# Patient Record
Sex: FEMALE | Race: WHITE | NOT HISPANIC OR LATINO | Employment: UNEMPLOYED | URBAN - METROPOLITAN AREA
[De-identification: names, ages, dates, MRNs, and addresses within clinical notes are randomized per-mention and may not be internally consistent; named-entity substitution may affect disease eponyms.]

---

## 2017-03-22 ENCOUNTER — OFFICE VISIT (OUTPATIENT)
Dept: INTERNAL MEDICINE | Facility: CLINIC | Age: 37
End: 2017-03-22
Payer: COMMERCIAL

## 2017-03-22 VITALS
BODY MASS INDEX: 28.23 KG/M2 | OXYGEN SATURATION: 99 % | HEART RATE: 89 BPM | TEMPERATURE: 98 F | SYSTOLIC BLOOD PRESSURE: 140 MMHG | DIASTOLIC BLOOD PRESSURE: 94 MMHG | RESPIRATION RATE: 18 BRPM | WEIGHT: 179.88 LBS | HEIGHT: 67 IN

## 2017-03-22 DIAGNOSIS — I10 ESSENTIAL HYPERTENSION: ICD-10-CM

## 2017-03-22 DIAGNOSIS — L03.011 INFECTED NAILBED OF FINGER, RIGHT: ICD-10-CM

## 2017-03-22 DIAGNOSIS — E10.9 TYPE 1 DIABETES MELLITUS WITHOUT COMPLICATION: Primary | ICD-10-CM

## 2017-03-22 DIAGNOSIS — Z13.29 SCREENING FOR HYPOTHYROIDISM: ICD-10-CM

## 2017-03-22 DIAGNOSIS — D50.9 IRON DEFICIENCY ANEMIA, UNSPECIFIED IRON DEFICIENCY ANEMIA TYPE: ICD-10-CM

## 2017-03-22 PROCEDURE — 99203 OFFICE O/P NEW LOW 30 MIN: CPT | Mod: S$GLB,,, | Performed by: NURSE PRACTITIONER

## 2017-03-22 PROCEDURE — 2022F DILAT RTA XM EVC RTNOPTHY: CPT | Mod: S$GLB,,, | Performed by: NURSE PRACTITIONER

## 2017-03-22 PROCEDURE — 3080F DIAST BP >= 90 MM HG: CPT | Mod: S$GLB,,, | Performed by: NURSE PRACTITIONER

## 2017-03-22 PROCEDURE — 4010F ACE/ARB THERAPY RXD/TAKEN: CPT | Mod: S$GLB,,, | Performed by: NURSE PRACTITIONER

## 2017-03-22 PROCEDURE — 3077F SYST BP >= 140 MM HG: CPT | Mod: S$GLB,,, | Performed by: NURSE PRACTITIONER

## 2017-03-22 PROCEDURE — 99999 PR PBB SHADOW E&M-EST. PATIENT-LVL III: CPT | Mod: PBBFAC,,, | Performed by: NURSE PRACTITIONER

## 2017-03-22 PROCEDURE — 3045F PR MOST RECENT HEMOGLOBIN A1C LEVEL 7.0-9.0%: CPT | Mod: S$GLB,,, | Performed by: NURSE PRACTITIONER

## 2017-03-22 PROCEDURE — 1160F RVW MEDS BY RX/DR IN RCRD: CPT | Mod: S$GLB,,, | Performed by: NURSE PRACTITIONER

## 2017-03-22 RX ORDER — LEVOFLOXACIN 500 MG/1
500 TABLET, FILM COATED ORAL DAILY
Qty: 7 TABLET | Refills: 0 | Status: SHIPPED | OUTPATIENT
Start: 2017-03-22 | End: 2017-10-05

## 2017-03-22 RX ORDER — LISINOPRIL 10 MG/1
10 TABLET ORAL DAILY
Qty: 30 TABLET | Refills: 1 | Status: SHIPPED | OUTPATIENT
Start: 2017-03-22 | End: 2017-05-17 | Stop reason: SDUPTHER

## 2017-03-22 NOTE — PROGRESS NOTES
Subjective:       Patient ID: Cira Keys is a 36 y.o. female.    Chief Complaint: Recurrent Skin Infections (4-5 months. index finger. right hand.)    HPI: Pt presents to clinic today new to me and clinic with c/o recurrent skin infection. She reports that she was seeing [facility] in [place] but would like to establish here. She reports that her right pointer finger has been infected Dec 30th. She reports that it stared as a splinter which she removed. She went to urgent care and they put her on bactrim and bactroban oint. . She also had some cipro left over from a cellulitis on face so she tried taking the cipro. Not getting better after 1 week.     She has a hx of ecoli bacteria bacteremia x 2 years ago  Also a type 1 diabetic. She reports that she has gone into DKA 4 times. She fgets her insulin OTC from [facility]. She takes MPH 20 in am and 15 at nioght. She uses coverage with R during the day but only 1- a week. She reports that she only usually has to cover during her cycle. Last A1C was 7.4 3 nights ago. She reports that she stopped taking her lisinopril because felt good      Review of Systems   Constitutional: Negative for chills, fatigue, fever and unexpected weight change.   HENT: Negative for congestion, ear pain, sore throat and trouble swallowing.    Eyes: Negative for pain and visual disturbance.   Respiratory: Negative for cough, chest tightness and shortness of breath.    Cardiovascular: Negative for chest pain, palpitations and leg swelling.   Gastrointestinal: Negative for abdominal distention, abdominal pain, constipation, diarrhea and vomiting.   Genitourinary: Negative for difficulty urinating, dysuria, flank pain, frequency and hematuria.   Musculoskeletal: Negative for back pain, gait problem, joint swelling, neck pain and neck stiffness.   Skin: Positive for wound. Negative for rash.        Right index finger infected   Neurological: Negative for dizziness, seizures, speech difficulty,  light-headedness and headaches.       Objective:      Physical Exam   Constitutional: She is oriented to person, place, and time. She appears well-developed and well-nourished.   HENT:   Head: Normocephalic and atraumatic.   Right Ear: External ear normal.   Left Ear: External ear normal.   Nose: Nose normal.   Mouth/Throat: Oropharynx is clear and moist.   Eyes: Conjunctivae and EOM are normal. Pupils are equal, round, and reactive to light.   Neck: Normal range of motion. Neck supple.   Cardiovascular: Normal rate, regular rhythm, normal heart sounds and intact distal pulses.    Pulmonary/Chest: Effort normal and breath sounds normal.   Abdominal: Soft. Bowel sounds are normal.   Musculoskeletal: Normal range of motion. She exhibits no deformity.        Arms:  Right finger nail bed lifting  Nail, infected cuticles no pulse   Neurological: She is alert and oriented to person, place, and time.   Skin: Skin is warm and dry.   Psychiatric: She has a normal mood and affect. Her behavior is normal. Judgment and thought content normal.   Nursing note and vitals reviewed.      Assessment:       1. Type 1 diabetes mellitus without complication    2. Essential hypertension    3. Infected nailbed of finger, right    4. Iron deficiency anemia, unspecified iron deficiency anemia type    5. Screening for hypothyroidism        Plan:   Cira was seen today for recurrent skin infections.    Diagnoses and all orders for this visit:    Type 1 diabetes mellitus without complication  -     CBC auto differential; Future  -     Comprehensive metabolic panel; Future  -     Lipid panel; Future  -     Hemoglobin A1c; Future    Essential hypertension  -     lisinopril 10 MG tablet; Take 1 tablet (10 mg total) by mouth once daily.    Infected nailbed of finger, right  -     levoFLOXacin (LEVAQUIN) 500 MG tablet; Take 1 tablet (500 mg total) by mouth once daily.    Iron deficiency anemia, unspecified iron deficiency anemia type  -      Ferritin; Future  -     Iron and TIBC; Future    Screening for hypothyroidism  -     TSH; Future  recheck finger 1 week and lab review

## 2017-03-22 NOTE — MR AVS SNAPSHOT
[facility]  [address] [facility] [address]  Phone: [phone]  Fax: [phone]                  Cira Keys   3/22/2017 1:45 PM   Office Visit    Description:  Female : 1980   Provider:  Radha Lino NP   Department:  [facility]           Reason for Visit     Recurrent Skin Infections           Diagnoses this Visit        Comments    Type 1 diabetes mellitus without complication    -  Primary     Essential hypertension         Infected nailbed of finger, right         Iron deficiency anemia, unspecified iron deficiency anemia type         Screening for hypothyroidism                To Do List           Future Appointments        Provider Department Dept Phone    3/24/2017 9:40 AM LAB, [facility] [phone]    3/30/2017 8:30 AM Radha Lino NP [facility] [phone]      Goals (5 Years of Data)     None       These Medications        Disp Refills Start End    lisinopril 10 MG tablet 30 tablet 1 3/22/2017 3/22/2018    Take 1 tablet (10 mg total) by mouth once daily. - Oral    Pharmacy: [facility] [address] [place] [address] [id number] Ph #: [phone]       levoFLOXacin (LEVAQUIN) 500 MG tablet 7 tablet 0 3/22/2017     Take 1 tablet (500 mg total) by mouth once daily. - Oral    Pharmacy: [facility] [address] [place] [address] [id number] Ph #: [phone]         [facility]     [facility] -  Assistance  Registered nurses in the [facility] provide clinical advisement, health education, appointment booking, and other advisory services.  Call for this free service at [phone].             Medications           Message regarding Medications     Verify the changes and/or additions to your medication regime listed below are the same as discussed with your clinician today.  If any of these changes or additions are incorrect, please notify  "your healthcare provider.        START taking these NEW medications        Refills    levoFLOXacin (LEVAQUIN) 500 MG tablet 0    Sig: Take 1 tablet (500 mg total) by mouth once daily.    Class: Normal    Route: Oral           Verify that the below list of medications is an accurate representation of the medications you are currently taking.  If none reported, the list may be blank. If incorrect, please contact your healthcare provider. Carry this list with you in case of emergency.           Current Medications     aspirin (ECOTRIN) 81 MG EC tablet Take 81 mg by mouth once daily.    b complex vitamins tablet Take 1 tablet by mouth once daily.    ferrous sulfate 325 (65 FE) MG EC tablet Take 1 tablet (325 mg total) by mouth once daily.    insulin aspart (NOVOLOG) 100 unit/mL InPn pen Inject 6 Units into the skin 3 (three) times daily with meals.    insulin needles, disposable, 32 x 1/4 " Ndle Use to inject insulin 5 times a day    insulin NPH (NOVOLIN N) 100 unit/mL injection Inject 15-20 Units into the skin as directed. Take 20 units qam and 15 units qpm    lisinopril 10 MG tablet Take 1 tablet (10 mg total) by mouth once daily.    pantoprazole (PROTONIX) 20 MG tablet Take 20 mg by mouth once daily.     levoFLOXacin (LEVAQUIN) 500 MG tablet Take 1 tablet (500 mg total) by mouth once daily.           Clinical Reference Information           Your Vitals Were     BP Pulse Temp Resp Height Weight    140/94 89 97.7 °F (36.5 °C) (Tympanic) 18 5' 7" (1.702 m) 81.6 kg (179 lb 14.3 oz)    SpO2 BMI             99% 28.18 kg/m2         Blood Pressure          Most Recent Value    BP  (!)  140/94      Allergies as of 3/22/2017     No Known Allergies      Immunizations Administered on Date of Encounter - 3/22/2017     None      Orders Placed During Today's Visit     Future Labs/Procedures Expected by Expires    CBC auto differential  3/22/2017 5/21/2018    Comprehensive metabolic panel  3/22/2017 5/21/2018    Ferritin  3/22/2017 " (Approximate) 3/22/2018    Iron and TIBC  3/22/2017 (Approximate) 3/22/2018    Lipid panel  3/22/2017 5/21/2018    TSH  3/22/2017 5/21/2018    Hemoglobin A1c  6/20/2017 (Approximate) 3/22/2018      [facility] Sign-Up     Activating your [facility] account is as easy as 1-2-3!     1) Visit [email or web address], select Sign Up Now, enter this activation code and your date of birth, then select Next.  [email or web address]  Expires: 5/6/2017  2:22 PM      2) Create a username and password to use when you visit [facility] in the future and select a security question in case you lose your password and select Next.    3) Enter your e-mail address and click Sign Up!    Additional Information  If you have questions, please e-mail [email or web address] or call [phone] to talk to our [facility] staff. Remember, [facility] is NOT to be used for urgent needs. For medical emergencies, dial 911.         Language Assistance Services     ATTENTION: Language assistance services are available, free of charge. Please call [phone].      ATENCIÓN: Si habla jaskaran, tiene a bhatti disposición servicios gratuitos de asistencia lingüística. Llame al [phone].     CHÚ Ý: N?u b?n nói Ti?ng Vi?t, có các d?ch v? h? tr? ngôn ng? mi?n phí dành cho b?n. G?i s? [phone].         [facility] complies with applicable Federal civil rights laws and does not discriminate on the basis of race, color, national origin, age, disability, or sex.

## 2017-05-17 DIAGNOSIS — I10 ESSENTIAL HYPERTENSION: ICD-10-CM

## 2017-05-17 RX ORDER — LISINOPRIL 10 MG/1
TABLET ORAL
Qty: 30 TABLET | Refills: 0 | Status: SHIPPED | OUTPATIENT
Start: 2017-05-17 | End: 2017-10-05 | Stop reason: SDUPTHER

## 2017-05-18 DIAGNOSIS — I10 ESSENTIAL HYPERTENSION: ICD-10-CM

## 2017-05-18 RX ORDER — LISINOPRIL 10 MG/1
TABLET ORAL
Qty: 30 TABLET | Refills: 0 | Status: SHIPPED | OUTPATIENT
Start: 2017-05-18 | End: 2017-07-20 | Stop reason: SDUPTHER

## 2017-07-20 DIAGNOSIS — I10 ESSENTIAL HYPERTENSION: ICD-10-CM

## 2017-07-20 RX ORDER — LISINOPRIL 10 MG/1
TABLET ORAL
Qty: 30 TABLET | Refills: 0 | Status: SHIPPED | OUTPATIENT
Start: 2017-07-20 | End: 2017-08-15 | Stop reason: SDUPTHER

## 2017-08-15 DIAGNOSIS — I10 ESSENTIAL HYPERTENSION: ICD-10-CM

## 2017-08-16 DIAGNOSIS — I10 ESSENTIAL HYPERTENSION: ICD-10-CM

## 2017-08-17 RX ORDER — LISINOPRIL 10 MG/1
10 TABLET ORAL DAILY
Qty: 30 TABLET | Refills: 0 | OUTPATIENT
Start: 2017-08-17

## 2017-08-17 RX ORDER — LISINOPRIL 10 MG/1
TABLET ORAL
Qty: 30 TABLET | Refills: 0 | Status: SHIPPED | OUTPATIENT
Start: 2017-08-17 | End: 2017-09-18 | Stop reason: SDUPTHER

## 2017-09-18 DIAGNOSIS — I10 ESSENTIAL HYPERTENSION: ICD-10-CM

## 2017-09-19 RX ORDER — LISINOPRIL 10 MG/1
TABLET ORAL
Qty: 30 TABLET | Refills: 0 | Status: SHIPPED | OUTPATIENT
Start: 2017-09-19 | End: 2017-10-05 | Stop reason: SDUPTHER

## 2017-09-20 DIAGNOSIS — I10 ESSENTIAL HYPERTENSION: ICD-10-CM

## 2017-09-20 RX ORDER — LISINOPRIL 10 MG/1
TABLET ORAL
Qty: 30 TABLET | Refills: 0 | Status: SHIPPED | OUTPATIENT
Start: 2017-09-20 | End: 2017-10-05 | Stop reason: SDUPTHER

## 2017-10-05 ENCOUNTER — HOSPITAL ENCOUNTER (OUTPATIENT)
Dept: RADIOLOGY | Facility: HOSPITAL | Age: 37
Discharge: HOME | End: 2017-10-05
Attending: NURSE PRACTITIONER
Payer: COMMERCIAL

## 2017-10-05 ENCOUNTER — OFFICE VISIT (OUTPATIENT)
Dept: INTERNAL MEDICINE | Facility: CLINIC | Age: 37
End: 2017-10-05
Payer: COMMERCIAL

## 2017-10-05 VITALS
SYSTOLIC BLOOD PRESSURE: 116 MMHG | BODY MASS INDEX: 26.26 KG/M2 | DIASTOLIC BLOOD PRESSURE: 64 MMHG | HEART RATE: 84 BPM | WEIGHT: 167.31 LBS | HEIGHT: 67 IN | RESPIRATION RATE: 18 BRPM

## 2017-10-05 DIAGNOSIS — I10 ESSENTIAL HYPERTENSION: ICD-10-CM

## 2017-10-05 DIAGNOSIS — L08.9: ICD-10-CM

## 2017-10-05 DIAGNOSIS — L08.9: Primary | ICD-10-CM

## 2017-10-05 DIAGNOSIS — S60.419D: Primary | ICD-10-CM

## 2017-10-05 DIAGNOSIS — S60.419D: ICD-10-CM

## 2017-10-05 PROCEDURE — 73130 X-RAY EXAM OF HAND: CPT | Mod: 26,RT,, | Performed by: RADIOLOGY

## 2017-10-05 PROCEDURE — 99214 OFFICE O/P EST MOD 30 MIN: CPT | Mod: S$GLB,,, | Performed by: NURSE PRACTITIONER

## 2017-10-05 PROCEDURE — 99999 PR PBB SHADOW E&M-EST. PATIENT-LVL III: CPT | Mod: PBBFAC,,, | Performed by: NURSE PRACTITIONER

## 2017-10-05 PROCEDURE — 73130 X-RAY EXAM OF HAND: CPT | Mod: TC,RT

## 2017-10-05 RX ORDER — LEVOFLOXACIN 500 MG/1
500 TABLET, FILM COATED ORAL DAILY
Qty: 10 TABLET | Refills: 0 | Status: SHIPPED | OUTPATIENT
Start: 2017-10-05 | End: 2017-10-16

## 2017-10-05 RX ORDER — LISINOPRIL 10 MG/1
10 TABLET ORAL DAILY
Qty: 30 TABLET | Refills: 0 | Status: SHIPPED | OUTPATIENT
Start: 2017-10-05 | End: 2017-12-20 | Stop reason: SDUPTHER

## 2017-10-05 RX ORDER — MUPIROCIN 20 MG/G
OINTMENT TOPICAL 3 TIMES DAILY
Qty: 30 G | Refills: 1 | Status: SHIPPED | OUTPATIENT
Start: 2017-10-05 | End: 2018-12-03 | Stop reason: ALTCHOICE

## 2017-10-05 NOTE — PROGRESS NOTES
Subjective:       Patient ID: Cira Keys is a 37 y.o. female.    Chief Complaint: Hand Pain (infected right index finger)    HPI: Pt presents to clinic today known to me with lost to f/u. She reports that she has never cleared up since Dec when she first saw us. She was suppose to come back to be referred and further w/u but she admits that she did not want to. She reports that it never got better but has only gotten worse. She reports that it is painful and draining. No fever. Has been checking her bs lately and reports that it is running well except around her period. She reports that her am bs . During the day she can get up to upper 100, 231 after chinese but that is not often.    Review of Systems   Constitutional: Negative for chills, fatigue, fever and unexpected weight change.   HENT: Negative for congestion, ear pain, sore throat and trouble swallowing.    Eyes: Negative for pain and visual disturbance.   Respiratory: Negative for cough, chest tightness and shortness of breath.    Cardiovascular: Negative for chest pain, palpitations and leg swelling.   Gastrointestinal: Negative for abdominal distention, abdominal pain, constipation, diarrhea and vomiting.   Genitourinary: Negative for difficulty urinating, dysuria, flank pain, frequency and hematuria.   Musculoskeletal: Negative for back pain, gait problem, joint swelling, neck pain and neck stiffness.   Skin: Positive for wound (right index finger). Negative for rash.   Neurological: Negative for dizziness, seizures, speech difficulty, light-headedness and headaches.       Objective:      Physical Exam   Constitutional: She is oriented to person, place, and time. She appears well-developed and well-nourished.   HENT:   Head: Normocephalic and atraumatic.   Right Ear: External ear normal.   Left Ear: External ear normal.   Nose: Nose normal.   Mouth/Throat: Oropharynx is clear and moist.   Eyes: Conjunctivae and EOM are normal. Pupils are equal,  round, and reactive to light.   Neck: Normal range of motion. Neck supple.   Cardiovascular: Normal rate, regular rhythm, normal heart sounds and intact distal pulses.    Pulmonary/Chest: Effort normal and breath sounds normal.   Abdominal: Soft. Bowel sounds are normal.   Musculoskeletal: Normal range of motion.   Neurological: She is alert and oriented to person, place, and time.   Skin: Skin is warm and dry. Capillary refill takes less than 2 seconds.   Right index finger pad completely red/swollen and tender. She has dried yellow crust to tip under nail bed.    Psychiatric: She has a normal mood and affect. Her behavior is normal. Judgment and thought content normal.   Nursing note and vitals reviewed.      Assessment:       1. Infected abrasion of finger of right hand, subsequent encounter    2. Essential hypertension        Plan:   Cira was seen today for hand pain.    Diagnoses and all orders for this visit:    Infected abrasion of finger of right hand, subsequent encounter  -     X-Ray Hand 3 view Right; Future  -     levoFLOXacin (LEVAQUIN) 500 MG tablet; Take 1 tablet (500 mg total) by mouth once daily.  -     mupirocin (BACTROBAN) 2 % ointment; Apply topically 3 (three) times daily.    Essential hypertension  -     lisinopril 10 MG tablet; Take 1 tablet (10 mg total) by mouth once daily.

## 2017-10-06 ENCOUNTER — PATIENT MESSAGE (OUTPATIENT)
Dept: INTERNAL MEDICINE | Facility: CLINIC | Age: 37
End: 2017-10-06

## 2017-10-10 ENCOUNTER — LAB VISIT (OUTPATIENT)
Dept: LAB | Facility: HOSPITAL | Age: 37
End: 2017-10-10
Attending: NURSE PRACTITIONER
Payer: COMMERCIAL

## 2017-10-10 DIAGNOSIS — E10.9 TYPE 1 DIABETES MELLITUS WITHOUT COMPLICATION: ICD-10-CM

## 2017-10-10 DIAGNOSIS — D50.9 IRON DEFICIENCY ANEMIA, UNSPECIFIED IRON DEFICIENCY ANEMIA TYPE: ICD-10-CM

## 2017-10-10 DIAGNOSIS — Z13.29 SCREENING FOR HYPOTHYROIDISM: ICD-10-CM

## 2017-10-10 LAB
ALBUMIN SERPL BCP-MCNC: 3.3 G/DL
ALP SERPL-CCNC: 131 U/L
ALT SERPL W/O P-5'-P-CCNC: 33 U/L
ANION GAP SERPL CALC-SCNC: 10 MMOL/L
AST SERPL-CCNC: 102 U/L
BASOPHILS # BLD AUTO: 0.06 K/UL
BASOPHILS NFR BLD: 1.4 %
BILIRUB SERPL-MCNC: 1 MG/DL
BUN SERPL-MCNC: 6 MG/DL
CALCIUM SERPL-MCNC: 9.2 MG/DL
CHLORIDE SERPL-SCNC: 102 MMOL/L
CHOLEST SERPL-MCNC: 234 MG/DL
CHOLEST/HDLC SERPL: 3.4 {RATIO}
CO2 SERPL-SCNC: 26 MMOL/L
CREAT SERPL-MCNC: 0.7 MG/DL
DIFFERENTIAL METHOD: ABNORMAL
EOSINOPHIL # BLD AUTO: 0 K/UL
EOSINOPHIL NFR BLD: 0.9 %
ERYTHROCYTE [DISTWIDTH] IN BLOOD BY AUTOMATED COUNT: 11.9 %
EST. GFR  (AFRICAN AMERICAN): >60 ML/MIN/1.73 M^2
EST. GFR  (NON AFRICAN AMERICAN): >60 ML/MIN/1.73 M^2
ESTIMATED AVG GLUCOSE: 177 MG/DL
FERRITIN SERPL-MCNC: 391 NG/ML
GLUCOSE SERPL-MCNC: 338 MG/DL
HBA1C MFR BLD HPLC: 7.8 %
HCT VFR BLD AUTO: 39.1 %
HDLC SERPL-MCNC: 68 MG/DL
HDLC SERPL: 29.1 %
HGB BLD-MCNC: 13.3 G/DL
IRON SERPL-MCNC: 87 UG/DL
LDLC SERPL CALC-MCNC: 142.6 MG/DL
LYMPHOCYTES # BLD AUTO: 1.5 K/UL
LYMPHOCYTES NFR BLD: 33.7 %
MCH RBC QN AUTO: 34.3 PG
MCHC RBC AUTO-ENTMCNC: 34 G/DL
MCV RBC AUTO: 101 FL
MONOCYTES # BLD AUTO: 0.4 K/UL
MONOCYTES NFR BLD: 10.1 %
NEUTROPHILS # BLD AUTO: 2.4 K/UL
NEUTROPHILS NFR BLD: 53.9 %
NONHDLC SERPL-MCNC: 166 MG/DL
PLATELET # BLD AUTO: 229 K/UL
PMV BLD AUTO: 9.9 FL
POTASSIUM SERPL-SCNC: 4.2 MMOL/L
PROT SERPL-MCNC: 7.5 G/DL
RBC # BLD AUTO: 3.88 M/UL
SATURATED IRON: 35 %
SODIUM SERPL-SCNC: 138 MMOL/L
TOTAL IRON BINDING CAPACITY: 246 UG/DL
TRANSFERRIN SERPL-MCNC: 166 MG/DL
TRIGL SERPL-MCNC: 117 MG/DL
TSH SERPL DL<=0.005 MIU/L-ACNC: 2.73 UIU/ML
WBC # BLD AUTO: 4.36 K/UL

## 2017-10-10 PROCEDURE — 36415 COLL VENOUS BLD VENIPUNCTURE: CPT

## 2017-10-10 PROCEDURE — 80061 LIPID PANEL: CPT

## 2017-10-10 PROCEDURE — 82728 ASSAY OF FERRITIN: CPT

## 2017-10-10 PROCEDURE — 83540 ASSAY OF IRON: CPT

## 2017-10-10 PROCEDURE — 85025 COMPLETE CBC W/AUTO DIFF WBC: CPT

## 2017-10-10 PROCEDURE — 84443 ASSAY THYROID STIM HORMONE: CPT

## 2017-10-10 PROCEDURE — 83036 HEMOGLOBIN GLYCOSYLATED A1C: CPT

## 2017-10-10 PROCEDURE — 80053 COMPREHEN METABOLIC PANEL: CPT

## 2017-10-16 ENCOUNTER — OFFICE VISIT (OUTPATIENT)
Dept: INTERNAL MEDICINE | Facility: CLINIC | Age: 37
End: 2017-10-16
Payer: COMMERCIAL

## 2017-10-16 VITALS
HEIGHT: 67 IN | SYSTOLIC BLOOD PRESSURE: 122 MMHG | TEMPERATURE: 96 F | BODY MASS INDEX: 26.4 KG/M2 | RESPIRATION RATE: 15 BRPM | WEIGHT: 168.19 LBS | DIASTOLIC BLOOD PRESSURE: 82 MMHG | HEART RATE: 71 BPM

## 2017-10-16 DIAGNOSIS — Z78.9 ALCOHOL USE: ICD-10-CM

## 2017-10-16 DIAGNOSIS — E78.5 ELEVATED LIPIDS: ICD-10-CM

## 2017-10-16 DIAGNOSIS — R71.8 ELEVATED MCV: ICD-10-CM

## 2017-10-16 DIAGNOSIS — R74.01 ELEVATED AST (SGOT): ICD-10-CM

## 2017-10-16 DIAGNOSIS — E10.9 TYPE 1 DIABETES MELLITUS WITHOUT COMPLICATION: ICD-10-CM

## 2017-10-16 DIAGNOSIS — S41.101D NON-HEALING WOUND OF UPPER EXTREMITY, RIGHT, SUBSEQUENT ENCOUNTER: Primary | ICD-10-CM

## 2017-10-16 PROCEDURE — 99999 PR PBB SHADOW E&M-EST. PATIENT-LVL III: CPT | Mod: PBBFAC,,, | Performed by: NURSE PRACTITIONER

## 2017-10-16 PROCEDURE — 99214 OFFICE O/P EST MOD 30 MIN: CPT | Mod: S$GLB,,, | Performed by: NURSE PRACTITIONER

## 2017-10-16 RX ORDER — ERYTHROMYCIN 5 MG/G
OINTMENT OPHTHALMIC
COMMUNITY
Start: 2017-09-26 | End: 2017-10-16

## 2017-10-16 NOTE — PROGRESS NOTES
Subjective:       Patient ID: Cira Keys is a 37 y.o. female.    Chief Complaint: Follow-up (10 day follow up)    HPI: Pt presents to clinic today known to me with c/o her finger is still infected. She reports that it had looked like it was healing but 2 days ago it started with a puss pocket and scabbed over again. She took her last levaquin last night. She also had an x ray which did not show bone involvement. She is a diabetic but not bad. Unsure why she is having trouble healing this. No fever.     Lab Results   Component Value Date    WBC 4.36 10/10/2017    HGB 13.3 10/10/2017    HCT 39.1 10/10/2017     (H) 10/10/2017     10/10/2017     BMP  Lab Results   Component Value Date     10/10/2017    K 4.2 10/10/2017     10/10/2017    CO2 26 10/10/2017    BUN 6 10/10/2017    CREATININE 0.7 10/10/2017    CALCIUM 9.2 10/10/2017    ANIONGAP 10 10/10/2017    ESTGFRAFRICA >60 10/10/2017    EGFRNONAA >60 10/10/2017     Lab Results   Component Value Date    ALT 33 10/10/2017     (H) 10/10/2017    ALKPHOS 131 10/10/2017    BILITOT 1.0 10/10/2017     She does drink 3 liquor drinks a night. She reports that this is a long standing thing for her.     She also reports that she is on her cycle now. She has elevated bs during her cycles.     Lab Results   Component Value Date    HGBA1C 7.8 (H) 10/10/2017     Lab Results   Component Value Date    FERRITIN 391 (H) 10/10/2017     Lab Results   Component Value Date    CHOL 234 (H) 10/10/2017     Lab Results   Component Value Date    HDL 68 10/10/2017     Lab Results   Component Value Date    LDLCALC 142.6 10/10/2017     Lab Results   Component Value Date    TRIG 117 10/10/2017     Lab Results   Component Value Date    CHOLHDL 29.1 10/10/2017     She takes novolog N 6 BID. She also has novolog R and she uses 6 units only as needed. She has had DM since 8yo. She also reports that she has low blood sugars. About 1-2 times a week. She reports that she  usually has this in the middle of the evening. She is happy ion the NPH because she can afford it.   Review of Systems   Constitutional: Negative for chills, fatigue, fever and unexpected weight change.   HENT: Negative for congestion, ear pain, sore throat and trouble swallowing.    Eyes: Negative for pain and visual disturbance.   Respiratory: Negative for cough, chest tightness and shortness of breath.    Cardiovascular: Negative for chest pain, palpitations and leg swelling.   Gastrointestinal: Negative for abdominal distention, abdominal pain, constipation, diarrhea and vomiting.   Genitourinary: Negative for difficulty urinating, dysuria, flank pain, frequency and hematuria.   Musculoskeletal: Negative for back pain, gait problem, joint swelling, neck pain and neck stiffness.   Skin: Negative for rash and wound.        Right index finger infected   Neurological: Negative for dizziness, seizures, speech difficulty, light-headedness and headaches.       Objective:      Physical Exam   Constitutional: She is oriented to person, place, and time. She appears well-developed and well-nourished.   HENT:   Head: Normocephalic and atraumatic.   Right Ear: External ear normal.   Left Ear: External ear normal.   Nose: Nose normal.   Mouth/Throat: Oropharynx is clear and moist.   Eyes: Conjunctivae and EOM are normal. Pupils are equal, round, and reactive to light.   Neck: Normal range of motion. Neck supple.   Cardiovascular: Normal rate, regular rhythm, normal heart sounds and intact distal pulses.    Pulmonary/Chest: Effort normal and breath sounds normal. No respiratory distress. She has no wheezes.   Abdominal: Soft. Bowel sounds are normal. She exhibits no distension and no mass. There is no tenderness. There is no guarding.   Musculoskeletal: Normal range of motion.   Neurological: She is alert and oriented to person, place, and time.   Skin: Skin is warm and dry.   Right index finger with scabbing just under nail  bed   Psychiatric: She has a normal mood and affect. Her behavior is normal. Judgment and thought content normal.   Nursing note and vitals reviewed.      Assessment:       1. Non-healing wound of upper extremity, right, subsequent encounter    2. Type 1 diabetes mellitus without complication    3. Elevated MCV    4. Alcohol use    5. Elevated AST (SGOT)    6. Elevated lipids        Plan:   Cira was seen today for follow-up.    Diagnoses and all orders for this visit:    Non-healing wound of upper extremity, right, subsequent encounter  -     Ambulatory Referral to Wound Clinic    Type 1 diabetes mellitus without complication    Elevated MCV  -     Vitamin B12; Future  -     Folate; Future  -     CBC auto differential; Future    Alcohol use  -     Vitamin B12; Future  -     Folate; Future  -     Comprehensive metabolic panel; Future    Elevated AST (SGOT)  -     Comprehensive metabolic panel; Future    Elevated lipids  -     Lipid panel; Future    Other orders  -     insulin NPH (NOVOLIN N) 100 unit/mL injection; Use 10 units in am and 5 units at night    Discussed low cholesterol diet. I am leary about starting Statin with elevated S+AST. She is a DM, HTN p[atient and would benifit from statin treatment but with her alcohol use causing elevated AST I would like to try life style modifications first. She was given hand outs on this. She also was encouraged to reduce alcohol to 1 drink a day. Increasing insulin during the day for tighter control and reducing at noght to reduce her episode of hypoglycemia.

## 2017-10-18 ENCOUNTER — PATIENT MESSAGE (OUTPATIENT)
Dept: INTERNAL MEDICINE | Facility: CLINIC | Age: 37
End: 2017-10-18

## 2017-10-23 ENCOUNTER — OFFICE VISIT (OUTPATIENT)
Dept: WOUND CARE | Facility: HOSPITAL | Age: 37
End: 2017-10-23
Attending: SURGERY
Payer: COMMERCIAL

## 2017-10-23 VITALS — DIASTOLIC BLOOD PRESSURE: 81 MMHG | RESPIRATION RATE: 20 BRPM | SYSTOLIC BLOOD PRESSURE: 134 MMHG | HEART RATE: 75 BPM

## 2017-10-23 DIAGNOSIS — L08.9 INFECTION OF INDEX FINGER: ICD-10-CM

## 2017-10-23 DIAGNOSIS — E10.9 TYPE 1 DIABETES MELLITUS WITHOUT COMPLICATION: Primary | ICD-10-CM

## 2017-10-23 PROCEDURE — 99212 OFFICE O/P EST SF 10 MIN: CPT
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
## 2017-12-20 DIAGNOSIS — I10 ESSENTIAL HYPERTENSION: ICD-10-CM

## 2017-12-21 RX ORDER — LISINOPRIL 10 MG/1
TABLET ORAL
Qty: 30 TABLET | Refills: 0 | Status: SHIPPED | OUTPATIENT
Start: 2017-12-21 | End: 2018-01-18 | Stop reason: SDUPTHER

## 2018-01-05 ENCOUNTER — TELEPHONE (OUTPATIENT)
Dept: INTERNAL MEDICINE | Facility: CLINIC | Age: 38
End: 2018-01-05

## 2018-01-05 RX ORDER — OSELTAMIVIR PHOSPHATE 75 MG/1
75 CAPSULE ORAL 2 TIMES DAILY
Qty: 10 CAPSULE | Refills: 0 | Status: SHIPPED | OUTPATIENT
Start: 2018-01-05 | End: 2018-01-15

## 2018-01-05 NOTE — TELEPHONE ENCOUNTER
----- Message from Bozena Redmond sent at 2018 10:20 AM CST -----  Contact: Pt  Cira Keys  MRN: [id number]  : 1980  PCP: Radha Lino  Home Phone      [phone]  Work Phone      Not on file.  Mobile          [phone]  Home Phone      [phone]      MESSAGE:  Pt's  Randolph saw Dr. Mercado today and was told he as the flu. Pt has the same symptoms as her  and wants to know if there is anything we can do for her before she comes in for her appt Monday. Please advise.  PHONE:  [phone]

## 2018-01-11 ENCOUNTER — TELEPHONE (OUTPATIENT)
Dept: INTERNAL MEDICINE | Facility: CLINIC | Age: 38
End: 2018-01-11

## 2018-01-11 DIAGNOSIS — E10.9 TYPE 1 DIABETES MELLITUS WITHOUT COMPLICATION: ICD-10-CM

## 2018-01-11 DIAGNOSIS — E11.9 TYPE 2 DIABETES MELLITUS WITHOUT COMPLICATION, WITHOUT LONG-TERM CURRENT USE OF INSULIN: Primary | ICD-10-CM

## 2018-01-11 NOTE — TELEPHONE ENCOUNTER
Spoke to Dr. Curry office and pt had a Liver function panel drawn recently but no other labs. Requested LF test results to be faxed to clinic and contacted patient to recommend her complete labs tomorrow as scheduled.   Pt verbalized understanding and thanked me for calling.

## 2018-01-11 NOTE — TELEPHONE ENCOUNTER
----- Message from Bozena Redmond sent at 2018 10:42 AM CST -----  Contact: pt  Cira Keys  MRN: [id number]  : 1980  PCP: Radha Lino  Home Phone      [phone]  Work Phone      Not on file.  Mobile          [phone]  Home Phone      [phone]      MESSAGE:  Pt had bloodwork done at Dr. Curry's office in [place] a couple months ago and pt wants to know if we can request her lab results from them so she doesn't have to go do labs tomorrow because she can't afford another bill. Please advise.  PHONE:  [phone]

## 2018-01-11 NOTE — TELEPHONE ENCOUNTER
Lab results have been received and reviewed per you.  You noted that LFT's are elevated and instructed that we check with patient to make sure that someone is addressing this. I spoke to the patient and she states that Dr. Finley has advised her that her LFT's have improved from 10/10/17 when you last checked it so he has not ordered any follow-up testing. She wanted me to check with you regarding tomorrow's labs. She states that she is struggling financially right now and cannot afford any additional labs. Please review her chart and advise on what labs are absolutely necessary. Thanks.

## 2018-01-18 DIAGNOSIS — I10 ESSENTIAL HYPERTENSION: ICD-10-CM

## 2018-01-18 RX ORDER — LISINOPRIL 10 MG/1
TABLET ORAL
Qty: 30 TABLET | Refills: 0 | Status: SHIPPED | OUTPATIENT
Start: 2018-01-18 | End: 2018-02-18 | Stop reason: SDUPTHER

## 2018-02-17 DIAGNOSIS — I10 ESSENTIAL HYPERTENSION: ICD-10-CM

## 2018-02-18 RX ORDER — LISINOPRIL 10 MG/1
TABLET ORAL
Qty: 30 TABLET | Refills: 0 | Status: SHIPPED | OUTPATIENT
Start: 2018-02-18 | End: 2020-02-11

## 2018-02-19 DIAGNOSIS — I10 ESSENTIAL HYPERTENSION: ICD-10-CM

## 2018-02-19 RX ORDER — LISINOPRIL 10 MG/1
TABLET ORAL
Qty: 30 TABLET | Refills: 0 | Status: SHIPPED | OUTPATIENT
Start: 2018-02-19 | End: 2018-04-26 | Stop reason: SDUPTHER

## 2018-04-26 DIAGNOSIS — I10 ESSENTIAL HYPERTENSION: ICD-10-CM

## 2018-04-26 RX ORDER — LISINOPRIL 10 MG/1
TABLET ORAL
Qty: 30 TABLET | Refills: 0 | Status: SHIPPED | OUTPATIENT
Start: 2018-04-26 | End: 2018-12-03 | Stop reason: SDUPTHER

## 2018-12-03 ENCOUNTER — OFFICE VISIT (OUTPATIENT)
Dept: OBSTETRICS AND GYNECOLOGY | Facility: CLINIC | Age: 38
End: 2018-12-03
Payer: COMMERCIAL

## 2018-12-03 ENCOUNTER — TELEPHONE (OUTPATIENT)
Dept: OBSTETRICS AND GYNECOLOGY | Facility: CLINIC | Age: 38
End: 2018-12-03

## 2018-12-03 VITALS
HEIGHT: 67 IN | DIASTOLIC BLOOD PRESSURE: 82 MMHG | HEART RATE: 80 BPM | WEIGHT: 179 LBS | BODY MASS INDEX: 28.09 KG/M2 | RESPIRATION RATE: 18 BRPM | SYSTOLIC BLOOD PRESSURE: 124 MMHG

## 2018-12-03 DIAGNOSIS — Z01.411 ENCOUNTER FOR GYNECOLOGICAL EXAMINATION WITH ABNORMAL FINDING: ICD-10-CM

## 2018-12-03 DIAGNOSIS — N92.1 MENORRHAGIA WITH IRREGULAR CYCLE: Primary | ICD-10-CM

## 2018-12-03 DIAGNOSIS — E10.9 TYPE 1 DIABETES MELLITUS WITHOUT COMPLICATION: ICD-10-CM

## 2018-12-03 PROCEDURE — 99999 PR PBB SHADOW E&M-EST. PATIENT-LVL III: CPT | Mod: PBBFAC,,, | Performed by: OBSTETRICS & GYNECOLOGY

## 2018-12-03 PROCEDURE — 3079F DIAST BP 80-89 MM HG: CPT | Mod: CPTII,S$GLB,, | Performed by: OBSTETRICS & GYNECOLOGY

## 2018-12-03 PROCEDURE — 3074F SYST BP LT 130 MM HG: CPT | Mod: CPTII,S$GLB,, | Performed by: OBSTETRICS & GYNECOLOGY

## 2018-12-03 PROCEDURE — 88175 CYTOPATH C/V AUTO FLUID REDO: CPT

## 2018-12-03 PROCEDURE — 99385 PREV VISIT NEW AGE 18-39: CPT | Mod: S$GLB,,, | Performed by: OBSTETRICS & GYNECOLOGY

## 2018-12-03 RX ORDER — NORGESTIMATE AND ETHINYL ESTRADIOL 0.25-0.035
1 KIT ORAL DAILY
Qty: 28 TABLET | Refills: 12 | Status: SHIPPED | OUTPATIENT
Start: 2018-12-03 | End: 2019-12-31

## 2018-12-03 RX ORDER — LORATADINE 10 MG/1
10 TABLET ORAL DAILY PRN
COMMUNITY
End: 2023-12-27

## 2018-12-03 NOTE — PROGRESS NOTES
Subjective:       Patient ID: Cira Keys is a 38 y.o. female.    Chief Complaint:  Well Woman and Menstrual Problem (spotting irregular)      History of Present Illness   the patient presents for her annual exam.  Patient reports that her menstrual cycles are very irregular.  Patient states she will go many months without a cycle and then will bleed heavy for up to 2 weeks.  Patient is a type 1 diabetic and states that her sugars are mostly under control with the exception when her menstrual cycle begins.  She is otherwise without gyn complaints.    Menstrual History:  OB History      Para Term  AB Living    2 2 2     1    SAB TAB Ectopic Multiple Live Births                      Menarche age:   Patient's last menstrual period was 2018 (approximate).         Review of Systems  Review of Systems   Constitutional: Negative for activity change, appetite change, chills, diaphoresis, fatigue, fever and unexpected weight change.   HENT: Negative for congestion, dental problem, drooling, ear discharge, ear pain, facial swelling, hearing loss, mouth sores, nosebleeds, postnasal drip, rhinorrhea, sinus pressure, sneezing, sore throat, tinnitus, trouble swallowing and voice change.    Eyes: Negative for photophobia, pain, discharge, redness, itching and visual disturbance.   Respiratory: Negative for apnea, cough, choking, chest tightness, shortness of breath, wheezing and stridor.    Cardiovascular: Negative for chest pain, palpitations and leg swelling.   Gastrointestinal: Negative for abdominal distention, abdominal pain, anal bleeding, blood in stool, constipation, diarrhea, nausea, rectal pain and vomiting.   Endocrine: Negative for cold intolerance, heat intolerance, polydipsia, polyphagia and polyuria.   Genitourinary: Positive for vaginal bleeding. Negative for decreased urine volume, difficulty urinating, dyspareunia, dysuria, enuresis, flank pain, frequency, genital sores, hematuria,  menstrual problem, pelvic pain, urgency, vaginal discharge and vaginal pain.   Musculoskeletal: Negative for arthralgias, back pain, gait problem, joint swelling, myalgias, neck pain and neck stiffness.   Skin: Negative for color change, pallor, rash and wound.   Allergic/Immunologic: Negative for environmental allergies, food allergies and immunocompromised state.   Neurological: Negative for dizziness, tremors, seizures, syncope, facial asymmetry, speech difficulty, weakness, light-headedness, numbness and headaches.   Hematological: Negative for adenopathy. Does not bruise/bleed easily.   Psychiatric/Behavioral: Negative for agitation, behavioral problems, confusion, decreased concentration, dysphoric mood, hallucinations, self-injury, sleep disturbance and suicidal ideas. The patient is not nervous/anxious and is not hyperactive.            Objective:      Physical Exam   Constitutional: She is oriented to person, place, and time. She appears well-developed and well-nourished.   Neck: No thyromegaly present.   Cardiovascular: Normal rate and regular rhythm.   Pulmonary/Chest: Effort normal and breath sounds normal. Right breast exhibits no inverted nipple, no mass, no nipple discharge, no skin change and no tenderness. Left breast exhibits no inverted nipple, no mass, no nipple discharge, no skin change and no tenderness. Breasts are symmetrical.   Abdominal: Soft. Bowel sounds are normal. She exhibits no mass. There is no tenderness. Hernia confirmed negative in the right inguinal area and confirmed negative in the left inguinal area.   Genitourinary: Vagina normal and uterus normal. Rectal exam shows no external hemorrhoid. No breast tenderness or discharge. Uterus is not enlarged and not tender. Cervix exhibits no motion tenderness, no discharge and no friability. Right adnexum displays no mass, no tenderness and no fullness. Left adnexum displays no mass, no tenderness and no fullness. No tenderness in the  vagina. No foreign body in the vagina. No vaginal discharge found.   Musculoskeletal: Normal range of motion.   Lymphadenopathy:        Right: No inguinal adenopathy present.        Left: No inguinal adenopathy present.   Neurological: She is alert and oriented to person, place, and time. She has normal reflexes.   Skin: Skin is dry.   Psychiatric: She has a normal mood and affect. Her behavior is normal. Judgment and thought content normal.   Nursing note and vitals reviewed.          Assessment:        1. Menorrhagia with irregular cycle    2. Type 1 diabetes mellitus without complication                Plan:    Patient considering birth control pills versus thermal ablation     Cira was seen today for well woman and menstrual problem.    Diagnoses and all orders for this visit:    Menorrhagia with irregular cycle    Type 1 diabetes mellitus without complication

## 2018-12-03 NOTE — TELEPHONE ENCOUNTER
----- Message from Nancy Suh LPN sent at 12/3/2018  2:30 PM CST -----  Contact: self      ----- Message -----  From: Ning Merchant  Sent: 12/3/2018   2:17 PM  To: [facility] Clinical Staff    Cira Keys  MRN: [id number]  Home Phone      [phone]  Work Phone      Not on file.  Mobile          [phone]  Home Phone      [phone]    Patient Care Team:  Krysta Mercado MD as PCP - General (Internal Medicine)  Clem Sidhu MD as Obstetrician (Obstetrics)  OB? No  What phone number can you be reached at? [phone]  Message:   Patient states she would like to go through with taking birth control as a treatment option for her irregular bleeding. Her pharmacy is [facility] in [place], Please return call.

## 2018-12-12 ENCOUNTER — OFFICE VISIT (OUTPATIENT)
Dept: URGENT CARE | Facility: CLINIC | Age: 38
End: 2018-12-12
Payer: COMMERCIAL

## 2018-12-12 VITALS
DIASTOLIC BLOOD PRESSURE: 83 MMHG | HEIGHT: 67 IN | OXYGEN SATURATION: 99 % | BODY MASS INDEX: 28.09 KG/M2 | SYSTOLIC BLOOD PRESSURE: 136 MMHG | RESPIRATION RATE: 16 BRPM | WEIGHT: 179 LBS | HEART RATE: 93 BPM | TEMPERATURE: 98 F

## 2018-12-12 DIAGNOSIS — R05.9 COUGH: Primary | ICD-10-CM

## 2018-12-12 DIAGNOSIS — E10.9 TYPE 1 DIABETES MELLITUS WITHOUT COMPLICATION: ICD-10-CM

## 2018-12-12 LAB
CTP QC/QA: YES
FLUAV AG NPH QL: NEGATIVE
FLUBV AG NPH QL: NEGATIVE

## 2018-12-12 PROCEDURE — 3079F DIAST BP 80-89 MM HG: CPT | Mod: CPTII,S$GLB,, | Performed by: EMERGENCY MEDICINE

## 2018-12-12 PROCEDURE — 99214 OFFICE O/P EST MOD 30 MIN: CPT | Mod: S$GLB,,, | Performed by: EMERGENCY MEDICINE

## 2018-12-12 PROCEDURE — 3075F SYST BP GE 130 - 139MM HG: CPT | Mod: CPTII,S$GLB,, | Performed by: EMERGENCY MEDICINE

## 2018-12-12 PROCEDURE — 87804 INFLUENZA ASSAY W/OPTIC: CPT | Mod: 59,QW,S$GLB, | Performed by: EMERGENCY MEDICINE

## 2018-12-12 PROCEDURE — 3008F BODY MASS INDEX DOCD: CPT | Mod: CPTII,S$GLB,, | Performed by: EMERGENCY MEDICINE

## 2018-12-12 RX ORDER — CODEINE PHOSPHATE AND GUAIFENESIN 10; 100 MG/5ML; MG/5ML
5-10 SOLUTION ORAL 3 TIMES DAILY PRN
Qty: 120 ML | Refills: 0 | Status: SHIPPED | OUTPATIENT
Start: 2018-12-12 | End: 2018-12-17

## 2018-12-12 RX ORDER — AMOXICILLIN 500 MG/1
500 TABLET, FILM COATED ORAL EVERY 12 HOURS
Qty: 20 TABLET | Refills: 0 | Status: SHIPPED | OUTPATIENT
Start: 2018-12-12 | End: 2018-12-22

## 2018-12-13 NOTE — PATIENT INSTRUCTIONS
Vernon Ritter MD  Go to the Emergency Department for any problems  Call your PCP for follow up next available.    Cough, Chronic, Uncertain Cause (Adult)    Everyone has had a cough as part of the common cold, flu, or bronchitis. This kind of cough occurs along with an achy feeling, low-grade fever, nasal and sinus congestion, and a scratchy or sore throat. This usually gets better in 2 to 3 weeks. A cough that lasts longer than 3 weeks may be due to other causes.  If your cough does not improve over the next 2 weeks, further testing may be needed. Follow up with your healthcare provider as advised. Cough suppressants may be recommended. Based on your exam today, the exact cause of your cough is not certain. Below are some common causes for persistent cough.  Smokers cough  Smokers cough doesnt go away. If you continue to smoke, it only gets worse. The cough is from irritation in the air passages. Talk to your healthcare provider about quitting. Medicines or nicotine-replacement products, like gum or the patch, may make quitting easier.  Postnasal drip  A cough that is worse at night may be due to postnasal drip. Excess mucus in the nose drains from the back of your nose to your throat. This triggers the cough reflex. Postnasal drip may be due to a sinus infection or allergy. Common allergens include dust, tobacco smoke (both inhaled and secondhand smoke), environmental pollutants, pollen, mold, pets, cleaning agents, room deodorizers, and chemical fumes. Over-the-counter antihistamines or decongestants may be helpful for allergies. A sinus infection may requires antibiotic treatment. See your healthcare provider if symptoms continue.  Medicines  Certain prescribed medicines can cause a chronic cough in some people:  · ACE inhibitors for high blood pressure. These include benazepril, captopril, enalapril, fosinopril, lisinopril, quinapril, ramipril, and others.  · Beta-blockers for high blood pressure and other  conditions. These include propranolol, atenolol, metoprolol, nadolol, and others.  Let your healthcare provider know if you are taking any of these.  Asthma  Cough may be the only sign of mild asthma. You may have tests to find out if asthma is causing your cough. You may also take asthma medicine on a trial basis.  Acid reflux (heartburn, GERD)  The esophagus is the tube that carries food from the mouth to the stomach. A valve at its lower end prevents stomach acids from flowing upward. If this valve does not work properly, acid from the stomach enters the esophagus. This may cause a burning pain in the upper abdomen or lower chest, belching, or cough. Symptoms are often worse when lying flat. Avoid eating or drinking before bedtime. Try using extra pillows to raise your upper body, or place 4-inch blocks under the head of your bed. You may try an over-the-counter antacid or an acid-blocking medicine such as famotidine, cimetidine, ranitidine, esomeprazole, lansoprazole, or omeprazole. Stronger medicines for this condition can be prescribed by your healthcare provider.  Follow-up care  Follow up with your healthcare provider, or as advised, if your cough does not improve. Further testing may be needed.  Note: If an X-ray was taken, a specialist will review it. You will be notified of any new findings that may affect your care.  When to seek medical advice  Call your healthcare provider right away if any of these occur:  · Mild wheezing or difficulty breathing  · Fever of 100.4ºF (38ºC) or higher, or as directed by your healthcare provider  · Unexpected weight loss  · Coughing up large amounts of colored sputum  · Night sweats (sheets and pajamas get soaking wet)  Call 911, or get immediate medical care  Contact emergency services right away if any of these occur:  · Coughing up blood  · Moderate to severe trouble breathing or wheezing  Date Last Reviewed: 9/13/2015  © 8040-1880 [employer] [address]  [place] [address]. All rights reserved. This information is not intended as a substitute for professional medical care. Always follow your healthcare professional's instructions.        Bronchitis, Antibiotic Treatment (Adult)    Bronchitis is an infection of the air passages (bronchial tubes) in your lungs. It often occurs when you have a cold. This illness is contagious during the first few days and is spread through the air by coughing and sneezing, or by direct contact (touching the sick person and then touching your own eyes, nose, or mouth).  Symptoms of bronchitis include cough with mucus (phlegm) and low-grade fever. Bronchitis usually lasts 7 to 14 days. Mild cases can be treated with simple home remedies. More severe infection is treated with an antibiotic.  Home care  Follow these guidelines when caring for yourself at home:  · If your symptoms are severe, rest at home for the first 2 to 3 days. When you go back to your usual activities, don't let yourself get too tired.  · Do not smoke. Also avoid being exposed to secondhand smoke.  · You may use over-the-counter medicines to control fever or pain, unless another medicine was prescribed. (Note: If you have chronic liver or kidney disease or have ever had a stomach ulcer or gastrointestinal bleeding, talk with your healthcare provider before using these medicines. Also talk to your provider if you are taking medicine to prevent blood clots.) Aspirin should never be given to anyone younger than 18 years of age who is ill with a viral infection or fever. It may cause severe liver or brain damage.  · Your appetite may be poor, so a light diet is fine. Avoid dehydration by drinking 6 to 8 glasses of fluids per day (such as water, soft drinks, sports drinks, juices, tea, or soup). Extra fluids will help loosen secretions in the nose and lungs.  · Over-the-counter cough, cold, and sore-throat medicines will not shorten the length of the  illness, but they may be helpful to reduce symptoms. (Note: Do not use decongestants if you have high blood pressure.)  · Finish all antibiotic medicine. Do this even if you are feeling better after only a few days.  Follow-up care  Follow up with your healthcare provider, or as advised. If you had an X-ray or ECG (electrocardiogram), a specialist will review it. You will be notified of any new findings that may affect your care.  Note: If you are age 65 or older, or if you have a chronic lung disease or condition that affects your immune system, or you smoke, talk to your healthcare provider about having pneumococcal vaccinations and a yearly influenza vaccination (flu shot).  When to seek medical advice  Call your healthcare provider right away if any of these occur:  · Fever of 100.4°F (38°C) or higher  · Coughing up increased amounts of colored sputum  · Weakness, drowsiness, headache, facial pain, ear pain, or a stiff neck  Call 911, or get immediate medical care  Contact emergency services right away if any of these occur.  · Coughing up blood  · Worsening weakness, drowsiness, headache, or stiff neck  · Trouble breathing, wheezing, or pain with breathing  Date Last Reviewed: 9/13/2015  © 7083-2748 [employer] [address] [place] [address], [place] [address]. All rights reserved. This information is not intended as a substitute for professional medical care. Always follow your healthcare professional's instructions.

## 2018-12-13 NOTE — PROGRESS NOTES
"Subjective:       Patient ID: Cira Keys is a 38 y.o. female.    Vitals:  height is 5' 7" (1.702 m) and weight is 81.2 kg (179 lb). Her oral temperature is 98.3 °F (36.8 °C). Her blood pressure is 136/83 and her pulse is 93. Her respiration is 16 and oxygen saturation is 99%.     Chief Complaint: Cough    1 1/2 week hx occas mucus prod cough, had a runny nose and sneezing but since resolved, BS have been elevated, is not pregnant, OK with amoxil and codeine cough med, NOC.      Cough   This is a new problem. Episode onset: 1.5 weeks. The problem has been gradually worsening. The problem occurs every few minutes. The cough is productive of sputum. Associated symptoms include a fever, a sore throat and wheezing. Pertinent negatives include no chills, ear pain, eye redness, hemoptysis, myalgias, rash or shortness of breath. Nothing aggravates the symptoms. She has tried OTC cough suppressant (mucinex) for the symptoms. The treatment provided no relief.       Constitution: Positive for fever. Negative for chills, sweating and fatigue.   HENT: Positive for congestion, sinus pressure and sore throat. Negative for ear pain, sinus pain and voice change.    Neck: Negative for painful lymph nodes.   Eyes: Negative for eye redness.   Respiratory: Positive for cough, sputum production and wheezing. Negative for chest tightness, bloody sputum, COPD, shortness of breath, stridor and asthma.    Gastrointestinal: Positive for nausea. Negative for vomiting.   Musculoskeletal: Negative for muscle ache.   Skin: Negative for rash.   Allergic/Immunologic: Negative for seasonal allergies and asthma.   Hematologic/Lymphatic: Negative for swollen lymph nodes.       Objective:      Physical Exam   Constitutional: She is oriented to person, place, and time. She appears well-developed and well-nourished.   Pleasant, occas non prod cough   HENT:   Head: Normocephalic and atraumatic.   Right Ear: Tympanic membrane, external ear and ear " canal normal.   Left Ear: Tympanic membrane, external ear and ear canal normal.   Nose: Nose normal.   Mouth/Throat: Uvula is midline, oropharynx is clear and moist and mucous membranes are normal.   Neck: Normal range of motion. Neck supple.   Cardiovascular: Normal rate, regular rhythm and normal heart sounds.   Pulmonary/Chest:   Occas coarse BS bilat lower 1/2 lung fields posteriorly   Musculoskeletal: Normal range of motion.   Lymphadenopathy:     She has no cervical adenopathy.   Neurological: She is alert and oriented to person, place, and time.   Skin: Skin is warm and dry.   Psychiatric: She has a normal mood and affect. Her behavior is normal.       Assessment:       1. Cough    2. Type 1 diabetes mellitus without complication        Plan:         Cough  -     POCT Influenza A/B  -     amoxicillin (AMOXIL) 500 MG Tab; Take 1 tablet (500 mg total) by mouth every 12 (twelve) hours. for 10 days  Dispense: 20 tablet; Refill: 0  -     guaifenesin-codeine 100-10 mg/5 ml (TUSSI-ORGANIDIN NR)  mg/5 mL syrup; Take 5-10 mLs by mouth 3 (three) times daily as needed for Cough.  Dispense: 120 mL; Refill: 0    Type 1 diabetes mellitus without complication        Vernon Ritter MD  Go to the Emergency Department for any problems  Call your PCP for follow up next available.

## 2019-01-08 ENCOUNTER — HOSPITAL ENCOUNTER (OUTPATIENT)
Facility: HOSPITAL | Age: 39
Discharge: HOME | End: 2019-01-09
Attending: SURGERY | Admitting: INTERNAL MEDICINE
Payer: COMMERCIAL

## 2019-01-08 DIAGNOSIS — I25.10 CARDIOVASCULAR DISEASE: ICD-10-CM

## 2019-01-08 DIAGNOSIS — E11.10 DIABETIC KETOACIDOSIS WITHOUT COMA ASSOCIATED WITH TYPE 2 DIABETES MELLITUS: Primary | ICD-10-CM

## 2019-01-08 DIAGNOSIS — R05.9 COUGH: ICD-10-CM

## 2019-01-08 LAB
ALBUMIN SERPL BCP-MCNC: 3.7 G/DL
ALP SERPL-CCNC: 115 U/L
ALT SERPL W/O P-5'-P-CCNC: 25 U/L
ANION GAP SERPL CALC-SCNC: 12 MMOL/L
ANION GAP SERPL CALC-SCNC: 16 MMOL/L
AST SERPL-CCNC: 57 U/L
B-HCG UR QL: NEGATIVE
B-OH-BUTYR BLD STRIP-SCNC: 3.1 MMOL/L
BASOPHILS # BLD AUTO: 0.07 K/UL
BASOPHILS NFR BLD: 0.8 %
BILIRUB SERPL-MCNC: 1.8 MG/DL
BILIRUB UR QL STRIP: ABNORMAL
BNP SERPL-MCNC: 13 PG/ML
BUN SERPL-MCNC: 6 MG/DL
BUN SERPL-MCNC: 7 MG/DL
CALCIUM SERPL-MCNC: 9.4 MG/DL
CALCIUM SERPL-MCNC: 9.7 MG/DL
CHLORIDE SERPL-SCNC: 100 MMOL/L
CHLORIDE SERPL-SCNC: 95 MMOL/L
CLARITY UR: CLEAR
CO2 SERPL-SCNC: 24 MMOL/L
CO2 SERPL-SCNC: 25 MMOL/L
COLOR UR: YELLOW
CREAT SERPL-MCNC: 0.7 MG/DL
CREAT SERPL-MCNC: 1 MG/DL
DIFFERENTIAL METHOD: ABNORMAL
EOSINOPHIL # BLD AUTO: 0 K/UL
EOSINOPHIL NFR BLD: 0.3 %
ERYTHROCYTE [DISTWIDTH] IN BLOOD BY AUTOMATED COUNT: 14 %
EST. GFR  (AFRICAN AMERICAN): >60 ML/MIN/1.73 M^2
EST. GFR  (AFRICAN AMERICAN): >60 ML/MIN/1.73 M^2
EST. GFR  (NON AFRICAN AMERICAN): >60 ML/MIN/1.73 M^2
EST. GFR  (NON AFRICAN AMERICAN): >60 ML/MIN/1.73 M^2
ESTIMATED AVG GLUCOSE: 217 MG/DL
GLUCOSE SERPL-MCNC: 379 MG/DL
GLUCOSE SERPL-MCNC: 75 MG/DL
GLUCOSE UR QL STRIP: ABNORMAL
HBA1C MFR BLD HPLC: 9.2 %
HCT VFR BLD AUTO: 38.7 %
HGB BLD-MCNC: 12.3 G/DL
HGB UR QL STRIP: ABNORMAL
KETONES UR QL STRIP: ABNORMAL
LEUKOCYTE ESTERASE UR QL STRIP: NEGATIVE
LYMPHOCYTES # BLD AUTO: 1.3 K/UL
LYMPHOCYTES NFR BLD: 13.8 %
MCH RBC QN AUTO: 29.3 PG
MCHC RBC AUTO-ENTMCNC: 31.8 G/DL
MCV RBC AUTO: 92 FL
MICROSCOPIC COMMENT: NORMAL
MONOCYTES # BLD AUTO: 0.7 K/UL
MONOCYTES NFR BLD: 7.4 %
NEUTROPHILS # BLD AUTO: 7 K/UL
NEUTROPHILS NFR BLD: 77.7 %
NITRITE UR QL STRIP: NEGATIVE
PH UR STRIP: 6 [PH] (ref 5–8)
PLATELET # BLD AUTO: 369 K/UL
PMV BLD AUTO: 9.6 FL
POCT GLUCOSE: 127 MG/DL (ref 70–110)
POCT GLUCOSE: 160 MG/DL (ref 70–110)
POCT GLUCOSE: 209 MG/DL (ref 70–110)
POCT GLUCOSE: 368 MG/DL (ref 70–110)
POTASSIUM SERPL-SCNC: 3.1 MMOL/L
POTASSIUM SERPL-SCNC: 3.7 MMOL/L
PROT SERPL-MCNC: 8.1 G/DL
PROT UR QL STRIP: NEGATIVE
RBC # BLD AUTO: 4.2 M/UL
RBC #/AREA URNS HPF: 4 /HPF (ref 0–4)
SODIUM SERPL-SCNC: 135 MMOL/L
SODIUM SERPL-SCNC: 137 MMOL/L
SP GR UR STRIP: 1.01 (ref 1–1.03)
URN SPEC COLLECT METH UR: ABNORMAL
UROBILINOGEN UR STRIP-ACNC: 1 EU/DL
WBC # BLD AUTO: 9.06 K/UL

## 2019-01-08 PROCEDURE — 63600175 PHARM REV CODE 636 W HCPCS: Performed by: NURSE PRACTITIONER

## 2019-01-08 PROCEDURE — 94760 N-INVAS EAR/PLS OXIMETRY 1: CPT

## 2019-01-08 PROCEDURE — 82010 KETONE BODYS QUAN: CPT

## 2019-01-08 PROCEDURE — 99220 PR INITIAL OBSERVATION CARE,LEVL III: CPT | Mod: ,,, | Performed by: INTERNAL MEDICINE

## 2019-01-08 PROCEDURE — 63600175 PHARM REV CODE 636 W HCPCS: Performed by: INTERNAL MEDICINE

## 2019-01-08 PROCEDURE — 99220 PR INITIAL OBSERVATION CARE,LEVL III: ICD-10-PCS | Mod: ,,, | Performed by: INTERNAL MEDICINE

## 2019-01-08 PROCEDURE — 80048 BASIC METABOLIC PNL TOTAL CA: CPT

## 2019-01-08 PROCEDURE — 94761 N-INVAS EAR/PLS OXIMETRY MLT: CPT

## 2019-01-08 PROCEDURE — 81025 URINE PREGNANCY TEST: CPT

## 2019-01-08 PROCEDURE — 82962 GLUCOSE BLOOD TEST: CPT

## 2019-01-08 PROCEDURE — 96361 HYDRATE IV INFUSION ADD-ON: CPT

## 2019-01-08 PROCEDURE — S5571 INSULIN DISPOS PEN 3 ML: HCPCS | Performed by: INTERNAL MEDICINE

## 2019-01-08 PROCEDURE — 81000 URINALYSIS NONAUTO W/SCOPE: CPT

## 2019-01-08 PROCEDURE — 96374 THER/PROPH/DIAG INJ IV PUSH: CPT

## 2019-01-08 PROCEDURE — 25000003 PHARM REV CODE 250: Performed by: NURSE PRACTITIONER

## 2019-01-08 PROCEDURE — 36415 COLL VENOUS BLD VENIPUNCTURE: CPT

## 2019-01-08 PROCEDURE — 99900035 HC TECH TIME PER 15 MIN (STAT)

## 2019-01-08 PROCEDURE — 85025 COMPLETE CBC W/AUTO DIFF WBC: CPT

## 2019-01-08 PROCEDURE — 83880 ASSAY OF NATRIURETIC PEPTIDE: CPT

## 2019-01-08 PROCEDURE — G0378 HOSPITAL OBSERVATION PER HR: HCPCS

## 2019-01-08 PROCEDURE — 83036 HEMOGLOBIN GLYCOSYLATED A1C: CPT

## 2019-01-08 PROCEDURE — 80053 COMPREHEN METABOLIC PANEL: CPT

## 2019-01-08 PROCEDURE — 96361 HYDRATE IV INFUSION ADD-ON: CPT | Performed by: SURGERY

## 2019-01-08 PROCEDURE — 96372 THER/PROPH/DIAG INJ SC/IM: CPT | Mod: 59 | Performed by: SURGERY

## 2019-01-08 PROCEDURE — 25000003 PHARM REV CODE 250: Performed by: SURGERY

## 2019-01-08 PROCEDURE — 25000003 PHARM REV CODE 250: Performed by: INTERNAL MEDICINE

## 2019-01-08 PROCEDURE — 99285 EMERGENCY DEPT VISIT HI MDM: CPT | Mod: 25

## 2019-01-08 RX ORDER — PANTOPRAZOLE SODIUM 40 MG/1
40 TABLET, DELAYED RELEASE ORAL DAILY
Status: DISCONTINUED | OUTPATIENT
Start: 2019-01-08 | End: 2019-01-09 | Stop reason: HOSPADM

## 2019-01-08 RX ORDER — ONDANSETRON 2 MG/ML
4 INJECTION INTRAMUSCULAR; INTRAVENOUS EVERY 8 HOURS PRN
Status: DISCONTINUED | OUTPATIENT
Start: 2019-01-08 | End: 2019-01-09 | Stop reason: HOSPADM

## 2019-01-08 RX ORDER — ACETAMINOPHEN 325 MG/1
650 TABLET ORAL EVERY 8 HOURS PRN
Status: DISCONTINUED | OUTPATIENT
Start: 2019-01-08 | End: 2019-01-09 | Stop reason: HOSPADM

## 2019-01-08 RX ORDER — SODIUM CHLORIDE 0.9 % (FLUSH) 0.9 %
5 SYRINGE (ML) INJECTION
Status: DISCONTINUED | OUTPATIENT
Start: 2019-01-08 | End: 2019-01-09 | Stop reason: HOSPADM

## 2019-01-08 RX ORDER — HYDROCODONE BITARTRATE AND ACETAMINOPHEN 5; 325 MG/1; MG/1
1 TABLET ORAL EVERY 4 HOURS PRN
Status: DISCONTINUED | OUTPATIENT
Start: 2019-01-08 | End: 2019-01-09 | Stop reason: HOSPADM

## 2019-01-08 RX ORDER — SODIUM CHLORIDE 9 MG/ML
INJECTION, SOLUTION INTRAVENOUS CONTINUOUS
Status: DISCONTINUED | OUTPATIENT
Start: 2019-01-08 | End: 2019-01-09 | Stop reason: HOSPADM

## 2019-01-08 RX ORDER — LISINOPRIL 10 MG/1
10 TABLET ORAL DAILY
Status: DISCONTINUED | OUTPATIENT
Start: 2019-01-09 | End: 2019-01-09 | Stop reason: HOSPADM

## 2019-01-08 RX ORDER — SODIUM CHLORIDE 9 MG/ML
1000 INJECTION, SOLUTION INTRAVENOUS
Status: ACTIVE | OUTPATIENT
Start: 2019-01-08 | End: 2019-01-09

## 2019-01-08 RX ORDER — GLUCAGON 1 MG
1 KIT INJECTION
Status: DISCONTINUED | OUTPATIENT
Start: 2019-01-08 | End: 2019-01-09 | Stop reason: HOSPADM

## 2019-01-08 RX ADMIN — PANTOPRAZOLE SODIUM 40 MG: 40 TABLET, DELAYED RELEASE ORAL at 03:01

## 2019-01-08 RX ADMIN — INSULIN HUMAN 5 UNITS: 100 INJECTION, SOLUTION PARENTERAL at 12:01

## 2019-01-08 RX ADMIN — SODIUM CHLORIDE 1000 ML: 0.9 INJECTION, SOLUTION INTRAVENOUS at 12:01

## 2019-01-08 RX ADMIN — INSULIN DETEMIR 10 UNITS: 100 INJECTION, SOLUTION SUBCUTANEOUS at 08:01

## 2019-01-08 RX ADMIN — SODIUM CHLORIDE: 0.9 INJECTION, SOLUTION INTRAVENOUS at 06:01

## 2019-01-08 NOTE — ED PROVIDER NOTES
Encounter Date: 2019       History     Chief Complaint   Patient presents with    Hyperglycemia     Patient presents with concerned for DKA. Reports that her blood sugar at home was >500. History of type 1 DM. Administered 4 units regular insulin about 30 mins ago.  Patient reports that she was recently treated for bronchitis and reports that her symptoms improved after antibiotics, but remains with productive cough.   Reports nausea. Denies abdominal pain or vomiting. No urinary symptoms.      The history is provided by the patient.     Review of patient's allergies indicates:  No Known Allergies  Past Medical History:   Diagnosis Date    E coli bacteremia     GERD (gastroesophageal reflux disease)     Iron deficiency anemia due to chronic blood loss 10/31/2015    Type 1 diabetes mellitus without complication 10/31/2015     Past Surgical History:   Procedure Laterality Date     SECTION      TUBAL LIGATION       Family History   Problem Relation Age of Onset    No Known Problems Mother     No Known Problems Father     No Known Problems Sister     No Known Problems Brother     Breast cancer Neg Hx     Colon cancer Neg Hx     Ovarian cancer Neg Hx      Social History     Tobacco Use    Smoking status: Former Smoker     Packs/day: 1.00     Start date: 1996     Last attempt to quit: 2014     Years since quittin.1    Smokeless tobacco: Never Used   Substance Use Topics    Alcohol use: Yes     Alcohol/week: 2.4 oz     Types: 4 Standard drinks or equivalent per week     Comment: occasionally    Drug use: No     Review of Systems   Constitutional: Negative for fever.   HENT: Negative for congestion, ear pain, rhinorrhea, sore throat and trouble swallowing.    Eyes: Negative for pain, discharge, redness and visual disturbance.   Respiratory: Positive for cough. Negative for shortness of breath and wheezing.    Cardiovascular: Negative for chest pain and leg swelling.    Gastrointestinal: Positive for nausea. Negative for abdominal pain, constipation, diarrhea and vomiting.   Endocrine: Negative for polydipsia, polyphagia and polyuria.   Genitourinary: Negative for difficulty urinating, dysuria, flank pain, frequency and urgency.   Musculoskeletal: Negative for arthralgias, back pain, myalgias and neck pain.   Skin: Negative for rash and wound.   Neurological: Negative for seizures, weakness and headaches.   Psychiatric/Behavioral: Negative.        Physical Exam     Initial Vitals [01/08/19 1123]   BP Pulse Resp Temp SpO2   (!) 150/103 108 20 96.5 °F (35.8 °C) 98 %      MAP       --         Physical Exam    Constitutional: No distress.   HENT:   Head: Normocephalic and atraumatic.   Nose: Nose normal.   Mouth/Throat: Oropharynx is clear and moist.   Eyes: Conjunctivae and EOM are normal. Pupils are equal, round, and reactive to light.   Neck: Neck supple.   Cardiovascular: Normal rate, regular rhythm, normal heart sounds and intact distal pulses.   Pulmonary/Chest: Breath sounds normal.   Abdominal: Soft. Bowel sounds are normal. There is no tenderness.   Musculoskeletal: Normal range of motion.   Neurological: She is alert and oriented to person, place, and time. She has normal strength.   Skin: Skin is warm and dry.   Psychiatric: She has a normal mood and affect. Thought content normal.         ED Course   Procedures  Labs Reviewed   CBC W/ AUTO DIFFERENTIAL   COMPREHENSIVE METABOLIC PANEL   URINALYSIS, REFLEX TO URINE CULTURE   PREGNANCY TEST, URINE RAPID   BETA - HYDROXYBUTYRATE, SERUM   B-TYPE NATRIURETIC PEPTIDE   POCT GLUCOSE MONITORING CONTINUOUS          Imaging Results    None              Medications   sodium chloride 0.9% bolus 1,000 mL (not administered)                         Clinical Impression:   The primary encounter diagnosis was Diabetic ketoacidosis without coma associated with type 2 diabetes mellitus. A diagnosis of Cough was also pertinent to this  visit.      Disposition:   Disposition: Placed in Observation  Condition: Stable                        Sukumar Poole Jr., MD  01/08/19 7009

## 2019-01-08 NOTE — HPI
Cira Keys is a 38 y.o. female  With PMH of type 1 diabetes since the age of 9 who presented to ER with 400-550 sugars for last 2-3 days.  She reports some nausea nd vomiting episodes.  She reports a recent ac bronchitis for last 2 weeks . And finished her antibiotics but she got sicker for last 3 days .  Work up in ER showed mild DKA ; received IVF in ER . Her sugars are better now   She is feeling hungry .  
None known

## 2019-01-08 NOTE — ASSESSMENT & PLAN NOTE
Very mild case   IVF should help   Long acting insulin   Correction scale should help   No need for icu or insulin drip   Home in am

## 2019-01-08 NOTE — PLAN OF CARE
Problem: Adult Inpatient Plan of Care  Goal: Plan of Care Review  Outcome: Ongoing (interventions implemented as appropriate)  Patient stable. Denies pain. Denies nausea. Accuchecks. Telemetry monitoring. Remains free from falls. POC reviewed. Patient voiced understanding. Instructed to call for needs. Will continue to monitor.

## 2019-01-08 NOTE — ED TRIAGE NOTES
"38 y.o. female presents to ER   Chief Complaint   Patient presents with    Hyperglycemia   Pt reports she "feels like I'm going into DKA again." No acute distress noted.    "

## 2019-01-09 VITALS
WEIGHT: 179 LBS | DIASTOLIC BLOOD PRESSURE: 63 MMHG | HEIGHT: 67 IN | HEART RATE: 79 BPM | BODY MASS INDEX: 28.09 KG/M2 | TEMPERATURE: 98 F | OXYGEN SATURATION: 98 % | RESPIRATION RATE: 18 BRPM | SYSTOLIC BLOOD PRESSURE: 134 MMHG

## 2019-01-09 LAB
ANION GAP SERPL CALC-SCNC: 12 MMOL/L
BASOPHILS # BLD AUTO: 0.07 K/UL
BASOPHILS NFR BLD: 1.4 %
BUN SERPL-MCNC: 4 MG/DL
CALCIUM SERPL-MCNC: 8.2 MG/DL
CHLORIDE SERPL-SCNC: 104 MMOL/L
CO2 SERPL-SCNC: 23 MMOL/L
CREAT SERPL-MCNC: 0.7 MG/DL
DIFFERENTIAL METHOD: ABNORMAL
EOSINOPHIL # BLD AUTO: 0.1 K/UL
EOSINOPHIL NFR BLD: 1.2 %
ERYTHROCYTE [DISTWIDTH] IN BLOOD BY AUTOMATED COUNT: 14.3 %
EST. GFR  (AFRICAN AMERICAN): >60 ML/MIN/1.73 M^2
EST. GFR  (NON AFRICAN AMERICAN): >60 ML/MIN/1.73 M^2
GLUCOSE SERPL-MCNC: 63 MG/DL
HCT VFR BLD AUTO: 33.5 %
HGB BLD-MCNC: 10.6 G/DL
LYMPHOCYTES # BLD AUTO: 1.9 K/UL
LYMPHOCYTES NFR BLD: 36.7 %
MCH RBC QN AUTO: 29.5 PG
MCHC RBC AUTO-ENTMCNC: 31.6 G/DL
MCV RBC AUTO: 93 FL
MONOCYTES # BLD AUTO: 0.5 K/UL
MONOCYTES NFR BLD: 10.3 %
NEUTROPHILS # BLD AUTO: 2.6 K/UL
NEUTROPHILS NFR BLD: 50.4 %
PLATELET # BLD AUTO: 291 K/UL
PMV BLD AUTO: 9.8 FL
POCT GLUCOSE: 296 MG/DL (ref 70–110)
POCT GLUCOSE: 74 MG/DL (ref 70–110)
POTASSIUM SERPL-SCNC: 3.3 MMOL/L
RBC # BLD AUTO: 3.59 M/UL
SODIUM SERPL-SCNC: 139 MMOL/L
WBC # BLD AUTO: 5.07 K/UL

## 2019-01-09 PROCEDURE — 63600175 PHARM REV CODE 636 W HCPCS: Performed by: INTERNAL MEDICINE

## 2019-01-09 PROCEDURE — 90471 IMMUNIZATION ADMIN: CPT | Performed by: INTERNAL MEDICINE

## 2019-01-09 PROCEDURE — 99238 HOSP IP/OBS DSCHRG MGMT 30/<: CPT | Mod: ,,, | Performed by: FAMILY MEDICINE

## 2019-01-09 PROCEDURE — G0378 HOSPITAL OBSERVATION PER HR: HCPCS

## 2019-01-09 PROCEDURE — 80048 BASIC METABOLIC PNL TOTAL CA: CPT

## 2019-01-09 PROCEDURE — 90686 IIV4 VACC NO PRSV 0.5 ML IM: CPT | Performed by: INTERNAL MEDICINE

## 2019-01-09 PROCEDURE — 96361 HYDRATE IV INFUSION ADD-ON: CPT | Performed by: SURGERY

## 2019-01-09 PROCEDURE — 36415 COLL VENOUS BLD VENIPUNCTURE: CPT

## 2019-01-09 PROCEDURE — 25000003 PHARM REV CODE 250: Performed by: INTERNAL MEDICINE

## 2019-01-09 PROCEDURE — 99238 PR HOSPITAL DISCHARGE DAY,<30 MIN: ICD-10-PCS | Mod: ,,, | Performed by: FAMILY MEDICINE

## 2019-01-09 PROCEDURE — 90472 IMMUNIZATION ADMIN EACH ADD: CPT | Performed by: INTERNAL MEDICINE

## 2019-01-09 PROCEDURE — 93010 EKG 12-LEAD: ICD-10-PCS | Mod: ,,, | Performed by: INTERNAL MEDICINE

## 2019-01-09 PROCEDURE — 25000003 PHARM REV CODE 250: Performed by: SURGERY

## 2019-01-09 PROCEDURE — 93010 ELECTROCARDIOGRAM REPORT: CPT | Mod: ,,, | Performed by: INTERNAL MEDICINE

## 2019-01-09 PROCEDURE — 93005 ELECTROCARDIOGRAM TRACING: CPT

## 2019-01-09 PROCEDURE — 90670 PCV13 VACCINE IM: CPT | Performed by: INTERNAL MEDICINE

## 2019-01-09 PROCEDURE — 85025 COMPLETE CBC W/AUTO DIFF WBC: CPT

## 2019-01-09 RX ADMIN — PNEUMOCOCCAL 13-VALENT CONJUGATE VACCINE 0.5 ML: 2.2; 2.2; 2.2; 2.2; 2.2; 4.4; 2.2; 2.2; 2.2; 2.2; 2.2; 2.2; 2.2 INJECTION, SUSPENSION INTRAMUSCULAR at 01:01

## 2019-01-09 RX ADMIN — LISINOPRIL 10 MG: 10 TABLET ORAL at 08:01

## 2019-01-09 RX ADMIN — SODIUM CHLORIDE: 0.9 INJECTION, SOLUTION INTRAVENOUS at 07:01

## 2019-01-09 RX ADMIN — PANTOPRAZOLE SODIUM 40 MG: 40 TABLET, DELAYED RELEASE ORAL at 08:01

## 2019-01-09 RX ADMIN — INFLUENZA A VIRUS A/MICHIGAN/45/2015 X-275 (H1N1) ANTIGEN (FORMALDEHYDE INACTIVATED), INFLUENZA A VIRUS A/SINGAPORE/INFIMH-16-0019/2016 IVR-186 (H3N2) ANTIGEN (FORMALDEHYDE INACTIVATED), INFLUENZA B VIRUS B/PHUKET/3073/2013 ANTIGEN (FORMALDEHYDE INACTIVATED), AND INFLUENZA B VIRUS B/MARYLAND/15/2016 BX-69A ANTIGEN (FORMALDEHYDE INACTIVATED) 0.5 ML: 15; 15; 15; 15 INJECTION, SUSPENSION INTRAMUSCULAR at 01:01

## 2019-01-09 RX ADMIN — SODIUM CHLORIDE: 0.9 INJECTION, SOLUTION INTRAVENOUS at 12:01

## 2019-01-09 NOTE — HOSPITAL COURSE
She reports that she is feeling much better this am. Ate breakfast. No Nausea. No diarrhea. On NS at 150ml/hr. VSS. Blood sugar this am down to 63.

## 2019-01-09 NOTE — PROGRESS NOTES
Staff Handoff  Bedside report per DERRICK Bender. No distress noted. Tele normal sinus. Room air. Awake, alert, oriented. Patient denies pain. Call bell in reach. Encouraged to call for assistance.     Resident Handoff

## 2019-01-09 NOTE — PLAN OF CARE
01/09/19 1336   Discharge Assessment   Assessment Type Discharge Planning Assessment   Confirmed/corrected address and phone number on facesheet? Yes   Assessment information obtained from? Patient   Expected Length of Stay (days) 2   Communicated expected length of stay with patient/caregiver yes   Prior to hospitilization cognitive status: Alert/Oriented   Prior to hospitalization functional status: Independent   Current cognitive status: Alert/Oriented   Current Functional Status: Independent   Facility Arrived From: home   Lives With spouse   Able to Return to Prior Arrangements yes   Is patient able to care for self after discharge? Yes   Who are your caregiver(s) and their phone number(s)? Karla [phone]   Patient's perception of discharge disposition home or selfcare   Readmission Within the Last 30 Days no previous admission in last 30 days   Patient currently being followed by outpatient case management? No   Patient currently receives any other outside agency services? No   Equipment Currently Used at Home glucometer   Do you have any problems affording any of your prescribed medications? No   Is the patient taking medications as prescribed? yes   Does the patient have transportation home? Yes   Transportation Anticipated family or friend will provide   Does the patient receive services at the [facility]? No   Discharge Plan A Home   Discharge Plan B Home   DME Needed Upon Discharge  none   Patient/Family in Agreement with Plan yes     Cira plans to discharge home today with her . She has no concerns for post acute care. CM contact information and discharge brochure given. Discharge information sheet for DKA given ncluding signs and symptoms indicating return to ED or call to PCP. Compliance and understanding voiced.

## 2019-01-09 NOTE — PROGRESS NOTES
[facility] Medicine  Progress Note    Patient Name: Cira Keys  MRN: [id number]  Patient Class: OP- Observation   Admission Date: 1/8/2019  Length of Stay: 0 days  Attending Physician: Vianney Lehman MD  Primary Care Provider: Krysta Mercado MD        Subjective:     Principal Problem:Diabetic ketoacidosis without coma associated with type 2 diabetes mellitus    HPI:  Cira Keys is a 38 y.o. female  With PMH of type 1 diabetes since the age of 9 who presented to ER with 400-550 sugars for last 2-3 days.  She reports some nausea nd vomiting episodes.  She reports a recent ac bronchitis for last 2 weeks . And finished her antibiotics but she got sicker for last 3 days .  Work up in ER showed mild DKA ; received IVF in ER . Her sugars are better now   She is feeling hungry .    Hospital Course:  She reports that she is feeling much better this am. Ate breakfast. No Nausea. No diarrhea. On NS at 150ml/hr. VSS. Blood sugar this am down to 63.     Interval note: feeling better  Review of Systems   Constitutional: Negative for chills and fever.   HENT: Negative for congestion, hearing loss, sinus pressure and sore throat.    Eyes: Negative for photophobia.   Respiratory: Positive for cough. Negative for choking, chest tightness and wheezing.    Cardiovascular: Negative for chest pain and palpitations.   Gastrointestinal: Positive for abdominal pain, nausea and vomiting. Negative for blood in stool.   Genitourinary: Negative for dysuria and hematuria.   Musculoskeletal: Negative for arthralgias and myalgias.   Skin: Negative for pallor.   Neurological: Negative for dizziness and numbness.   Hematological: Does not bruise/bleed easily.   Psychiatric/Behavioral: Negative for confusion and suicidal ideas. The patient is not nervous/anxious.      Objective:     Vital Signs (Most Recent):  Temp: 97.9 °F (36.6 °C) (01/09/19 0730)  Pulse: 78 (01/09/19 1000)  Resp: 18 (01/09/19  0730)  BP: (!) 142/69 (01/09/19 0730)  SpO2: 99 % (01/09/19 0730) Vital Signs (24h Range):  Temp:  [96.9 °F (36.1 °C)-98.7 °F (37.1 °C)] 97.9 °F (36.6 °C)  Pulse:  [72-97] 78  Resp:  [16-20] 18  SpO2:  [97 %-100 %] 99 %  BP: (119-142)/(60-70) 142/69     Weight: 81.2 kg (179 lb)  Body mass index is 28.04 kg/m².    Physical Exam   Constitutional: She is oriented to person, place, and time. She appears well-developed and well-nourished.   HENT:   Head: Normocephalic and atraumatic.   Right Ear: External ear normal.   Left Ear: External ear normal.   Mouth/Throat: Oropharynx is clear and moist.   Eyes: Conjunctivae and EOM are normal. Pupils are equal, round, and reactive to light.   Neck: Normal range of motion. Neck supple. No JVD present. No tracheal deviation present. No thyromegaly present.   Cardiovascular: Normal rate, regular rhythm, normal heart sounds and intact distal pulses.   Pulmonary/Chest: Effort normal and breath sounds normal. No respiratory distress. She has no wheezes. She has no rales. She exhibits no tenderness.   Abdominal: Soft. Bowel sounds are normal. She exhibits no distension and no mass. There is no tenderness. There is no rebound and no guarding.   Musculoskeletal: Normal range of motion. She exhibits no edema.   Lymphadenopathy:     She has no cervical adenopathy.   Neurological: She is alert and oriented to person, place, and time. She has normal reflexes. She displays normal reflexes. No cranial nerve deficit. She exhibits normal muscle tone. Coordination normal.   CN: Optic discs are flat with normal vasculature, PERRL, Extraoccular movements and visual fields are full. Normal facial sensation and strength, Hearing symmetric, Tongue and Palate are midline and strong. Shoulder Shrug symmetric and strong.   Skin: Skin is warm and dry.   Psychiatric: She has a normal mood and affect.   Nursing note and vitals reviewed.        CRANIAL NERVES     CN III, IV, VI   Pupils are equal, round, and  reactive to light.  Extraocular motions are normal.        Significant Labs:   CBC:   Recent Labs   Lab 01/08/19  1135 01/09/19  0431   WBC 9.06 5.07   HGB 12.3 10.6*   HCT 38.7 33.5*   * 291     CMP:   Recent Labs   Lab 01/08/19  1136 01/08/19  1758 01/09/19  0431   * 137 139   K 3.7 3.1* 3.3*   CL 95 100 104   CO2 24 25 23   * 75 63*   BUN 7 6 4*   CREATININE 1.0 0.7 0.7   CALCIUM 9.7 9.4 8.2*   PROT 8.1  --   --    ALBUMIN 3.7  --   --    BILITOT 1.8*  --   --    ALKPHOS 115  --   --    AST 57*  --   --    ALT 25  --   --    ANIONGAP 16 12 12   EGFRNONAA >60 >60 >60   Urine Studies:   Recent Labs   Lab 01/08/19  1235   COLORU Yellow   APPEARANCEUA Clear   PHUR 6.0   SPECGRAV 1.010   PROTEINUA Negative   GLUCUA 2+*   KETONESU 3+*   BILIRUBINUA 1+*   OCCULTUA 3+*   NITRITE Negative   UROBILINOGEN 1.0   LEUKOCYTESUR Negative   RBCUA 4        Beta-Hydroxybutyrate 0.0 - 0.5 mmol/L 3.1 Abnormally high   6.1 Abnormally high   4.9 Abnormally high   5.4 Abnormally high     CXR The lungs are clear, with normal appearance of pulmonary vasculature and no pleural effusion or pneumothorax.    The cardiac silhouette is normal in size. The hilar and mediastinal contours are unremarkable.    Bones are intact.    EKG NSR    Assessment/Plan:      * Diabetic ketoacidosis without coma associated with type 2 diabetes mellitus    Very mild case   IVF should help   Long acting insulin   Correction scale should help   No need for icu or insulin drip   Home in am     Can not afford long acting insulin. Will resume NPH but not till this evening. Will resume her sliding scale as well     Cough    CXR is clear   No intervention   She completed course of antibiotics         Essential hypertension    Resume lisinopril            VTE Risk Mitigation (From admission, onward)        Ordered     IP VTE LOW RISK PATIENT  Once      01/08/19 1514     Place JODY hose  Until discontinued      01/08/19 1514              Maulik MATOS  MD Juanjo  Department of Hospital Medicine   [facility]

## 2019-01-09 NOTE — PLAN OF CARE
Problem: Adult Inpatient Plan of Care  Goal: Plan of Care Review  Outcome: Ongoing (interventions implemented as appropriate)  Patient rested well throughout shift. Room air. Tele normal sinus. Alert and oriented. Neuro intact. IV fluids continued. Blood glucose monitoring continued. Free from fall or injury. Plan of care reviewed with patient.

## 2019-01-09 NOTE — H&P
"[facility] Medicine  History & Physical    Patient Name: Cira Keys  MRN: [id number]  Admission Date: 2019  Attending Physician: iVanney Lehman MD   Primary Care Provider: Krysta Mercado MD         Patient information was obtained from patient, past medical records and ER records.     Subjective:     Principal Problem:Diabetic ketoacidosis without coma associated with type 2 diabetes mellitus    Chief Complaint:   Chief Complaint   Patient presents with    Hyperglycemia        HPI: Cira Keys is a 38 y.o. female  With PMH of type 1 diabetes since the age of 9 who presented to ER with 400-550 sugars for last 2-3 days.  She reports some nausea nd vomiting episodes.  She reports a recent ac bronchitis for last 2 weeks . And finished her antibiotics but she got sicker for last 3 days .  Work up in ER showed mild DKA ; received IVF in ER . Her sugars are better now   She is feeling hungry .    Past Medical History:   Diagnosis Date    E coli bacteremia     GERD (gastroesophageal reflux disease)     Iron deficiency anemia due to chronic blood loss 10/31/2015    Type 1 diabetes mellitus without complication 10/31/2015       Past Surgical History:   Procedure Laterality Date     SECTION      TUBAL LIGATION         Review of patient's allergies indicates:  No Known Allergies    No current facility-administered medications on file prior to encounter.      Current Outpatient Medications on File Prior to Encounter   Medication Sig    b complex vitamins tablet Take 1 tablet by mouth once daily.    insulin needles, disposable, 32 x 1/4 " Ndle Use to inject insulin 5 times a day    insulin NPH (NOVOLIN N) 100 unit/mL injection Use 10 units in am and 5 units at night    lisinopril 10 MG tablet TAKE ONE TABLET BY MOUTH ONCE DAILY    loratadine (CLARITIN) 10 mg tablet Take 10 mg by mouth once daily.    norgestimate-ethinyl estradiol (SPRINTEC, 28,) 0.25-35 " mg-mcg per tablet Take 1 tablet by mouth once daily.     Family History     Problem Relation (Age of Onset)    No Known Problems Mother, Father, Sister, Brother        Tobacco Use    Smoking status: Former Smoker     Packs/day: 1.00     Start date: 1996     Last attempt to quit: 2014     Years since quittin.1    Smokeless tobacco: Never Used   Substance and Sexual Activity    Alcohol use: Yes     Alcohol/week: 2.4 oz     Types: 4 Standard drinks or equivalent per week     Comment: occasionally    Drug use: No    Sexual activity: Yes     Birth control/protection: See Surgical Hx     Comment:      Review of Systems   Constitutional: Negative for chills and fever.   HENT: Negative for congestion, hearing loss, sinus pressure and sore throat.    Eyes: Negative for photophobia.   Respiratory: Positive for cough. Negative for choking, chest tightness and wheezing.    Cardiovascular: Negative for chest pain and palpitations.   Gastrointestinal: Positive for abdominal pain, nausea and vomiting. Negative for blood in stool.   Genitourinary: Negative for dysuria and hematuria.   Musculoskeletal: Negative for arthralgias and myalgias.   Skin: Negative for pallor.   Neurological: Negative for dizziness and numbness.   Hematological: Does not bruise/bleed easily.   Psychiatric/Behavioral: Negative for confusion and suicidal ideas. The patient is not nervous/anxious.      Objective:     Vital Signs (Most Recent):  Temp: 98.7 °F (37.1 °C) (19 1535)  Pulse: 81 (19 1612)  Resp: 18 (19 1535)  BP: 126/60 (19 1535)  SpO2: 100 % (19 1535) Vital Signs (24h Range):  Temp:  [96.5 °F (35.8 °C)-98.7 °F (37.1 °C)] 98.7 °F (37.1 °C)  Pulse:  [] 81  Resp:  [16-20] 18  SpO2:  [98 %-100 %] 100 %  BP: (126-150)/() 126/60     Weight: 81.2 kg (179 lb)  Body mass index is 28.04 kg/m².    Physical Exam   Constitutional: She is oriented to person, place, and time. She appears  well-developed and well-nourished.   HENT:   Head: Normocephalic and atraumatic.   Right Ear: External ear normal.   Left Ear: External ear normal.   Mouth/Throat: Oropharynx is clear and moist.   Eyes: Conjunctivae and EOM are normal. Pupils are equal, round, and reactive to light.   Neck: Normal range of motion. Neck supple. No JVD present. No tracheal deviation present. No thyromegaly present.   Cardiovascular: Normal rate, regular rhythm, normal heart sounds and intact distal pulses.   Pulmonary/Chest: Effort normal and breath sounds normal. No respiratory distress. She has no wheezes. She has no rales. She exhibits no tenderness.   Abdominal: Soft. Bowel sounds are normal. She exhibits no distension and no mass. There is no tenderness. There is no rebound and no guarding.   Musculoskeletal: Normal range of motion. She exhibits no edema.   Lymphadenopathy:     She has no cervical adenopathy.   Neurological: She is alert and oriented to person, place, and time. She has normal reflexes. She displays normal reflexes. No cranial nerve deficit. She exhibits normal muscle tone. Coordination normal.   CN: Optic discs are flat with normal vasculature, PERRL, Extraoccular movements and visual fields are full. Normal facial sensation and strength, Hearing symmetric, Tongue and Palate are midline and strong. Shoulder Shrug symmetric and strong.   Skin: Skin is warm and dry.   Psychiatric: She has a normal mood and affect.   Nursing note and vitals reviewed.        CRANIAL NERVES     CN III, IV, VI   Pupils are equal, round, and reactive to light.  Extraocular motions are normal.        Significant Labs:   CBC:   Recent Labs   Lab 01/08/19  1135   WBC 9.06   HGB 12.3   HCT 38.7   *     CMP:   Recent Labs   Lab 01/08/19  1136   *   K 3.7   CL 95   CO2 24   *   BUN 7   CREATININE 1.0   CALCIUM 9.7   PROT 8.1   ALBUMIN 3.7   BILITOT 1.8*   ALKPHOS 115   AST 57*   ALT 25   ANIONGAP 16   EGFRNONAA >60   Urine  Studies:   Recent Labs   Lab 01/08/19  1235   COLORU Yellow   APPEARANCEUA Clear   PHUR 6.0   SPECGRAV 1.010   PROTEINUA Negative   GLUCUA 2+*   KETONESU 3+*   BILIRUBINUA 1+*   OCCULTUA 3+*   NITRITE Negative   UROBILINOGEN 1.0   LEUKOCYTESUR Negative   RBCUA 4        Beta-Hydroxybutyrate 0.0 - 0.5 mmol/L 3.1 Abnormally high   6.1 Abnormally high   4.9 Abnormally high   5.4 Abnormally high         Significant Imaging: CXR: I have reviewed all pertinent results/findings within the past 24 hours and my personal findings are:  clear    Assessment/Plan:     * Diabetic ketoacidosis without coma associated with type 2 diabetes mellitus    Very mild case   IVF should help   Long acting insulin   Correction scale should help   No need for icu or insulin drip   Home in am          Cough    CXR is clear   No intervention   She completed course of antibiotics         Essential hypertension    Resume lisinopril            VTE Risk Mitigation (From admission, onward)        Ordered     IP VTE LOW RISK PATIENT  Once      01/08/19 1514     Place JODY hose  Until discontinued      01/08/19 1514             Vianney Lehman MD  Department of Hospital Medicine   [facility]

## 2019-01-09 NOTE — DISCHARGE SUMMARY
[facility] Medicine  Discharge Summary      Patient Name: Cira Keys  MRN: [id number]  Admission Date: 1/8/2019  Hospital Length of Stay: 0 days  Discharge Date and Time:  01/09/2019 12:24 PM  Attending Physician: Vianney Lehman MD   Discharging Provider: Radha Lino NP  Primary Care Provider: Krysta Mercado MD      HPI:   Cira Keys is a 38 y.o. female  With PMH of type 1 diabetes since the age of 9 who presented to ER with 400-550 sugars for last 2-3 days.  She reports some nausea nd vomiting episodes.  She reports a recent ac bronchitis for last 2 weeks . And finished her antibiotics but she got sicker for last 3 days .  Work up in ER showed mild DKA ; received IVF in ER . Her sugars are better now   She is feeling hungry .    * No surgery found *      Hospital Course:   She reports that she is feeling much better this am. Ate breakfast. No Nausea. No diarrhea. On NS at 150ml/hr. VSS. Blood sugar this am down to 63.      Consults:     * Diabetic ketoacidosis without coma associated with type 2 diabetes mellitus    Very mild case   IVF should help   Long acting insulin   Correction scale should help   No need for icu or insulin drip   Home in am     Can not afford long acting insulin. Will resume NPH but not till this evening. Will resume her sliding scale as well     Cough    CXR is clear   No intervention   She completed course of antibiotics         Essential hypertension    Resume lisinopril            Final Active Diagnoses:    Diagnosis Date Noted POA    PRINCIPAL PROBLEM:  Diabetic ketoacidosis without coma associated with type 2 diabetes mellitus [E11.10] 01/08/2019 Yes    Cough [R05] 12/12/2018 Yes    Essential hypertension [I10] 03/22/2017 Yes      Problems Resolved During this Admission:       Discharged Condition: good    Disposition: Home or Self Care    Follow Up:  Follow-up Information     Krysta Mercado MD In 2 days.    Specialty:   Internal Medicine  Contact information:  [address] [address]  [phone]                 Patient Instructions:   No discharge procedures on file.    Significant Diagnostic Studies:     CBC:        Recent Labs   Lab 01/08/19  1135 01/09/19  0431   WBC 9.06 5.07   HGB 12.3 10.6*   HCT 38.7 33.5*   * 291      CMP:         Recent Labs   Lab 01/08/19  1136 01/08/19  1758 01/09/19  0431   * 137 139   K 3.7 3.1* 3.3*   CL 95 100 104   CO2 24 25 23   * 75 63*   BUN 7 6 4*   CREATININE 1.0 0.7 0.7   CALCIUM 9.7 9.4 8.2*   PROT 8.1  --   --    ALBUMIN 3.7  --   --    BILITOT 1.8*  --   --    ALKPHOS 115  --   --    AST 57*  --   --    ALT 25  --   --    ANIONGAP 16 12 12   EGFRNONAA >60 >60 >60   Urine Studies:       Recent Labs   Lab 01/08/19  1235   COLORU Yellow   APPEARANCEUA Clear   PHUR 6.0   SPECGRAV 1.010   PROTEINUA Negative   GLUCUA 2+*   KETONESU 3+*   BILIRUBINUA 1+*   OCCULTUA 3+*   NITRITE Negative   UROBILINOGEN 1.0   LEUKOCYTESUR Negative   RBCUA 4         Beta-Hydroxybutyrate 0.0 - 0.5 mmol/L 3.1 Abnormally high   6.1 Abnormally high   4.9 Abnormally high   5.4 Abnormally high     CXR The lungs are clear, with normal appearance of pulmonary vasculature and no pleural effusion or pneumothorax.    The cardiac silhouette is normal in size. The hilar and mediastinal contours are unremarkable.    Bones are intact.     EKG NSR          Pending Diagnostic Studies:     None         Medications:  Reconciled Home Medications:      Medication List      CONTINUE taking these medications    b complex vitamins tablet  Take 1 tablet by mouth once daily.     insulin  unit/mL injection  Commonly known as:  NovoLIN N NPH U-100 Insulin  Use 10 units in am and 5 units at night     lisinopril 10 MG tablet  TAKE ONE TABLET BY MOUTH ONCE DAILY     loratadine 10 mg tablet  Commonly known as:  CLARITIN  Take 10 mg by mouth once daily.     norgestimate-ethinyl estradiol 0.25-35 mg-mcg per  "tablet  Commonly known as:  SPRINTEC (28)  Take 1 tablet by mouth once daily.     pen needle, diabetic 32 gauge x 1/4" Ndle  Use to inject insulin 5 times a day            Indwelling Lines/Drains at time of discharge:   Lines/Drains/Airways          None          Time spent on the discharge of patient: 20 minutes  Patient was seen and examined on the date of discharge and determined to be suitable for discharge.         Radha Lino NP  Department of Hospital Medicine  [facility]  "

## 2019-01-09 NOTE — PLAN OF CARE
Problem: Adult Inpatient Plan of Care  Goal: Plan of Care Review  Outcome: Outcome(s) achieved Date Met: 01/09/19  Patient stable. Denies pain. Denies nausea. Accuchecks. IV fluids. Telemetry monitoring. Remains free from falls/injury. Patient to be discharged to home/self-care. Discharge instructions given. Patient voiced understanding. Will continue to monitor.

## 2019-01-09 NOTE — PLAN OF CARE
01/09/19 1348   Final Note   Assessment Type Final Discharge Note   Anticipated Discharge Disposition Home   What phone number can be called within the next 1-3 days to see how you are doing after discharge? [phone]   Hospital Follow Up  Appt(s) scheduled? Yes   Discharge plans and expectations educations in teach back method with documentation complete? Yes   Right Care Referral Info   Post Acute Recommendation No Care

## 2019-01-09 NOTE — SUBJECTIVE & OBJECTIVE
Interval note: feeling better  Review of Systems   Constitutional: Negative for chills and fever.   HENT: Negative for congestion, hearing loss, sinus pressure and sore throat.    Eyes: Negative for photophobia.   Respiratory: Positive for cough. Negative for choking, chest tightness and wheezing.    Cardiovascular: Negative for chest pain and palpitations.   Gastrointestinal: Positive for abdominal pain, nausea and vomiting. Negative for blood in stool.   Genitourinary: Negative for dysuria and hematuria.   Musculoskeletal: Negative for arthralgias and myalgias.   Skin: Negative for pallor.   Neurological: Negative for dizziness and numbness.   Hematological: Does not bruise/bleed easily.   Psychiatric/Behavioral: Negative for confusion and suicidal ideas. The patient is not nervous/anxious.      Objective:     Vital Signs (Most Recent):  Temp: 97.9 °F (36.6 °C) (01/09/19 0730)  Pulse: 78 (01/09/19 1000)  Resp: 18 (01/09/19 0730)  BP: (!) 142/69 (01/09/19 0730)  SpO2: 99 % (01/09/19 0730) Vital Signs (24h Range):  Temp:  [96.9 °F (36.1 °C)-98.7 °F (37.1 °C)] 97.9 °F (36.6 °C)  Pulse:  [72-97] 78  Resp:  [16-20] 18  SpO2:  [97 %-100 %] 99 %  BP: (119-142)/(60-70) 142/69     Weight: 81.2 kg (179 lb)  Body mass index is 28.04 kg/m².    Physical Exam   Constitutional: She is oriented to person, place, and time. She appears well-developed and well-nourished.   HENT:   Head: Normocephalic and atraumatic.   Right Ear: External ear normal.   Left Ear: External ear normal.   Mouth/Throat: Oropharynx is clear and moist.   Eyes: Conjunctivae and EOM are normal. Pupils are equal, round, and reactive to light.   Neck: Normal range of motion. Neck supple. No JVD present. No tracheal deviation present. No thyromegaly present.   Cardiovascular: Normal rate, regular rhythm, normal heart sounds and intact distal pulses.   Pulmonary/Chest: Effort normal and breath sounds normal. No respiratory distress. She has no wheezes. She has no  rales. She exhibits no tenderness.   Abdominal: Soft. Bowel sounds are normal. She exhibits no distension and no mass. There is no tenderness. There is no rebound and no guarding.   Musculoskeletal: Normal range of motion. She exhibits no edema.   Lymphadenopathy:     She has no cervical adenopathy.   Neurological: She is alert and oriented to person, place, and time. She has normal reflexes. She displays normal reflexes. No cranial nerve deficit. She exhibits normal muscle tone. Coordination normal.   CN: Optic discs are flat with normal vasculature, PERRL, Extraoccular movements and visual fields are full. Normal facial sensation and strength, Hearing symmetric, Tongue and Palate are midline and strong. Shoulder Shrug symmetric and strong.   Skin: Skin is warm and dry.   Psychiatric: She has a normal mood and affect.   Nursing note and vitals reviewed.        CRANIAL NERVES     CN III, IV, VI   Pupils are equal, round, and reactive to light.  Extraocular motions are normal.        Significant Labs:   CBC:   Recent Labs   Lab 01/08/19  1135 01/09/19  0431   WBC 9.06 5.07   HGB 12.3 10.6*   HCT 38.7 33.5*   * 291     CMP:   Recent Labs   Lab 01/08/19  1136 01/08/19  1758 01/09/19  0431   * 137 139   K 3.7 3.1* 3.3*   CL 95 100 104   CO2 24 25 23   * 75 63*   BUN 7 6 4*   CREATININE 1.0 0.7 0.7   CALCIUM 9.7 9.4 8.2*   PROT 8.1  --   --    ALBUMIN 3.7  --   --    BILITOT 1.8*  --   --    ALKPHOS 115  --   --    AST 57*  --   --    ALT 25  --   --    ANIONGAP 16 12 12   EGFRNONAA >60 >60 >60   Urine Studies:   Recent Labs   Lab 01/08/19  1235   COLORU Yellow   APPEARANCEUA Clear   PHUR 6.0   SPECGRAV 1.010   PROTEINUA Negative   GLUCUA 2+*   KETONESU 3+*   BILIRUBINUA 1+*   OCCULTUA 3+*   NITRITE Negative   UROBILINOGEN 1.0   LEUKOCYTESUR Negative   RBCUA 4        Beta-Hydroxybutyrate 0.0 - 0.5 mmol/L 3.1 Abnormally high   6.1 Abnormally high   4.9 Abnormally high   5.4 Abnormally high     CXR The  lungs are clear, with normal appearance of pulmonary vasculature and no pleural effusion or pneumothorax.    The cardiac silhouette is normal in size. The hilar and mediastinal contours are unremarkable.    Bones are intact.    EKG NSR

## 2019-01-09 NOTE — ASSESSMENT & PLAN NOTE
Very mild case   IVF should help   Long acting insulin   Correction scale should help   No need for icu or insulin drip   Home in am     Can not afford long acting insulin. Will resume NPH but not till this evening. Will resume her sliding scale as well

## 2019-03-14 ENCOUNTER — PATIENT MESSAGE (OUTPATIENT)
Dept: INTERNAL MEDICINE | Facility: CLINIC | Age: 39
End: 2019-03-14

## 2019-08-16 DIAGNOSIS — Z12.39 BREAST CANCER SCREENING: ICD-10-CM

## 2019-08-21 LAB
LEFT EYE DM RETINOPATHY: NEGATIVE
RIGHT EYE DM RETINOPATHY: NEGATIVE

## 2019-10-10 ENCOUNTER — OFFICE VISIT (OUTPATIENT)
Dept: URGENT CARE | Facility: CLINIC | Age: 39
End: 2019-10-10
Payer: COMMERCIAL

## 2019-10-10 VITALS
WEIGHT: 170 LBS | DIASTOLIC BLOOD PRESSURE: 62 MMHG | TEMPERATURE: 99 F | HEIGHT: 67 IN | SYSTOLIC BLOOD PRESSURE: 137 MMHG | BODY MASS INDEX: 26.68 KG/M2 | OXYGEN SATURATION: 99 % | HEART RATE: 86 BPM

## 2019-10-10 DIAGNOSIS — R81 GLUCOSURIA: ICD-10-CM

## 2019-10-10 DIAGNOSIS — R30.0 DYSURIA: Primary | ICD-10-CM

## 2019-10-10 LAB
BILIRUB UR QL STRIP: POSITIVE
GLUCOSE SERPL-MCNC: 234 MG/DL (ref 70–110)
GLUCOSE UR QL STRIP: POSITIVE
KETONES UR QL STRIP: POSITIVE
LEUKOCYTE ESTERASE UR QL STRIP: NEGATIVE
PH, POC UA: 5.5
POC BLOOD, URINE: NEGATIVE
POC NITRATES, URINE: POSITIVE
PROT UR QL STRIP: NEGATIVE
SP GR UR STRIP: 1.01 (ref 1–1.03)
UROBILINOGEN UR STRIP-ACNC: NORMAL (ref 0.1–1.1)

## 2019-10-10 PROCEDURE — 99214 OFFICE O/P EST MOD 30 MIN: CPT | Mod: S$GLB,,, | Performed by: NURSE PRACTITIONER

## 2019-10-10 PROCEDURE — 99214 PR OFFICE/OUTPT VISIT, EST, LEVL IV, 30-39 MIN: ICD-10-PCS | Mod: S$GLB,,, | Performed by: NURSE PRACTITIONER

## 2019-10-10 PROCEDURE — 99000 PR SPECIMEN HANDLING,DR OFF->LAB: ICD-10-PCS | Mod: S$GLB,,, | Performed by: NURSE PRACTITIONER

## 2019-10-10 PROCEDURE — 82962 GLUCOSE BLOOD TEST: CPT | Mod: S$GLB,,, | Performed by: NURSE PRACTITIONER

## 2019-10-10 PROCEDURE — 82962 POCT GLUCOSE, HAND-HELD DEVICE: ICD-10-PCS | Mod: S$GLB,,, | Performed by: NURSE PRACTITIONER

## 2019-10-10 PROCEDURE — 81003 URINALYSIS AUTO W/O SCOPE: CPT | Mod: QW,S$GLB,, | Performed by: NURSE PRACTITIONER

## 2019-10-10 PROCEDURE — 99000 SPECIMEN HANDLING OFFICE-LAB: CPT | Mod: S$GLB,,, | Performed by: NURSE PRACTITIONER

## 2019-10-10 PROCEDURE — 81003 POCT URINALYSIS, DIPSTICK, AUTOMATED, W/O SCOPE: ICD-10-PCS | Mod: QW,S$GLB,, | Performed by: NURSE PRACTITIONER

## 2019-10-10 RX ORDER — PHENAZOPYRIDINE HYDROCHLORIDE 200 MG/1
200 TABLET, FILM COATED ORAL 3 TIMES DAILY PRN
Qty: 6 TABLET | Refills: 0 | Status: SHIPPED | OUTPATIENT
Start: 2019-10-10 | End: 2019-10-12

## 2019-10-10 RX ORDER — SULFAMETHOXAZOLE AND TRIMETHOPRIM 800; 160 MG/1; MG/1
1 TABLET ORAL 2 TIMES DAILY
Qty: 20 TABLET | Refills: 0 | Status: SHIPPED | OUTPATIENT
Start: 2019-10-10 | End: 2019-10-20

## 2019-10-10 NOTE — PROGRESS NOTES
"Subjective:       Patient ID: Cira Keys is a 39 y.o. female.    Vitals:  height is 5' 7" (1.702 m) and weight is 77.1 kg (170 lb). Her oral temperature is 98.6 °F (37 °C). Her blood pressure is 137/62 and her pulse is 86. Her oxygen saturation is 99%.     Chief Complaint: Dysuria    Patient presents today with frequency on urination and urgency . Patient also notices an odor when she urinates. Onset of symptoms two days ago . Patient is concerned there may be sugar in her urine as she is diabetic she states.     Dysuria    This is a new problem. The current episode started gradual onset. The problem occurs every urination. The problem has been unchanged. The quality of the pain is described as aching. The pain is at a severity of 0/10. The patient is experiencing no pain. There has been no fever. She is sexually active. There is no history of pyelonephritis. Associated symptoms include frequency and urgency. Pertinent negatives include no chills, hematuria, nausea, vomiting or rash. She has tried acetaminophen for the symptoms. The treatment provided mild relief. Her past medical history is significant for recurrent UTIs.       Constitution: Negative for chills and fever.   Neck: Negative for painful lymph nodes.   Gastrointestinal: Negative for abdominal pain, nausea and vomiting.   Genitourinary: Positive for dysuria, frequency and urgency. Negative for urine decreased, hematuria, history of kidney stones, painful menstruation, irregular menstruation, missed menses, heavy menstrual bleeding, ovarian cysts, genital trauma, vaginal pain, vaginal discharge, vaginal bleeding, vaginal odor, painful intercourse, genital sore, painful ejaculation and pelvic pain.   Musculoskeletal: Positive for back pain.   Skin: Negative for rash and lesion.   Hematologic/Lymphatic: Negative for swollen lymph nodes.       Objective:      Physical Exam      Assessment:       1. Dysuria    2. Glucosuria        Plan:       Patient had " elevated glucose.  Advised to take insulin as directed by her PCP for this issue.  Verbalized understanding.  Patient states she has her insulin with her and will do so asap.      Dysuria  -     POCT Urinalysis, Dipstick, Automated, W/O Scope  -     sulfamethoxazole-trimethoprim 800-160mg (BACTRIM DS) 800-160 mg Tab; Take 1 tablet by mouth 2 (two) times daily. for 10 days  Dispense: 20 tablet; Refill: 0  -     phenazopyridine (PYRIDIUM) 200 MG tablet; Take 1 tablet (200 mg total) by mouth 3 (three) times daily as needed for Pain.  Dispense: 6 tablet; Refill: 0  -     Urine culture    Glucosuria  -     POCT Glucose, Hand-Held Device      Patient Instructions     Please follow up with your Primary care provider within 2-5 days if your signs and symptoms have not resolved or worsen.     If your condition worsens or fails to improve we recommend that you receive another evaluation at the emergency room immediately or contact your primary medical clinic to discuss your concerns.    You must understand that you have received an Urgent Care treatment only and that you may be released before all of your medical problems are known or treated.   You, the patient, will arrange for follow up care as instructed.       Urinary Tract Infection    If your condition worsens or fails to improve we recommend that you receive another evaluation at the emergency room immediately or contact your primary medical clinic to discuss your concerns.    You must understand that you have received an Urgent Care treatment only and that you may be released before all of your medical problems are known or treated.     You, the patient, will arrange for follow up care as instructed.   You have been given an antibiotic to treat your condition today.  Please complete the antibiotic as directed on the bottle.     If you are female and on BCP use additional methods to prevent pregnancy while on antibiotics and for one cycle after.     You may take AZO  "for discomfort as directed on box. Take after a meal.  Use NO MORE than 2 full days as this can mask worsening symptoms.      If a culture was sent off:  We should receive results within 4 business days of culture.  If you are not contacted by the afternoon of the 4th day, please contact us for your results.          Dysuria     Painful urination (dysuria) is often caused by a problem in the urinary tract.   Dysuria is pain felt during urination. It is often described as a burning. Learn more about this problem and how it can be treated.  What causes dysuria?  Possible causes include:  · Infection with a bacteria or virus such as a urinary tract infection (UTI or a sexually transmitted infection (STI)  · Sensitivity or allergy to chemicals such as those found in lotions and other products  · Prostate or bladder problems  · Radiation therapy to the pelvic area  How is dysuria diagnosed?  Your healthcare provider will examine you. He or she will ask about your symptoms and health. After talking with you and doing a physical exam, your healthcare provider may know what is causing your dysuria. He or she will usually request  a sample of your urine. Tests of your urine, or a "urinalysis," are done. A urinalysis may include:  · Looking at the urine sample (visual exam)  · Checking for substances (chemical exam)  · Looking at a small amount under a microscope (microscopic exam)  Some parts of the urinalysis may be done in the provider's office and some in a lab. And, the urine sample may be checked for bacteria and yeast (urine culture). Your healthcare provider will tell you more about these tests if they are needed.  How is dysuria treated?  Treatment depends on the cause. If you have a bacterial infection, you may need antibiotics. You may be given medicines to make it easier for you to urinate and help relieve pain. Your healthcare provider can tell you more about your treatment options. Untreated, symptoms may get " worse.  When to call your healthcare provider  Call the healthcare provider right away if you have any of the following:  · Fever of 100.4°F (38°C) or higher   · No improvement after three days of treatment  · Trouble urinating because of pain  · New or increased discharge from the vagina or penis  · Rash or joint pain  · Increased back or abdominal pain  · Enlarged painful lymph nodes (lumps) in the groin   Date Last Reviewed: 1/1/2017 © 2000-2017 [employer]. [address] [place] [address]. All rights reserved. This information is not intended as a substitute for professional medical care. Always follow your healthcare professional's instructions.

## 2019-10-10 NOTE — PATIENT INSTRUCTIONS

## 2019-10-14 ENCOUNTER — TELEPHONE (OUTPATIENT)
Dept: URGENT CARE | Facility: CLINIC | Age: 39
End: 2019-10-14

## 2019-10-14 LAB
BACTERIA UR CULT: ABNORMAL
BACTERIA UR CULT: ABNORMAL
OTHER ANTIBIOTIC SUSC ISLT: ABNORMAL

## 2019-11-08 ENCOUNTER — IMMUNIZATION (OUTPATIENT)
Dept: INTERNAL MEDICINE | Facility: CLINIC | Age: 39
End: 2019-11-08
Payer: COMMERCIAL

## 2019-11-08 PROCEDURE — 90686 IIV4 VACC NO PRSV 0.5 ML IM: CPT | Mod: PBBFAC

## 2019-11-08 NOTE — PROGRESS NOTES
Influenza vaccine given, no reaction noted. Patient informed last visit was 2017. Annual is past due. Patient states she will schedule appointment before leaving clinic today.

## 2019-12-11 ENCOUNTER — OFFICE VISIT (OUTPATIENT)
Dept: INTERNAL MEDICINE | Facility: CLINIC | Age: 39
End: 2019-12-11
Payer: COMMERCIAL

## 2019-12-11 VITALS
OXYGEN SATURATION: 100 % | HEART RATE: 99 BPM | BODY MASS INDEX: 28.72 KG/M2 | HEIGHT: 67 IN | RESPIRATION RATE: 18 BRPM | WEIGHT: 183 LBS | DIASTOLIC BLOOD PRESSURE: 76 MMHG | SYSTOLIC BLOOD PRESSURE: 142 MMHG

## 2019-12-11 DIAGNOSIS — L03.011 CELLULITIS OF FINGER OF RIGHT HAND: Primary | ICD-10-CM

## 2019-12-11 DIAGNOSIS — E10.9 TYPE 1 DIABETES MELLITUS WITHOUT COMPLICATION: ICD-10-CM

## 2019-12-11 DIAGNOSIS — Z23 NEED FOR VACCINATION: ICD-10-CM

## 2019-12-11 PROCEDURE — 99999 PR PBB SHADOW E&M-EST. PATIENT-LVL III: ICD-10-PCS | Mod: PBBFAC,,, | Performed by: INTERNAL MEDICINE

## 2019-12-11 PROCEDURE — 90714 TD VACC NO PRESV 7 YRS+ IM: CPT | Mod: S$GLB,,, | Performed by: INTERNAL MEDICINE

## 2019-12-11 PROCEDURE — 90471 PNEUMOCOCCAL POLYSACCHARIDE VACCINE 23-VALENT =>2YO SQ IM: ICD-10-PCS | Mod: S$GLB,,, | Performed by: INTERNAL MEDICINE

## 2019-12-11 PROCEDURE — 99214 PR OFFICE/OUTPT VISIT, EST, LEVL IV, 30-39 MIN: ICD-10-PCS | Mod: 25,S$GLB,, | Performed by: INTERNAL MEDICINE

## 2019-12-11 PROCEDURE — 99214 OFFICE O/P EST MOD 30 MIN: CPT | Mod: 25,S$GLB,, | Performed by: INTERNAL MEDICINE

## 2019-12-11 PROCEDURE — 99999 PR PBB SHADOW E&M-EST. PATIENT-LVL III: CPT | Mod: PBBFAC,,, | Performed by: INTERNAL MEDICINE

## 2019-12-11 PROCEDURE — 90732 PNEUMOCOCCAL POLYSACCHARIDE VACCINE 23-VALENT =>2YO SQ IM: ICD-10-PCS | Mod: S$GLB,,, | Performed by: INTERNAL MEDICINE

## 2019-12-11 PROCEDURE — 90472 IMMUNIZATION ADMIN EACH ADD: CPT | Mod: S$GLB,,, | Performed by: INTERNAL MEDICINE

## 2019-12-11 PROCEDURE — 90472 TD VACCINE GREATER THAN OR EQUAL TO 7YO PRESERVATIVE FREE IM: ICD-10-PCS | Mod: S$GLB,,, | Performed by: INTERNAL MEDICINE

## 2019-12-11 PROCEDURE — 90714 TD VACCINE GREATER THAN OR EQUAL TO 7YO PRESERVATIVE FREE IM: ICD-10-PCS | Mod: S$GLB,,, | Performed by: INTERNAL MEDICINE

## 2019-12-11 PROCEDURE — 90732 PPSV23 VACC 2 YRS+ SUBQ/IM: CPT | Mod: S$GLB,,, | Performed by: INTERNAL MEDICINE

## 2019-12-11 PROCEDURE — 90471 IMMUNIZATION ADMIN: CPT | Mod: S$GLB,,, | Performed by: INTERNAL MEDICINE

## 2019-12-11 RX ORDER — CLINDAMYCIN HYDROCHLORIDE 300 MG/1
300 CAPSULE ORAL EVERY 8 HOURS
Qty: 30 CAPSULE | Refills: 0 | Status: SHIPPED | OUTPATIENT
Start: 2019-12-11 | End: 2019-12-21

## 2019-12-11 NOTE — PROGRESS NOTES
"Subjective:       Patient ID: Cira Keys is a 39 y.o. female.    Chief Complaint: Nail Problem      HPI:  Patient is new to me but known to clinic and presents with concern for nail infection. She reports her sx started 5 years ago. She was diagnosed with an infection and fungus of her right 2nd finger nail at that time. She reports she had her nail removed and was "put on some kind of medications"--this was done 2 years ago. Reports there was no improvement. She thinks her sx are getting worse as has some redness spreading down her finger. Denies fevers. She reports the tip of the finger is painful.     She reports she is "getting my insulin over the counter". She is taking "10 units in the morning and 5 units at night". She has not seen her prviosu PCP in 2 years. Reports home blood sugars are 200s. She is a former smoker: 1 PPD for about 20 years; now she vapes for last 3 years.     Past Medical History:   Diagnosis Date    E coli bacteremia 2011    GERD (gastroesophageal reflux disease)     Iron deficiency anemia due to chronic blood loss 10/31/2015    Type 1 diabetes mellitus without complication 10/31/2015       Family History   Problem Relation Age of Onset    No Known Problems Mother     No Known Problems Father     No Known Problems Sister     No Known Problems Brother     Breast cancer Neg Hx     Colon cancer Neg Hx     Ovarian cancer Neg Hx        Social History     Socioeconomic History    Marital status:      Spouse name: Not on file    Number of children: Not on file    Years of education: Not on file    Highest education level: Not on file   Occupational History    Not on file   Social Needs    Financial resource strain: Not on file    Food insecurity:     Worry: Not on file     Inability: Not on file    Transportation needs:     Medical: Not on file     Non-medical: Not on file   Tobacco Use    Smoking status: Former Smoker     Packs/day: 1.00     Start date: 11/1/1996 "     Last attempt to quit: 2014     Years since quittin.1    Smokeless tobacco: Never Used   Substance and Sexual Activity    Alcohol use: Yes     Alcohol/week: 4.0 standard drinks     Types: 4 Standard drinks or equivalent per week     Comment: occasionally    Drug use: No    Sexual activity: Yes     Birth control/protection: See Surgical Hx     Comment:    Lifestyle    Physical activity:     Days per week: Not on file     Minutes per session: Not on file    Stress: Not on file   Relationships    Social connections:     Talks on phone: Not on file     Gets together: Not on file     Attends Taoism service: Not on file     Active member of club or organization: Not on file     Attends meetings of clubs or organizations: Not on file     Relationship status: Not on file   Other Topics Concern    Not on file   Social History Narrative    Not on file       Review of Systems   Constitutional: Negative for activity change, fatigue, fever and unexpected weight change.   HENT: Negative for congestion, ear pain, hearing loss, rhinorrhea and sore throat.    Eyes: Negative for redness and visual disturbance.   Respiratory: Negative for cough, shortness of breath and wheezing.    Cardiovascular: Negative for chest pain, palpitations and leg swelling.   Gastrointestinal: Negative for abdominal pain, constipation, diarrhea, nausea and vomiting.   Genitourinary: Negative for dysuria, frequency, pelvic pain and urgency.   Musculoskeletal: Negative for back pain, joint swelling and neck pain.   Skin: Positive for color change (redness right 2nd finger). Negative for rash and wound.   Neurological: Negative for dizziness, tremors, weakness, light-headedness and headaches.         Objective:      Physical Exam   Constitutional: She is oriented to person, place, and time. She appears well-developed and well-nourished. No distress.   HENT:   Head: Normocephalic and atraumatic.   Right Ear: External ear normal.    Left Ear: External ear normal.   Eyes: Pupils are equal, round, and reactive to light. Conjunctivae and EOM are normal. Right eye exhibits no discharge. Left eye exhibits no discharge.   Neck: Neck supple. No tracheal deviation present.   Cardiovascular: Normal rate and regular rhythm.   No murmur heard.  Pulmonary/Chest: Effort normal and breath sounds normal. No respiratory distress. She has no wheezes. She has no rales.   Abdominal: Soft. Bowel sounds are normal. She exhibits no distension. There is no tenderness.   Neurological: She is alert and oriented to person, place, and time. No cranial nerve deficit.   Skin: Skin is warm and dry. There is erythema (right distal 2nd finger with peeling of cuticle).   Psychiatric: She has a normal mood and affect. Her behavior is normal.   Vitals reviewed.      Assessment:       1. Cellulitis of finger of right hand    2. Type 1 diabetes mellitus without complication    3. Need for vaccination        Plan:       Cira was seen today for nail problem.    Diagnoses and all orders for this visit:    Cellulitis of finger of right hand  -     clindamycin (CLEOCIN) 300 MG capsule; Take 1 capsule (300 mg total) by mouth every 8 (eight) hours. for 10 days  She reports having this issue for 2 years  My thoughts are she is getting recurrent skin infections due to uncontrolled DM or possible some sort of embolic phenomenin with DM, tobacco use, etc  On exam, this really looks more like a cellulitis. There is redness spreading from distal finger to DIP joint, no swelling. Cuticle is peeling and there is small open area medially which would be a source of infection  Treat with antibx first  If not better, and given chronicity, consider embolism. However, pulse is good. Cap refill good and no signs of necrosis  Recurrent fungal infection also a consideration.    Type 1 diabetes mellitus without complication  -     (In Office Administered) Pneumococcal Polysaccharide Vaccine (23  Valent) (SQ/IM)  Has not seen any doctor for this in 2 years  Will have her follow up with her PCP  Reports getting her insulin over the counter last 2 years but sugars are uncontrolled  1800 nicole ADA diet  Will need labs for next visit  Long discussion with patient. It is critical she follow up with her PCP to get her diabetes under control    Need for vaccination  -     (In Office Administered) Pneumococcal Polysaccharide Vaccine (23 Valent) (SQ/IM)  -     (In Office Administered) Td Vaccine - Preservative Free

## 2019-12-31 RX ORDER — NORGESTIMATE AND ETHINYL ESTRADIOL 0.25-0.035
KIT ORAL
Qty: 28 TABLET | Refills: 0 | Status: SHIPPED | OUTPATIENT
Start: 2019-12-31 | End: 2020-01-13 | Stop reason: SDUPTHER

## 2020-01-13 RX ORDER — NORGESTIMATE AND ETHINYL ESTRADIOL 0.25-0.035
1 KIT ORAL DAILY
Qty: 28 TABLET | Refills: 0 | Status: SHIPPED | OUTPATIENT
Start: 2020-01-13 | End: 2020-01-29 | Stop reason: SDUPTHER

## 2020-01-13 NOTE — TELEPHONE ENCOUNTER
"Cira desire refill of OCP. Annual scheduled 1/29  Patient Active Problem List   Diagnosis    Type 1 diabetes mellitus without complication    Iron deficiency anemia due to chronic blood loss    Pyelonephritis    Hyponatremia    High anion gap metabolic acidosis    Respiratory alkalosis    Hypophosphatemia    Hypomagnesemia    Fever    Hypokalemia    Essential hypertension    Infection of right index finger    Cough    Diabetic ketoacidosis without coma associated with type 2 diabetes mellitus     Prior to Admission medications    Medication Sig Start Date End Date Taking? Authorizing Provider   b complex vitamins tablet Take 1 tablet by mouth once daily.    Historical Provider, MD   insulin needles, disposable, 32 x 1/4 " Ndle Use to inject insulin 5 times a day 11/3/15   Haily Yang MD   insulin NPH (NOVOLIN N) 100 unit/mL injection Use 10 units in am and 5 units at night 10/16/17   Radha Lino NP   lisinopril 10 MG tablet TAKE ONE TABLET BY MOUTH ONCE DAILY  Patient not taking: Reported on 12/11/2019 2/18/18   Radha Lino NP   loratadine (CLARITIN) 10 mg tablet Take 10 mg by mouth once daily.    Historical Provider, MD   SPRINTEC, 28, 0.25-35 mg-mcg per tablet TAKE 1 TABLET BY MOUTH ONCE DAILY 12/31/19   Marianna Gamble CNM       "

## 2020-01-13 NOTE — TELEPHONE ENCOUNTER
----- Message from Shweta Presley MA sent at 1/13/2020  9:43 AM CST -----      ----- Message -----  From: Irasema Banegas MA  Sent: 1/13/2020   9:18 AM CST  To: [facility] Staff    Pt is a [facility] pt. Pt needs a refill on her OCP sent to [facility] in [place] Pt is scheduled for an annual appt with Marianna since she has the soonest available.

## 2020-01-29 ENCOUNTER — OFFICE VISIT (OUTPATIENT)
Dept: OBSTETRICS AND GYNECOLOGY | Facility: CLINIC | Age: 40
End: 2020-01-29
Payer: COMMERCIAL

## 2020-01-29 VITALS
BODY MASS INDEX: 28.25 KG/M2 | RESPIRATION RATE: 16 BRPM | SYSTOLIC BLOOD PRESSURE: 138 MMHG | HEIGHT: 67 IN | WEIGHT: 180 LBS | HEART RATE: 86 BPM | DIASTOLIC BLOOD PRESSURE: 80 MMHG

## 2020-01-29 DIAGNOSIS — Z30.09 COUNSELING FOR BIRTH CONTROL, ORAL CONTRACEPTIVES: ICD-10-CM

## 2020-01-29 DIAGNOSIS — B37.2 SKIN YEAST INFECTION: ICD-10-CM

## 2020-01-29 DIAGNOSIS — Z12.31 BREAST CANCER SCREENING BY MAMMOGRAM: Primary | ICD-10-CM

## 2020-01-29 DIAGNOSIS — Z01.419 WELL WOMAN EXAM WITH ROUTINE GYNECOLOGICAL EXAM: ICD-10-CM

## 2020-01-29 PROCEDURE — 99396 PREV VISIT EST AGE 40-64: CPT | Mod: S$GLB,,, | Performed by: ADVANCED PRACTICE MIDWIFE

## 2020-01-29 PROCEDURE — 99999 PR PBB SHADOW E&M-EST. PATIENT-LVL III: ICD-10-PCS | Mod: PBBFAC,,, | Performed by: ADVANCED PRACTICE MIDWIFE

## 2020-01-29 PROCEDURE — 99396 PR PREVENTIVE VISIT,EST,40-64: ICD-10-PCS | Mod: S$GLB,,, | Performed by: ADVANCED PRACTICE MIDWIFE

## 2020-01-29 PROCEDURE — 99999 PR PBB SHADOW E&M-EST. PATIENT-LVL III: CPT | Mod: PBBFAC,,, | Performed by: ADVANCED PRACTICE MIDWIFE

## 2020-01-29 RX ORDER — NYSTATIN 100000 U/G
CREAM TOPICAL 2 TIMES DAILY
Qty: 15 G | Refills: 0 | Status: SHIPPED | OUTPATIENT
Start: 2020-01-29 | End: 2020-02-11

## 2020-01-29 RX ORDER — NORGESTIMATE AND ETHINYL ESTRADIOL 0.25-0.035
1 KIT ORAL DAILY
Qty: 28 TABLET | Refills: 0 | Status: SHIPPED | OUTPATIENT
Start: 2020-01-29 | End: 2020-03-09

## 2020-01-29 NOTE — PROGRESS NOTES
"  Subjective:    Patient ID: Cira Keys is a 39 y.o. y.o. female.     Chief Complaint: Annual Well Woman Exam     History of Present Illness:  Cira presents today for Annual Well Woman exam. She describes her menses as regular every month without intermenstrual spotting.She denies pelvic pain.  She denies breast tenderness, masses, nipple discharge. She denies difficulty with urination or bowel movements. She denies menopausal symptoms such as hotflashes, vaginal dryness, and night sweats. She denies bloating, early satiety, or weight changes. She is sexually active. Contraception is by oral contraceptives (estrogen/progesterone).    The following portions of the patient's history were reviewed and updated as appropriate: allergies, current medications, past family history, past medical history, past social history, past surgical history and problem list.      Menstrual History:   Patient's last menstrual period was 2019 (exact date)..     OB History        2    Para   2    Term   2            AB        Living   1       SAB        TAB        Ectopic        Multiple        Live Births   2                 The following portions of the patient's history were reviewed and updated as appropriate: allergies, current medications, past family history, past medical history, past social history, past surgical history and problem list.        ROS:     Review of Systems   Constitutional: Negative for fatigue.   Endocrine: Positive for diabetes.        Type 1    Genitourinary: Negative for menorrhagia, menstrual problem, pelvic pain, vaginal discharge, vaginal pain and vaginal odor.       Objective:    Vital Signs:  Vitals:    20 1613   BP: 138/80   Pulse: 86   Resp: 16   Weight: 81.6 kg (180 lb)   Height: 5' 7" (1.702 m)         Physical Exam:  General:  alert, cooperative, appears stated age   Skin:  Skin color, texture, turgor normal. No rashes or lesions   HEENT:  conjunctivae/corneas clear. " PERRL.   Neck: supple, trachea midline, no adenopathy or thyromegally   Respiratory:  clear to auscultation bilaterally   Heart:  regular rate and rhythm, S1, S2 normal, no murmur, click, rub or gallop   Breasts:  no discharge, erythema, or tenderness, mammography schedule is discussed, yeast under bilateral breast    Abdomen:  normal findings: bowel sounds normal, no masses palpable, no organomegaly and soft, non-tender   Pelvis: External genitalia: normal general appearance  Urinary system: urethral meatus normal, bladder nontender  Vaginal: normal mucosa without prolapse or lesions  Cervix: normal appearance  Uterus: normal size, shape, position  Adnexa: normal size, nontender bilaterally   Extremities: Normal ROM; no edema, no cyanosis   Neurologial: Normal strength and tone. No focal numbness or weakness. Reflexes 2+ and equal.   Psychiatric: normal mood, speech, dress, and thought processes         Assessment:       Healthy female exam.     1. Breast cancer screening by mammogram    2. Well woman exam with routine gynecological exam    3. Counseling for birth control, oral contraceptives          Plan:       All questions answered.  Breast self exam technique reviewed and patient encouraged to perform self-exam monthly.  Contraception: OCP (estrogen/progesterone).  Follow up in 1 year.  Follow up as needed.  Mammogram.  nystatin      COUNSELING:  Cira was counseled on S use and side-effects of various contraceptive measures, A.C.O.G. Pap guidelines and recommendations for yearly pelvic exams in addition to recommendations for monthly self breast exams; to see her PCP for other health maintenance.

## 2020-02-06 ENCOUNTER — HOSPITAL ENCOUNTER (OUTPATIENT)
Dept: RADIOLOGY | Facility: HOSPITAL | Age: 40
Discharge: HOME | End: 2020-02-06
Attending: ADVANCED PRACTICE MIDWIFE
Payer: COMMERCIAL

## 2020-02-06 VITALS — BODY MASS INDEX: 28.25 KG/M2 | WEIGHT: 180 LBS | HEIGHT: 67 IN

## 2020-02-06 DIAGNOSIS — Z12.31 BREAST CANCER SCREENING BY MAMMOGRAM: ICD-10-CM

## 2020-02-06 PROCEDURE — 77067 MAMMO DIGITAL SCREENING BILAT WITH TOMOSYNTHESIS_CAD: ICD-10-PCS | Mod: 26,,, | Performed by: RADIOLOGY

## 2020-02-06 PROCEDURE — 77067 SCR MAMMO BI INCL CAD: CPT | Mod: TC

## 2020-02-06 PROCEDURE — 77067 SCR MAMMO BI INCL CAD: CPT | Mod: 26,,, | Performed by: RADIOLOGY

## 2020-02-06 PROCEDURE — 77063 MAMMO DIGITAL SCREENING BILAT WITH TOMOSYNTHESIS_CAD: ICD-10-PCS | Mod: 26,,, | Performed by: RADIOLOGY

## 2020-02-06 PROCEDURE — 77063 BREAST TOMOSYNTHESIS BI: CPT | Mod: 26,,, | Performed by: RADIOLOGY

## 2020-02-11 ENCOUNTER — OFFICE VISIT (OUTPATIENT)
Dept: INTERNAL MEDICINE | Facility: CLINIC | Age: 40
End: 2020-02-11
Payer: COMMERCIAL

## 2020-02-11 VITALS
RESPIRATION RATE: 16 BRPM | HEART RATE: 93 BPM | HEIGHT: 67 IN | OXYGEN SATURATION: 99 % | DIASTOLIC BLOOD PRESSURE: 68 MMHG | BODY MASS INDEX: 28.48 KG/M2 | SYSTOLIC BLOOD PRESSURE: 132 MMHG | WEIGHT: 181.44 LBS

## 2020-02-11 DIAGNOSIS — L08.9 INFECTION OF INDEX FINGER: ICD-10-CM

## 2020-02-11 DIAGNOSIS — E10.9 TYPE 1 DIABETES MELLITUS WITHOUT COMPLICATION: Primary | ICD-10-CM

## 2020-02-11 DIAGNOSIS — D50.0 IRON DEFICIENCY ANEMIA DUE TO CHRONIC BLOOD LOSS: ICD-10-CM

## 2020-02-11 DIAGNOSIS — I10 ESSENTIAL HYPERTENSION: ICD-10-CM

## 2020-02-11 PROCEDURE — 99214 PR OFFICE/OUTPT VISIT, EST, LEVL IV, 30-39 MIN: ICD-10-PCS | Mod: S$GLB,,, | Performed by: NURSE PRACTITIONER

## 2020-02-11 PROCEDURE — 99214 OFFICE O/P EST MOD 30 MIN: CPT | Mod: S$GLB,,, | Performed by: NURSE PRACTITIONER

## 2020-02-11 PROCEDURE — 99999 PR PBB SHADOW E&M-EST. PATIENT-LVL III: CPT | Mod: PBBFAC,,, | Performed by: NURSE PRACTITIONER

## 2020-02-11 PROCEDURE — 99999 PR PBB SHADOW E&M-EST. PATIENT-LVL III: ICD-10-PCS | Mod: PBBFAC,,, | Performed by: NURSE PRACTITIONER

## 2020-02-11 RX ORDER — MUPIROCIN 20 MG/G
OINTMENT TOPICAL 3 TIMES DAILY
Qty: 30 G | Refills: 0 | Status: ON HOLD | OUTPATIENT
Start: 2020-02-11 | End: 2020-09-14

## 2020-02-11 NOTE — PROGRESS NOTES
Subjective:       Patient ID: Cira Keys is a 39 y.o. female.    Chief Complaint: Follow-up    HPI: Pt presents to clinic today known to me. She is here to f/u from Dr Mercado pointer finger right hand. She had cellulitis. Was treated with abx from Dr Mercado. She had to f/u with Dr Engel because it did not get better. She reports that she was told to f/u if not better. It did get better but after abx has started to come back. She has pulled her hang nail. She has been dealing with this finger on and off for 3 years. She was first referred to wound care and had x ray which did not show involvement. They removed the nail.     She has not been seen for her DM management since 3/2017. She reports that she gets her insulin  OTC and treats herself. She was checking her own A1C and accu check machine. She is using novolin N am 14 units nightly 4-6 and she uses regular insulin on sliding scale.   Review of Systems   Constitutional: Negative for chills, fatigue, fever and unexpected weight change.   HENT: Negative for congestion, ear pain, sore throat and trouble swallowing.    Eyes: Negative for pain and visual disturbance.   Respiratory: Negative for cough, chest tightness and shortness of breath.    Cardiovascular: Negative for chest pain, palpitations and leg swelling.   Gastrointestinal: Negative for abdominal distention, abdominal pain, constipation, diarrhea and vomiting.   Genitourinary: Negative for difficulty urinating, dysuria, flank pain, frequency and hematuria.   Musculoskeletal: Negative for back pain, gait problem, joint swelling, neck pain and neck stiffness.   Skin: Negative for rash and wound.        Right pointer finger skin pealing, no puss, no swelling   Neurological: Negative for dizziness, seizures, speech difficulty, light-headedness and headaches.       Objective:      Physical Exam   Constitutional: She is oriented to person, place, and time. She appears well-developed and well-nourished.    HENT:   Head: Normocephalic and atraumatic.   Right Ear: External ear normal.   Left Ear: External ear normal.   Nose: Nose normal.   Mouth/Throat: Oropharynx is clear and moist.   Eyes: Pupils are equal, round, and reactive to light. Conjunctivae and EOM are normal.   Neck: Normal range of motion. Neck supple. No thyromegaly present.   Cardiovascular: Normal rate, regular rhythm, normal heart sounds and intact distal pulses.   No murmur heard.  Pulmonary/Chest: Effort normal and breath sounds normal. No stridor. No respiratory distress. She has no wheezes.   Abdominal: Soft. Bowel sounds are normal. She exhibits no distension. There is no tenderness. There is no guarding.   Musculoskeletal: Normal range of motion. She exhibits no edema.   Neurological: She is alert and oriented to person, place, and time.   Skin: Skin is warm and dry. Capillary refill takes less than 2 seconds.   Psychiatric: She has a normal mood and affect. Her behavior is normal. Judgment and thought content normal.   Nursing note and vitals reviewed.      Assessment:       1. Type 1 diabetes mellitus without complication    2. Iron deficiency anemia due to chronic blood loss    3. Essential hypertension    4. Infection of right index finger        Plan:     Problem List Items Addressed This Visit     Type 1 diabetes mellitus without complication - Primary    Relevant Orders    Lipid panel    Hemoglobin A1c    Iron deficiency anemia due to chronic blood loss    Relevant Orders    Ferritin    Iron and TIBC    Essential hypertension    Relevant Orders    CBC auto differential    Comprehensive metabolic panel    TSH    Infection of right index finger    Overview            Relevant Medications    mupirocin (BACTROBAN) 2 % ointment    Other Relevant Orders    X-Ray Hand 2 View Right          Repeat image of that finger to make sure just soft tissue involved. Bactroban and refer back to derm.   Labs this week and f.u next week for review and start  ace/statin and discuss DM management

## 2020-02-12 ENCOUNTER — HOSPITAL ENCOUNTER (OUTPATIENT)
Dept: RADIOLOGY | Facility: HOSPITAL | Age: 40
Discharge: HOME | End: 2020-02-12
Attending: NURSE PRACTITIONER
Payer: COMMERCIAL

## 2020-02-12 DIAGNOSIS — L08.9 INFECTION OF INDEX FINGER: ICD-10-CM

## 2020-02-12 PROCEDURE — 73130 XR HAND COMPLETE 3 VIEW RIGHT: ICD-10-PCS | Mod: 26,RT,, | Performed by: RADIOLOGY

## 2020-02-12 PROCEDURE — 73130 X-RAY EXAM OF HAND: CPT | Mod: 26,RT,, | Performed by: RADIOLOGY

## 2020-02-12 PROCEDURE — 73130 X-RAY EXAM OF HAND: CPT | Mod: TC,RT

## 2020-02-18 ENCOUNTER — OFFICE VISIT (OUTPATIENT)
Dept: INTERNAL MEDICINE | Facility: CLINIC | Age: 40
End: 2020-02-18
Payer: COMMERCIAL

## 2020-02-18 VITALS
RESPIRATION RATE: 16 BRPM | DIASTOLIC BLOOD PRESSURE: 68 MMHG | BODY MASS INDEX: 27.64 KG/M2 | SYSTOLIC BLOOD PRESSURE: 110 MMHG | OXYGEN SATURATION: 100 % | HEIGHT: 67 IN | HEART RATE: 102 BPM | WEIGHT: 176.13 LBS

## 2020-02-18 DIAGNOSIS — E10.65 UNCONTROLLED TYPE 1 DIABETES MELLITUS WITH HYPERGLYCEMIA: Primary | ICD-10-CM

## 2020-02-18 DIAGNOSIS — D50.9 IRON DEFICIENCY ANEMIA, UNSPECIFIED IRON DEFICIENCY ANEMIA TYPE: ICD-10-CM

## 2020-02-18 PROCEDURE — 99214 PR OFFICE/OUTPT VISIT, EST, LEVL IV, 30-39 MIN: ICD-10-PCS | Mod: S$GLB,,, | Performed by: NURSE PRACTITIONER

## 2020-02-18 PROCEDURE — 99999 PR PBB SHADOW E&M-EST. PATIENT-LVL III: ICD-10-PCS | Mod: PBBFAC,,, | Performed by: NURSE PRACTITIONER

## 2020-02-18 PROCEDURE — 99214 OFFICE O/P EST MOD 30 MIN: CPT | Mod: S$GLB,,, | Performed by: NURSE PRACTITIONER

## 2020-02-18 PROCEDURE — 99999 PR PBB SHADOW E&M-EST. PATIENT-LVL III: CPT | Mod: PBBFAC,,, | Performed by: NURSE PRACTITIONER

## 2020-02-18 RX ORDER — IRON POLYSACCHARIDE COMPLEX 150 MG
150 CAPSULE ORAL DAILY
Qty: 30 CAPSULE | Refills: 3 | COMMUNITY
Start: 2020-02-18 | End: 2020-02-19 | Stop reason: SDUPTHER

## 2020-02-18 RX ORDER — ATORVASTATIN CALCIUM 20 MG/1
20 TABLET, FILM COATED ORAL DAILY
Qty: 90 TABLET | Refills: 3 | Status: ON HOLD | OUTPATIENT
Start: 2020-02-25 | End: 2020-09-15 | Stop reason: SDUPTHER

## 2020-02-18 RX ORDER — LISINOPRIL 2.5 MG/1
2.5 TABLET ORAL DAILY
Qty: 90 TABLET | Refills: 3 | Status: SHIPPED | OUTPATIENT
Start: 2020-02-18 | End: 2021-03-22

## 2020-02-18 NOTE — PROGRESS NOTES
Subjective:       Patient ID: Cira Keys is a 39 y.o. female.    Chief Complaint: Follow-up    HPI: Pt presents to clinic today known to me with c/o needing routine f/u. Her finger swelling and redness has resolved. She has completed her second course of doxy last week from derm.     Lab Results   Component Value Date    WBC 3.43 (L) 02/12/2020    HGB 8.5 (L) 02/12/2020    HCT 30.1 (L) 02/12/2020    MCV 81 (L) 02/12/2020     02/12/2020     Lab Results   Component Value Date    FERRITIN 24 02/12/2020     Lab Results   Component Value Date    IRON 15 (L) 02/12/2020    TIBC 494 (H) 02/12/2020    FERRITIN 24 02/12/2020     Lab Results   Component Value Date    HGBA1C 8.0 (H) 02/12/2020         She denies heavy periods. Has had better periods since on OCP. Microcytic and hypochromic. She reports that she is eating a lot of red meat. Has hx of fe def anemia. She reports that  She was told to stop taking it.    Review of Systems   Constitutional: Negative for chills, fatigue, fever and unexpected weight change.   HENT: Negative for congestion, ear pain, sore throat and trouble swallowing.    Eyes: Negative for pain and visual disturbance.   Respiratory: Negative for cough, chest tightness and shortness of breath.    Cardiovascular: Negative for chest pain, palpitations and leg swelling.   Gastrointestinal: Negative for abdominal distention, abdominal pain, constipation, diarrhea and vomiting.   Genitourinary: Negative for difficulty urinating, dysuria, flank pain, frequency and hematuria.   Musculoskeletal: Negative for back pain, gait problem, joint swelling, neck pain and neck stiffness.   Skin: Negative for rash and wound.   Neurological: Negative for dizziness, seizures, speech difficulty, light-headedness and headaches.       Objective:      Physical Exam   Constitutional: She is oriented to person, place, and time. She appears well-developed and well-nourished.   HENT:   Head: Normocephalic and atraumatic.    Right Ear: External ear normal.   Left Ear: External ear normal.   Nose: Nose normal.   Mouth/Throat: Oropharynx is clear and moist.   Eyes: Pupils are equal, round, and reactive to light. Conjunctivae and EOM are normal.   Neck: Normal range of motion. Neck supple.   Cardiovascular: Normal rate, regular rhythm, normal heart sounds and intact distal pulses.   Pulmonary/Chest: Effort normal and breath sounds normal.   Abdominal: Soft. Bowel sounds are normal.   Musculoskeletal: Normal range of motion.   Neurological: She is alert and oriented to person, place, and time.   Skin: Skin is warm and dry.   Psychiatric: She has a normal mood and affect. Her behavior is normal. Judgment and thought content normal.   Nursing note and vitals reviewed.      Assessment:       1. Uncontrolled type 1 diabetes mellitus with hyperglycemia    2. Iron deficiency anemia, unspecified iron deficiency anemia type        Plan:     Problem List Items Addressed This Visit     None      Visit Diagnoses     Uncontrolled type 1 diabetes mellitus with hyperglycemia    -  Primary    Relevant Medications    lisinopril (PRINIVIL,ZESTRIL) 2.5 MG tablet    atorvastatin (LIPITOR) 20 MG tablet (Start on 2/25/2020)    Iron deficiency anemia, unspecified iron deficiency anemia type        Relevant Medications    iron polysaccharides (NIFEREX) 150 mg iron Cap        F/u 2 months for repeat fe studies as well as lft with new addition of statin.

## 2020-02-19 DIAGNOSIS — D50.9 IRON DEFICIENCY ANEMIA, UNSPECIFIED IRON DEFICIENCY ANEMIA TYPE: ICD-10-CM

## 2020-02-19 RX ORDER — IRON POLYSACCHARIDE COMPLEX 150 MG
150 CAPSULE ORAL DAILY
Qty: 30 CAPSULE | Refills: 3 | Status: ON HOLD | OUTPATIENT
Start: 2020-02-19 | End: 2020-09-14

## 2020-03-09 RX ORDER — NORGESTIMATE AND ETHINYL ESTRADIOL 0.25-0.035
KIT ORAL
Qty: 28 TABLET | Refills: 5 | Status: SHIPPED | OUTPATIENT
Start: 2020-03-09 | End: 2021-04-06

## 2020-09-10 ENCOUNTER — HOSPITAL ENCOUNTER (EMERGENCY)
Facility: HOSPITAL | Age: 40
Discharge: ANOTHER HOSPITAL | End: 2020-09-11
Attending: SURGERY
Payer: OTHER GOVERNMENT

## 2020-09-10 DIAGNOSIS — R11.2 NAUSEA AND VOMITING, INTRACTABILITY OF VOMITING NOT SPECIFIED, UNSPECIFIED VOMITING TYPE: Primary | ICD-10-CM

## 2020-09-10 DIAGNOSIS — R11.10 EMESIS: ICD-10-CM

## 2020-09-10 DIAGNOSIS — R74.8 ELEVATED LIVER ENZYMES: ICD-10-CM

## 2020-09-10 LAB
ALBUMIN SERPL BCP-MCNC: 2.7 G/DL (ref 3.5–5.2)
ALBUMIN SERPL BCP-MCNC: 3.3 G/DL (ref 3.5–5.2)
ALP SERPL-CCNC: 122 U/L (ref 55–135)
ALP SERPL-CCNC: 158 U/L (ref 55–135)
ALT SERPL W/O P-5'-P-CCNC: 395 U/L (ref 10–44)
ALT SERPL W/O P-5'-P-CCNC: 473 U/L (ref 10–44)
AMMONIA PLAS-SCNC: 36 UMOL/L (ref 10–50)
AMPHET+METHAMPHET UR QL: NEGATIVE
ANION GAP SERPL CALC-SCNC: 14 MMOL/L (ref 8–16)
ANION GAP SERPL CALC-SCNC: 21 MMOL/L (ref 8–16)
APTT BLDCRRT: 28.4 SEC (ref 21–32)
AST SERPL-CCNC: 1144 U/L (ref 10–40)
AST SERPL-CCNC: 1734 U/L (ref 10–40)
B-HCG UR QL: NEGATIVE
B-OH-BUTYR BLD STRIP-SCNC: 5.4 MMOL/L (ref 0–0.5)
BACTERIA #/AREA URNS HPF: ABNORMAL /HPF
BARBITURATES UR QL SCN>200 NG/ML: NEGATIVE
BASOPHILS # BLD AUTO: 0.08 K/UL (ref 0–0.2)
BASOPHILS NFR BLD: 0.9 % (ref 0–1.9)
BENZODIAZ UR QL SCN>200 NG/ML: NEGATIVE
BILIRUB SERPL-MCNC: 1.4 MG/DL (ref 0.1–1)
BILIRUB SERPL-MCNC: 1.9 MG/DL (ref 0.1–1)
BILIRUB UR QL STRIP: ABNORMAL
BNP SERPL-MCNC: 15 PG/ML (ref 0–99)
BUN SERPL-MCNC: 7 MG/DL (ref 6–20)
BUN SERPL-MCNC: 8 MG/DL (ref 6–20)
BZE UR QL SCN: NEGATIVE
CALCIUM SERPL-MCNC: 7.2 MG/DL (ref 8.7–10.5)
CALCIUM SERPL-MCNC: 8 MG/DL (ref 8.7–10.5)
CANNABINOIDS UR QL SCN: NEGATIVE
CHLORIDE SERPL-SCNC: 101 MMOL/L (ref 95–110)
CHLORIDE SERPL-SCNC: 96 MMOL/L (ref 95–110)
CK MB SERPL-MCNC: 0.6 NG/ML (ref 0.1–6.5)
CK MB SERPL-RTO: 1.1 % (ref 0–5)
CK SERPL-CCNC: 57 U/L (ref 20–180)
CK SERPL-CCNC: 57 U/L (ref 20–180)
CLARITY UR: CLEAR
CO2 SERPL-SCNC: 20 MMOL/L (ref 23–29)
CO2 SERPL-SCNC: 24 MMOL/L (ref 23–29)
COLOR UR: YELLOW
CREAT SERPL-MCNC: 0.8 MG/DL (ref 0.5–1.4)
CREAT SERPL-MCNC: 1 MG/DL (ref 0.5–1.4)
CREAT UR-MCNC: 401.8 MG/DL (ref 15–325)
DIFFERENTIAL METHOD: ABNORMAL
EOSINOPHIL # BLD AUTO: 0 K/UL (ref 0–0.5)
EOSINOPHIL NFR BLD: 0.5 % (ref 0–8)
ERYTHROCYTE [DISTWIDTH] IN BLOOD BY AUTOMATED COUNT: 16.9 % (ref 11.5–14.5)
EST. GFR  (AFRICAN AMERICAN): >60 ML/MIN/1.73 M^2
EST. GFR  (AFRICAN AMERICAN): >60 ML/MIN/1.73 M^2
EST. GFR  (NON AFRICAN AMERICAN): >60 ML/MIN/1.73 M^2
EST. GFR  (NON AFRICAN AMERICAN): >60 ML/MIN/1.73 M^2
ETHANOL SERPL-MCNC: <10 MG/DL
GLUCOSE SERPL-MCNC: 167 MG/DL (ref 70–110)
GLUCOSE SERPL-MCNC: 230 MG/DL (ref 70–110)
GLUCOSE UR QL STRIP: NEGATIVE
HCT VFR BLD AUTO: 34.7 % (ref 37–48.5)
HGB BLD-MCNC: 10.4 G/DL (ref 12–16)
HGB UR QL STRIP: ABNORMAL
HYALINE CASTS #/AREA URNS LPF: 0 /LPF
IMM GRANULOCYTES # BLD AUTO: 0.03 K/UL (ref 0–0.04)
IMM GRANULOCYTES NFR BLD AUTO: 0.3 % (ref 0–0.5)
INR PPP: 1.4 (ref 0.8–1.2)
KETONES UR QL STRIP: ABNORMAL
LACTATE SERPL-SCNC: 2.1 MMOL/L (ref 0.5–2.2)
LEUKOCYTE ESTERASE UR QL STRIP: ABNORMAL
LIPASE SERPL-CCNC: 11 U/L (ref 4–60)
LYMPHOCYTES # BLD AUTO: 1.3 K/UL (ref 1–4.8)
LYMPHOCYTES NFR BLD: 14.5 % (ref 18–48)
MCH RBC QN AUTO: 24.5 PG (ref 27–31)
MCHC RBC AUTO-ENTMCNC: 30 G/DL (ref 32–36)
MCV RBC AUTO: 82 FL (ref 82–98)
METHADONE UR QL SCN>300 NG/ML: NEGATIVE
MICROSCOPIC COMMENT: ABNORMAL
MONOCYTES # BLD AUTO: 0.6 K/UL (ref 0.3–1)
MONOCYTES NFR BLD: 6.5 % (ref 4–15)
NEUTROPHILS # BLD AUTO: 6.7 K/UL (ref 1.8–7.7)
NEUTROPHILS NFR BLD: 77.3 % (ref 38–73)
NITRITE UR QL STRIP: POSITIVE
NRBC BLD-RTO: 0 /100 WBC
OPIATES UR QL SCN: NEGATIVE
PCP UR QL SCN>25 NG/ML: NEGATIVE
PH UR STRIP: 6 [PH] (ref 5–8)
PLATELET # BLD AUTO: 321 K/UL (ref 150–350)
PMV BLD AUTO: 10 FL (ref 9.2–12.9)
POC PH VENOUS: 7.5
POCT GLUCOSE: 153 MG/DL (ref 70–110)
POCT GLUCOSE: 154 MG/DL (ref 70–110)
POCT GLUCOSE: 174 MG/DL (ref 70–110)
POCT GLUCOSE: 174 MG/DL (ref 70–110)
POCT GLUCOSE: 201 MG/DL (ref 70–110)
POCT GLUCOSE: 202 MG/DL (ref 70–110)
POCT GLUCOSE: 204 MG/DL (ref 70–110)
POCT GLUCOSE: 206 MG/DL (ref 70–110)
POCT GLUCOSE: 212 MG/DL (ref 70–110)
POTASSIUM SERPL-SCNC: 2.9 MMOL/L (ref 3.5–5.1)
POTASSIUM SERPL-SCNC: 3.2 MMOL/L (ref 3.5–5.1)
PROT SERPL-MCNC: 6.1 G/DL (ref 6–8.4)
PROT SERPL-MCNC: 7.3 G/DL (ref 6–8.4)
PROT UR QL STRIP: ABNORMAL
PROTHROMBIN TIME: 14.8 SEC (ref 9–12.5)
RBC # BLD AUTO: 4.25 M/UL (ref 4–5.4)
RBC #/AREA URNS HPF: 30 /HPF (ref 0–4)
SARS-COV-2 RDRP RESP QL NAA+PROBE: NEGATIVE
SODIUM SERPL-SCNC: 137 MMOL/L (ref 136–145)
SODIUM SERPL-SCNC: 139 MMOL/L (ref 136–145)
SP GR UR STRIP: >=1.03 (ref 1–1.03)
SQUAMOUS #/AREA URNS HPF: 10 /HPF
TOXICOLOGY INFORMATION: ABNORMAL
TROPONIN I SERPL DL<=0.01 NG/ML-MCNC: <0.006 NG/ML (ref 0–0.03)
URN SPEC COLLECT METH UR: ABNORMAL
UROBILINOGEN UR STRIP-ACNC: ABNORMAL EU/DL
WBC # BLD AUTO: 8.64 K/UL (ref 3.9–12.7)
WBC #/AREA URNS HPF: 20 /HPF (ref 0–5)

## 2020-09-10 PROCEDURE — 84484 ASSAY OF TROPONIN QUANT: CPT

## 2020-09-10 PROCEDURE — 83036 HEMOGLOBIN GLYCOSYLATED A1C: CPT

## 2020-09-10 PROCEDURE — 96365 THER/PROPH/DIAG IV INF INIT: CPT

## 2020-09-10 PROCEDURE — 87086 URINE CULTURE/COLONY COUNT: CPT

## 2020-09-10 PROCEDURE — 93005 ELECTROCARDIOGRAM TRACING: CPT

## 2020-09-10 PROCEDURE — 82140 ASSAY OF AMMONIA: CPT

## 2020-09-10 PROCEDURE — 96375 TX/PRO/DX INJ NEW DRUG ADDON: CPT

## 2020-09-10 PROCEDURE — 82553 CREATINE MB FRACTION: CPT

## 2020-09-10 PROCEDURE — 96361 HYDRATE IV INFUSION ADD-ON: CPT

## 2020-09-10 PROCEDURE — 25000003 PHARM REV CODE 250: Performed by: SURGERY

## 2020-09-10 PROCEDURE — 81000 URINALYSIS NONAUTO W/SCOPE: CPT | Mod: 59

## 2020-09-10 PROCEDURE — 85610 PROTHROMBIN TIME: CPT

## 2020-09-10 PROCEDURE — 82800 BLOOD PH: CPT | Performed by: SURGERY

## 2020-09-10 PROCEDURE — 82010 KETONE BODYS QUAN: CPT

## 2020-09-10 PROCEDURE — 83880 ASSAY OF NATRIURETIC PEPTIDE: CPT

## 2020-09-10 PROCEDURE — 63600175 PHARM REV CODE 636 W HCPCS: Performed by: SURGERY

## 2020-09-10 PROCEDURE — 96376 TX/PRO/DX INJ SAME DRUG ADON: CPT

## 2020-09-10 PROCEDURE — 83605 ASSAY OF LACTIC ACID: CPT

## 2020-09-10 PROCEDURE — 82962 GLUCOSE BLOOD TEST: CPT | Mod: 91

## 2020-09-10 PROCEDURE — 96367 TX/PROPH/DG ADDL SEQ IV INF: CPT

## 2020-09-10 PROCEDURE — 87077 CULTURE AEROBIC IDENTIFY: CPT | Mod: 59

## 2020-09-10 PROCEDURE — 36415 COLL VENOUS BLD VENIPUNCTURE: CPT

## 2020-09-10 PROCEDURE — 87088 URINE BACTERIA CULTURE: CPT

## 2020-09-10 PROCEDURE — 63600175 PHARM REV CODE 636 W HCPCS: Performed by: INTERNAL MEDICINE

## 2020-09-10 PROCEDURE — 80320 DRUG SCREEN QUANTALCOHOLS: CPT

## 2020-09-10 PROCEDURE — 81025 URINE PREGNANCY TEST: CPT

## 2020-09-10 PROCEDURE — 99285 EMERGENCY DEPT VISIT HI MDM: CPT | Mod: 25

## 2020-09-10 PROCEDURE — 80053 COMPREHEN METABOLIC PANEL: CPT | Mod: 91

## 2020-09-10 PROCEDURE — 85025 COMPLETE CBC W/AUTO DIFF WBC: CPT

## 2020-09-10 PROCEDURE — 93010 ELECTROCARDIOGRAM REPORT: CPT | Mod: ,,, | Performed by: INTERNAL MEDICINE

## 2020-09-10 PROCEDURE — 80307 DRUG TEST PRSMV CHEM ANLYZR: CPT

## 2020-09-10 PROCEDURE — 93010 EKG 12-LEAD: ICD-10-PCS | Mod: ,,, | Performed by: INTERNAL MEDICINE

## 2020-09-10 PROCEDURE — 87040 BLOOD CULTURE FOR BACTERIA: CPT

## 2020-09-10 PROCEDURE — 96366 THER/PROPH/DIAG IV INF ADDON: CPT

## 2020-09-10 PROCEDURE — U0002 COVID-19 LAB TEST NON-CDC: HCPCS

## 2020-09-10 PROCEDURE — 80074 ACUTE HEPATITIS PANEL: CPT

## 2020-09-10 PROCEDURE — 87186 SC STD MICRODIL/AGAR DIL: CPT

## 2020-09-10 PROCEDURE — 85730 THROMBOPLASTIN TIME PARTIAL: CPT

## 2020-09-10 PROCEDURE — 82550 ASSAY OF CK (CPK): CPT

## 2020-09-10 PROCEDURE — 83690 ASSAY OF LIPASE: CPT

## 2020-09-10 RX ORDER — DEXTROSE MONOHYDRATE AND SODIUM CHLORIDE 5; .9 G/100ML; G/100ML
1000 INJECTION, SOLUTION INTRAVENOUS CONTINUOUS
Status: DISCONTINUED | OUTPATIENT
Start: 2020-09-10 | End: 2020-09-10

## 2020-09-10 RX ORDER — SODIUM CHLORIDE AND POTASSIUM CHLORIDE 150; 900 MG/100ML; MG/100ML
1000 INJECTION, SOLUTION INTRAVENOUS CONTINUOUS
Status: DISCONTINUED | OUTPATIENT
Start: 2020-09-11 | End: 2020-09-10

## 2020-09-10 RX ORDER — POTASSIUM CHLORIDE 7.45 MG/ML
10 INJECTION INTRAVENOUS
Status: COMPLETED | OUTPATIENT
Start: 2020-09-10 | End: 2020-09-11

## 2020-09-10 RX ORDER — SODIUM CHLORIDE 9 MG/ML
1000 INJECTION, SOLUTION INTRAVENOUS
Status: COMPLETED | OUTPATIENT
Start: 2020-09-10 | End: 2020-09-11

## 2020-09-10 RX ORDER — ONDANSETRON 2 MG/ML
4 INJECTION INTRAMUSCULAR; INTRAVENOUS
Status: COMPLETED | OUTPATIENT
Start: 2020-09-10 | End: 2020-09-10

## 2020-09-10 RX ORDER — POTASSIUM CHLORIDE 7.45 MG/ML
INJECTION INTRAVENOUS
Status: DISPENSED
Start: 2020-09-10 | End: 2020-09-11

## 2020-09-10 RX ADMIN — ONDANSETRON 4 MG: 2 INJECTION, SOLUTION INTRAMUSCULAR; INTRAVENOUS at 04:09

## 2020-09-10 RX ADMIN — SODIUM CHLORIDE 2 UNITS/HR  (ORDERING DOSE ONLY,MUST KEEP AS DOSE RANGE.ONLY CHANGE IF INITIAL DOSE EXCEEDS SUGGESTED RANGE): 9 INJECTION, SOLUTION INTRAVENOUS at 04:09

## 2020-09-10 RX ADMIN — SODIUM CHLORIDE 1000 ML: 0.9 INJECTION, SOLUTION INTRAVENOUS at 11:09

## 2020-09-10 RX ADMIN — DEXTROSE AND SODIUM CHLORIDE 1000 ML: 5; .9 INJECTION, SOLUTION INTRAVENOUS at 04:09

## 2020-09-10 RX ADMIN — POTASSIUM CHLORIDE 10 MEQ: 7.46 INJECTION, SOLUTION INTRAVENOUS at 11:09

## 2020-09-10 RX ADMIN — PIPERACILLIN AND TAZOBACTAM 4.5 G: 4; .5 INJECTION, POWDER, FOR SOLUTION INTRAVENOUS at 11:09

## 2020-09-10 NOTE — PLAN OF CARE
(Physician in Lead of Transfers)   Outside Transfer Acceptance Note / Regional Referral Center    Transferring Physician:  Juan Campoverde MD (ED)    Accepting Physician: Erika Lima MD    Date of Acceptance: 09/10/2020    Transferring Facility: [facility]     Reason for Transfer: Transaminitis, AGMA, DKA, HLOC for hepatology consult    Report from Transferring Physician/Hospital course: 40F with DM-2, to ED with N/V, and found to have acute hepatitis, transaminitis, hyperbilirubinemia,  anion gap metabolic acidosis likely due to a mixed picture of mild DKA (venous pH 7.5, AG 21, B hydroxy 5) and starvation ketoacidosis.  She has no appetite or PO intake. Starting insulin gtt and D5.  Transfer center called for transfer to [facility] for hepatology consult, and Dr. Dr Godfrey (hepatology) agrees that she needs to come to [facility].  May have hepatitis or early liver failure, and may need a liver Bx.      VS: VSS, see Epic    Labs:   TB 1.9  AST 1700         EtOH/Tox screen/COVID all negative    Radiographs: U/S shows fatty liver, no CBD dilation.      To Do List: trend labs, supportive care, hepatology consult, f/u viral hep panel sent in ED    Upon patient arrival to floor, please send SecureChat to [facility] or call extension [phone] (if no answer, this will flip to a beeper, so enter your call back number) for Hospital Medicine admit team assignment and for additional admit orders for the patient.  Do not page the attending physician associated with the patient on arrival (this physician may not be on duty at the time of arrival).  Rather, always call [phone] to reach the triage physician for orders and team assignment.    Erika Lima MD  Hospital Medicine Staff  Cell: [phone]

## 2020-09-10 NOTE — ED PROVIDER NOTES
[facility]                                                 Chief Complaint  40 y.o. female with Vomiting and Abdominal Pain      History of Present Illness  Cira Keys presents to the emergency room with abdominal pain  Patient with abdominal pain with nausea vomiting, feeling worse today  Patient with intractable nausea vomiting this week, denies any fever   Patient with nonacute abdomen, no signs of distress on ER evaluation    The history is provided by the patient   device was not used during this ER visit  Medical history: Bacteremia, GERD, anemia, diabetes  Surgeries:  tubal ligation  No Known Allergies     I have reviewed all of this patient's past medical, surgical, family, and social   histories as well as active allergies and medications documented in the  electronic medical record    Review of Systems and Physical Exam      Review of Systems  -- Constitution - no fever, denies fatigue, no weakness, no chills  -- Eyes - no tearing or redness, no visual disturbance  -- Ear, Nose - no tinnitus or earache, no nasal congestion or discharge  -- Mouth,Throat - no sore throat, no toothache, normal voice, normal swallowing  -- Respiratory - denies cough and congestion, no shortness of breath, no SUGGS  -- Cardiovascular - denies chest pain, no palpitations, denies claudication  -- Gastrointestinal - nausea, vomiting, abdominal cramps, diarrhea  -- Genitourinary - no dysuria, denies flank pain, no hematuria, no STD risk  -- Musculoskeletal - denies back pain, negative for trauma or injury  -- Neurological - no headache, denies weakness or seizure; no LOC  -- Skin - denies pallor, rash, or changes in skin. no hives or welts noted    Vital Signs  Her temperature is 98.9 °F (37.2 °C).   Her blood pressure is 114/74 and her pulse is 101.   Her respiration is 20 and oxygen saturation is 97%.     Physical Exam  -- Nursing note and vitals reviewed  -- Constitutional:  Appears well-developed and well-nourished  -- Head: Atraumatic. Normocephalic. No obvious abnormality  -- Eyes: Pupils are equal and reactive to light. Normal conjunctiva and lids  -- Cardiac: Normal rate, regular rhythm and normal heart sounds  -- Pulmonary: Normal respiratory effort, breath sounds clear to auscultation  -- Abdominal: Soft, no tenderness. Normal bowel sounds. Normal liver edge  -- Musculoskeletal: Normal range of motion, no effusions. Joints stable   -- Neurological: No focal deficits. Showed good interaction with staff  -- Vascular: Posterior tibial, dorsalis pedis and radial pulses 2+ bilaterally      Emergency Room Course      Lab Results     K 3.2 (L)   CL 96   CO2 20 (L)   BUN 8   CREATININE 1.0    (H)   ALKPHOS 158 (H)   AST 1,734 (H)    (H)   BILITOT 1.9 (H)   ALBUMIN 3.3 (L)   PROT 7.3   WBC 8.64   HGB 10.4 (L)   HCT 34.7 (L)      CPK 57   CPK 57   CPKMB 0.6   TROPONINI <0.006   BNP 15   LACTATE 2.1     Urinalysis  -- Urinalysis performed during this ER visit showed signs of infection  -- The urine today has been sent for lab culture, results pending      EKG  -- The EKG findings today were without concerning findings from baseline    Radiology  -- patient with fatty infiltration liver on ultrasound today    Additional Work up  -- ammonia is currently pending  -- Blood cultures have also been drawn, results are pending   -- rapid Coronavirus PCR was negative    Medications Given  piperacillin-tazobactam 4.5 g in dextrose 5 % 100 mL IVPB (ready to mix system)   sodium chloride 0.9% bolus 1,000 mL (1,000 mLs Intravenous New Bag 9/10/20 1105)     Critical Care ED Physician Time (minutes):  -- Performed by: Juan Campoverde M.D.  -- Date/Time: 11:36 AM 9/10/2020   -- Direct Patient Care (Face Time): 5  -- Additional History from Records or Taking Additional History: 0  -- Ordering, Reviewing, and Interpreting Diagnostic Studies: 5  -- Total Time in Documentation: 5  --  Consultation with Other Physicians: 5  -- Consultation with Family Related to Condition: 0  -- Total Critical Care Time: 20     ED Physician Management  -- Diagnosis management comments: 40 y.o. female with nausea vomiting  -- patient initially thought she was in diabetic ketoacidosis, no DKA today   -- marked elevation of liver enzymes with no history of liver pathology noted  -- patient with no fever, blood cultures drawn, negative COVID virus test today  -- patient given IV Zosyn as a prophylaxis, hepatitis test currently pending  -- patient needs further evaluation by hepatology, transfer pending now    Diagnosis  [R11.10] Emesis  [R11.2] Nausea and vomiting  [R74.8] Elevated liver enzymes    Disposition and Plan  -- Disposition: transfer  -- Condition: stable    This note is dictated on M*Modal word recognition program.  There are word recognition mistakes that are occasionally missed on review.             Juan Campoverde MD  09/11/20 3505

## 2020-09-10 NOTE — ED TRIAGE NOTES
Weakness, nausea, abdominal pain, vomiting x2 days. Patient has blurred vision when standing up. Patient is a diabetic. Last cbg was 260.

## 2020-09-11 ENCOUNTER — HOSPITAL ENCOUNTER (INPATIENT)
Facility: HOSPITAL | Age: 40
LOS: 4 days | Discharge: HOME | DRG: 441 | End: 2020-09-15
Attending: HOSPITALIST | Admitting: HOSPITALIST

## 2020-09-11 VITALS
RESPIRATION RATE: 23 BRPM | WEIGHT: 174.19 LBS | TEMPERATURE: 98 F | BODY MASS INDEX: 27.34 KG/M2 | OXYGEN SATURATION: 100 % | SYSTOLIC BLOOD PRESSURE: 110 MMHG | HEART RATE: 138 BPM | HEIGHT: 67 IN | DIASTOLIC BLOOD PRESSURE: 66 MMHG

## 2020-09-11 DIAGNOSIS — R07.9 CHEST PAIN: ICD-10-CM

## 2020-09-11 DIAGNOSIS — E10.65 UNCONTROLLED TYPE 1 DIABETES MELLITUS WITH HYPERGLYCEMIA: ICD-10-CM

## 2020-09-11 DIAGNOSIS — E11.10 DKA (DIABETIC KETOACIDOSES): ICD-10-CM

## 2020-09-11 DIAGNOSIS — B17.9 ACUTE HEPATITIS: Primary | ICD-10-CM

## 2020-09-11 DIAGNOSIS — K76.9: ICD-10-CM

## 2020-09-11 PROBLEM — F10.939 ALCOHOL WITHDRAWAL: Status: ACTIVE | Noted: 2020-09-11

## 2020-09-11 PROBLEM — N39.0 UTI (URINARY TRACT INFECTION): Status: ACTIVE | Noted: 2020-09-11

## 2020-09-11 LAB
ALBUMIN SERPL BCP-MCNC: 2.2 G/DL (ref 3.5–5.2)
ALBUMIN SERPL BCP-MCNC: 2.7 G/DL (ref 3.5–5.2)
ALBUMIN SERPL BCP-MCNC: 2.9 G/DL (ref 3.5–5.2)
ALLENS TEST: ABNORMAL
ALP SERPL-CCNC: 102 U/L (ref 55–135)
ALP SERPL-CCNC: 125 U/L (ref 55–135)
ALP SERPL-CCNC: 127 U/L (ref 55–135)
ALT SERPL W/O P-5'-P-CCNC: 315 U/L (ref 10–44)
ALT SERPL W/O P-5'-P-CCNC: 323 U/L (ref 10–44)
ALT SERPL W/O P-5'-P-CCNC: 326 U/L (ref 10–44)
AMMONIA PLAS-SCNC: 35 UMOL/L (ref 10–50)
ANION GAP SERPL CALC-SCNC: 11 MMOL/L (ref 8–16)
ANION GAP SERPL CALC-SCNC: 17 MMOL/L (ref 8–16)
ANION GAP SERPL CALC-SCNC: 19 MMOL/L (ref 8–16)
ANION GAP SERPL CALC-SCNC: 25 MMOL/L (ref 8–16)
ANION GAP SERPL CALC-SCNC: 29 MMOL/L (ref 8–16)
APAP SERPL-MCNC: <3 UG/ML (ref 10–20)
AST SERPL-CCNC: 427 U/L (ref 10–40)
AST SERPL-CCNC: 582 U/L (ref 10–40)
AST SERPL-CCNC: 694 U/L (ref 10–40)
BILIRUB SERPL-MCNC: 1.3 MG/DL (ref 0.1–1)
BILIRUB SERPL-MCNC: 1.4 MG/DL (ref 0.1–1)
BILIRUB SERPL-MCNC: 1.5 MG/DL (ref 0.1–1)
BUN SERPL-MCNC: 10 MG/DL (ref 6–20)
BUN SERPL-MCNC: 6 MG/DL (ref 6–20)
BUN SERPL-MCNC: 6 MG/DL (ref 6–20)
BUN SERPL-MCNC: 9 MG/DL (ref 6–20)
BUN SERPL-MCNC: 9 MG/DL (ref 6–20)
CALCIUM SERPL-MCNC: 6.1 MG/DL (ref 8.7–10.5)
CALCIUM SERPL-MCNC: 6.3 MG/DL (ref 8.7–10.5)
CALCIUM SERPL-MCNC: 7.2 MG/DL (ref 8.7–10.5)
CALCIUM SERPL-MCNC: 7.4 MG/DL (ref 8.7–10.5)
CALCIUM SERPL-MCNC: 7.5 MG/DL (ref 8.7–10.5)
CHLORIDE SERPL-SCNC: 100 MMOL/L (ref 95–110)
CHLORIDE SERPL-SCNC: 100 MMOL/L (ref 95–110)
CHLORIDE SERPL-SCNC: 104 MMOL/L (ref 95–110)
CHLORIDE SERPL-SCNC: 107 MMOL/L (ref 95–110)
CHLORIDE SERPL-SCNC: 116 MMOL/L (ref 95–110)
CO2 SERPL-SCNC: 11 MMOL/L (ref 23–29)
CO2 SERPL-SCNC: 11 MMOL/L (ref 23–29)
CO2 SERPL-SCNC: 12 MMOL/L (ref 23–29)
CO2 SERPL-SCNC: 15 MMOL/L (ref 23–29)
CO2 SERPL-SCNC: 7 MMOL/L (ref 23–29)
CREAT SERPL-MCNC: 0.7 MG/DL (ref 0.5–1.4)
CREAT SERPL-MCNC: 0.8 MG/DL (ref 0.5–1.4)
CREAT SERPL-MCNC: 1.1 MG/DL (ref 0.5–1.4)
CREAT SERPL-MCNC: 1.2 MG/DL (ref 0.5–1.4)
CREAT SERPL-MCNC: 1.2 MG/DL (ref 0.5–1.4)
DELSYS: ABNORMAL
EST. GFR  (AFRICAN AMERICAN): >60 ML/MIN/1.73 M^2
EST. GFR  (NON AFRICAN AMERICAN): 56 ML/MIN/1.73 M^2
EST. GFR  (NON AFRICAN AMERICAN): 56 ML/MIN/1.73 M^2
EST. GFR  (NON AFRICAN AMERICAN): >60 ML/MIN/1.73 M^2
ESTIMATED AVG GLUCOSE: 194 MG/DL (ref 68–131)
FERRITIN SERPL-MCNC: 123 NG/ML (ref 20–300)
GLUCOSE SERPL-MCNC: 132 MG/DL (ref 70–110)
GLUCOSE SERPL-MCNC: 267 MG/DL (ref 70–110)
GLUCOSE SERPL-MCNC: 295 MG/DL (ref 70–110)
GLUCOSE SERPL-MCNC: 397 MG/DL (ref 70–110)
GLUCOSE SERPL-MCNC: 494 MG/DL (ref 70–110)
HAV IGM SERPL QL IA: NEGATIVE
HBA1C MFR BLD HPLC: 8.4 % (ref 4–5.6)
HBV CORE IGM SERPL QL IA: NEGATIVE
HBV SURFACE AG SERPL QL IA: NEGATIVE
HCO3 UR-SCNC: 12.4 MMOL/L (ref 24–28)
HCV AB SERPL QL IA: NEGATIVE
IRON SERPL-MCNC: 11 UG/DL (ref 30–160)
LACTATE SERPL-SCNC: 2.1 MMOL/L (ref 0.5–2.2)
LACTATE SERPL-SCNC: 5.9 MMOL/L (ref 0.5–2.2)
LIPASE SERPL-CCNC: 3 U/L (ref 4–60)
MAGNESIUM SERPL-MCNC: 0.8 MG/DL (ref 1.6–2.6)
MAGNESIUM SERPL-MCNC: 1.5 MG/DL (ref 1.6–2.6)
MODE: ABNORMAL
PCO2 BLDA: 23.5 MMHG (ref 35–45)
PH SMN: 7.33 [PH] (ref 7.35–7.45)
PHOSPHATE SERPL-MCNC: 2.3 MG/DL (ref 2.7–4.5)
PHOSPHATE SERPL-MCNC: 4.7 MG/DL (ref 2.7–4.5)
PO2 BLDA: 22 MMHG (ref 40–60)
POC BE: -14 MMOL/L
POC SATURATED O2: 36 % (ref 95–100)
POC TCO2: 13 MMOL/L (ref 24–29)
POCT GLUCOSE: 147 MG/DL (ref 70–110)
POCT GLUCOSE: 172 MG/DL (ref 70–110)
POCT GLUCOSE: 180 MG/DL (ref 70–110)
POCT GLUCOSE: 189 MG/DL (ref 70–110)
POCT GLUCOSE: 220 MG/DL (ref 70–110)
POCT GLUCOSE: 262 MG/DL (ref 70–110)
POCT GLUCOSE: 270 MG/DL (ref 70–110)
POCT GLUCOSE: 290 MG/DL (ref 70–110)
POCT GLUCOSE: 333 MG/DL (ref 70–110)
POCT GLUCOSE: 341 MG/DL (ref 70–110)
POCT GLUCOSE: 403 MG/DL (ref 70–110)
POCT GLUCOSE: 449 MG/DL (ref 70–110)
POTASSIUM SERPL-SCNC: 3.3 MMOL/L (ref 3.5–5.1)
POTASSIUM SERPL-SCNC: 3.4 MMOL/L (ref 3.5–5.1)
POTASSIUM SERPL-SCNC: 3.5 MMOL/L (ref 3.5–5.1)
POTASSIUM SERPL-SCNC: 3.8 MMOL/L (ref 3.5–5.1)
POTASSIUM SERPL-SCNC: 3.8 MMOL/L (ref 3.5–5.1)
PROT SERPL-MCNC: 5.1 G/DL (ref 6–8.4)
PROT SERPL-MCNC: 6.1 G/DL (ref 6–8.4)
PROT SERPL-MCNC: 6.7 G/DL (ref 6–8.4)
SAMPLE: ABNORMAL
SATURATED IRON: 4 % (ref 20–50)
SITE: ABNORMAL
SODIUM SERPL-SCNC: 136 MMOL/L (ref 136–145)
SODIUM SERPL-SCNC: 137 MMOL/L (ref 136–145)
SODIUM SERPL-SCNC: 139 MMOL/L (ref 136–145)
TOTAL IRON BINDING CAPACITY: 290 UG/DL (ref 250–450)
TRANSFERRIN SERPL-MCNC: 196 MG/DL (ref 200–375)
TRANSFERRIN SERPL-MCNC: 196 MG/DL (ref 200–375)
TSH SERPL DL<=0.005 MIU/L-ACNC: 1.15 UIU/ML (ref 0.4–4)

## 2020-09-11 PROCEDURE — 86703 HIV-1/HIV-2 1 RESULT ANTBDY: CPT

## 2020-09-11 PROCEDURE — 63600175 PHARM REV CODE 636 W HCPCS: Performed by: STUDENT IN AN ORGANIZED HEALTH CARE EDUCATION/TRAINING PROGRAM

## 2020-09-11 PROCEDURE — 80048 BASIC METABOLIC PNL TOTAL CA: CPT | Mod: 91

## 2020-09-11 PROCEDURE — 83605 ASSAY OF LACTIC ACID: CPT | Mod: 91

## 2020-09-11 PROCEDURE — 82728 ASSAY OF FERRITIN: CPT

## 2020-09-11 PROCEDURE — 80053 COMPREHEN METABOLIC PANEL: CPT

## 2020-09-11 PROCEDURE — 82803 BLOOD GASES ANY COMBINATION: CPT

## 2020-09-11 PROCEDURE — 20600001 HC STEP DOWN PRIVATE ROOM

## 2020-09-11 PROCEDURE — 83735 ASSAY OF MAGNESIUM: CPT | Mod: 91

## 2020-09-11 PROCEDURE — 83540 ASSAY OF IRON: CPT

## 2020-09-11 PROCEDURE — 63600175 PHARM REV CODE 636 W HCPCS: Performed by: INTERNAL MEDICINE

## 2020-09-11 PROCEDURE — 80329 ANALGESICS NON-OPIOID 1 OR 2: CPT

## 2020-09-11 PROCEDURE — 82140 ASSAY OF AMMONIA: CPT

## 2020-09-11 PROCEDURE — 84100 ASSAY OF PHOSPHORUS: CPT | Mod: 91

## 2020-09-11 PROCEDURE — 87522 HEPATITIS C REVRS TRNSCRPJ: CPT

## 2020-09-11 PROCEDURE — 99223 PR INITIAL HOSPITAL CARE,LEVL III: ICD-10-PCS | Mod: ,,, | Performed by: STUDENT IN AN ORGANIZED HEALTH CARE EDUCATION/TRAINING PROGRAM

## 2020-09-11 PROCEDURE — 25000003 PHARM REV CODE 250: Performed by: STUDENT IN AN ORGANIZED HEALTH CARE EDUCATION/TRAINING PROGRAM

## 2020-09-11 PROCEDURE — 84443 ASSAY THYROID STIM HORMONE: CPT

## 2020-09-11 PROCEDURE — 63600175 PHARM REV CODE 636 W HCPCS: Performed by: SURGERY

## 2020-09-11 PROCEDURE — 80053 COMPREHEN METABOLIC PANEL: CPT | Mod: 91

## 2020-09-11 PROCEDURE — 36415 COLL VENOUS BLD VENIPUNCTURE: CPT

## 2020-09-11 PROCEDURE — 83605 ASSAY OF LACTIC ACID: CPT

## 2020-09-11 PROCEDURE — 25000003 PHARM REV CODE 250: Performed by: INTERNAL MEDICINE

## 2020-09-11 PROCEDURE — 83516 IMMUNOASSAY NONANTIBODY: CPT

## 2020-09-11 PROCEDURE — 86038 ANTINUCLEAR ANTIBODIES: CPT

## 2020-09-11 PROCEDURE — 99900035 HC TECH TIME PER 15 MIN (STAT)

## 2020-09-11 PROCEDURE — 80048 BASIC METABOLIC PNL TOTAL CA: CPT

## 2020-09-11 PROCEDURE — 83690 ASSAY OF LIPASE: CPT

## 2020-09-11 PROCEDURE — 82103 ALPHA-1-ANTITRYPSIN TOTAL: CPT

## 2020-09-11 PROCEDURE — 99223 1ST HOSP IP/OBS HIGH 75: CPT | Mod: ,,, | Performed by: STUDENT IN AN ORGANIZED HEALTH CARE EDUCATION/TRAINING PROGRAM

## 2020-09-11 PROCEDURE — 82962 GLUCOSE BLOOD TEST: CPT

## 2020-09-11 PROCEDURE — 25000003 PHARM REV CODE 250: Performed by: SURGERY

## 2020-09-11 PROCEDURE — 80074 ACUTE HEPATITIS PANEL: CPT

## 2020-09-11 RX ORDER — POTASSIUM CHLORIDE 20 MEQ/1
40 TABLET, EXTENDED RELEASE ORAL ONCE
Status: COMPLETED | OUTPATIENT
Start: 2020-09-11 | End: 2020-09-11

## 2020-09-11 RX ORDER — DEXTROSE MONOHYDRATE 100 MG/ML
1000 INJECTION, SOLUTION INTRAVENOUS
Status: DISCONTINUED | OUTPATIENT
Start: 2020-09-11 | End: 2020-09-15 | Stop reason: HOSPADM

## 2020-09-11 RX ORDER — LORAZEPAM 2 MG/ML
2 INJECTION INTRAMUSCULAR ONCE AS NEEDED
Status: DISCONTINUED | OUTPATIENT
Start: 2020-09-11 | End: 2020-09-13

## 2020-09-11 RX ORDER — IBUPROFEN 200 MG
16 TABLET ORAL
Status: DISCONTINUED | OUTPATIENT
Start: 2020-09-11 | End: 2020-09-14 | Stop reason: SDUPTHER

## 2020-09-11 RX ORDER — SODIUM CHLORIDE 9 MG/ML
1000 INJECTION, SOLUTION INTRAVENOUS
Status: COMPLETED | OUTPATIENT
Start: 2020-09-11 | End: 2020-09-11

## 2020-09-11 RX ORDER — IBUPROFEN 200 MG
24 TABLET ORAL
Status: DISCONTINUED | OUTPATIENT
Start: 2020-09-11 | End: 2020-09-14 | Stop reason: SDUPTHER

## 2020-09-11 RX ORDER — THIAMINE HCL 100 MG
100 TABLET ORAL 3 TIMES DAILY
Status: DISCONTINUED | OUTPATIENT
Start: 2020-09-24 | End: 2020-09-15

## 2020-09-11 RX ORDER — THIAMINE HCL 100 MG
100 TABLET ORAL DAILY
Status: DISCONTINUED | OUTPATIENT
Start: 2020-09-21 | End: 2020-09-11

## 2020-09-11 RX ORDER — LORAZEPAM 0.5 MG/1
0.5 TABLET ORAL EVERY 6 HOURS PRN
Status: DISCONTINUED | OUTPATIENT
Start: 2020-09-11 | End: 2020-09-11

## 2020-09-11 RX ORDER — POTASSIUM CHLORIDE 7.45 MG/ML
10 INJECTION INTRAVENOUS
Status: COMPLETED | OUTPATIENT
Start: 2020-09-11 | End: 2020-09-11

## 2020-09-11 RX ORDER — ONDANSETRON 2 MG/ML
4 INJECTION INTRAMUSCULAR; INTRAVENOUS
Status: COMPLETED | OUTPATIENT
Start: 2020-09-11 | End: 2020-09-11

## 2020-09-11 RX ORDER — THIAMINE HCL 100 MG
100 TABLET ORAL 3 TIMES DAILY
Status: DISCONTINUED | OUTPATIENT
Start: 2020-09-14 | End: 2020-09-11

## 2020-09-11 RX ORDER — MAGNESIUM SULFATE 1 G/100ML
1 INJECTION INTRAVENOUS ONCE
Status: COMPLETED | OUTPATIENT
Start: 2020-09-11 | End: 2020-09-11

## 2020-09-11 RX ORDER — DEXTROSE MONOHYDRATE AND SODIUM CHLORIDE 5; .9 G/100ML; G/100ML
1000 INJECTION, SOLUTION INTRAVENOUS
Status: COMPLETED | OUTPATIENT
Start: 2020-09-11 | End: 2020-09-11

## 2020-09-11 RX ORDER — LORAZEPAM 0.5 MG/1
0.5 TABLET ORAL EVERY 6 HOURS PRN
Status: DISCONTINUED | OUTPATIENT
Start: 2020-09-11 | End: 2020-09-14

## 2020-09-11 RX ORDER — GLUCAGON 1 MG
1 KIT INJECTION
Status: DISCONTINUED | OUTPATIENT
Start: 2020-09-11 | End: 2020-09-14 | Stop reason: SDUPTHER

## 2020-09-11 RX ORDER — MAGNESIUM SULFATE HEPTAHYDRATE 40 MG/ML
2 INJECTION, SOLUTION INTRAVENOUS ONCE
Status: COMPLETED | OUTPATIENT
Start: 2020-09-11 | End: 2020-09-11

## 2020-09-11 RX ORDER — SODIUM CHLORIDE 9 MG/ML
1000 INJECTION, SOLUTION INTRAVENOUS
Status: DISCONTINUED | OUTPATIENT
Start: 2020-09-11 | End: 2020-09-11

## 2020-09-11 RX ORDER — SODIUM CHLORIDE 0.9 % (FLUSH) 0.9 %
10 SYRINGE (ML) INJECTION
Status: DISCONTINUED | OUTPATIENT
Start: 2020-09-11 | End: 2020-09-15 | Stop reason: HOSPADM

## 2020-09-11 RX ORDER — THIAMINE HCL 100 MG
100 TABLET ORAL DAILY
Status: DISCONTINUED | OUTPATIENT
Start: 2020-10-08 | End: 2020-09-15

## 2020-09-11 RX ORDER — LORAZEPAM 1 MG/1
1 TABLET ORAL EVERY 6 HOURS PRN
Status: DISCONTINUED | OUTPATIENT
Start: 2020-09-11 | End: 2020-09-11

## 2020-09-11 RX ORDER — FOLIC ACID 1 MG/1
1 TABLET ORAL DAILY
Status: DISCONTINUED | OUTPATIENT
Start: 2020-09-12 | End: 2020-09-15 | Stop reason: HOSPADM

## 2020-09-11 RX ORDER — ENOXAPARIN SODIUM 100 MG/ML
40 INJECTION SUBCUTANEOUS EVERY 24 HOURS
Status: DISCONTINUED | OUTPATIENT
Start: 2020-09-11 | End: 2020-09-15 | Stop reason: HOSPADM

## 2020-09-11 RX ADMIN — POTASSIUM CHLORIDE: 149 INJECTION, SOLUTION, CONCENTRATE INTRAVENOUS at 08:09

## 2020-09-11 RX ADMIN — INSULIN HUMAN 5 UNITS: 100 INJECTION, SOLUTION PARENTERAL at 05:09

## 2020-09-11 RX ADMIN — ONDANSETRON 4 MG: 2 INJECTION INTRAMUSCULAR; INTRAVENOUS at 08:09

## 2020-09-11 RX ADMIN — POTASSIUM CHLORIDE 40 MEQ: 1500 TABLET, EXTENDED RELEASE ORAL at 10:09

## 2020-09-11 RX ADMIN — SODIUM CHLORIDE 1000 ML: 0.9 INJECTION, SOLUTION INTRAVENOUS at 11:09

## 2020-09-11 RX ADMIN — SODIUM CHLORIDE 3 UNITS/HR  (ORDERING DOSE ONLY,MUST KEEP AS DOSE RANGE.ONLY CHANGE IF INITIAL DOSE EXCEEDS SUGGESTED RANGE): 9 INJECTION, SOLUTION INTRAVENOUS at 08:09

## 2020-09-11 RX ADMIN — MAGNESIUM SULFATE 1 G: 1 INJECTION INTRAVENOUS at 01:09

## 2020-09-11 RX ADMIN — DEXTROSE AND SODIUM CHLORIDE 1000 ML: 5; .9 INJECTION, SOLUTION INTRAVENOUS at 12:09

## 2020-09-11 RX ADMIN — MAGNESIUM SULFATE 2 G: 2 INJECTION INTRAVENOUS at 09:09

## 2020-09-11 RX ADMIN — CALCIUM GLUCONATE 1000 MG: 98 INJECTION, SOLUTION INTRAVENOUS at 06:09

## 2020-09-11 RX ADMIN — PIPERACILLIN SODIUM,TAZOBACTAM SODIUM 4.5 G: 4; .5 INJECTION, POWDER, FOR SOLUTION INTRAVENOUS at 09:09

## 2020-09-11 RX ADMIN — ONDANSETRON 4 MG: 2 INJECTION INTRAMUSCULAR; INTRAVENOUS at 05:09

## 2020-09-11 RX ADMIN — POTASSIUM CHLORIDE 10 MEQ: 7.46 INJECTION, SOLUTION INTRAVENOUS at 05:09

## 2020-09-11 RX ADMIN — ENOXAPARIN SODIUM 40 MG: 40 INJECTION SUBCUTANEOUS at 08:09

## 2020-09-11 RX ADMIN — SODIUM CHLORIDE 1000 ML: 0.9 INJECTION, SOLUTION INTRAVENOUS at 05:09

## 2020-09-11 RX ADMIN — SODIUM CHLORIDE 2 UNITS/HR  (ORDERING DOSE ONLY,MUST KEEP AS DOSE RANGE.ONLY CHANGE IF INITIAL DOSE EXCEEDS SUGGESTED RANGE): 9 INJECTION, SOLUTION INTRAVENOUS at 11:09

## 2020-09-11 RX ADMIN — CEFTRIAXONE 2 G: 2 INJECTION, SOLUTION INTRAVENOUS at 04:09

## 2020-09-11 NOTE — ED NOTES
Care assumed of pt, agree with assessment of previous nurse.  Pt remains calm and cooperative. Instructed to call for needs and voiced understanding. Denies any pain. Does not need to use the bathroom at this time.

## 2020-09-11 NOTE — PROVIDER PROGRESS NOTES - EMERGENCY DEPT.
Encounter Date: 9/10/2020    ED Physician Progress Notes           This patient initially presented to the ER with elevated LFTs  Patient also had hard to control blood sugars in the ER as well  Patient's initial venous pH was 7.5, also symptoms of DKA now  Patient's picture was also clouded by possible hepatitis/liver disease  Ultrasound of the abdomen only showed fatty infiltration of liver  Patient also had been heavily drinking until 2 days ago per interview  This was complex patient with several different issues to address    Patient had a UTI in diagnosis, given Zosyn/ IV Rocephin in the ER  Blood cultures are currently pending, urine culture shows E coli    Patient throughout her ER stay got IV fluids and IV insulin  Patient CO2 continue to remain low, unclear of DKA or not  Electrolytes were corrected on continued lab work in the ER  Unclear if this pictures clouded by UTI and or liver issues back/ETOH  LFTs are improving, patient not undergoing any withdrawal symptoms    Patient certainly needs higher level care for hepatology evaluation  Patient also needs further evaluation for blood sugars and abnormal labs  Care going forward to be appropriately managed by specialists at [facility]  Patient's blood sugar is also being managed with insulin and IV fluids  Blood cultures need to be followed for possible sepsis issues also  IV Rocephin given in the ER for E coli UTI, normal initial white count  Lactic acid however was elevated, hemodynamically stable on transfer       Juan Campoverde M.D. 5:47 PM 9/11/2020

## 2020-09-11 NOTE — ED NOTES
Patient resting quietly with no complaints at this time. Awaiting blood work at 2000. Insulin drip transfusing without problems. IV site without redness or swelling. Call light in reach, bed in low position, SR up x2.

## 2020-09-11 NOTE — ED NOTES
Patient has bed assignment at [facility].  Bed 8096; patient has bee notified.  No questions at this time.  EMS has been set up per transfer center.  Will continue to monitor.

## 2020-09-12 LAB
A1AT SERPL-MCNC: 174 MG/DL (ref 100–190)
ALBUMIN SERPL BCP-MCNC: 2.5 G/DL (ref 3.5–5.2)
ALP SERPL-CCNC: 114 U/L (ref 55–135)
ALT SERPL W/O P-5'-P-CCNC: 237 U/L (ref 10–44)
ANION GAP SERPL CALC-SCNC: 11 MMOL/L (ref 8–16)
ANION GAP SERPL CALC-SCNC: 11 MMOL/L (ref 8–16)
ANION GAP SERPL CALC-SCNC: 12 MMOL/L (ref 8–16)
ANION GAP SERPL CALC-SCNC: 13 MMOL/L (ref 8–16)
ANION GAP SERPL CALC-SCNC: 14 MMOL/L (ref 8–16)
ANION GAP SERPL CALC-SCNC: 15 MMOL/L (ref 8–16)
ANION GAP SERPL CALC-SCNC: 18 MMOL/L (ref 8–16)
AST SERPL-CCNC: 237 U/L (ref 10–40)
BASOPHILS # BLD AUTO: 0.08 K/UL (ref 0–0.2)
BASOPHILS NFR BLD: 0.9 % (ref 0–1.9)
BILIRUB SERPL-MCNC: 1.1 MG/DL (ref 0.1–1)
BUN SERPL-MCNC: 2 MG/DL (ref 6–20)
BUN SERPL-MCNC: 3 MG/DL (ref 6–20)
BUN SERPL-MCNC: 3 MG/DL (ref 6–20)
BUN SERPL-MCNC: 5 MG/DL (ref 6–20)
BUN SERPL-MCNC: 6 MG/DL (ref 6–20)
CALCIUM SERPL-MCNC: 6.8 MG/DL (ref 8.7–10.5)
CALCIUM SERPL-MCNC: 7.3 MG/DL (ref 8.7–10.5)
CALCIUM SERPL-MCNC: 7.4 MG/DL (ref 8.7–10.5)
CALCIUM SERPL-MCNC: 7.5 MG/DL (ref 8.7–10.5)
CERULOPLASMIN SERPL-MCNC: 31 MG/DL (ref 15–45)
CHLORIDE SERPL-SCNC: 103 MMOL/L (ref 95–110)
CHLORIDE SERPL-SCNC: 104 MMOL/L (ref 95–110)
CHLORIDE SERPL-SCNC: 106 MMOL/L (ref 95–110)
CHLORIDE SERPL-SCNC: 107 MMOL/L (ref 95–110)
CHLORIDE SERPL-SCNC: 107 MMOL/L (ref 95–110)
CO2 SERPL-SCNC: 11 MMOL/L (ref 23–29)
CO2 SERPL-SCNC: 13 MMOL/L (ref 23–29)
CO2 SERPL-SCNC: 14 MMOL/L (ref 23–29)
CO2 SERPL-SCNC: 15 MMOL/L (ref 23–29)
CO2 SERPL-SCNC: 17 MMOL/L (ref 23–29)
CREAT SERPL-MCNC: 0.7 MG/DL (ref 0.5–1.4)
CREAT SERPL-MCNC: 0.8 MG/DL (ref 0.5–1.4)
CREAT SERPL-MCNC: 0.9 MG/DL (ref 0.5–1.4)
CREAT SERPL-MCNC: 0.9 MG/DL (ref 0.5–1.4)
DIFFERENTIAL METHOD: ABNORMAL
EOSINOPHIL # BLD AUTO: 0.1 K/UL (ref 0–0.5)
EOSINOPHIL NFR BLD: 0.9 % (ref 0–8)
ERYTHROCYTE [DISTWIDTH] IN BLOOD BY AUTOMATED COUNT: 17.2 % (ref 11.5–14.5)
EST. GFR  (AFRICAN AMERICAN): >60 ML/MIN/1.73 M^2
EST. GFR  (NON AFRICAN AMERICAN): >60 ML/MIN/1.73 M^2
GLUCOSE SERPL-MCNC: 151 MG/DL (ref 70–110)
GLUCOSE SERPL-MCNC: 156 MG/DL (ref 70–110)
GLUCOSE SERPL-MCNC: 191 MG/DL (ref 70–110)
GLUCOSE SERPL-MCNC: 199 MG/DL (ref 70–110)
GLUCOSE SERPL-MCNC: 199 MG/DL (ref 70–110)
GLUCOSE SERPL-MCNC: 205 MG/DL (ref 70–110)
GLUCOSE SERPL-MCNC: 303 MG/DL (ref 70–110)
HCT VFR BLD AUTO: 29.9 % (ref 37–48.5)
HGB BLD-MCNC: 8.4 G/DL (ref 12–16)
IMM GRANULOCYTES # BLD AUTO: 0.03 K/UL (ref 0–0.04)
IMM GRANULOCYTES NFR BLD AUTO: 0.3 % (ref 0–0.5)
INR PPP: 1.1 (ref 0.8–1.2)
LACTATE SERPL-SCNC: 0.9 MMOL/L (ref 0.5–2.2)
LYMPHOCYTES # BLD AUTO: 1.7 K/UL (ref 1–4.8)
LYMPHOCYTES NFR BLD: 19.5 % (ref 18–48)
MAGNESIUM SERPL-MCNC: 1.5 MG/DL (ref 1.6–2.6)
MCH RBC QN AUTO: 24.7 PG (ref 27–31)
MCHC RBC AUTO-ENTMCNC: 28.1 G/DL (ref 32–36)
MCV RBC AUTO: 88 FL (ref 82–98)
MONOCYTES # BLD AUTO: 0.8 K/UL (ref 0.3–1)
MONOCYTES NFR BLD: 9.6 % (ref 4–15)
NEUTROPHILS # BLD AUTO: 6 K/UL (ref 1.8–7.7)
NEUTROPHILS NFR BLD: 68.8 % (ref 38–73)
NRBC BLD-RTO: 0 /100 WBC
PHOSPHATE SERPL-MCNC: 1.8 MG/DL (ref 2.7–4.5)
PLATELET # BLD AUTO: 270 K/UL (ref 150–350)
PMV BLD AUTO: 10.4 FL (ref 9.2–12.9)
POCT GLUCOSE: 145 MG/DL (ref 70–110)
POCT GLUCOSE: 147 MG/DL (ref 70–110)
POCT GLUCOSE: 153 MG/DL (ref 70–110)
POCT GLUCOSE: 171 MG/DL (ref 70–110)
POCT GLUCOSE: 185 MG/DL (ref 70–110)
POCT GLUCOSE: 186 MG/DL (ref 70–110)
POCT GLUCOSE: 188 MG/DL (ref 70–110)
POCT GLUCOSE: 200 MG/DL (ref 70–110)
POCT GLUCOSE: 202 MG/DL (ref 70–110)
POCT GLUCOSE: 206 MG/DL (ref 70–110)
POCT GLUCOSE: 219 MG/DL (ref 70–110)
POCT GLUCOSE: 221 MG/DL (ref 70–110)
POCT GLUCOSE: 248 MG/DL (ref 70–110)
POCT GLUCOSE: 264 MG/DL (ref 70–110)
POCT GLUCOSE: 282 MG/DL (ref 70–110)
POCT GLUCOSE: 282 MG/DL (ref 70–110)
POCT GLUCOSE: 307 MG/DL (ref 70–110)
POCT GLUCOSE: 318 MG/DL (ref 70–110)
POTASSIUM SERPL-SCNC: 3.5 MMOL/L (ref 3.5–5.1)
POTASSIUM SERPL-SCNC: 3.7 MMOL/L (ref 3.5–5.1)
POTASSIUM SERPL-SCNC: 3.8 MMOL/L (ref 3.5–5.1)
POTASSIUM SERPL-SCNC: 3.9 MMOL/L (ref 3.5–5.1)
POTASSIUM SERPL-SCNC: 3.9 MMOL/L (ref 3.5–5.1)
POTASSIUM SERPL-SCNC: 4 MMOL/L (ref 3.5–5.1)
POTASSIUM SERPL-SCNC: 4.1 MMOL/L (ref 3.5–5.1)
PROT SERPL-MCNC: 5.9 G/DL (ref 6–8.4)
PROTHROMBIN TIME: 11.9 SEC (ref 9–12.5)
RBC # BLD AUTO: 3.4 M/UL (ref 4–5.4)
SODIUM SERPL-SCNC: 129 MMOL/L (ref 136–145)
SODIUM SERPL-SCNC: 132 MMOL/L (ref 136–145)
SODIUM SERPL-SCNC: 133 MMOL/L (ref 136–145)
SODIUM SERPL-SCNC: 134 MMOL/L (ref 136–145)
SODIUM SERPL-SCNC: 135 MMOL/L (ref 136–145)
WBC # BLD AUTO: 8.68 K/UL (ref 3.9–12.7)

## 2020-09-12 PROCEDURE — 63600175 PHARM REV CODE 636 W HCPCS: Performed by: STUDENT IN AN ORGANIZED HEALTH CARE EDUCATION/TRAINING PROGRAM

## 2020-09-12 PROCEDURE — 84100 ASSAY OF PHOSPHORUS: CPT

## 2020-09-12 PROCEDURE — 20600001 HC STEP DOWN PRIVATE ROOM

## 2020-09-12 PROCEDURE — 85610 PROTHROMBIN TIME: CPT

## 2020-09-12 PROCEDURE — 80048 BASIC METABOLIC PNL TOTAL CA: CPT | Mod: 91

## 2020-09-12 PROCEDURE — 80048 BASIC METABOLIC PNL TOTAL CA: CPT

## 2020-09-12 PROCEDURE — 99223 1ST HOSP IP/OBS HIGH 75: CPT | Mod: ,,, | Performed by: STUDENT IN AN ORGANIZED HEALTH CARE EDUCATION/TRAINING PROGRAM

## 2020-09-12 PROCEDURE — 83735 ASSAY OF MAGNESIUM: CPT

## 2020-09-12 PROCEDURE — 25000003 PHARM REV CODE 250: Performed by: STUDENT IN AN ORGANIZED HEALTH CARE EDUCATION/TRAINING PROGRAM

## 2020-09-12 PROCEDURE — 99223 PR INITIAL HOSPITAL CARE,LEVL III: ICD-10-PCS | Mod: ,,, | Performed by: STUDENT IN AN ORGANIZED HEALTH CARE EDUCATION/TRAINING PROGRAM

## 2020-09-12 PROCEDURE — 36415 COLL VENOUS BLD VENIPUNCTURE: CPT

## 2020-09-12 PROCEDURE — 85025 COMPLETE CBC W/AUTO DIFF WBC: CPT

## 2020-09-12 PROCEDURE — 82550 ASSAY OF CK (CPK): CPT

## 2020-09-12 PROCEDURE — S5010 5% DEXTROSE AND 0.45% SALINE: HCPCS | Performed by: STUDENT IN AN ORGANIZED HEALTH CARE EDUCATION/TRAINING PROGRAM

## 2020-09-12 PROCEDURE — 80053 COMPREHEN METABOLIC PANEL: CPT

## 2020-09-12 PROCEDURE — 82390 ASSAY OF CERULOPLASMIN: CPT

## 2020-09-12 PROCEDURE — 83605 ASSAY OF LACTIC ACID: CPT

## 2020-09-12 PROCEDURE — 82784 ASSAY IGA/IGD/IGG/IGM EACH: CPT

## 2020-09-12 PROCEDURE — 86235 NUCLEAR ANTIGEN ANTIBODY: CPT

## 2020-09-12 RX ORDER — MAGNESIUM SULFATE HEPTAHYDRATE 40 MG/ML
2 INJECTION, SOLUTION INTRAVENOUS ONCE
Status: COMPLETED | OUTPATIENT
Start: 2020-09-12 | End: 2020-09-12

## 2020-09-12 RX ORDER — SODIUM,POTASSIUM PHOSPHATES 280-250MG
2 POWDER IN PACKET (EA) ORAL ONCE
Status: COMPLETED | OUTPATIENT
Start: 2020-09-12 | End: 2020-09-12

## 2020-09-12 RX ORDER — INSULIN ASPART 100 [IU]/ML
5 INJECTION, SOLUTION INTRAVENOUS; SUBCUTANEOUS ONCE
Status: COMPLETED | OUTPATIENT
Start: 2020-09-12 | End: 2020-09-12

## 2020-09-12 RX ADMIN — POTASSIUM & SODIUM PHOSPHATES POWDER PACK 280-160-250 MG 2 PACKET: 280-160-250 PACK at 10:09

## 2020-09-12 RX ADMIN — ENOXAPARIN SODIUM 40 MG: 40 INJECTION SUBCUTANEOUS at 04:09

## 2020-09-12 RX ADMIN — MAGNESIUM SULFATE 2 G: 2 INJECTION INTRAVENOUS at 10:09

## 2020-09-12 RX ADMIN — VANCOMYCIN HYDROCHLORIDE 1250 MG: 1.25 INJECTION, POWDER, LYOPHILIZED, FOR SOLUTION INTRAVENOUS at 02:09

## 2020-09-12 RX ADMIN — CALCIUM GLUCONATE 1000 MG: 98 INJECTION, SOLUTION INTRAVENOUS at 02:09

## 2020-09-12 RX ADMIN — VANCOMYCIN HYDROCHLORIDE 1250 MG: 1.25 INJECTION, POWDER, LYOPHILIZED, FOR SOLUTION INTRAVENOUS at 11:09

## 2020-09-12 RX ADMIN — INSULIN ASPART 5 UNITS: 100 INJECTION, SOLUTION INTRAVENOUS; SUBCUTANEOUS at 01:09

## 2020-09-12 RX ADMIN — THIAMINE HYDROCHLORIDE 500 MG: 100 INJECTION, SOLUTION INTRAMUSCULAR; INTRAVENOUS at 09:09

## 2020-09-12 RX ADMIN — POTASSIUM CHLORIDE: 149 INJECTION, SOLUTION, CONCENTRATE INTRAVENOUS at 08:09

## 2020-09-12 RX ADMIN — THERA TABS 1 TABLET: TAB at 09:09

## 2020-09-12 RX ADMIN — FOLIC ACID 1 MG: 1 TABLET ORAL at 09:09

## 2020-09-12 RX ADMIN — POTASSIUM CHLORIDE: 149 INJECTION, SOLUTION, CONCENTRATE INTRAVENOUS at 02:09

## 2020-09-12 RX ADMIN — THIAMINE HYDROCHLORIDE 500 MG: 100 INJECTION, SOLUTION INTRAMUSCULAR; INTRAVENOUS at 04:09

## 2020-09-12 RX ADMIN — PIPERACILLIN SODIUM,TAZOBACTAM SODIUM 4.5 G: 4; .5 INJECTION, POWDER, FOR SOLUTION INTRAVENOUS at 04:09

## 2020-09-12 RX ADMIN — CALCIUM GLUCONATE 1000 MG: 98 INJECTION, SOLUTION INTRAVENOUS at 03:09

## 2020-09-12 NOTE — SUBJECTIVE & OBJECTIVE
Interval History: No acute events overnight, patient still on DKA on insulin drip with D5. Anion gap impriving, BG 200s. Following bicarb to be >18 in order to transition to SQ.     Review of Systems   Constitutional: Positive for appetite change, diaphoresis and fatigue. Negative for fever.   HENT: Negative.    Eyes: Negative.    Respiratory: Negative    Cardiovascular: Negative.    Gastrointestinal: Negative    Endocrine: Negative.    Genitourinary: Negative.    Musculoskeletal: Negative.    Skin: Negative.    Neurological: Positive for dizziness and light-headedness.   Psychiatric/Behavioral: The patient is nervous/anxious.      Objective:     Vital Signs (Most Recent):  Temp: 97.5 °F (36.4 °C) (09/12/20 1530)  Pulse: 89 (09/12/20 1530)  Resp: (!) 26 (09/12/20 1530)  BP: (!) 114/56 (09/12/20 1530)  SpO2: 99 % (09/12/20 0800) Vital Signs (24h Range):  Temp:  [97.5 °F (36.4 °C)-98.6 °F (37 °C)] 97.5 °F (36.4 °C)  Pulse:  [] 89  Resp:  [23-28] 26  SpO2:  [99 %-100 %] 99 %  BP: ()/(52-60) 114/56     Weight: 79 kg (174 lb 2.6 oz)  Body mass index is 27.28 kg/m².    Intake/Output Summary (Last 24 hours) at 9/12/2020 1658  Last data filed at 9/12/2020 1422  Gross per 24 hour   Intake 2420.74 ml   Output --   Net 2420.74 ml      Physical Exam  Constitutional:       General: She is in acute distress.   HENT:      Head: Normocephalic and atraumatic.      Mouth/Throat:      Mouth: Mucous membranes are moist.   Eyes:      Extraocular Movements: Extraocular movements intact.      Pupils: Pupils are equal, round, and reactive to light.   Cardiovascular:      Rate and Rhythm: Normal rate and regular rhythm.   Pulmonary:      Breath sounds: Normal breath sounds.   Abdominal:      General: Abdomen is flat. Bowel sounds are normal.   Musculoskeletal:         General: No swelling.   Skin:     General: Skin is warm.   Neurological:      General: No focal deficit present.      Mental Status: She is alert and oriented to  person, place, and time.      Cranial Nerves: No cranial nerve deficit.       Significant Labs:   CBC:   Recent Labs   Lab 09/12/20  0708   WBC 8.68   HGB 8.4*   HCT 29.9*        CMP:   Recent Labs   Lab 09/11/20  0941  09/11/20  1609  09/12/20  0708 09/12/20  0751 09/12/20  1311      < > 137   < > 134*  134* 133* 129*   K 3.5   < > 3.8   < > 4.1  4.0 3.9 3.7      < > 107   < > 106  107 104 103   CO2 11*   < > 11*   < > 14*  14* 11* 15*   *   < > 267*   < > 199*  199* 205* 303*   BUN 10   < > 9   < > 5*  5* 5* 3*   CREATININE 1.1   < > 1.2   < > 0.9  0.9 0.8 0.8   CALCIUM 7.2*   < > 7.4*   < > 7.5*  7.5* 7.5* 7.4*   PROT 6.1  --  6.7  --  5.9*  --   --    ALBUMIN 2.7*  --  2.9*  --  2.5*  --   --    BILITOT 1.5*  --  1.4*  --  1.1*  --   --    ALKPHOS 125  --  127  --  114  --   --    *  --  427*  --  237*  --   --    *  --  326*  --  237*  --   --    ANIONGAP 25*   < > 19*   < > 14  13 18* 11   EGFRNONAA >60   < > 56*   < > >60.0  >60.0 >60.0 >60.0    < > = values in this interval not displayed.       Significant Imaging: I have reviewed and interpreted all pertinent imaging results/findings within the past 24 hours.

## 2020-09-12 NOTE — SUBJECTIVE & OBJECTIVE
Past Medical History:   Diagnosis Date    E coli bacteremia     GERD (gastroesophageal reflux disease)     Iron deficiency anemia due to chronic blood loss 10/31/2015    Type 1 diabetes mellitus without complication 10/31/2015       Past Surgical History:   Procedure Laterality Date     SECTION      TUBAL LIGATION         Review of patient's allergies indicates:  No Known Allergies    Current Facility-Administered Medications on File Prior to Encounter   Medication    [COMPLETED] 0.9%  NaCl infusion    [COMPLETED] 0.9%  NaCl infusion    [COMPLETED] calcium gluconate 1g in dextrose 5% 100mL (ready to mix system)    [COMPLETED] cefTRIAXone (ROCEPHIN) 2 g/50 mL D5W IVPB    [COMPLETED] dextrose 5 % and 0.9 % NaCl infusion    [COMPLETED] insulin regular injection 5 Units    [COMPLETED] magnesium sulfate in dextrose IVPB (premix) 1 g    [COMPLETED] ondansetron injection 4 mg    [COMPLETED] ondansetron injection 4 mg    [COMPLETED] potassium chloride 10 mEq in 100 mL IVPB    [COMPLETED] potassium chloride 10 mEq in 100 mL IVPB    [] potassium chloride in water 10 mEq/100 mL IVPB    [DISCONTINUED] 0.9 % NaCl with KCl 20 mEq infusion    [DISCONTINUED] 0.9%  NaCl infusion    [DISCONTINUED] calcium gluconate 1 g in dextrose 5 % 100 mL IVPB    [DISCONTINUED] dextrose 5 % and 0.9 % NaCl infusion    [DISCONTINUED] dextrose 50% injection 12.5 g    [DISCONTINUED] dextrose 50% injection 12.5 g    [DISCONTINUED] dextrose 50% injection 25 g    [DISCONTINUED] dextrose 50% injection 25 g    [DISCONTINUED] insulin regular 100 Units in sodium chloride 0.9% 100 mL infusion    [DISCONTINUED] insulin regular 100 Units in sodium chloride 0.9% 100 mL infusion    [DISCONTINUED] piperacillin-tazobactam 4.5 g in dextrose 5 % 100 mL IVPB (ready to mix system)    [DISCONTINUED] sodium chloride 0.9% bolus 1,000 mL    [DISCONTINUED] sodium chloride 0.9% bolus 1,000 mL    [DISCONTINUED] sodium  "chloride 0.9% bolus 1,000 mL    [DISCONTINUED] sodium chloride 0.9% bolus 250 mL     Current Outpatient Medications on File Prior to Encounter   Medication Sig    atorvastatin (LIPITOR) 20 MG tablet Take 1 tablet (20 mg total) by mouth once daily.    b complex vitamins tablet Take 1 tablet by mouth once daily.    insulin needles, disposable, 32 x 1/4 " Ndle Use to inject insulin 5 times a day    insulin NPH (NOVOLIN N) 100 unit/mL injection Use 10 units in am and 5 units at night    iron polysaccharides (NIFEREX) 150 mg iron Cap Take 1 capsule (150 mg total) by mouth once daily.    lisinopril (PRINIVIL,ZESTRIL) 2.5 MG tablet Take 1 tablet (2.5 mg total) by mouth once daily.    loratadine (CLARITIN) 10 mg tablet Take 10 mg by mouth once daily.    mupirocin (BACTROBAN) 2 % ointment Apply topically 3 (three) times daily.    SPRINTEC, 28, 0.25-35 mg-mcg per tablet TAKE 1 TABLET BY MOUTH ONCE DAILY     Family History     Problem Relation (Age of Onset)    No Known Problems Mother, Father, Sister, Brother        Tobacco Use    Smoking status: Former Smoker     Packs/day: 1.00     Start date: 1996     Quit date: 2014     Years since quittin.8    Smokeless tobacco: Never Used   Substance and Sexual Activity    Alcohol use: Yes     Alcohol/week: 4.0 standard drinks     Types: 4 Standard drinks or equivalent per week     Comment: occasionally    Drug use: No    Sexual activity: Yes     Partners: Male     Birth control/protection: See Surgical Hx     Comment:      Review of Systems   Constitutional: Positive for appetite change, chills, diaphoresis and fatigue. Negative for fever.   HENT: Negative.    Eyes: Negative.    Respiratory: Positive for shortness of breath.    Cardiovascular: Negative.    Gastrointestinal: Positive for nausea and vomiting.   Endocrine: Negative.    Genitourinary: Negative.    Musculoskeletal: Negative.    Skin: Negative.    Neurological: Positive for dizziness and " light-headedness.   Psychiatric/Behavioral: The patient is nervous/anxious.      Objective:     Vital Signs (Most Recent):  Temp: 98.2 °F (36.8 °C) (09/11/20 2019)  Pulse: (!) 112 (09/11/20 2019)  Resp: (!) 25 (09/11/20 2019)  BP: (!) 89/52 (09/11/20 2019)  SpO2: 100 % (09/11/20 2019) Vital Signs (24h Range):  Temp:  [97.3 °F (36.3 °C)-98.8 °F (37.1 °C)] 98.2 °F (36.8 °C)  Pulse:  [] 112  Resp:  [16-25] 25  SpO2:  [97 %-100 %] 100 %  BP: ()/(52-78) 89/52     Weight: 79 kg (174 lb 2.6 oz)  Body mass index is 27.28 kg/m².    Physical Exam  Constitutional:       General: She is in acute distress.   HENT:      Head: Normocephalic and atraumatic.      Mouth/Throat:      Mouth: Mucous membranes are moist.   Eyes:      Extraocular Movements: Extraocular movements intact.      Pupils: Pupils are equal, round, and reactive to light.   Cardiovascular:      Rate and Rhythm: Normal rate and regular rhythm.   Pulmonary:      Breath sounds: Normal breath sounds.   Abdominal:      General: Abdomen is flat. Bowel sounds are normal.   Musculoskeletal:         General: No swelling.   Skin:     General: Skin is warm.   Neurological:      General: No focal deficit present.      Mental Status: She is alert and oriented to person, place, and time.      Cranial Nerves: No cranial nerve deficit.           CRANIAL NERVES     CN III, IV, VI   Pupils are equal, round, and reactive to light.       Significant Labs:   CBC:   Recent Labs   Lab 09/10/20  1001   WBC 8.64   HGB 10.4*   HCT 34.7*        CMP:   Recent Labs   Lab 09/11/20  0452 09/11/20  0941 09/11/20  1157 09/11/20  1609    136 136 137   K 3.4* 3.5 3.8 3.8    100 100 107   CO2 15* 11* 7* 11*   * 397* 494* 267*   BUN 6 10 9 9   CREATININE 0.7 1.1 1.2 1.2   CALCIUM 6.1* 7.2* 7.5* 7.4*   PROT 5.1* 6.1  --  6.7   ALBUMIN 2.2* 2.7*  --  2.9*   BILITOT 1.3* 1.5*  --  1.4*   ALKPHOS 102 125  --  127   * 582*  --  427*   * 323*  --  326*    ANIONGAP 17* 25* 29* 19*   EGFRNONAA >60 >60 56* 56*     Urine Culture:   Recent Labs   Lab 09/10/20  1006   LABURIN GRAM NEGATIVE MCKINLEY  >100,000 cfu/ml  Identification and susceptibility pending  *     Urine Studies:   Recent Labs   Lab 09/10/20  1006   COLORU Yellow   APPEARANCEUA Clear   PHUR 6.0   SPECGRAV >=1.030*   PROTEINUA 2+*   GLUCUA Negative   KETONESU 3+*   BILIRUBINUA 3+*   OCCULTUA 3+*   NITRITE Positive*   UROBILINOGEN 2.0-3.0*   LEUKOCYTESUR 1+*   RBCUA 30*   WBCUA 20*   BACTERIA Many*   SQUAMEPITHEL 10   HYALINECASTS 0       Significant Imaging: I have reviewed and interpreted all pertinent imaging results/findings within the past 24 hours.

## 2020-09-12 NOTE — PROGRESS NOTES
Therapy with vancomycin complete and/or consult discontinued by provider. Pharmacy will sign off, please re-consult as needed.    Bernarda Velez, PharmD, BCPS  [phone]

## 2020-09-12 NOTE — ASSESSMENT & PLAN NOTE
Patient with chronic alcohol abuse. Last drink on Saturday (6 days ago) this is the longest she has gone without drinking. She has never had alcohol withdrawal, seizures or DT. At the moment patient is extremely anxious with tachycardia due to new diagnosis of possible liver disease. No hallucinations, no diaphoresis, no tremors.     Plan:   --CIWA protocol   -- ativan 0.5 for withdrawal PRN   --ativan 2mg for seizures PRN   --Started thiamine, folate

## 2020-09-12 NOTE — CARE UPDATE
Rapid Response Nurse AI Alert     AI alert received, chart check completed abnormal VS noted. bedside RN, Lopez contacted, no concerns verbalized at this time. Primary to round a write orders soon per RN. Rapid response recommend replacing electrolytes and rechecking labs and lactic acid ASAP. Will continue to follow as orders are placed and plan to round ASAP. Instructed to call [phone] for further concerns or assistance.

## 2020-09-12 NOTE — ASSESSMENT & PLAN NOTE
39 y/o presenting with nausea, vomiting, dizziness, diaphoresis transferred from [place] with DKA with new onset transiminits, on insulin ggt on arrival. On arrival anion gap improving, blood glucose <200,  Lactic acid improving. VBG with normal pH. Patient refers that she has had DKA before, but never have been as this. She has not been able to keep food down for the past 4 days. Patient presenting with hypotension, MAP 66 yesterday. Today BP well controlled.     Plan:   --BG <200, transitioned to dextrose 5 % and 0.45 % NaCl 1,000 mL with potassium chloride infusion.   --Will monitor serum bicarb until >18, to transition start SubQ insulin.   --BG Q1hr  --CMP Q4h  --Will monitor

## 2020-09-12 NOTE — SUBJECTIVE & OBJECTIVE
"Review of Systems   Constitutional: Positive for appetite change and fatigue. Negative for chills and fever.   HENT: Negative for trouble swallowing.    Respiratory: Negative for cough and shortness of breath.    Cardiovascular: Negative for chest pain and leg swelling.   Gastrointestinal: Positive for nausea and vomiting. Negative for abdominal pain, blood in stool, constipation and diarrhea.   Genitourinary: Negative for dysuria.   Musculoskeletal: Negative for arthralgias.   Skin: Negative for color change and pallor.   Neurological: Positive for light-headedness. Negative for headaches.   Psychiatric/Behavioral: Negative for behavioral problems and confusion.       Past Medical History:   Diagnosis Date    E coli bacteremia     GERD (gastroesophageal reflux disease)     Iron deficiency anemia due to chronic blood loss 10/31/2015    Type 1 diabetes mellitus without complication 10/31/2015       Past Surgical History:   Procedure Laterality Date     SECTION      TUBAL LIGATION         Family history of liver disease: Yes    Review of patient's allergies indicates:  No Known Allergies    Tobacco Use    Smoking status: Former Smoker     Packs/day: 1.00     Start date: 1996     Quit date: 2014     Years since quittin.8    Smokeless tobacco: Never Used   Substance and Sexual Activity    Alcohol use: Yes     Alcohol/week: 4.0 standard drinks     Types: 4 Standard drinks or equivalent per week     Comment: occasionally    Drug use: No    Sexual activity: Yes     Partners: Male     Birth control/protection: See Surgical Hx     Comment:        Medications Prior to Admission   Medication Sig Dispense Refill Last Dose    atorvastatin (LIPITOR) 20 MG tablet Take 1 tablet (20 mg total) by mouth once daily. 90 tablet 3     b complex vitamins tablet Take 1 tablet by mouth once daily.       insulin needles, disposable, 32 x 1/4 " Ndle Use to inject insulin 5 times a day 100 each 1  "    insulin NPH (NOVOLIN N) 100 unit/mL injection Use 10 units in am and 5 units at night 10 mL 0     iron polysaccharides (NIFEREX) 150 mg iron Cap Take 1 capsule (150 mg total) by mouth once daily. 30 capsule 3     lisinopril (PRINIVIL,ZESTRIL) 2.5 MG tablet Take 1 tablet (2.5 mg total) by mouth once daily. 90 tablet 3     loratadine (CLARITIN) 10 mg tablet Take 10 mg by mouth once daily.       mupirocin (BACTROBAN) 2 % ointment Apply topically 3 (three) times daily. 30 g 0     SPRINTEC, 28, 0.25-35 mg-mcg per tablet TAKE 1 TABLET BY MOUTH ONCE DAILY 28 tablet 5        Objective:     Vital Signs (Most Recent):  Temp: 97.5 °F (36.4 °C) (09/12/20 1530)  Pulse: 89 (09/12/20 1530)  Resp: (!) 26 (09/12/20 1530)  BP: (!) 114/56 (09/12/20 1530)  SpO2: 99 % (09/12/20 0800) Vital Signs (24h Range):  Temp:  [97.5 °F (36.4 °C)-98.6 °F (37 °C)] 97.5 °F (36.4 °C)  Pulse:  [] 89  Resp:  [23-28] 26  SpO2:  [99 %-100 %] 99 %  BP: ()/(52-66) 114/56     Weight: 79 kg (174 lb 2.6 oz) (09/11/20 1813)  Body mass index is 27.28 kg/m².    Physical Exam  Vitals signs and nursing note reviewed.   Constitutional:       General: She is not in acute distress.  Eyes:      General: No scleral icterus.  Neck:      Musculoskeletal: Neck supple.   Cardiovascular:      Rate and Rhythm: Normal rate and regular rhythm.      Heart sounds: No murmur.   Pulmonary:      Effort: Pulmonary effort is normal. No respiratory distress.      Breath sounds: Normal breath sounds.   Abdominal:      General: Bowel sounds are normal. There is no distension.      Palpations: Abdomen is soft.      Tenderness: There is no abdominal tenderness.   Musculoskeletal:      Right lower leg: No edema.      Left lower leg: No edema.   Skin:     General: Skin is warm.      Coloration: Skin is not jaundiced.      Comments: No palmar erythema or spider angiomas   Neurological:      Mental Status: She is alert.   Psychiatric:         Behavior: Behavior normal.          MELD-Na score: 8 at 9/12/2020  1:11 PM  MELD score: 8 at 9/12/2020  1:11 PM  Calculated from:  Serum Creatinine: 0.8 mg/dL (Rounded to 1 mg/dL) at 9/12/2020  1:11 PM  Serum Sodium: 129 mmol/L at 9/12/2020  1:11 PM  Total Bilirubin: 1.1 mg/dL at 9/12/2020  7:08 AM  INR(ratio): 1.1 at 9/12/2020  7:08 AM  Age: 40 years 1 month    Significant Labs:  Labs within the past month have been reviewed.    Significant Imaging:  Reviewed

## 2020-09-12 NOTE — ASSESSMENT & PLAN NOTE
Patient with history of chronic alcohol abuse, drinks up to a liter daily and has increased her alcohol intake in the last couple of months. Presented with DKA to outside hospital with elevated LFTs, AST 1734, . Patient refers that she had elevated liver enzymes once, but resolved and never to this extend. She was transferred here for hepatology workup.     Plan:  --Liver US: Hepatomegaly with fatty infiltration of the liver.   --Hepatology consult,  aware  --Hepatitis panel ordered  --acetaminophen level  --CINDY, anti-smitth ab  --alpha- antitrypsin  --HIV

## 2020-09-12 NOTE — ASSESSMENT & PLAN NOTE
Ms. Keys is a 39yo F w/PMH of T1DM, HTN and alcohol use who presented with nausea, decreased PO intake, DKA and elevated transminases. Hepatology consulted for increased LFTs.    Rise in transaminases to AST 1700s and  he has with only very mild rise in bilirubin.  She does have significant alcohol use and some risk factors for nonalcoholic fatty liver disease, but neither explains such high LFT elevations or the AST:ALT ratio.  Differential includes maybe some ischemic injury (initially hypotensive) or autoimmune. Hep panel negative.  Per patient no other medication exposures.  Mental status intact, no signs of portal hypertension, no evidence of cirrhosis.    - secondary workup:  Please send CINDY, anti mitochondrial antibody, and anti smooth muscle antibody, total IgG, CK.  Hep panel negative, alpha-1 antitrypsin normal.  - please get Doppler ultrasound of the liver  - monitor daily CMP and INR  - if LFTs do not continue to improve and or no etiology to hepatitis found, will consider liver biopsy on Monday

## 2020-09-12 NOTE — PROGRESS NOTES
Pharmacokinetic Initial Assessment: IV Vancomycin    Assessment/Plan:    Initiate intravenous vancomycin with loading dose of 0 mg once followed by a maintenance dose of vancomycin 1250mg IV every 12 hours  Desired empiric serum trough concentration is 10 to 15 mcg/mL  Draw vancomycin trough level 60 min prior to fourth dose on 9/13 at approximately 0930  Pharmacy will continue to follow and monitor vancomycin.      Thank you for allowing pharmacy participate in this patient's care.     Laura Dietz, PharmD  Clinical Pharmacist, [facility]  [email or web address]  office [phone]         Patient brief summary:  Cira Keys is a 40 y.o. female initiated on antimicrobial therapy with IV Vancomycin for treatment of suspected urinary tract infection    Drug Allergies:   Review of patient's allergies indicates:  No Known Allergies    Actual Body Weight:   100.2kg    Renal Function:   Estimated Creatinine Clearance: 100.2 mL/min (based on SCr of 0.8 mg/dL).,     Dialysis Method (if applicable):  N/A    CBC (last 72 hours):  Recent Labs   Lab Result Units 09/10/20  1001 09/10/20  1050   WBC K/uL 8.64  --    Hemoglobin g/dL 10.4*  --    Hemoglobin A1C %  --  8.4*   Hematocrit % 34.7*  --    Platelets K/uL 321  --    Gran% % 77.3*  --    Lymph% % 14.5*  --    Mono% % 6.5  --    Eosinophil% % 0.5  --    Basophil% % 0.9  --    Differential Method  Automated  --        Metabolic Panel (last 72 hours):  Recent Labs   Lab Result Units 09/10/20  1001 09/10/20  1006 09/10/20  2101 09/11/20  0452 09/11/20  0941 09/11/20  1157 09/11/20  1609 09/11/20  1954   Sodium mmol/L 137  --  139 136 136 136 137 139   Potassium mmol/L 3.2*  --  2.9* 3.4* 3.5 3.8 3.8 3.3*   Chloride mmol/L 96  --  101 104 100 100 107 116*   CO2 mmol/L 20*  --  24 15* 11* 7* 11* 12*   Glucose mg/dL 230*  --  167* 295* 397* 494* 267* 132*   Glucose, UA   --  Negative  --   --   --   --   --   --    BUN, Bld mg/dL 8  --  7 6 10 9 9 6   Creatinine  mg/dL 1.0  --  0.8 0.7 1.1 1.2 1.2 0.8   Creatinine, Random Ur mg/dL  --  401.8*  --   --   --   --   --   --    Albumin g/dL 3.3*  --  2.7* 2.2* 2.7*  --  2.9*  --    Total Bilirubin mg/dL 1.9*  --  1.4* 1.3* 1.5*  --  1.4*  --    Alkaline Phosphatase U/L 158*  --  122 102 125  --  127  --    AST U/L 1,734*  --  1,144* 694* 582*  --  427*  --    ALT U/L 473*  --  395* 315* 323*  --  326*  --    Magnesium mg/dL  --   --   --   --   --  0.8* 1.5*  --    Phosphorus mg/dL  --   --   --   --   --  4.7* 2.3*  --        Drug levels (last 3 results):  No results for input(s): VANCOMYCINRA, VANCOMYCINPE, VANCOMYCINTR in the last 72 hours.    Microbiologic Results:  Microbiology Results (last 7 days)       ** No results found for the last 168 hours. **

## 2020-09-12 NOTE — ASSESSMENT & PLAN NOTE
Patient with history of chronic alcohol abuse, drinks up to a liter daily and has increased her alcohol intake in the last couple of months. Presented with DKA to outside hospital with elevated LFTs, AST 1734, . Patient refers that she had elevated liver enzymes once, but resolved and never to this extend. She was transferred here for hepatology workup.     Plan:  --Liver US: Hepatomegaly with fatty infiltration of the liver.   --Hepatology consulted will continue to follow recommendations. At the moment most likely etiology could be alcohol indice, but rarely it presents with LFTs >1000. They are concerned for possible ischemic vs autoimmune. Will f/u labs.   --Hepatitis panel ordered: normal   --acetaminophen level NEG  --alpha-1 antitrypsin normal.  --Pending: CINDY, anti-smooth ab, cyroglobulin, anti smith,CK, total IgG  --Dopple US of liver ordered along with ECHO   >>>>if LFTs do not continue to improve and or no etiology to hepatitis found, will consider liver biopsy on Monday

## 2020-09-12 NOTE — HPI
Ms. Keys is a 39yo F w/PMH of T1DM, HTN and alcohol use who presented with nausea, decreased PO intake, DKA and elevated transminases. Hepatology consulted for increased LFTs.    Patient came in to outside hospital with about a week decreased p.o. intake nausea, non-bloody vomiting.  She was found to be in DKA but also with AST 1700s, ALT 470s, TBili 1.9, .  Transferred here for higher level of care.  Patient does have history of heavy alcohol use.  She has been drinking for 20+ years and since the recent COVID pandemic has been drinking up to a gallon of vodka over 2-3 days.  Her last drink was a week ago prior to onset of recent symptoms.  She has been told about elevated liver enzymes in the past, but never been told about cirrhosis or alcohol-related liver disease. She otherwise denies any new medications other than Prilosec.  No over-the-counter or herbal meds.  No excessive Tylenol use.  No syncopal episodes at home, but did have some low blood pressures on presentation.  Her father  from alcohol-related liver disease.  Regarding her alcohol use, she has never been to rehab, she did have a DUI 14 years ago, she has never tried to quit in the past.  Initially, a slightly tachycardic and borderline hypotensive with lactate 5.1.  AST and , T bili 1.1.  Hep panel negative.  Ultrasound with hepatomegaly.

## 2020-09-12 NOTE — ASSESSMENT & PLAN NOTE
Ucx + gram negative rods patient treated with zosyn and rocephin on outside hospital. Denies urinary symptoms at the moment.      Plan:   --Continue zosyn   --f/u urine cultures

## 2020-09-12 NOTE — PROGRESS NOTES
"[facility] Hospital Medicine  Progress Note    Patient Name: Cira Keys  MRN: [id number]  Patient Class: IP- Inpatient   Admission Date: 9/11/2020  Length of Stay: 1 days  Attending Physician: Behram Khan, MD  Primary Care Provider: Primary Doctor [facility] Medicine Team: [facility] MED 5 Sindhu Cruz MD    Subjective:     Principal Problem:<principal problem not specified>        HPI:  41 y/o F with TIDM, HTN and heavy ETOH abuse transferred from [facility] ED after presenting with worsening nausea, vomiting, diaphoresis and low oral intake for the past 5 days. It started as dizziness when standing up that worsened to the extend of not being able to move on her bed, she had a decrease appetite, with no intake since 4 days ago and excessive vomiting "too many to count".  Her last drink was on Saturday, 1/5 vodka. This is the most she has been without drinking. She refers mild SOB and chills. Denies chest pain, dysuria, fever, weakness, numbness and tingling.     Patient with history of heavy alcohol use, drinks everyday since she was 17 y/o. She says she has increased the amount she drinks since March, can finish a gallon in 2.5 days.     On [facility] patient presented with acute hepatitis, anion gap metabolic acidosis secondary to mild DKA vs starvation ketoacidosis. Patient with no previous history of cirrhosis, liver US remarkable for hepatomegaly with fatty infiltration of the liver, for which patient was transferred for hepatology evaluation. Patient refers having 5-6 DKA in her life, last one 3 years ago, but has never presented as this.     Overview/Hospital Course:  Patient admitted to internal medicine team as transfer for hepatology evaluation after presenting with LFTs >1000. Patient also on DKA most likely 2/2 UTI, came with insulin infusion drip. Anion gap improving, currently following bicarb to be >18 in order to transition to SQ. Patient with history of excessive " alcohol intake, for which concerned for alcohol withdrawal, CIWA protocol started and patient on thiamine and folate. Hepatology consulted and will f/u labs and US doppler.     Interval History: No acute events overnight, patient still on DKA on insulin drip with D5. Anion gap impriving, BG 200s. Following bicarb to be >18 in order to transition to SQ.     Review of Systems   Constitutional: Positive for appetite change, diaphoresis and fatigue. Negative for fever.   HENT: Negative.    Eyes: Negative.    Respiratory: Negative    Cardiovascular: Negative.    Gastrointestinal: Negative    Endocrine: Negative.    Genitourinary: Negative.    Musculoskeletal: Negative.    Skin: Negative.    Neurological: Positive for dizziness and light-headedness.   Psychiatric/Behavioral: The patient is nervous/anxious.      Objective:     Vital Signs (Most Recent):  Temp: 97.5 °F (36.4 °C) (09/12/20 1530)  Pulse: 89 (09/12/20 1530)  Resp: (!) 26 (09/12/20 1530)  BP: (!) 114/56 (09/12/20 1530)  SpO2: 99 % (09/12/20 0800) Vital Signs (24h Range):  Temp:  [97.5 °F (36.4 °C)-98.6 °F (37 °C)] 97.5 °F (36.4 °C)  Pulse:  [] 89  Resp:  [23-28] 26  SpO2:  [99 %-100 %] 99 %  BP: ()/(52-60) 114/56     Weight: 79 kg (174 lb 2.6 oz)  Body mass index is 27.28 kg/m².    Intake/Output Summary (Last 24 hours) at 9/12/2020 1658  Last data filed at 9/12/2020 1422  Gross per 24 hour   Intake 2420.74 ml   Output --   Net 2420.74 ml      Physical Exam  Constitutional:       General: She is in acute distress.   HENT:      Head: Normocephalic and atraumatic.      Mouth/Throat:      Mouth: Mucous membranes are moist.   Eyes:      Extraocular Movements: Extraocular movements intact.      Pupils: Pupils are equal, round, and reactive to light.   Cardiovascular:      Rate and Rhythm: Normal rate and regular rhythm.   Pulmonary:      Breath sounds: Normal breath sounds.   Abdominal:      General: Abdomen is flat. Bowel sounds are normal.    Musculoskeletal:         General: No swelling.   Skin:     General: Skin is warm.   Neurological:      General: No focal deficit present.      Mental Status: She is alert and oriented to person, place, and time.      Cranial Nerves: No cranial nerve deficit.       Significant Labs:   CBC:   Recent Labs   Lab 09/12/20  0708   WBC 8.68   HGB 8.4*   HCT 29.9*        CMP:   Recent Labs   Lab 09/11/20  0941  09/11/20  1609  09/12/20  0708 09/12/20  0751 09/12/20  1311      < > 137   < > 134*  134* 133* 129*   K 3.5   < > 3.8   < > 4.1  4.0 3.9 3.7      < > 107   < > 106  107 104 103   CO2 11*   < > 11*   < > 14*  14* 11* 15*   *   < > 267*   < > 199*  199* 205* 303*   BUN 10   < > 9   < > 5*  5* 5* 3*   CREATININE 1.1   < > 1.2   < > 0.9  0.9 0.8 0.8   CALCIUM 7.2*   < > 7.4*   < > 7.5*  7.5* 7.5* 7.4*   PROT 6.1  --  6.7  --  5.9*  --   --    ALBUMIN 2.7*  --  2.9*  --  2.5*  --   --    BILITOT 1.5*  --  1.4*  --  1.1*  --   --    ALKPHOS 125  --  127  --  114  --   --    *  --  427*  --  237*  --   --    *  --  326*  --  237*  --   --    ANIONGAP 25*   < > 19*   < > 14  13 18* 11   EGFRNONAA >60   < > 56*   < > >60.0  >60.0 >60.0 >60.0    < > = values in this interval not displayed.       Significant Imaging: I have reviewed and interpreted all pertinent imaging results/findings within the past 24 hours.      Assessment/Plan:      DKA (diabetic ketoacidoses)  41 y/o presenting with nausea, vomiting, dizziness, diaphoresis transferred from [place] with DKA with new onset transiminits, on insulin ggt on arrival. On arrival anion gap improving, blood glucose <200,  Lactic acid improving. VBG with normal pH. Patient refers that she has had DKA before, but never have been as this. She has not been able to keep food down for the past 4 days. Patient presenting with hypotension, MAP 66 yesterday. Today BP well controlled.     Plan:   --BG <200, transitioned to dextrose 5 %  and 0.45 % NaCl 1,000 mL with potassium chloride infusion.   --Will monitor serum bicarb until >18, to transition start SubQ insulin.   --BG Q1hr  --CMP Q4h  --Will monitor     Acute hepatitis  Patient with history of chronic alcohol abuse, drinks up to a liter daily and has increased her alcohol intake in the last couple of months. Presented with DKA to outside hospital with elevated LFTs, AST 1734, . Patient refers that she had elevated liver enzymes once, but resolved and never to this extend. She was transferred here for hepatology workup.     Plan:  --Liver US: Hepatomegaly with fatty infiltration of the liver.   --Hepatology consulted will continue to follow recommendations. At the moment most likely etiology could be alcohol indice, but rarely it presents with LFTs >1000. They are concerned for possible ischemic vs autoimmune. Will f/u labs.   --Hepatitis panel ordered: normal   --acetaminophen level NEG  --alpha-1 antitrypsin normal.  --Pending: CINDY, anti-smooth ab, cyroglobulin, anti smith,CK, total IgG  --Dopple US of liver ordered along with ECHO   >>>>if LFTs do not continue to improve and or no etiology to hepatitis found, will consider liver biopsy on Monday      Alcohol withdrawal  Patient with chronic alcohol abuse. Last drink on Saturday (6 days ago) this is the longest she has gone without drinking. She has never had alcohol withdrawal, seizures or DT. At the moment patient is extremely anxious with tachycardia due to new diagnosis of possible liver disease. No hallucinations, no diaphoresis, no tremors.     Plan:   --CIWA protocol   -- ativan 0.5 for withdrawal PRN   --ativan 2mg for seizures PRN   --Started thiamine, folate       UTI (urinary tract infection)  Ucx + gram negative rods patient treated with zosyn and rocephin on outside hospital. Denies urinary symptoms at the moment.      Plan:   --Will discontinue antibiotics since patient assymptomatic     Hypokalemia  Patient with low  potassium     Plan:   --replace with d5 infusion and 40meq PO   --F/U BMP       High anion gap metabolic acidosis  --see DKA      VTE Risk Mitigation (From admission, onward)         Ordered     enoxaparin injection 40 mg  Every 24 hours      09/11/20 1927     Place sequential compression device  Until discontinued      09/11/20 1927     IP VTE LOW RISK PATIENT  Once      09/11/20 1927                Discharge Planning   JUVENTINO: 9/14/2020     Code Status: Full Code   Is the patient medically ready for discharge?:     Reason for patient still in hospital (select all that apply): Treatment                     Sindhu Cruz MD  Department of Hospital Medicine   [facility]

## 2020-09-12 NOTE — HOSPITAL COURSE
Patient admitted to internal medicine team as transfer for hepatology evaluation after presenting with LFTs >1000. Patient also was in DKA most likely 2/2 UTI, came with insulin infusion drip. Now on sub Q insulin. Patient with history of excessive alcohol intake, for which concerned for alcohol withdrawal, CIWA protocol started and patient on thiamine and folate. Hepatology consulted and will f/u labs. Doppler US negative. On 9/15 patient's liver enzymes: 67 AST and 135 ALT. Hepatology does not want a biopsy anymore since enzymes down trending. Transitioned to low dose sliding scale.

## 2020-09-12 NOTE — HPI
"39 y/o F with TIDM, HTN and heavy ETOH abuse transferred from [facility] ED after presenting with worsening nausea, vomiting, diaphoresis and low oral intake for the past 5 days. It started as dizziness when standing up that worsened to the extend of not being able to move on her bed, she had a decrease appetite, with no intake since 4 days ago and excessive vomiting "too many to count".  Her last drink was on Saturday, 1/5 vodka. This is the most she has been without drinking. She refers mild SOB and chills. Denies chest pain, dysuria, fever, weakness, numbness and tingling.     Patient with history of heavy alcohol use, drinks everyday since she was 17 y/o. She says she has increased the amount she drinks since March, can finish a gallon in 2.5 days.     On [facility] patient presented with acute hepatitis, anion gap metabolic acidosis secondary to mild DKA vs starvation ketoacidosis. Patient with no previous history of cirrhosis, liver US remarkable for hepatomegaly with fatty infiltration of the liver, for which patient was transferred for hepatology evaluation. Patient refers having 5-6 DKA in her life, last one 3 years ago, but has never presented as this.   "

## 2020-09-12 NOTE — ASSESSMENT & PLAN NOTE
39 y/o presenting with nausea, vomiting, dizziness, diaphoresis transferred from [place] with DKA with new onset transiminits, on insulin ggt on arrival. On arrival anion gap improving, blood glucose <200,  Lactic acid improving. VBG with normal pH. Patient refers that she has had DKA before, but never have been as this. She has not been able to keep food down for the past 4 days. Patient presenting with hypotension, MAP 66.     Plan:   --BG <200, transitioned to dextrose 5 % and 0.45 % NaCl 1,000 mL with potassium chloride infusion.   --Will monitor serum bicarb until >18, to transition start SubQ insulin.   --BG Q1hr  --CMP Q4h  --Will monitor

## 2020-09-12 NOTE — H&P
"[facility] Hospital Medicine  History & Physical    Patient Name: Cira Keys  MRN: [id number]  Admission Date: 9/11/2020  Attending Physician: Vernon Olivares MD   Primary Care Provider: Primary Doctor [facility] Medicine Team: Networked reference to record PCT  Sindhu Cruz MD     Patient information was obtained from patient and ER records.     Subjective:     Principal Problem:<principal problem not specified>    Chief Complaint: No chief complaint on file.       HPI: 39 y/o F with TIDM, HTN and heavy ETOH abuse transferred from [facility] ED after presenting with worsening nausea, vomiting, diaphoresis and low oral intake for the past 5 days. It started as dizziness when standing up that worsened to the extend of not being able to move on her bed, she had a decrease appetite, with no intake since 4 days ago and excessive vomiting "too many to count".  Her last drink was on Saturday, 1/5 vodka. This is the most she has been without drinking. She refers mild SOB and chills. Denies chest pain, dysuria, fever, weakness, numbness and tingling.     Patient with history of heavy alcohol use, drinks everyday since she was 17 y/o. She says she has increased the amount she drinks since March, can finish a gallon in 2.5 days.     On [facility] patient presented with acute hepatitis, anion gap metabolic acidosis secondary to mild DKA vs starvation ketoacidosis. Patient with no previous history of cirrhosis, liver US remarkable for hepatomegaly with fatty infiltration of the liver, for which patient was transferred for hepatology evaluation. Patient refers having 5-6 DKA in her life, last one 3 years ago, but has never presented as this.     Past Medical History:   Diagnosis Date    E coli bacteremia 2011    GERD (gastroesophageal reflux disease)     Iron deficiency anemia due to chronic blood loss 10/31/2015    Type 1 diabetes mellitus without complication 10/31/2015       Past Surgical " History:   Procedure Laterality Date     SECTION      TUBAL LIGATION         Review of patient's allergies indicates:  No Known Allergies    Current Facility-Administered Medications on File Prior to Encounter   Medication    [COMPLETED] 0.9%  NaCl infusion    [COMPLETED] 0.9%  NaCl infusion    [COMPLETED] calcium gluconate 1g in dextrose 5% 100mL (ready to mix system)    [COMPLETED] cefTRIAXone (ROCEPHIN) 2 g/50 mL D5W IVPB    [COMPLETED] dextrose 5 % and 0.9 % NaCl infusion    [COMPLETED] insulin regular injection 5 Units    [COMPLETED] magnesium sulfate in dextrose IVPB (premix) 1 g    [COMPLETED] ondansetron injection 4 mg    [COMPLETED] ondansetron injection 4 mg    [COMPLETED] potassium chloride 10 mEq in 100 mL IVPB    [COMPLETED] potassium chloride 10 mEq in 100 mL IVPB    [] potassium chloride in water 10 mEq/100 mL IVPB    [DISCONTINUED] 0.9 % NaCl with KCl 20 mEq infusion    [DISCONTINUED] 0.9%  NaCl infusion    [DISCONTINUED] calcium gluconate 1 g in dextrose 5 % 100 mL IVPB    [DISCONTINUED] dextrose 5 % and 0.9 % NaCl infusion    [DISCONTINUED] dextrose 50% injection 12.5 g    [DISCONTINUED] dextrose 50% injection 12.5 g    [DISCONTINUED] dextrose 50% injection 25 g    [DISCONTINUED] dextrose 50% injection 25 g    [DISCONTINUED] insulin regular 100 Units in sodium chloride 0.9% 100 mL infusion    [DISCONTINUED] insulin regular 100 Units in sodium chloride 0.9% 100 mL infusion    [DISCONTINUED] piperacillin-tazobactam 4.5 g in dextrose 5 % 100 mL IVPB (ready to mix system)    [DISCONTINUED] sodium chloride 0.9% bolus 1,000 mL    [DISCONTINUED] sodium chloride 0.9% bolus 1,000 mL    [DISCONTINUED] sodium chloride 0.9% bolus 1,000 mL    [DISCONTINUED] sodium chloride 0.9% bolus 250 mL     Current Outpatient Medications on File Prior to Encounter   Medication Sig    atorvastatin (LIPITOR) 20 MG tablet Take 1 tablet (20 mg total) by mouth once daily.    b  "complex vitamins tablet Take 1 tablet by mouth once daily.    insulin needles, disposable, 32 x 1/4 " Ndle Use to inject insulin 5 times a day    insulin NPH (NOVOLIN N) 100 unit/mL injection Use 10 units in am and 5 units at night    iron polysaccharides (NIFEREX) 150 mg iron Cap Take 1 capsule (150 mg total) by mouth once daily.    lisinopril (PRINIVIL,ZESTRIL) 2.5 MG tablet Take 1 tablet (2.5 mg total) by mouth once daily.    loratadine (CLARITIN) 10 mg tablet Take 10 mg by mouth once daily.    mupirocin (BACTROBAN) 2 % ointment Apply topically 3 (three) times daily.    SPRINTEC, 28, 0.25-35 mg-mcg per tablet TAKE 1 TABLET BY MOUTH ONCE DAILY     Family History     Problem Relation (Age of Onset)    No Known Problems Mother, Father, Sister, Brother        Tobacco Use    Smoking status: Former Smoker     Packs/day: 1.00     Start date: 1996     Quit date: 2014     Years since quittin.8    Smokeless tobacco: Never Used   Substance and Sexual Activity    Alcohol use: Yes     Alcohol/week: 4.0 standard drinks     Types: 4 Standard drinks or equivalent per week     Comment: occasionally    Drug use: No    Sexual activity: Yes     Partners: Male     Birth control/protection: See Surgical Hx     Comment:      Review of Systems   Constitutional: Positive for appetite change, chills, diaphoresis and fatigue. Negative for fever.   HENT: Negative.    Eyes: Negative.    Respiratory: Positive for shortness of breath.    Cardiovascular: Negative.    Gastrointestinal: Positive for nausea and vomiting.   Endocrine: Negative.    Genitourinary: Negative.    Musculoskeletal: Negative.    Skin: Negative.    Neurological: Positive for dizziness and light-headedness.   Psychiatric/Behavioral: The patient is nervous/anxious.      Objective:     Vital Signs (Most Recent):  Temp: 98.2 °F (36.8 °C) (20)  Pulse: (!) 112 (20)  Resp: (!) 25 (20)  BP: (!) 89/52 (20 " 2019)  SpO2: 100 % (09/11/20 2019) Vital Signs (24h Range):  Temp:  [97.3 °F (36.3 °C)-98.8 °F (37.1 °C)] 98.2 °F (36.8 °C)  Pulse:  [] 112  Resp:  [16-25] 25  SpO2:  [97 %-100 %] 100 %  BP: ()/(52-78) 89/52     Weight: 79 kg (174 lb 2.6 oz)  Body mass index is 27.28 kg/m².    Physical Exam  Constitutional:       General: She is in acute distress.   HENT:      Head: Normocephalic and atraumatic.      Mouth/Throat:      Mouth: Mucous membranes are moist.   Eyes:      Extraocular Movements: Extraocular movements intact.      Pupils: Pupils are equal, round, and reactive to light.   Cardiovascular:      Rate and Rhythm: Normal rate and regular rhythm.   Pulmonary:      Breath sounds: Normal breath sounds.   Abdominal:      General: Abdomen is flat. Bowel sounds are normal.   Musculoskeletal:         General: No swelling.   Skin:     General: Skin is warm.   Neurological:      General: No focal deficit present.      Mental Status: She is alert and oriented to person, place, and time.      Cranial Nerves: No cranial nerve deficit.           CRANIAL NERVES     CN III, IV, VI   Pupils are equal, round, and reactive to light.       Significant Labs:   CBC:   Recent Labs   Lab 09/10/20  1001   WBC 8.64   HGB 10.4*   HCT 34.7*        CMP:   Recent Labs   Lab 09/11/20  0452 09/11/20  0941 09/11/20  1157 09/11/20  1609    136 136 137   K 3.4* 3.5 3.8 3.8    100 100 107   CO2 15* 11* 7* 11*   * 397* 494* 267*   BUN 6 10 9 9   CREATININE 0.7 1.1 1.2 1.2   CALCIUM 6.1* 7.2* 7.5* 7.4*   PROT 5.1* 6.1  --  6.7   ALBUMIN 2.2* 2.7*  --  2.9*   BILITOT 1.3* 1.5*  --  1.4*   ALKPHOS 102 125  --  127   * 582*  --  427*   * 323*  --  326*   ANIONGAP 17* 25* 29* 19*   EGFRNONAA >60 >60 56* 56*     Urine Culture:   Recent Labs   Lab 09/10/20  1006   LABURIN GRAM NEGATIVE MCKINLEY  >100,000 cfu/ml  Identification and susceptibility pending  *     Urine Studies:   Recent Labs   Lab 09/10/20  1006    COLORU Yellow   APPEARANCEUA Clear   PHUR 6.0   SPECGRAV >=1.030*   PROTEINUA 2+*   GLUCUA Negative   KETONESU 3+*   BILIRUBINUA 3+*   OCCULTUA 3+*   NITRITE Positive*   UROBILINOGEN 2.0-3.0*   LEUKOCYTESUR 1+*   RBCUA 30*   WBCUA 20*   BACTERIA Many*   SQUAMEPITHEL 10   HYALINECASTS 0       Significant Imaging: I have reviewed and interpreted all pertinent imaging results/findings within the past 24 hours.    Assessment/Plan:     DKA (diabetic ketoacidoses)  39 y/o presenting with nausea, vomiting, dizziness, diaphoresis transferred from [place] with DKA with new onset transiminits, on insulin ggt on arrival. On arrival anion gap improving, blood glucose <200,  Lactic acid improving. VBG with normal pH. Patient refers that she has had DKA before, but never have been as this. She has not been able to keep food down for the past 4 days. Patient presenting with hypotension, MAP 66.     Plan:   --BG <200, transitioned to dextrose 5 % and 0.45 % NaCl 1,000 mL with potassium chloride infusion.   --Will monitor serum bicarb until >18, to transition start SubQ insulin.   --BG Q1hr  --CMP Q4h  --Will monitor     Acute hepatitis  Patient with history of chronic alcohol abuse, drinks up to a liter daily and has increased her alcohol intake in the last couple of months. Presented with DKA to outside hospital with elevated LFTs, AST 1734, . Patient refers that she had elevated liver enzymes once, but resolved and never to this extend. She was transferred here for hepatology workup.     Plan:  --Liver US: Hepatomegaly with fatty infiltration of the liver.   --Hepatology consult,  aware  --Hepatitis panel ordered  --acetaminophen level  --CINDY, anti-smitth ab  --alpha- antitrypsin  --HIV     Alcohol withdrawal  Patient with chronic alcohol abuse. Last drink on Saturday (6 days ago) this is the longest she has gone without drinking. She has never had alcohol withdrawal, seizures or DT. At the moment patient  is extremely anxious with tachycardia due to new diagnosis of possible liver disease. No hallucinations, no diaphoresis, no tremors.     Plan:   --CIWA protocol   -- ativan 0.5 for withdrawal PRN   --ativan 2mg for seizures PRN   --Started thiamine, folate       UTI (urinary tract infection)  Ucx + gram negative rods patient treated with zosyn and rocephin on outside hospital. Denies urinary symptoms at the moment.      Plan:   --Continue zosyn   --f/u urine cultures     Hypokalemia  Patient with low potassium     Plan:   --replace with d5 infusion and 40meq PO   --F/U BMP       High anion gap metabolic acidosis  --see DKA      VTE Risk Mitigation (From admission, onward)         Ordered     enoxaparin injection 40 mg  Every 24 hours      09/11/20 1927     Place sequential compression device  Until discontinued      09/11/20 1927     IP VTE LOW RISK PATIENT  Once      09/11/20 1927                   Sindhu Cruz MD  Department of Hospital Medicine   [facility]

## 2020-09-12 NOTE — CARE UPDATE
Rapid Response Nurse Chart Check     Chart check completed, abnormal VS noted. bedside RN, [place] contacted. Discussed doses of electrolyte replacements and antibiotics not given as ordered and on time. Patient with adequate IV access. Instructed to RN to check IV compatibility of medications and administer them. Stressed the importance of antibiotics given on time and electrolyte replacements to prevent arrhythmias. No further concerns verbalized at this time. Instructed to call [phone] for further concerns or assistance.

## 2020-09-12 NOTE — ASSESSMENT & PLAN NOTE
Ucx + gram negative rods patient treated with zosyn and rocephin on outside hospital. Denies urinary symptoms at the moment.      Plan:   --Will discontinue antibiotics since patient assymptomatic

## 2020-09-12 NOTE — CONSULTS
[facility]  Hepatology  Consult Note    Patient Name: Cira Keys  MRN: [id number]  Admission Date: 2020  Hospital Length of Stay: 1 days  Attending Provider: Behram Khan, MD   Primary Care Physician: Primary Doctor No  Principal Problem:<principal problem not specified>    Inpatient consult to Hepatology  Consult performed by: Barak Andrew MD  Consult ordered by: Clarisse Salazar MD        Subjective:     Transplant status: No    HPI:  Ms. Keys is a 41yo F w/PMH of T1DM, HTN and alcohol use who presented with nausea, decreased PO intake, DKA and elevated transminases. Hepatology consulted for increased LFTs.    Patient came in to outside hospital with about a week decreased p.o. intake nausea, non-bloody vomiting.  She was found to be in DKA but also with AST 1700s, ALT 470s, TBili 1.9, .  Transferred here for higher level of care.  Patient does have history of heavy alcohol use.  She has been drinking for 20+ years and since the recent COVID pandemic has been drinking up to a gallon of vodka over 2-3 days.  Her last drink was a week ago prior to onset of recent symptoms.  She has been told about elevated liver enzymes in the past, but never been told about cirrhosis or alcohol-related liver disease. She otherwise denies any new medications other than Prilosec.  No over-the-counter or herbal meds.  No excessive Tylenol use.  No syncopal episodes at home, but did have some low blood pressures on presentation.  Her father  from alcohol-related liver disease.  Regarding her alcohol use, she has never been to rehab, she did have a DUI 14 years ago, she has never tried to quit in the past.  Initially, a slightly tachycardic and borderline hypotensive with lactate 5.1.  AST and , T bili 1.1.  Hep panel negative.  Ultrasound with hepatomegaly.    Review of Systems   Constitutional: Positive for appetite change and fatigue. Negative for chills and fever.   HENT: Negative for  "trouble swallowing.    Respiratory: Negative for cough and shortness of breath.    Cardiovascular: Negative for chest pain and leg swelling.   Gastrointestinal: Positive for nausea and vomiting. Negative for abdominal pain, blood in stool, constipation and diarrhea.   Genitourinary: Negative for dysuria.   Musculoskeletal: Negative for arthralgias.   Skin: Negative for color change and pallor.   Neurological: Positive for light-headedness. Negative for headaches.   Psychiatric/Behavioral: Negative for behavioral problems and confusion.       Past Medical History:   Diagnosis Date    E coli bacteremia     GERD (gastroesophageal reflux disease)     Iron deficiency anemia due to chronic blood loss 10/31/2015    Type 1 diabetes mellitus without complication 10/31/2015       Past Surgical History:   Procedure Laterality Date     SECTION      TUBAL LIGATION         Family history of liver disease: Yes    Review of patient's allergies indicates:  No Known Allergies    Tobacco Use    Smoking status: Former Smoker     Packs/day: 1.00     Start date: 1996     Quit date: 2014     Years since quittin.8    Smokeless tobacco: Never Used   Substance and Sexual Activity    Alcohol use: Yes     Alcohol/week: 4.0 standard drinks     Types: 4 Standard drinks or equivalent per week     Comment: occasionally    Drug use: No    Sexual activity: Yes     Partners: Male     Birth control/protection: See Surgical Hx     Comment:        Medications Prior to Admission   Medication Sig Dispense Refill Last Dose    atorvastatin (LIPITOR) 20 MG tablet Take 1 tablet (20 mg total) by mouth once daily. 90 tablet 3     b complex vitamins tablet Take 1 tablet by mouth once daily.       insulin needles, disposable, 32 x 1/4 " Ndle Use to inject insulin 5 times a day 100 each 1     insulin NPH (NOVOLIN N) 100 unit/mL injection Use 10 units in am and 5 units at night 10 mL 0     iron polysaccharides " (NIFEREX) 150 mg iron Cap Take 1 capsule (150 mg total) by mouth once daily. 30 capsule 3     lisinopril (PRINIVIL,ZESTRIL) 2.5 MG tablet Take 1 tablet (2.5 mg total) by mouth once daily. 90 tablet 3     loratadine (CLARITIN) 10 mg tablet Take 10 mg by mouth once daily.       mupirocin (BACTROBAN) 2 % ointment Apply topically 3 (three) times daily. 30 g 0     SPRINTEC, 28, 0.25-35 mg-mcg per tablet TAKE 1 TABLET BY MOUTH ONCE DAILY 28 tablet 5        Objective:     Vital Signs (Most Recent):  Temp: 97.5 °F (36.4 °C) (09/12/20 1530)  Pulse: 89 (09/12/20 1530)  Resp: (!) 26 (09/12/20 1530)  BP: (!) 114/56 (09/12/20 1530)  SpO2: 99 % (09/12/20 0800) Vital Signs (24h Range):  Temp:  [97.5 °F (36.4 °C)-98.6 °F (37 °C)] 97.5 °F (36.4 °C)  Pulse:  [] 89  Resp:  [23-28] 26  SpO2:  [99 %-100 %] 99 %  BP: ()/(52-66) 114/56     Weight: 79 kg (174 lb 2.6 oz) (09/11/20 1813)  Body mass index is 27.28 kg/m².    Physical Exam  Vitals signs and nursing note reviewed.   Constitutional:       General: She is not in acute distress.  Eyes:      General: No scleral icterus.  Neck:      Musculoskeletal: Neck supple.   Cardiovascular:      Rate and Rhythm: Normal rate and regular rhythm.      Heart sounds: No murmur.   Pulmonary:      Effort: Pulmonary effort is normal. No respiratory distress.      Breath sounds: Normal breath sounds.   Abdominal:      General: Bowel sounds are normal. There is no distension.      Palpations: Abdomen is soft.      Tenderness: There is no abdominal tenderness.   Musculoskeletal:      Right lower leg: No edema.      Left lower leg: No edema.   Skin:     General: Skin is warm.      Coloration: Skin is not jaundiced.      Comments: No palmar erythema or spider angiomas   Neurological:      Mental Status: She is alert.   Psychiatric:         Behavior: Behavior normal.         MELD-Na score: 8 at 9/12/2020  1:11 PM  MELD score: 8 at 9/12/2020  1:11 PM  Calculated from:  Serum Creatinine: 0.8  mg/dL (Rounded to 1 mg/dL) at 9/12/2020  1:11 PM  Serum Sodium: 129 mmol/L at 9/12/2020  1:11 PM  Total Bilirubin: 1.1 mg/dL at 9/12/2020  7:08 AM  INR(ratio): 1.1 at 9/12/2020  7:08 AM  Age: 40 years 1 month    Significant Labs:  Labs within the past month have been reviewed.    Significant Imaging:  Reviewed    Assessment/Plan:     Acute hepatitis  Ms. Keys is a 41yo F w/PMH of T1DM, HTN and alcohol use who presented with nausea, decreased PO intake, DKA and elevated transminases. Hepatology consulted for increased LFTs.    Rise in transaminases to AST 1700s and  he has with only very mild rise in bilirubin.  She does have significant alcohol use and some risk factors for nonalcoholic fatty liver disease, but neither explains such high LFT elevations or the AST:ALT ratio.  Differential includes maybe some ischemic injury (initially hypotensive) or autoimmune. Hep panel negative.  Per patient no other medication exposures.  Mental status intact, no signs of portal hypertension, no evidence of cirrhosis.    - secondary workup:  Please send CINDY, anti mitochondrial antibody, and anti smooth muscle antibody, total IgG, CK.  Hep panel negative, alpha-1 antitrypsin normal.  - please get Doppler ultrasound of the liver  - monitor daily CMP and INR  - if LFTs do not continue to improve and or no etiology to hepatitis found, will consider liver biopsy on Monday        Thank you for your consult. I will follow-up with patient. Please contact us if you have any additional questions.    Barak Andrew MD  Hepatology  [facility]

## 2020-09-12 NOTE — CARE UPDATE
"RAPID RESPONSE NURSE PROACTIVE ROUNDING NOTE     Time of Visit: 033    Admit Date: 2020  LOS: 1  Code Status: Full Code   Date of Visit: 2020  : 1980  Age: 40 y.o.  Sex: female  Race: White  Bed: 8096/8096 A:   MRN: [id number]  Was the patient discharged from an ICU this admission? no   Was the patient discharged from a PACU within last 24 hours?  no  Did the patient receive conscious sedation/general anesthesia in last 24 hours?  no  Was the patient in the ED within the past 24 hours?  yes  Was the patient started on NIPPV within the past 24 hours?  no  Attending Physician: Vernon Olivares MD  Primary Service: Networked reference to record PCT     ASSESSMENT     Notified by Epic patient alert.  Reason for alert: AI alert sepsis    Diagnosis: <principal problem not specified>    Abnormal Vital Signs: BP (!) 96/56 (BP Location: Left arm, Patient Position: Sitting)   Pulse 108   Temp 98.6 °F (37 °C) (Oral)   Resp (!) 24   Ht 5' 7" (1.702 m)   Wt 79 kg (174 lb 2.6 oz)   LMP 2020 (Exact Date)   SpO2 100%   Breastfeeding No   BMI 27.28 kg/m²      Clinical Issues: sepsis    Patient  has a past medical history of E coli bacteremia, GERD (gastroesophageal reflux disease), Iron deficiency anemia due to chronic blood loss, and Type 1 diabetes mellitus without complication.      Patient seen for elevated mews score and AI alert. Patient sitting up in bed AAOx3 states is feeling better. Vital signs stable. Electrolytes being replaced and antibiotics infusing. Repeat lactic acid. 0.9.     INTERVENTIONS/ RECOMMENDATIONS     Continue current care. Monitor vital signs. Monitor intake and output. Monitor renal function and electrolytes and replace as appropriate. Continue antibiotics. Maintain seizure and fall precautions. Suggest DT prophylaxis if not ordered, patient has hx of ETOH abuse. Continue insulin and glucose checks per MD.     Discussed plan of care with [facility].    PHYSICIAN ESCALATION "     Yes/No  no    Orders received and case discussed with NA.    Disposition: Remain in room 8096.    FOLLOW-UP     Call back the Rapid Response Nurse, Erika Nava RN at [phone] for additional questions or concerns.

## 2020-09-13 LAB
ALBUMIN SERPL BCP-MCNC: 2.4 G/DL (ref 3.5–5.2)
ALP SERPL-CCNC: 110 U/L (ref 55–135)
ALT SERPL W/O P-5'-P-CCNC: 170 U/L (ref 10–44)
ANION GAP SERPL CALC-SCNC: 11 MMOL/L (ref 8–16)
ANION GAP SERPL CALC-SCNC: 12 MMOL/L (ref 8–16)
ANION GAP SERPL CALC-SCNC: 12 MMOL/L (ref 8–16)
ANION GAP SERPL CALC-SCNC: 13 MMOL/L (ref 8–16)
ANION GAP SERPL CALC-SCNC: 13 MMOL/L (ref 8–16)
ANION GAP SERPL CALC-SCNC: 14 MMOL/L (ref 8–16)
ANION GAP SERPL CALC-SCNC: 14 MMOL/L (ref 8–16)
ASCENDING AORTA: 3.11 CM
AST SERPL-CCNC: 121 U/L (ref 10–40)
AV INDEX (PROSTH): 0.76
AV MEAN GRADIENT: 4 MMHG
AV PEAK GRADIENT: 7 MMHG
AV VALVE AREA: 3.08 CM2
AV VELOCITY RATIO: 0.68
BACTERIA UR CULT: ABNORMAL
BASOPHILS # BLD AUTO: 0.06 K/UL (ref 0–0.2)
BASOPHILS NFR BLD: 1.4 % (ref 0–1.9)
BILIRUB SERPL-MCNC: 0.9 MG/DL (ref 0.1–1)
BSA FOR ECHO PROCEDURE: 1.93 M2
BUN SERPL-MCNC: 2 MG/DL (ref 6–20)
BUN SERPL-MCNC: <2 MG/DL (ref 6–20)
CALCIUM SERPL-MCNC: 7.3 MG/DL (ref 8.7–10.5)
CALCIUM SERPL-MCNC: 7.3 MG/DL (ref 8.7–10.5)
CALCIUM SERPL-MCNC: 7.5 MG/DL (ref 8.7–10.5)
CALCIUM SERPL-MCNC: 7.6 MG/DL (ref 8.7–10.5)
CALCIUM SERPL-MCNC: 7.6 MG/DL (ref 8.7–10.5)
CALCIUM SERPL-MCNC: 7.7 MG/DL (ref 8.7–10.5)
CALCIUM SERPL-MCNC: 8.1 MG/DL (ref 8.7–10.5)
CHLORIDE SERPL-SCNC: 102 MMOL/L (ref 95–110)
CHLORIDE SERPL-SCNC: 103 MMOL/L (ref 95–110)
CHLORIDE SERPL-SCNC: 104 MMOL/L (ref 95–110)
CHLORIDE SERPL-SCNC: 104 MMOL/L (ref 95–110)
CHLORIDE SERPL-SCNC: 106 MMOL/L (ref 95–110)
CHLORIDE SERPL-SCNC: 106 MMOL/L (ref 95–110)
CHLORIDE SERPL-SCNC: 107 MMOL/L (ref 95–110)
CK SERPL-CCNC: 95 U/L (ref 20–180)
CO2 SERPL-SCNC: 16 MMOL/L (ref 23–29)
CO2 SERPL-SCNC: 16 MMOL/L (ref 23–29)
CO2 SERPL-SCNC: 17 MMOL/L (ref 23–29)
CO2 SERPL-SCNC: 18 MMOL/L (ref 23–29)
CO2 SERPL-SCNC: 19 MMOL/L (ref 23–29)
CREAT SERPL-MCNC: 0.7 MG/DL (ref 0.5–1.4)
CV ECHO LV RWT: 0.36 CM
DIFFERENTIAL METHOD: ABNORMAL
DOP CALC AO PEAK VEL: 1.3 M/S
DOP CALC AO VTI: 23.91 CM
DOP CALC LVOT AREA: 4 CM2
DOP CALC LVOT DIAMETER: 2.27 CM
DOP CALC LVOT PEAK VEL: 0.89 M/S
DOP CALC LVOT STROKE VOLUME: 73.74 CM3
DOP CALCLVOT PEAK VEL VTI: 18.23 CM
E WAVE DECELERATION TIME: 179.05 MSEC
E/A RATIO: 2.07
E/E' RATIO: 6.07 M/S
ECHO LV POSTERIOR WALL: 0.9 CM (ref 0.6–1.1)
EOSINOPHIL # BLD AUTO: 0.1 K/UL (ref 0–0.5)
EOSINOPHIL NFR BLD: 1.6 % (ref 0–8)
ERYTHROCYTE [DISTWIDTH] IN BLOOD BY AUTOMATED COUNT: 16.9 % (ref 11.5–14.5)
EST. GFR  (AFRICAN AMERICAN): >60 ML/MIN/1.73 M^2
EST. GFR  (NON AFRICAN AMERICAN): >60 ML/MIN/1.73 M^2
FRACTIONAL SHORTENING: 31 % (ref 28–44)
GLUCOSE SERPL-MCNC: 190 MG/DL (ref 70–110)
GLUCOSE SERPL-MCNC: 245 MG/DL (ref 70–110)
GLUCOSE SERPL-MCNC: 259 MG/DL (ref 70–110)
GLUCOSE SERPL-MCNC: 274 MG/DL (ref 70–110)
GLUCOSE SERPL-MCNC: 286 MG/DL (ref 70–110)
GLUCOSE SERPL-MCNC: 287 MG/DL (ref 70–110)
GLUCOSE SERPL-MCNC: 305 MG/DL (ref 70–110)
HCT VFR BLD AUTO: 27.4 % (ref 37–48.5)
HGB BLD-MCNC: 8 G/DL (ref 12–16)
IGG SERPL-MCNC: 1169 MG/DL (ref 650–1600)
IMM GRANULOCYTES # BLD AUTO: 0.01 K/UL (ref 0–0.04)
IMM GRANULOCYTES NFR BLD AUTO: 0.2 % (ref 0–0.5)
INR PPP: 1 (ref 0.8–1.2)
INTERVENTRICULAR SEPTUM: 0.66 CM (ref 0.6–1.1)
IVRT: 81.83 MSEC
LA MAJOR: 5.77 CM
LA MINOR: 5.81 CM
LA WIDTH: 3.64 CM
LEFT ATRIUM SIZE: 3.43 CM
LEFT ATRIUM VOLUME INDEX: 32.2 ML/M2
LEFT ATRIUM VOLUME: 61.45 CM3
LEFT INTERNAL DIMENSION IN SYSTOLE: 3.44 CM (ref 2.1–4)
LEFT VENTRICLE DIASTOLIC VOLUME INDEX: 61.69 ML/M2
LEFT VENTRICLE DIASTOLIC VOLUME: 117.57 ML
LEFT VENTRICLE MASS INDEX: 69 G/M2
LEFT VENTRICLE SYSTOLIC VOLUME INDEX: 25.7 ML/M2
LEFT VENTRICLE SYSTOLIC VOLUME: 48.95 ML
LEFT VENTRICULAR INTERNAL DIMENSION IN DIASTOLE: 4.99 CM (ref 3.5–6)
LEFT VENTRICULAR MASS: 131.02 G
LV LATERAL E/E' RATIO: 5.35 M/S
LV SEPTAL E/E' RATIO: 7 M/S
LYMPHOCYTES # BLD AUTO: 1.1 K/UL (ref 1–4.8)
LYMPHOCYTES NFR BLD: 25.4 % (ref 18–48)
MAGNESIUM SERPL-MCNC: 1.7 MG/DL (ref 1.6–2.6)
MCH RBC QN AUTO: 25.1 PG (ref 27–31)
MCHC RBC AUTO-ENTMCNC: 29.2 G/DL (ref 32–36)
MCV RBC AUTO: 86 FL (ref 82–98)
MONOCYTES # BLD AUTO: 0.6 K/UL (ref 0.3–1)
MONOCYTES NFR BLD: 14.7 % (ref 4–15)
MV PEAK A VEL: 0.44 M/S
MV PEAK E VEL: 0.91 M/S
MV STENOSIS PRESSURE HALF TIME: 51.93 MS
MV VALVE AREA P 1/2 METHOD: 4.24 CM2
NEUTROPHILS # BLD AUTO: 2.4 K/UL (ref 1.8–7.7)
NEUTROPHILS NFR BLD: 56.7 % (ref 38–73)
NRBC BLD-RTO: 0 /100 WBC
PHOSPHATE SERPL-MCNC: 1.1 MG/DL (ref 2.7–4.5)
PISA TR MAX VEL: 2.64 M/S
PLATELET # BLD AUTO: 242 K/UL (ref 150–350)
PMV BLD AUTO: 10.9 FL (ref 9.2–12.9)
POCT GLUCOSE: 192 MG/DL (ref 70–110)
POCT GLUCOSE: 201 MG/DL (ref 70–110)
POCT GLUCOSE: 204 MG/DL (ref 70–110)
POCT GLUCOSE: 215 MG/DL (ref 70–110)
POCT GLUCOSE: 216 MG/DL (ref 70–110)
POCT GLUCOSE: 221 MG/DL (ref 70–110)
POCT GLUCOSE: 230 MG/DL (ref 70–110)
POCT GLUCOSE: 246 MG/DL (ref 70–110)
POCT GLUCOSE: 250 MG/DL (ref 70–110)
POCT GLUCOSE: 256 MG/DL (ref 70–110)
POCT GLUCOSE: 260 MG/DL (ref 70–110)
POCT GLUCOSE: 273 MG/DL (ref 70–110)
POCT GLUCOSE: 280 MG/DL (ref 70–110)
POCT GLUCOSE: 288 MG/DL (ref 70–110)
POCT GLUCOSE: 305 MG/DL (ref 70–110)
POCT GLUCOSE: 313 MG/DL (ref 70–110)
POCT GLUCOSE: 317 MG/DL (ref 70–110)
POCT GLUCOSE: 321 MG/DL (ref 70–110)
POTASSIUM SERPL-SCNC: 3.5 MMOL/L (ref 3.5–5.1)
POTASSIUM SERPL-SCNC: 3.6 MMOL/L (ref 3.5–5.1)
POTASSIUM SERPL-SCNC: 3.7 MMOL/L (ref 3.5–5.1)
POTASSIUM SERPL-SCNC: 3.8 MMOL/L (ref 3.5–5.1)
POTASSIUM SERPL-SCNC: 3.8 MMOL/L (ref 3.5–5.1)
POTASSIUM SERPL-SCNC: 4.1 MMOL/L (ref 3.5–5.1)
POTASSIUM SERPL-SCNC: 4.2 MMOL/L (ref 3.5–5.1)
PROT SERPL-MCNC: 5.5 G/DL (ref 6–8.4)
PROTHROMBIN TIME: 11.3 SEC (ref 9–12.5)
PULM VEIN S/D RATIO: 1.24
PV PEAK D VEL: 0.42 M/S
PV PEAK S VEL: 0.52 M/S
RA MAJOR: 4.59 CM
RA WIDTH: 3.38 CM
RBC # BLD AUTO: 3.19 M/UL (ref 4–5.4)
RIGHT VENTRICULAR END-DIASTOLIC DIMENSION: 2.93 CM
RV TISSUE DOPPLER FREE WALL SYSTOLIC VELOCITY 1 (APICAL 4 CHAMBER VIEW): 15.78 CM/S
SINUS: 2.77 CM
SODIUM SERPL-SCNC: 133 MMOL/L (ref 136–145)
SODIUM SERPL-SCNC: 134 MMOL/L (ref 136–145)
SODIUM SERPL-SCNC: 134 MMOL/L (ref 136–145)
SODIUM SERPL-SCNC: 135 MMOL/L (ref 136–145)
SODIUM SERPL-SCNC: 135 MMOL/L (ref 136–145)
SODIUM SERPL-SCNC: 136 MMOL/L (ref 136–145)
SODIUM SERPL-SCNC: 136 MMOL/L (ref 136–145)
STJ: 2.55 CM
TDI LATERAL: 0.17 M/S
TDI SEPTAL: 0.13 M/S
TDI: 0.15 M/S
TR MAX PG: 28 MMHG
TRICUSPID ANNULAR PLANE SYSTOLIC EXCURSION: 2.05 CM
WBC # BLD AUTO: 4.29 K/UL (ref 3.9–12.7)

## 2020-09-13 PROCEDURE — 85610 PROTHROMBIN TIME: CPT

## 2020-09-13 PROCEDURE — 99233 PR SUBSEQUENT HOSPITAL CARE,LEVL III: ICD-10-PCS | Mod: ,,, | Performed by: STUDENT IN AN ORGANIZED HEALTH CARE EDUCATION/TRAINING PROGRAM

## 2020-09-13 PROCEDURE — 85025 COMPLETE CBC W/AUTO DIFF WBC: CPT

## 2020-09-13 PROCEDURE — C9399 UNCLASSIFIED DRUGS OR BIOLOG: HCPCS | Performed by: STUDENT IN AN ORGANIZED HEALTH CARE EDUCATION/TRAINING PROGRAM

## 2020-09-13 PROCEDURE — 25000003 PHARM REV CODE 250: Performed by: STUDENT IN AN ORGANIZED HEALTH CARE EDUCATION/TRAINING PROGRAM

## 2020-09-13 PROCEDURE — 83735 ASSAY OF MAGNESIUM: CPT

## 2020-09-13 PROCEDURE — 63600175 PHARM REV CODE 636 W HCPCS: Performed by: STUDENT IN AN ORGANIZED HEALTH CARE EDUCATION/TRAINING PROGRAM

## 2020-09-13 PROCEDURE — 80053 COMPREHEN METABOLIC PANEL: CPT

## 2020-09-13 PROCEDURE — 80048 BASIC METABOLIC PNL TOTAL CA: CPT | Mod: 91

## 2020-09-13 PROCEDURE — 94761 N-INVAS EAR/PLS OXIMETRY MLT: CPT

## 2020-09-13 PROCEDURE — 20600001 HC STEP DOWN PRIVATE ROOM

## 2020-09-13 PROCEDURE — 84100 ASSAY OF PHOSPHORUS: CPT

## 2020-09-13 PROCEDURE — 36415 COLL VENOUS BLD VENIPUNCTURE: CPT

## 2020-09-13 PROCEDURE — 99233 SBSQ HOSP IP/OBS HIGH 50: CPT | Mod: ,,, | Performed by: STUDENT IN AN ORGANIZED HEALTH CARE EDUCATION/TRAINING PROGRAM

## 2020-09-13 RX ORDER — INSULIN ASPART 100 [IU]/ML
3 INJECTION, SOLUTION INTRAVENOUS; SUBCUTANEOUS
Status: DISCONTINUED | OUTPATIENT
Start: 2020-09-13 | End: 2020-09-14

## 2020-09-13 RX ORDER — GLUCAGON 1 MG
1 KIT INJECTION
Status: DISCONTINUED | OUTPATIENT
Start: 2020-09-13 | End: 2020-09-14

## 2020-09-13 RX ORDER — ONDANSETRON 2 MG/ML
4 INJECTION INTRAMUSCULAR; INTRAVENOUS EVERY 4 HOURS PRN
Status: DISCONTINUED | OUTPATIENT
Start: 2020-09-13 | End: 2020-09-15 | Stop reason: HOSPADM

## 2020-09-13 RX ORDER — IBUPROFEN 200 MG
24 TABLET ORAL
Status: DISCONTINUED | OUTPATIENT
Start: 2020-09-13 | End: 2020-09-14

## 2020-09-13 RX ORDER — ONDANSETRON HYDROCHLORIDE 4 MG/5ML
4 SOLUTION ORAL EVERY 8 HOURS PRN
Status: DISCONTINUED | OUTPATIENT
Start: 2020-09-13 | End: 2020-09-13

## 2020-09-13 RX ORDER — IBUPROFEN 200 MG
16 TABLET ORAL
Status: DISCONTINUED | OUTPATIENT
Start: 2020-09-13 | End: 2020-09-14

## 2020-09-13 RX ORDER — INSULIN ASPART 100 [IU]/ML
0-5 INJECTION, SOLUTION INTRAVENOUS; SUBCUTANEOUS
Status: DISCONTINUED | OUTPATIENT
Start: 2020-09-13 | End: 2020-09-14

## 2020-09-13 RX ORDER — PANTOPRAZOLE SODIUM 40 MG/1
40 TABLET, DELAYED RELEASE ORAL DAILY
Status: DISCONTINUED | OUTPATIENT
Start: 2020-09-13 | End: 2020-09-15 | Stop reason: HOSPADM

## 2020-09-13 RX ADMIN — INSULIN ASPART 3 UNITS: 100 INJECTION, SOLUTION INTRAVENOUS; SUBCUTANEOUS at 04:09

## 2020-09-13 RX ADMIN — POTASSIUM CHLORIDE: 149 INJECTION, SOLUTION, CONCENTRATE INTRAVENOUS at 04:09

## 2020-09-13 RX ADMIN — INSULIN DETEMIR 4 UNITS: 100 INJECTION, SOLUTION SUBCUTANEOUS at 08:09

## 2020-09-13 RX ADMIN — MAGNESIUM SULFATE HEPTAHYDRATE 1 G: 500 INJECTION, SOLUTION INTRAMUSCULAR; INTRAVENOUS at 10:09

## 2020-09-13 RX ADMIN — ENOXAPARIN SODIUM 40 MG: 40 INJECTION SUBCUTANEOUS at 04:09

## 2020-09-13 RX ADMIN — POTASSIUM PHOSPHATE, MONOBASIC AND POTASSIUM PHOSPHATE, DIBASIC 30 MMOL: 224; 236 INJECTION, SOLUTION, CONCENTRATE INTRAVENOUS at 10:09

## 2020-09-13 RX ADMIN — FOLIC ACID 1 MG: 1 TABLET ORAL at 08:09

## 2020-09-13 RX ADMIN — INSULIN ASPART 1 UNITS: 100 INJECTION, SOLUTION INTRAVENOUS; SUBCUTANEOUS at 09:09

## 2020-09-13 RX ADMIN — THIAMINE HYDROCHLORIDE 500 MG: 100 INJECTION, SOLUTION INTRAMUSCULAR; INTRAVENOUS at 09:09

## 2020-09-13 RX ADMIN — THIAMINE HYDROCHLORIDE 500 MG: 100 INJECTION, SOLUTION INTRAMUSCULAR; INTRAVENOUS at 02:09

## 2020-09-13 RX ADMIN — ONDANSETRON HYDROCHLORIDE 4 MG: 4 SOLUTION ORAL at 08:09

## 2020-09-13 RX ADMIN — INSULIN DETEMIR 9 UNITS: 100 INJECTION, SOLUTION SUBCUTANEOUS at 09:09

## 2020-09-13 RX ADMIN — THIAMINE HYDROCHLORIDE 500 MG: 100 INJECTION, SOLUTION INTRAMUSCULAR; INTRAVENOUS at 08:09

## 2020-09-13 RX ADMIN — PANTOPRAZOLE SODIUM 40 MG: 40 TABLET, DELAYED RELEASE ORAL at 08:09

## 2020-09-13 RX ADMIN — INSULIN ASPART 4 UNITS: 100 INJECTION, SOLUTION INTRAVENOUS; SUBCUTANEOUS at 04:09

## 2020-09-13 RX ADMIN — THERA TABS 1 TABLET: TAB at 08:09

## 2020-09-13 NOTE — PROGRESS NOTES
"[facility] Medicine  Progress Note    Patient Name: Cira Keys  MRN: [id number]  Patient Class: IP- Inpatient   Admission Date: 9/11/2020  Length of Stay: 2 days  Attending Physician: Behram Khan, MD  Primary Care Provider: Primary Doctor [facility] Medicine Team: [facility] 5 Clarisse Salazar MD    Subjective:     Principal Problem:Acute hepatitis        HPI:  41 y/o F with TIDM, HTN and heavy ETOH abuse transferred from [facility] ED after presenting with worsening nausea, vomiting, diaphoresis and low oral intake for the past 5 days. It started as dizziness when standing up that worsened to the extend of not being able to move on her bed, she had a decrease appetite, with no intake since 4 days ago and excessive vomiting "too many to count".  Her last drink was on Saturday, 1/5 vodka. This is the most she has been without drinking. She refers mild SOB and chills. Denies chest pain, dysuria, fever, weakness, numbness and tingling.     Patient with history of heavy alcohol use, drinks everyday since she was 15 y/o. She says she has increased the amount she drinks since March, can finish a gallon in 2.5 days.     On [facility] patient presented with acute hepatitis, anion gap metabolic acidosis secondary to mild DKA vs starvation ketoacidosis. Patient with no previous history of cirrhosis, liver US remarkable for hepatomegaly with fatty infiltration of the liver, for which patient was transferred for hepatology evaluation. Patient refers having 5-6 DKA in her life, last one 3 years ago, but has never presented as this.     Overview/Hospital Course:  Patient admitted to internal medicine team as transfer for hepatology evaluation after presenting with LFTs >1000. Patient also was in DKA most likely 2/2 UTI, came with insulin infusion drip. Now on sub Q insulin. Patient with history of excessive alcohol intake, for which concerned for alcohol withdrawal, CIWA protocol started and patient on " thiamine and folate. Hepatology consulted and will f/u labs. Doppler US negative.     Interval History: Transitioned off insulin drip today. Doing ok with some mild nausea this AM relieved with zofran.    Review of Systems   Constitutional: Positive for fatigue. Negative for chills, diaphoresis and fever.   HENT: Negative for congestion.    Eyes: Negative for visual disturbance.   Respiratory: Negative for shortness of breath and wheezing.    Gastrointestinal: Positive for nausea and vomiting.   Genitourinary: Negative for dysuria.   Musculoskeletal: Negative for gait problem.   Skin: Negative for wound.   Neurological: Negative for dizziness, tremors and weakness.   Psychiatric/Behavioral: Negative for agitation, behavioral problems and confusion.     Objective:     Vital Signs (Most Recent):  Temp: 98.6 °F (37 °C) (09/13/20 1629)  Pulse: 93 (09/13/20 1629)  Resp: 18 (09/13/20 1629)  BP: 131/85 (09/13/20 1629)  SpO2: 100 % (09/13/20 1629) Vital Signs (24h Range):  Temp:  [96.5 °F (35.8 °C)-98.6 °F (37 °C)] 98.6 °F (37 °C)  Pulse:  [76-93] 93  Resp:  [18-23] 18  SpO2:  [97 %-100 %] 100 %  BP: (114-158)/(56-85) 131/85     Weight: 78.9 kg (174 lb)  Body mass index is 27.25 kg/m².    Intake/Output Summary (Last 24 hours) at 9/13/2020 1747  Last data filed at 9/12/2020 2010  Gross per 24 hour   Intake 100 ml   Output 500 ml   Net -400 ml      Physical Exam  Vitals signs and nursing note reviewed.   Constitutional:       General: She is not in acute distress.     Appearance: Normal appearance. She is not toxic-appearing.   HENT:      Head: Normocephalic and atraumatic.   Eyes:      General: No scleral icterus.     Extraocular Movements: Extraocular movements intact.      Conjunctiva/sclera: Conjunctivae normal.   Neck:      Musculoskeletal: Normal range of motion.   Pulmonary:      Effort: Pulmonary effort is normal. No respiratory distress.   Abdominal:      General: There is no distension.      Palpations: Abdomen is  soft.   Musculoskeletal: Normal range of motion.         General: No swelling.   Skin:     General: Skin is warm and dry.      Coloration: Skin is not jaundiced.   Neurological:      General: No focal deficit present.      Mental Status: She is alert and oriented to person, place, and time. Mental status is at baseline.   Psychiatric:         Mood and Affect: Mood normal.         Behavior: Behavior normal.         Significant Labs:   Blood Culture: No results for input(s): LABBLOO in the last 48 hours.  BMP:   Recent Labs   Lab 09/13/20  0450  09/13/20  1536   *  287*   < > 305*   *  133*   < > 135*   K 3.7  3.6   < > 4.2     104   < > 103   CO2 16*  16*   < > 18*   BUN 2*  2*   < > <2*   CREATININE 0.7  0.7   < > 0.7   CALCIUM 7.3*  7.3*   < > 7.6*   MG 1.7  --   --     < > = values in this interval not displayed.     CBC:   Recent Labs   Lab 09/12/20  0708 09/13/20  0450   WBC 8.68 4.29   HGB 8.4* 8.0*   HCT 29.9* 27.4*    242     CMP:   Recent Labs   Lab 09/12/20  0708  09/13/20  0450 09/13/20  0810 09/13/20  1302 09/13/20  1536   *  134*   < > 134*  133* 136 136 135*   K 4.1  4.0   < > 3.7  3.6 3.8 4.1 4.2     107   < > 106  104 107 104 103   CO2 14*  14*   < > 16*  16* 17* 18* 18*   *  199*   < > 286*  287* 274* 245* 305*   BUN 5*  5*   < > 2*  2* <2* <2* <2*   CREATININE 0.9  0.9   < > 0.7  0.7 0.7 0.7 0.7   CALCIUM 7.5*  7.5*   < > 7.3*  7.3* 7.6* 8.1* 7.6*   PROT 5.9*  --  5.5*  --   --   --    ALBUMIN 2.5*  --  2.4*  --   --   --    BILITOT 1.1*  --  0.9  --   --   --    ALKPHOS 114  --  110  --   --   --    *  --  121*  --   --   --    *  --  170*  --   --   --    ANIONGAP 14  13   < > 12  13 12 14 14   EGFRNONAA >60.0  >60.0   < > >60.0  >60.0 >60.0 >60.0 >60.0    < > = values in this interval not displayed.     Lactic Acid:   Recent Labs   Lab 09/11/20 1953 09/12/20  0005   LACTATE 2.1 0.9     Magnesium:   Recent Labs    Lab 09/12/20  0708 09/13/20  0450   MG 1.5* 1.7     POCT Glucose:   Recent Labs   Lab 09/13/20  1224 09/13/20  1440 09/13/20  1622   POCTGLUCOSE 246* 288* 305*     Respiratory Culture: No results for input(s): GSRESP, RESPIRATORYC in the last 48 hours.  Urine Culture: No results for input(s): LABURIN in the last 48 hours.  Urine Studies: No results for input(s): COLORU, APPEARANCEUA, PHUR, SPECGRAV, PROTEINUA, GLUCUA, KETONESU, BILIRUBINUA, OCCULTUA, NITRITE, UROBILINOGEN, LEUKOCYTESUR, RBCUA, WBCUA, BACTERIA, SQUAMEPITHEL, HYALINECASTS in the last 48 hours.    Invalid input(s): WRIGHTSUR    Significant Imaging: I have reviewed all pertinent imaging results/findings within the past 24 hours.      Assessment/Plan:      * Acute hepatitis  Patient with history of chronic alcohol abuse, drinks up to a liter daily and has increased her alcohol intake in the last couple of months. Presented with DKA to outside hospital with elevated LFTs, AST 1734, . Patient refers that she had elevated liver enzymes once, but resolved and never to this extend. She was transferred here for hepatology workup. LFTs trending down.    Plan:  --Liver US: Hepatomegaly with fatty infiltration of the liver.   --Hepatology consulted will continue to follow recommendations. At the moment most likely etiology could be alcohol indice, but rarely it presents with LFTs >1000. They are concerned for possible ischemic vs autoimmune. Will f/u labs.   --Hepatitis panel ordered: normal   --acetaminophen level NEG  --alpha-1 antitrypsin normal.  --Pending: CINDY, anti-smooth ab, cyroglobulin, anti smith,CK, total IgG  --Dopple US of liver ordered (normal) along with ECHO   >>>>if LFTs do not continue to improve and or no etiology to hepatitis found, will consider liver biopsy on Monday      UTI (urinary tract infection)  Ucx + gram negative rods patient treated with zosyn and rocephin on outside hospital. Denies urinary symptoms at the moment.       Plan:   --Will discontinue antibiotics since patient assymptomatic     Alcohol withdrawal  Patient with chronic alcohol abuse. Last drink on Saturday (6 days ago) this is the longest she has gone without drinking. She has never had alcohol withdrawal, seizures or DT. At the moment patient is extremely anxious with tachycardia due to new diagnosis of possible liver disease. No hallucinations, no diaphoresis, no tremors.     Plan:   --CIWA protocol (pt has been 1, 0 past two nights on CIWA score)  -- ativan 0.5 for withdrawal PRN   --Started IV thiamine, PO folate   --Will discuss alcohol cessation programs with CM to provide to pt    DKA (diabetic ketoacidoses)  41 y/o presenting with nausea, vomiting, dizziness, diaphoresis transferred from [place] with DKA with new onset transiminits, on insulin ggt on arrival. On arrival anion gap improving, blood glucose <200,  Lactic acid improving. VBG with normal pH. Patient refers that she has had DKA before, but never have been as this. She has not been able to keep food down for the past 4 days. Patient presenting with hypotension, MAP 66 yesterday. Today BP well controlled.     Plan:   --Now transitioned off of the dextrose 5 % and 0.45 % NaCl drip, diabetic diet  --Transitioned back to SubQ insulin ( 9 units detemir with 3 units TID with meals)  --BG now back to every 4 hrs and before meals  --BMP Q4h to assess for return into DKA    Hypokalemia  Patient with low potassium on admission likely 2/2 insulin drip and DKA. Most recent 4.2    Plan:   --replace as needed  --F/U BMP Q4      High anion gap metabolic acidosis  --see DKA        VTE Risk Mitigation (From admission, onward)         Ordered     enoxaparin injection 40 mg  Every 24 hours      09/11/20 1927     Place sequential compression device  Until discontinued      09/11/20 1927     IP VTE LOW RISK PATIENT  Once      09/11/20 1927                Discharge Planning   JUVENTINO: 9/14/2020     Code Status: Full Code    Is the patient medically ready for discharge?:     Reason for patient still in hospital (select all that apply): Laboratory test, Treatment and Consult recommendations  Discharge Plan A: Home   Discharge Delays: (!) Change in Medical Condition              Clarisse Salazar MD  Department of Hospital Medicine   [facility]

## 2020-09-13 NOTE — ASSESSMENT & PLAN NOTE
Patient with low potassium on admission likely 2/2 insulin drip and DKA. Most recent 4.2    Plan:   --replace as needed  --F/U BMP Q4

## 2020-09-13 NOTE — PLAN OF CARE
Problem: Adult Inpatient Plan of Care  Goal: Plan of Care Review  Outcome: Ongoing, Progressing  Flowsheets (Taken 9/13/2020 7721)  Plan of Care Reviewed With:   patient   spouse   Pt remains free from falls/injury. No acute events during shift. VSS, NAD. Bed in lowest position, wheels locked, side rails up times 2, call light w/in reach, family at bedside. Room clear of obstacles. Pt independent in ADLs, ambulates in room and to bathroom independently. Pt taken off of insulin drip. BG monitoring schedule adjusted to AC/HS. Pt with some nausea early this shift, resolved with PO Zofran. Pt was able to tolerate her dinner well. WCTM.

## 2020-09-13 NOTE — SUBJECTIVE & OBJECTIVE
Interval History: Transitioned off insulin drip today. Doing ok with some mild nausea this AM relieved with zofran.    Review of Systems   Constitutional: Positive for fatigue. Negative for chills, diaphoresis and fever.   HENT: Negative for congestion.    Eyes: Negative for visual disturbance.   Respiratory: Negative for shortness of breath and wheezing.    Gastrointestinal: Positive for nausea and vomiting.   Genitourinary: Negative for dysuria.   Musculoskeletal: Negative for gait problem.   Skin: Negative for wound.   Neurological: Negative for dizziness, tremors and weakness.   Psychiatric/Behavioral: Negative for agitation, behavioral problems and confusion.     Objective:     Vital Signs (Most Recent):  Temp: 98.6 °F (37 °C) (09/13/20 1629)  Pulse: 93 (09/13/20 1629)  Resp: 18 (09/13/20 1629)  BP: 131/85 (09/13/20 1629)  SpO2: 100 % (09/13/20 1629) Vital Signs (24h Range):  Temp:  [96.5 °F (35.8 °C)-98.6 °F (37 °C)] 98.6 °F (37 °C)  Pulse:  [76-93] 93  Resp:  [18-23] 18  SpO2:  [97 %-100 %] 100 %  BP: (114-158)/(56-85) 131/85     Weight: 78.9 kg (174 lb)  Body mass index is 27.25 kg/m².    Intake/Output Summary (Last 24 hours) at 9/13/2020 1747  Last data filed at 9/12/2020 2010  Gross per 24 hour   Intake 100 ml   Output 500 ml   Net -400 ml      Physical Exam  Vitals signs and nursing note reviewed.   Constitutional:       General: She is not in acute distress.     Appearance: Normal appearance. She is not toxic-appearing.   HENT:      Head: Normocephalic and atraumatic.   Eyes:      General: No scleral icterus.     Extraocular Movements: Extraocular movements intact.      Conjunctiva/sclera: Conjunctivae normal.   Neck:      Musculoskeletal: Normal range of motion.   Pulmonary:      Effort: Pulmonary effort is normal. No respiratory distress.   Abdominal:      General: There is no distension.      Palpations: Abdomen is soft.   Musculoskeletal: Normal range of motion.         General: No swelling.    Skin:     General: Skin is warm and dry.      Coloration: Skin is not jaundiced.   Neurological:      General: No focal deficit present.      Mental Status: She is alert and oriented to person, place, and time. Mental status is at baseline.   Psychiatric:         Mood and Affect: Mood normal.         Behavior: Behavior normal.         Significant Labs:   Blood Culture: No results for input(s): LABBLOO in the last 48 hours.  BMP:   Recent Labs   Lab 09/13/20  0450  09/13/20  1536   *  287*   < > 305*   *  133*   < > 135*   K 3.7  3.6   < > 4.2     104   < > 103   CO2 16*  16*   < > 18*   BUN 2*  2*   < > <2*   CREATININE 0.7  0.7   < > 0.7   CALCIUM 7.3*  7.3*   < > 7.6*   MG 1.7  --   --     < > = values in this interval not displayed.     CBC:   Recent Labs   Lab 09/12/20  0708 09/13/20  0450   WBC 8.68 4.29   HGB 8.4* 8.0*   HCT 29.9* 27.4*    242     CMP:   Recent Labs   Lab 09/12/20  0708  09/13/20  0450 09/13/20  0810 09/13/20  1302 09/13/20  1536   *  134*   < > 134*  133* 136 136 135*   K 4.1  4.0   < > 3.7  3.6 3.8 4.1 4.2     107   < > 106  104 107 104 103   CO2 14*  14*   < > 16*  16* 17* 18* 18*   *  199*   < > 286*  287* 274* 245* 305*   BUN 5*  5*   < > 2*  2* <2* <2* <2*   CREATININE 0.9  0.9   < > 0.7  0.7 0.7 0.7 0.7   CALCIUM 7.5*  7.5*   < > 7.3*  7.3* 7.6* 8.1* 7.6*   PROT 5.9*  --  5.5*  --   --   --    ALBUMIN 2.5*  --  2.4*  --   --   --    BILITOT 1.1*  --  0.9  --   --   --    ALKPHOS 114  --  110  --   --   --    *  --  121*  --   --   --    *  --  170*  --   --   --    ANIONGAP 14  13   < > 12  13 12 14 14   EGFRNONAA >60.0  >60.0   < > >60.0  >60.0 >60.0 >60.0 >60.0    < > = values in this interval not displayed.     Lactic Acid:   Recent Labs   Lab 09/11/20 1953 09/12/20  0005   LACTATE 2.1 0.9     Magnesium:   Recent Labs   Lab 09/12/20  0708 09/13/20  0450   MG 1.5* 1.7     POCT Glucose:   Recent  Labs   Lab 09/13/20  1224 09/13/20  1440 09/13/20  1622   POCTGLUCOSE 246* 288* 305*     Respiratory Culture: No results for input(s): GSRESP, RESPIRATORYC in the last 48 hours.  Urine Culture: No results for input(s): LABURIN in the last 48 hours.  Urine Studies: No results for input(s): COLORU, APPEARANCEUA, PHUR, SPECGRAV, PROTEINUA, GLUCUA, KETONESU, BILIRUBINUA, OCCULTUA, NITRITE, UROBILINOGEN, LEUKOCYTESUR, RBCUA, WBCUA, BACTERIA, SQUAMEPITHEL, HYALINECASTS in the last 48 hours.    Invalid input(s): TANJA    Significant Imaging: I have reviewed all pertinent imaging results/findings within the past 24 hours.

## 2020-09-13 NOTE — ASSESSMENT & PLAN NOTE
39 y/o presenting with nausea, vomiting, dizziness, diaphoresis transferred from [place] with DKA with new onset transiminits, on insulin ggt on arrival. On arrival anion gap improving, blood glucose <200,  Lactic acid improving. VBG with normal pH. Patient refers that she has had DKA before, but never have been as this. She has not been able to keep food down for the past 4 days. Patient presenting with hypotension, MAP 66 yesterday. Today BP well controlled.     Plan:   --Now transitioned off of the dextrose 5 % and 0.45 % NaCl drip, diabetic diet  --Transitioned back to SubQ insulin ( 9 units detemir with 3 units TID with meals)  --BG now back to every 4 hrs and before meals  --BMP Q4h to assess for return into DKA

## 2020-09-13 NOTE — ASSESSMENT & PLAN NOTE
Patient with chronic alcohol abuse. Last drink on Saturday (6 days ago) this is the longest she has gone without drinking. She has never had alcohol withdrawal, seizures or DT. At the moment patient is extremely anxious with tachycardia due to new diagnosis of possible liver disease. No hallucinations, no diaphoresis, no tremors.     Plan:   --CIWA protocol (pt has been 1, 0 past two nights on CIWA score)  -- ativan 0.5 for withdrawal PRN   --Started IV thiamine, PO folate   --Will discuss alcohol cessation programs with CM to provide to pt

## 2020-09-13 NOTE — ASSESSMENT & PLAN NOTE
Patient with history of chronic alcohol abuse, drinks up to a liter daily and has increased her alcohol intake in the last couple of months. Presented with DKA to outside hospital with elevated LFTs, AST 1734, . Patient refers that she had elevated liver enzymes once, but resolved and never to this extend. She was transferred here for hepatology workup. LFTs trending down.    Plan:  --Liver US: Hepatomegaly with fatty infiltration of the liver.   --Hepatology consulted will continue to follow recommendations. At the moment most likely etiology could be alcohol indice, but rarely it presents with LFTs >1000. They are concerned for possible ischemic vs autoimmune. Will f/u labs.   --Hepatitis panel ordered: normal   --acetaminophen level NEG  --alpha-1 antitrypsin normal.  --Pending: CINDY, anti-smooth ab, cyroglobulin, anti smith,CK, total IgG  --Dopple US of liver ordered (normal) along with ECHO   >>>>if LFTs do not continue to improve and or no etiology to hepatitis found, will consider liver biopsy on Monday

## 2020-09-14 ENCOUNTER — TELEPHONE (OUTPATIENT)
Dept: HEPATOLOGY | Facility: CLINIC | Age: 40
End: 2020-09-14

## 2020-09-14 DIAGNOSIS — R79.89 ELEVATED LIVER FUNCTION TESTS: Primary | ICD-10-CM

## 2020-09-14 LAB
ALBUMIN SERPL BCP-MCNC: 2.6 G/DL (ref 3.5–5.2)
ALP SERPL-CCNC: 117 U/L (ref 55–135)
ALT SERPL W/O P-5'-P-CCNC: 135 U/L (ref 10–44)
ANA SER QL IF: NORMAL
ANION GAP SERPL CALC-SCNC: 10 MMOL/L (ref 8–16)
ANION GAP SERPL CALC-SCNC: 11 MMOL/L (ref 8–16)
ANION GAP SERPL CALC-SCNC: 11 MMOL/L (ref 8–16)
ANION GAP SERPL CALC-SCNC: 9 MMOL/L (ref 8–16)
AST SERPL-CCNC: 67 U/L (ref 10–40)
BASOPHILS # BLD AUTO: 0.07 K/UL (ref 0–0.2)
BASOPHILS NFR BLD: 1.8 % (ref 0–1.9)
BILIRUB SERPL-MCNC: 0.8 MG/DL (ref 0.1–1)
BUN SERPL-MCNC: 2 MG/DL (ref 6–20)
BUN SERPL-MCNC: 3 MG/DL (ref 6–20)
BUN SERPL-MCNC: <2 MG/DL (ref 6–20)
CALCIUM SERPL-MCNC: 7.7 MG/DL (ref 8.7–10.5)
CALCIUM SERPL-MCNC: 7.9 MG/DL (ref 8.7–10.5)
CALCIUM SERPL-MCNC: 8 MG/DL (ref 8.7–10.5)
CALCIUM SERPL-MCNC: 8.2 MG/DL (ref 8.7–10.5)
CALCIUM SERPL-MCNC: 8.3 MG/DL (ref 8.7–10.5)
CHLORIDE SERPL-SCNC: 101 MMOL/L (ref 95–110)
CHLORIDE SERPL-SCNC: 102 MMOL/L (ref 95–110)
CHLORIDE SERPL-SCNC: 103 MMOL/L (ref 95–110)
CHLORIDE SERPL-SCNC: 104 MMOL/L (ref 95–110)
CHLORIDE SERPL-SCNC: 104 MMOL/L (ref 95–110)
CHLORIDE SERPL-SCNC: 106 MMOL/L (ref 95–110)
CHLORIDE SERPL-SCNC: 107 MMOL/L (ref 95–110)
CO2 SERPL-SCNC: 22 MMOL/L (ref 23–29)
CO2 SERPL-SCNC: 23 MMOL/L (ref 23–29)
CO2 SERPL-SCNC: 24 MMOL/L (ref 23–29)
CREAT SERPL-MCNC: 0.6 MG/DL (ref 0.5–1.4)
CREAT SERPL-MCNC: 0.6 MG/DL (ref 0.5–1.4)
CREAT SERPL-MCNC: 0.7 MG/DL (ref 0.5–1.4)
DIFFERENTIAL METHOD: ABNORMAL
EOSINOPHIL # BLD AUTO: 0.1 K/UL (ref 0–0.5)
EOSINOPHIL NFR BLD: 1.8 % (ref 0–8)
ERYTHROCYTE [DISTWIDTH] IN BLOOD BY AUTOMATED COUNT: 16.9 % (ref 11.5–14.5)
EST. GFR  (AFRICAN AMERICAN): >60 ML/MIN/1.73 M^2
EST. GFR  (NON AFRICAN AMERICAN): >60 ML/MIN/1.73 M^2
GLUCOSE SERPL-MCNC: 105 MG/DL (ref 70–110)
GLUCOSE SERPL-MCNC: 107 MG/DL (ref 70–110)
GLUCOSE SERPL-MCNC: 107 MG/DL (ref 70–110)
GLUCOSE SERPL-MCNC: 210 MG/DL (ref 70–110)
GLUCOSE SERPL-MCNC: 224 MG/DL (ref 70–110)
GLUCOSE SERPL-MCNC: 274 MG/DL (ref 70–110)
GLUCOSE SERPL-MCNC: 334 MG/DL (ref 70–110)
HAV IGM SERPL QL IA: NEGATIVE
HBV CORE IGM SERPL QL IA: NEGATIVE
HBV SURFACE AG SERPL QL IA: NEGATIVE
HCT VFR BLD AUTO: 28.2 % (ref 37–48.5)
HCV AB SERPL QL IA: NEGATIVE
HCV RNA SERPL NAA+PROBE-LOG IU: <1.08 LOG (10) IU/ML
HCV RNA SERPL QL NAA+PROBE: NOT DETECTED IU/ML
HCV RNA SPEC NAA+PROBE-ACNC: <12 IU/ML
HGB BLD-MCNC: 8.5 G/DL (ref 12–16)
HIV 1+2 AB+HIV1 P24 AG SERPL QL IA: NEGATIVE
IMM GRANULOCYTES # BLD AUTO: 0 K/UL (ref 0–0.04)
IMM GRANULOCYTES NFR BLD AUTO: 0 % (ref 0–0.5)
INR PPP: 1 (ref 0.8–1.2)
LYMPHOCYTES # BLD AUTO: 1.4 K/UL (ref 1–4.8)
LYMPHOCYTES NFR BLD: 34.3 % (ref 18–48)
MAGNESIUM SERPL-MCNC: 1.6 MG/DL (ref 1.6–2.6)
MCH RBC QN AUTO: 25.3 PG (ref 27–31)
MCHC RBC AUTO-ENTMCNC: 30.1 G/DL (ref 32–36)
MCV RBC AUTO: 84 FL (ref 82–98)
MONOCYTES # BLD AUTO: 0.6 K/UL (ref 0.3–1)
MONOCYTES NFR BLD: 15.7 % (ref 4–15)
NEUTROPHILS # BLD AUTO: 1.8 K/UL (ref 1.8–7.7)
NEUTROPHILS NFR BLD: 46.4 % (ref 38–73)
NRBC BLD-RTO: 0 /100 WBC
PHOSPHATE SERPL-MCNC: 1.9 MG/DL (ref 2.7–4.5)
PLATELET # BLD AUTO: 240 K/UL (ref 150–350)
PMV BLD AUTO: 10.9 FL (ref 9.2–12.9)
POCT GLUCOSE: 153 MG/DL (ref 70–110)
POCT GLUCOSE: 208 MG/DL (ref 70–110)
POCT GLUCOSE: 319 MG/DL (ref 70–110)
POCT GLUCOSE: 338 MG/DL (ref 70–110)
POCT GLUCOSE: 369 MG/DL (ref 70–110)
POCT GLUCOSE: 378 MG/DL (ref 70–110)
POCT GLUCOSE: 385 MG/DL (ref 70–110)
POCT GLUCOSE: 85 MG/DL (ref 70–110)
POTASSIUM SERPL-SCNC: 3.3 MMOL/L (ref 3.5–5.1)
POTASSIUM SERPL-SCNC: 3.3 MMOL/L (ref 3.5–5.1)
POTASSIUM SERPL-SCNC: 3.6 MMOL/L (ref 3.5–5.1)
POTASSIUM SERPL-SCNC: 3.7 MMOL/L (ref 3.5–5.1)
POTASSIUM SERPL-SCNC: 4.4 MMOL/L (ref 3.5–5.1)
POTASSIUM SERPL-SCNC: 4.6 MMOL/L (ref 3.5–5.1)
POTASSIUM SERPL-SCNC: 4.6 MMOL/L (ref 3.5–5.1)
PROT SERPL-MCNC: 6 G/DL (ref 6–8.4)
PROTHROMBIN TIME: 10.9 SEC (ref 9–12.5)
RBC # BLD AUTO: 3.36 M/UL (ref 4–5.4)
SMOOTH MUSCLE AB TITR SER IF: NORMAL {TITER}
SODIUM SERPL-SCNC: 133 MMOL/L (ref 136–145)
SODIUM SERPL-SCNC: 134 MMOL/L (ref 136–145)
SODIUM SERPL-SCNC: 135 MMOL/L (ref 136–145)
SODIUM SERPL-SCNC: 136 MMOL/L (ref 136–145)
SODIUM SERPL-SCNC: 138 MMOL/L (ref 136–145)
SODIUM SERPL-SCNC: 139 MMOL/L (ref 136–145)
SODIUM SERPL-SCNC: 140 MMOL/L (ref 136–145)
WBC # BLD AUTO: 3.96 K/UL (ref 3.9–12.7)

## 2020-09-14 PROCEDURE — C9399 UNCLASSIFIED DRUGS OR BIOLOG: HCPCS | Performed by: STUDENT IN AN ORGANIZED HEALTH CARE EDUCATION/TRAINING PROGRAM

## 2020-09-14 PROCEDURE — 63600175 PHARM REV CODE 636 W HCPCS: Performed by: STUDENT IN AN ORGANIZED HEALTH CARE EDUCATION/TRAINING PROGRAM

## 2020-09-14 PROCEDURE — 25000003 PHARM REV CODE 250: Performed by: STUDENT IN AN ORGANIZED HEALTH CARE EDUCATION/TRAINING PROGRAM

## 2020-09-14 PROCEDURE — 20600001 HC STEP DOWN PRIVATE ROOM

## 2020-09-14 PROCEDURE — 85610 PROTHROMBIN TIME: CPT

## 2020-09-14 PROCEDURE — 36415 COLL VENOUS BLD VENIPUNCTURE: CPT

## 2020-09-14 PROCEDURE — 84100 ASSAY OF PHOSPHORUS: CPT

## 2020-09-14 PROCEDURE — 99232 SBSQ HOSP IP/OBS MODERATE 35: CPT | Mod: ,,, | Performed by: STUDENT IN AN ORGANIZED HEALTH CARE EDUCATION/TRAINING PROGRAM

## 2020-09-14 PROCEDURE — 80048 BASIC METABOLIC PNL TOTAL CA: CPT | Mod: 91

## 2020-09-14 PROCEDURE — 85025 COMPLETE CBC W/AUTO DIFF WBC: CPT

## 2020-09-14 PROCEDURE — 99232 PR SUBSEQUENT HOSPITAL CARE,LEVL II: ICD-10-PCS | Mod: ,,, | Performed by: STUDENT IN AN ORGANIZED HEALTH CARE EDUCATION/TRAINING PROGRAM

## 2020-09-14 PROCEDURE — 80053 COMPREHEN METABOLIC PANEL: CPT

## 2020-09-14 PROCEDURE — 83735 ASSAY OF MAGNESIUM: CPT

## 2020-09-14 PROCEDURE — 80321 ALCOHOLS BIOMARKERS 1OR 2: CPT

## 2020-09-14 PROCEDURE — 86256 FLUORESCENT ANTIBODY TITER: CPT

## 2020-09-14 RX ORDER — IBUPROFEN 200 MG
16 TABLET ORAL
Status: DISCONTINUED | OUTPATIENT
Start: 2020-09-14 | End: 2020-09-15 | Stop reason: HOSPADM

## 2020-09-14 RX ORDER — IBUPROFEN 200 MG
24 TABLET ORAL
Status: DISCONTINUED | OUTPATIENT
Start: 2020-09-14 | End: 2020-09-15 | Stop reason: HOSPADM

## 2020-09-14 RX ORDER — INSULIN ASPART 100 [IU]/ML
1-10 INJECTION, SOLUTION INTRAVENOUS; SUBCUTANEOUS
Status: DISCONTINUED | OUTPATIENT
Start: 2020-09-14 | End: 2020-09-14

## 2020-09-14 RX ORDER — INSULIN ASPART 100 [IU]/ML
0-5 INJECTION, SOLUTION INTRAVENOUS; SUBCUTANEOUS
Status: DISCONTINUED | OUTPATIENT
Start: 2020-09-14 | End: 2020-09-15 | Stop reason: HOSPADM

## 2020-09-14 RX ORDER — HYDROGEN PEROXIDE 3 %
20 SOLUTION, NON-ORAL MISCELLANEOUS DAILY
COMMUNITY

## 2020-09-14 RX ORDER — POTASSIUM CHLORIDE 20 MEQ/1
40 TABLET, EXTENDED RELEASE ORAL ONCE
Status: COMPLETED | OUTPATIENT
Start: 2020-09-14 | End: 2020-09-14

## 2020-09-14 RX ORDER — INSULIN ASPART 100 [IU]/ML
6 INJECTION, SOLUTION INTRAVENOUS; SUBCUTANEOUS
Status: DISCONTINUED | OUTPATIENT
Start: 2020-09-14 | End: 2020-09-15

## 2020-09-14 RX ORDER — GLUCAGON 1 MG
1 KIT INJECTION
Status: DISCONTINUED | OUTPATIENT
Start: 2020-09-14 | End: 2020-09-15 | Stop reason: HOSPADM

## 2020-09-14 RX ORDER — OMEPRAZOLE 20 MG/1
20 CAPSULE, DELAYED RELEASE ORAL DAILY
Status: ON HOLD | COMMUNITY
End: 2020-09-15 | Stop reason: HOSPADM

## 2020-09-14 RX ORDER — INSULIN ASPART 100 [IU]/ML
3 INJECTION, SOLUTION INTRAVENOUS; SUBCUTANEOUS
Status: DISCONTINUED | OUTPATIENT
Start: 2020-09-14 | End: 2020-09-14

## 2020-09-14 RX ORDER — FERROUS SULFATE 325(65) MG
325 TABLET ORAL WEEKLY
COMMUNITY
End: 2022-08-29

## 2020-09-14 RX ORDER — GLUCAGON 1 MG
1 KIT INJECTION
Status: DISCONTINUED | OUTPATIENT
Start: 2020-09-14 | End: 2020-09-14

## 2020-09-14 RX ORDER — TALC
2 POWDER (GRAM) TOPICAL ONCE
Status: DISCONTINUED | OUTPATIENT
Start: 2020-09-14 | End: 2020-09-14

## 2020-09-14 RX ADMIN — THIAMINE HYDROCHLORIDE 500 MG: 100 INJECTION, SOLUTION INTRAMUSCULAR; INTRAVENOUS at 09:09

## 2020-09-14 RX ADMIN — PANTOPRAZOLE SODIUM 40 MG: 40 TABLET, DELAYED RELEASE ORAL at 09:09

## 2020-09-14 RX ADMIN — INSULIN ASPART 10 UNITS: 100 INJECTION, SOLUTION INTRAVENOUS; SUBCUTANEOUS at 12:09

## 2020-09-14 RX ADMIN — INSULIN ASPART 3 UNITS: 100 INJECTION, SOLUTION INTRAVENOUS; SUBCUTANEOUS at 09:09

## 2020-09-14 RX ADMIN — INSULIN ASPART 6 UNITS: 100 INJECTION, SOLUTION INTRAVENOUS; SUBCUTANEOUS at 05:09

## 2020-09-14 RX ADMIN — POTASSIUM PHOSPHATE, MONOBASIC AND POTASSIUM PHOSPHATE, DIBASIC 30 MMOL: 224; 236 INJECTION, SOLUTION, CONCENTRATE INTRAVENOUS at 08:09

## 2020-09-14 RX ADMIN — Medication 200 MG: at 08:09

## 2020-09-14 RX ADMIN — POTASSIUM CHLORIDE 40 MEQ: 1500 TABLET, EXTENDED RELEASE ORAL at 08:09

## 2020-09-14 RX ADMIN — INSULIN ASPART 4 UNITS: 100 INJECTION, SOLUTION INTRAVENOUS; SUBCUTANEOUS at 05:09

## 2020-09-14 RX ADMIN — FOLIC ACID 1 MG: 1 TABLET ORAL at 09:09

## 2020-09-14 RX ADMIN — INSULIN ASPART 3 UNITS: 100 INJECTION, SOLUTION INTRAVENOUS; SUBCUTANEOUS at 12:09

## 2020-09-14 RX ADMIN — THIAMINE HYDROCHLORIDE 500 MG: 100 INJECTION, SOLUTION INTRAMUSCULAR; INTRAVENOUS at 03:09

## 2020-09-14 RX ADMIN — THERA TABS 1 TABLET: TAB at 09:09

## 2020-09-14 RX ADMIN — INSULIN DETEMIR 7 UNITS: 100 INJECTION, SOLUTION SUBCUTANEOUS at 09:09

## 2020-09-14 RX ADMIN — INSULIN DETEMIR 5 UNITS: 100 INJECTION, SOLUTION SUBCUTANEOUS at 09:09

## 2020-09-14 RX ADMIN — ENOXAPARIN SODIUM 40 MG: 40 INJECTION SUBCUTANEOUS at 05:09

## 2020-09-14 NOTE — ASSESSMENT & PLAN NOTE
Patient with low potassium on admission likely 2/2 insulin drip and DKA. Most recent 4.6 on 9/15    Plan:   --replace as needed  --F/U BMP Q4

## 2020-09-14 NOTE — SUBJECTIVE & OBJECTIVE
Interval History: no acute events overnight     Review of Systems   Constitutional: Positive for fatigue. Negative for activity change, appetite change, chills and fever.   HENT: Negative for congestion, ear pain, hearing loss, sinus pressure and sinus pain.    Eyes: Negative for pain and redness.   Respiratory: Negative for apnea, cough, choking, chest tightness and shortness of breath.    Cardiovascular: Negative for chest pain and leg swelling.   Gastrointestinal: Positive for abdominal pain. Negative for abdominal distention, anal bleeding, blood in stool, constipation and diarrhea.   Endocrine: Negative for polydipsia and polyuria.   Genitourinary: Negative for difficulty urinating, dysuria, enuresis, flank pain and frequency.   Musculoskeletal: Negative for arthralgias and back pain.   Skin: Negative.    Allergic/Immunologic: Negative.    Neurological: Negative for dizziness, seizures, facial asymmetry, light-headedness, numbness and headaches.   Hematological: Negative.    Psychiatric/Behavioral: Negative for agitation, behavioral problems, confusion, decreased concentration, dysphoric mood and hallucinations. The patient is not hyperactive.      Objective:     Vital Signs (Most Recent):  Temp: 96.8 °F (36 °C) (09/14/20 1151)  Pulse: 78 (09/14/20 1151)  Resp: 15 (09/14/20 1151)  BP: 137/70 (09/14/20 1151)  SpO2: 100 % (09/14/20 1151) Vital Signs (24h Range):  Temp:  [96.8 °F (36 °C)-98.6 °F (37 °C)] 96.8 °F (36 °C)  Pulse:  [72-93] 78  Resp:  [15-18] 15  SpO2:  [98 %-100 %] 100 %  BP: (125-138)/(59-85) 137/70     Weight: 78.9 kg (174 lb)  Body mass index is 27.25 kg/m².    Intake/Output Summary (Last 24 hours) at 9/14/2020 1525  Last data filed at 9/14/2020 0927  Gross per 24 hour   Intake 240 ml   Output --   Net 240 ml      Physical Exam  Constitutional:       General: She is not in acute distress.     Appearance: Normal appearance. She is not ill-appearing.   HENT:      Head: Normocephalic.      Nose: Nose  normal. No congestion.      Mouth/Throat:      Mouth: Mucous membranes are moist.   Eyes:      Extraocular Movements: Extraocular movements intact.      Conjunctiva/sclera: Conjunctivae normal.      Pupils: Pupils are equal, round, and reactive to light.   Neck:      Musculoskeletal: Normal range of motion and neck supple.   Cardiovascular:      Rate and Rhythm: Normal rate and regular rhythm.      Pulses: Normal pulses.      Heart sounds: Normal heart sounds. No murmur.   Pulmonary:      Effort: Pulmonary effort is normal. No respiratory distress.      Breath sounds: Normal breath sounds. No stridor. No wheezing or rhonchi.   Abdominal:      General: Abdomen is flat.      Palpations: There is no mass.      Tenderness: There is abdominal tenderness. There is no guarding or rebound.      Hernia: No hernia is present.   Musculoskeletal: Normal range of motion.         General: No swelling, tenderness or deformity.   Skin:     General: Skin is warm.   Neurological:      General: No focal deficit present.      Mental Status: She is alert and oriented to person, place, and time. Mental status is at baseline.      Cranial Nerves: No cranial nerve deficit.      Sensory: No sensory deficit.      Motor: No weakness.   Psychiatric:         Mood and Affect: Mood normal.         Behavior: Behavior normal.         Significant Labs: All pertinent labs within the past 24 hours have been reviewed.    Significant Imaging: I have reviewed and interpreted all pertinent imaging results/findings within the past 24 hours.

## 2020-09-14 NOTE — PLAN OF CARE
Problem: Diabetes Comorbidity  Goal: Blood Glucose Level Within Desired Range  Outcome: Ongoing, Progressing  Intervention: Maintain Glycemic Control  Flowsheets (Taken 9/14/2020 0604)  Glycemic Management:   blood glucose monitoring   insulin dose matched to carbohydrate intake   supplemental insulin given     POC discussed with patient. AAOx4. VSS. No acute changes overnight. Pt had no complaints. WCTM

## 2020-09-14 NOTE — ASSESSMENT & PLAN NOTE
Patient with chronic alcohol abuse. Last drink on Saturday (6 days ago) this is the longest she has gone without drinking. She has never had alcohol withdrawal, seizures or DT. At the moment patient is extremely anxious with tachycardia due to new diagnosis of possible liver disease. No hallucinations, no diaphoresis, no tremors.     Plan:   --CIWA protocol (pt has been 1, 0 past two nights on CIWA score)  --Started IV thiamine, PO folate   --Will discuss alcohol cessation programs with CM to provide to pt

## 2020-09-14 NOTE — PROGRESS NOTES
"[facility] Hospital Medicine  Progress Note    Patient Name: Cira Keys  MRN: [id number]  Patient Class: IP- Inpatient   Admission Date: 9/11/2020  Length of Stay: 3 days  Attending Physician: Behram Khan, MD  Primary Care Provider: Krysta Mercado MD    [facility] Medicine Team: [facility] HOSP MED 5 Timothy Ritter MD    Subjective:     Principal Problem:Acute hepatitis        HPI:  41 y/o F with TIDM, HTN and heavy ETOH abuse transferred from [facility] ED after presenting with worsening nausea, vomiting, diaphoresis and low oral intake for the past 5 days. It started as dizziness when standing up that worsened to the extend of not being able to move on her bed, she had a decrease appetite, with no intake since 4 days ago and excessive vomiting "too many to count".  Her last drink was on Saturday, 1/5 vodka. This is the most she has been without drinking. She refers mild SOB and chills. Denies chest pain, dysuria, fever, weakness, numbness and tingling.     Patient with history of heavy alcohol use, drinks everyday since she was 15 y/o. She says she has increased the amount she drinks since March, can finish a gallon in 2.5 days.     On [facility] patient presented with acute hepatitis, anion gap metabolic acidosis secondary to mild DKA vs starvation ketoacidosis. Patient with no previous history of cirrhosis, liver US remarkable for hepatomegaly with fatty infiltration of the liver, for which patient was transferred for hepatology evaluation. Patient refers having 5-6 DKA in her life, last one 3 years ago, but has never presented as this.     Overview/Hospital Course:  Patient admitted to internal medicine team as transfer for hepatology evaluation after presenting with LFTs >1000. Patient also was in DKA most likely 2/2 UTI, came with insulin infusion drip. Now on sub Q insulin. Patient with history of excessive alcohol intake, for which concerned for alcohol withdrawal, CIWA protocol started and " patient on thiamine and folate. Hepatology consulted and will f/u labs. Doppler US negative. On 9/15 patient's liver enzymes: 67 AST and 135 ALT. Hepatology does not want a biopsy anymore since enzymes down trending. Transitioned to low dose sliding scale.     Interval History: no acute events overnight     Review of Systems   Constitutional: Positive for fatigue. Negative for activity change, appetite change, chills and fever.   HENT: Negative for congestion, ear pain, hearing loss, sinus pressure and sinus pain.    Eyes: Negative for pain and redness.   Respiratory: Negative for apnea, cough, choking, chest tightness and shortness of breath.    Cardiovascular: Negative for chest pain and leg swelling.   Gastrointestinal: Positive for abdominal pain. Negative for abdominal distention, anal bleeding, blood in stool, constipation and diarrhea.   Endocrine: Negative for polydipsia and polyuria.   Genitourinary: Negative for difficulty urinating, dysuria, enuresis, flank pain and frequency.   Musculoskeletal: Negative for arthralgias and back pain.   Skin: Negative.    Allergic/Immunologic: Negative.    Neurological: Negative for dizziness, seizures, facial asymmetry, light-headedness, numbness and headaches.   Hematological: Negative.    Psychiatric/Behavioral: Negative for agitation, behavioral problems, confusion, decreased concentration, dysphoric mood and hallucinations. The patient is not hyperactive.      Objective:     Vital Signs (Most Recent):  Temp: 96.8 °F (36 °C) (09/14/20 1151)  Pulse: 78 (09/14/20 1151)  Resp: 15 (09/14/20 1151)  BP: 137/70 (09/14/20 1151)  SpO2: 100 % (09/14/20 1151) Vital Signs (24h Range):  Temp:  [96.8 °F (36 °C)-98.6 °F (37 °C)] 96.8 °F (36 °C)  Pulse:  [72-93] 78  Resp:  [15-18] 15  SpO2:  [98 %-100 %] 100 %  BP: (125-138)/(59-85) 137/70     Weight: 78.9 kg (174 lb)  Body mass index is 27.25 kg/m².    Intake/Output Summary (Last 24 hours) at 9/14/2020 5796  Last data filed at  9/14/2020 0927  Gross per 24 hour   Intake 240 ml   Output --   Net 240 ml      Physical Exam  Constitutional:       General: She is not in acute distress.     Appearance: Normal appearance. She is not ill-appearing.   HENT:      Head: Normocephalic.      Nose: Nose normal. No congestion.      Mouth/Throat:      Mouth: Mucous membranes are moist.   Eyes:      Extraocular Movements: Extraocular movements intact.      Conjunctiva/sclera: Conjunctivae normal.      Pupils: Pupils are equal, round, and reactive to light.   Neck:      Musculoskeletal: Normal range of motion and neck supple.   Cardiovascular:      Rate and Rhythm: Normal rate and regular rhythm.      Pulses: Normal pulses.      Heart sounds: Normal heart sounds. No murmur.   Pulmonary:      Effort: Pulmonary effort is normal. No respiratory distress.      Breath sounds: Normal breath sounds. No stridor. No wheezing or rhonchi.   Abdominal:      General: Abdomen is flat.      Palpations: There is no mass.      Tenderness: There is abdominal tenderness. There is no guarding or rebound.      Hernia: No hernia is present.   Musculoskeletal: Normal range of motion.         General: No swelling, tenderness or deformity.   Skin:     General: Skin is warm.   Neurological:      General: No focal deficit present.      Mental Status: She is alert and oriented to person, place, and time. Mental status is at baseline.      Cranial Nerves: No cranial nerve deficit.      Sensory: No sensory deficit.      Motor: No weakness.   Psychiatric:         Mood and Affect: Mood normal.         Behavior: Behavior normal.         Significant Labs: All pertinent labs within the past 24 hours have been reviewed.    Significant Imaging: I have reviewed and interpreted all pertinent imaging results/findings within the past 24 hours.      Assessment/Plan:      * Acute hepatitis  Patient with history of chronic alcohol abuse, drinks up to a liter daily and has increased her alcohol intake  in the last couple of months. Presented with DKA to outside hospital with elevated LFTs, AST 1734, . Patient refers that she had elevated liver enzymes once, but resolved and never to this extend. She was transferred here for hepatology workup. LFTs trending down.    Plan:  --Liver US: Hepatomegaly with fatty infiltration of the liver.   --Hepatology consulted will continue to follow recommendations. At the moment most likely etiology could be alcohol indice, but rarely it presents with LFTs >1000. They are concerned for possible ischemic vs autoimmune. Will f/u labs.   --Hepatitis panel ordered: normal   --acetaminophen level NEG  --alpha-1 antitrypsin normal.  --Pending: CINDY, anti-smooth ab, cyroglobulin, anti smith,CK, total IgG  --Dopple US of liver ordered (normal) along with ECHO   -- on 9/15 AST 67 and . D/C per hepatology with outpatient follow up     UTI (urinary tract infection)  Ucx + gram negative rods patient treated with zosyn and rocephin on outside hospital. Denies urinary symptoms at the moment.      Plan:   --Will discontinue antibiotics since patient assymptomatic     Alcohol withdrawal  Patient with chronic alcohol abuse. Last drink on Saturday (6 days ago) this is the longest she has gone without drinking. She has never had alcohol withdrawal, seizures or DT. At the moment patient is extremely anxious with tachycardia due to new diagnosis of possible liver disease. No hallucinations, no diaphoresis, no tremors.     Plan:   --CIWA protocol (pt has been 1, 0 past two nights on CIWA score)  --Started IV thiamine, PO folate   --Will discuss alcohol cessation programs with CM to provide to pt    DKA (diabetic ketoacidoses)  39 y/o presenting with nausea, vomiting, dizziness, diaphoresis transferred from [place] with DKA with new onset transiminits, on insulin ggt on arrival. On arrival anion gap improving, blood glucose <200,  Lactic acid improving. VBG with normal pH. Patient refers  that she has had DKA before, but never have been as this. She has not been able to keep food down for the past 4 days. Patient presenting with hypotension, MAP 66 yesterday. Today BP well controlled.     Plan:   --Now transitioned off of the dextrose 5 % and 0.45 % NaCl drip, diabetic diet  --Transitioned back to SubQ insulin low dose sliding scale  --BG now back to every 4 hrs and before meals  --BMP Q4h to assess for return into DKA    Hypokalemia  Patient with low potassium on admission likely 2/2 insulin drip and DKA. Most recent 4.6 on 9/15    Plan:   --replace as needed  --F/U BMP Q4      High anion gap metabolic acidosis  --see DKA        VTE Risk Mitigation (From admission, onward)         Ordered     enoxaparin injection 40 mg  Every 24 hours      09/11/20 1927     Place sequential compression device  Until discontinued      09/11/20 1927     IP VTE LOW RISK PATIENT  Once      09/11/20 1927                Discharge Planning   JUVENTINO: 9/15/2020     Code Status: Full Code   Is the patient medically ready for discharge?:     Reason for patient still in hospital (select all that apply): Treatment  Discharge Plan A: Home with family   Discharge Delays: (!) Change in Medical Condition              Timothy Ritter MD  Department of Hospital Medicine   [facility]

## 2020-09-14 NOTE — PLAN OF CARE
Krysta Mercado MD   [address] [address]       [id number]  [place], [place] - [address] [address]  [address]  [facility] [address]  Phone: [phone] Fax: [phone]     Payor: GENERIC COMMERCIAL / Plan: GENERIC COMMERCIAL / Product Type: Commercial /           09/14/20 1340   Discharge Assessment   Assessment Type Discharge Planning Assessment   Confirmed/corrected address and phone number on facesheet? Yes   Assessment information obtained from? Patient   Prior to hospitilization cognitive status: Alert/Oriented   Prior to hospitalization functional status: Independent   Current cognitive status: Alert/Oriented   Current Functional Status: Independent   Facility Arrived From: [facility] from [facility]   Lives With spouse   Able to Return to Prior Arrangements yes   Is patient able to care for self after discharge? Yes   Who are your caregiver(s) and their phone number(s)? Karla Aguirre (mother) [phone]   Patient's perception of discharge disposition home or selfcare   Readmission Within the Last 30 Days no previous admission in last 30 days   Patient currently being followed by outpatient case management? No   Patient currently receives any other outside agency services? No   Equipment Currently Used at Home none   Is the patient taking medications as prescribed? yes   Does the patient have transportation home? Yes   Transportation Anticipated family or friend will provide   Does the patient receive services at the [facility]? No   Discharge Plan A Home with family   Discharge Plan B Home with family   DME Needed Upon Discharge  none   Patient/Family in Agreement with Plan yes

## 2020-09-14 NOTE — TREATMENT PLAN
Hepatology Treatment Plan    Cira Keys is a 40 y.o. female admitted to hospital 9/11/2020 (Hospital Day: 4) due to Acute hepatitis.     Interval History  - chart reviewed  - LFTs continue to resolve, AST 67, , INR normal (1.0)  - IGG WNL (1.1k), CINDY, ASMA pending    Plan  - resolving enzymes, normal synthetic function, ok with discharge from hep standpoint. Etiology of abnormal LFT unclear  - will arrange repeat labs in 2 weeks  - will follow-up pending AI serologies  - please obtain daily CBC, BMP, LFT, INR  - Plan of care was discussed with primary team   - signing off, call if any questions    Thank you for involving us in the care of Cira Keys. Please call with any additional concerns or questions.    Noble Mayes MD  Gastroenterology Fellow

## 2020-09-14 NOTE — PLAN OF CARE
Patient ambulated independently.  at bedside. VS stable.  BS covered with SS. Plan of care reviewed.

## 2020-09-14 NOTE — TELEPHONE ENCOUNTER
----- Message from Shivani Ellison RN sent at 9/14/2020  2:33 PM CDT -----  Regarding: Hospital discharge  Patient with alcohol use disorder seen for acute hepatitis and in withdrawals.  LFT's normalizing but will need follow up in four weeks please.   Thanks  Dloores

## 2020-09-14 NOTE — ASSESSMENT & PLAN NOTE
Patient with history of chronic alcohol abuse, drinks up to a liter daily and has increased her alcohol intake in the last couple of months. Presented with DKA to outside hospital with elevated LFTs, AST 1734, . Patient refers that she had elevated liver enzymes once, but resolved and never to this extend. She was transferred here for hepatology workup. LFTs trending down.    Plan:  --Liver US: Hepatomegaly with fatty infiltration of the liver.   --Hepatology consulted will continue to follow recommendations. At the moment most likely etiology could be alcohol indice, but rarely it presents with LFTs >1000. They are concerned for possible ischemic vs autoimmune. Will f/u labs.   --Hepatitis panel ordered: normal   --acetaminophen level NEG  --alpha-1 antitrypsin normal.  --Pending: CINDY, anti-smooth ab, cyroglobulin, anti smith,CK, total IgG  --Dopple US of liver ordered (normal) along with ECHO   -- on 9/15 AST 67 and . D/C per hepatology with outpatient follow up

## 2020-09-14 NOTE — ASSESSMENT & PLAN NOTE
39 y/o presenting with nausea, vomiting, dizziness, diaphoresis transferred from [place] with DKA with new onset transiminits, on insulin ggt on arrival. On arrival anion gap improving, blood glucose <200,  Lactic acid improving. VBG with normal pH. Patient refers that she has had DKA before, but never have been as this. She has not been able to keep food down for the past 4 days. Patient presenting with hypotension, MAP 66 yesterday. Today BP well controlled.     Plan:   --Now transitioned off of the dextrose 5 % and 0.45 % NaCl drip, diabetic diet  --Transitioned back to SubQ insulin low dose sliding scale  --BG now back to every 4 hrs and before meals  --BMP Q4h to assess for return into DKA

## 2020-09-15 VITALS
WEIGHT: 174 LBS | HEIGHT: 67 IN | OXYGEN SATURATION: 100 % | HEART RATE: 90 BPM | BODY MASS INDEX: 27.31 KG/M2 | RESPIRATION RATE: 18 BRPM | DIASTOLIC BLOOD PRESSURE: 78 MMHG | TEMPERATURE: 99 F | SYSTOLIC BLOOD PRESSURE: 155 MMHG

## 2020-09-15 LAB
ALBUMIN SERPL BCP-MCNC: 2.7 G/DL (ref 3.5–5.2)
ALP SERPL-CCNC: 119 U/L (ref 55–135)
ALT SERPL W/O P-5'-P-CCNC: 100 U/L (ref 10–44)
ANION GAP SERPL CALC-SCNC: 8 MMOL/L (ref 8–16)
ANION GAP SERPL CALC-SCNC: 8 MMOL/L (ref 8–16)
ANION GAP SERPL CALC-SCNC: 9 MMOL/L (ref 8–16)
AST SERPL-CCNC: 39 U/L (ref 10–40)
BACTERIA BLD CULT: NORMAL
BACTERIA BLD CULT: NORMAL
BASOPHILS # BLD AUTO: 0.06 K/UL (ref 0–0.2)
BASOPHILS NFR BLD: 1.5 % (ref 0–1.9)
BILIRUB SERPL-MCNC: 0.8 MG/DL (ref 0.1–1)
BUN SERPL-MCNC: 3 MG/DL (ref 6–20)
BUN SERPL-MCNC: 4 MG/DL (ref 6–20)
BUN SERPL-MCNC: 4 MG/DL (ref 6–20)
CALCIUM SERPL-MCNC: 8.3 MG/DL (ref 8.7–10.5)
CALCIUM SERPL-MCNC: 8.3 MG/DL (ref 8.7–10.5)
CALCIUM SERPL-MCNC: 8.4 MG/DL (ref 8.7–10.5)
CHLORIDE SERPL-SCNC: 100 MMOL/L (ref 95–110)
CHLORIDE SERPL-SCNC: 101 MMOL/L (ref 95–110)
CHLORIDE SERPL-SCNC: 103 MMOL/L (ref 95–110)
CO2 SERPL-SCNC: 24 MMOL/L (ref 23–29)
CO2 SERPL-SCNC: 25 MMOL/L (ref 23–29)
CO2 SERPL-SCNC: 26 MMOL/L (ref 23–29)
CREAT SERPL-MCNC: 0.7 MG/DL (ref 0.5–1.4)
DIFFERENTIAL METHOD: ABNORMAL
EOSINOPHIL # BLD AUTO: 0.1 K/UL (ref 0–0.5)
EOSINOPHIL NFR BLD: 1.9 % (ref 0–8)
ERYTHROCYTE [DISTWIDTH] IN BLOOD BY AUTOMATED COUNT: 16.7 % (ref 11.5–14.5)
EST. GFR  (AFRICAN AMERICAN): >60 ML/MIN/1.73 M^2
EST. GFR  (NON AFRICAN AMERICAN): >60 ML/MIN/1.73 M^2
GLUCOSE SERPL-MCNC: 266 MG/DL (ref 70–110)
GLUCOSE SERPL-MCNC: 287 MG/DL (ref 70–110)
GLUCOSE SERPL-MCNC: 320 MG/DL (ref 70–110)
HCT VFR BLD AUTO: 30.7 % (ref 37–48.5)
HGB BLD-MCNC: 8.9 G/DL (ref 12–16)
IMM GRANULOCYTES # BLD AUTO: 0.01 K/UL (ref 0–0.04)
IMM GRANULOCYTES NFR BLD AUTO: 0.2 % (ref 0–0.5)
INR PPP: 1 (ref 0.8–1.2)
LYMPHOCYTES # BLD AUTO: 1.5 K/UL (ref 1–4.8)
LYMPHOCYTES NFR BLD: 35.6 % (ref 18–48)
MAGNESIUM SERPL-MCNC: 1.5 MG/DL (ref 1.6–2.6)
MCH RBC QN AUTO: 24.7 PG (ref 27–31)
MCHC RBC AUTO-ENTMCNC: 29 G/DL (ref 32–36)
MCV RBC AUTO: 85 FL (ref 82–98)
MONOCYTES # BLD AUTO: 0.7 K/UL (ref 0.3–1)
MONOCYTES NFR BLD: 16.2 % (ref 4–15)
NEUTROPHILS # BLD AUTO: 1.8 K/UL (ref 1.8–7.7)
NEUTROPHILS NFR BLD: 44.6 % (ref 38–73)
NRBC BLD-RTO: 0 /100 WBC
PHOSPHATE SERPL-MCNC: 2.7 MG/DL (ref 2.7–4.5)
PLATELET # BLD AUTO: 260 K/UL (ref 150–350)
PMV BLD AUTO: 10.5 FL (ref 9.2–12.9)
POCT GLUCOSE: 261 MG/DL (ref 70–110)
POCT GLUCOSE: 302 MG/DL (ref 70–110)
POCT GLUCOSE: 367 MG/DL (ref 70–110)
POTASSIUM SERPL-SCNC: 3.9 MMOL/L (ref 3.5–5.1)
POTASSIUM SERPL-SCNC: 4.1 MMOL/L (ref 3.5–5.1)
POTASSIUM SERPL-SCNC: 4.2 MMOL/L (ref 3.5–5.1)
PROT SERPL-MCNC: 6.3 G/DL (ref 6–8.4)
PROTHROMBIN TIME: 10.6 SEC (ref 9–12.5)
RBC # BLD AUTO: 3.6 M/UL (ref 4–5.4)
SODIUM SERPL-SCNC: 133 MMOL/L (ref 136–145)
SODIUM SERPL-SCNC: 135 MMOL/L (ref 136–145)
SODIUM SERPL-SCNC: 136 MMOL/L (ref 136–145)
WBC # BLD AUTO: 4.13 K/UL (ref 3.9–12.7)

## 2020-09-15 PROCEDURE — 99239 PR HOSPITAL DISCHARGE DAY,>30 MIN: ICD-10-PCS | Mod: ,,, | Performed by: STUDENT IN AN ORGANIZED HEALTH CARE EDUCATION/TRAINING PROGRAM

## 2020-09-15 PROCEDURE — 90686 IIV4 VACC NO PRSV 0.5 ML IM: CPT | Performed by: STUDENT IN AN ORGANIZED HEALTH CARE EDUCATION/TRAINING PROGRAM

## 2020-09-15 PROCEDURE — 25000003 PHARM REV CODE 250: Performed by: STUDENT IN AN ORGANIZED HEALTH CARE EDUCATION/TRAINING PROGRAM

## 2020-09-15 PROCEDURE — 94761 N-INVAS EAR/PLS OXIMETRY MLT: CPT

## 2020-09-15 PROCEDURE — 85025 COMPLETE CBC W/AUTO DIFF WBC: CPT

## 2020-09-15 PROCEDURE — 36415 COLL VENOUS BLD VENIPUNCTURE: CPT

## 2020-09-15 PROCEDURE — 99239 HOSP IP/OBS DSCHRG MGMT >30: CPT | Mod: ,,, | Performed by: STUDENT IN AN ORGANIZED HEALTH CARE EDUCATION/TRAINING PROGRAM

## 2020-09-15 PROCEDURE — 83735 ASSAY OF MAGNESIUM: CPT

## 2020-09-15 PROCEDURE — 80048 BASIC METABOLIC PNL TOTAL CA: CPT

## 2020-09-15 PROCEDURE — 90471 IMMUNIZATION ADMIN: CPT | Performed by: STUDENT IN AN ORGANIZED HEALTH CARE EDUCATION/TRAINING PROGRAM

## 2020-09-15 PROCEDURE — 84100 ASSAY OF PHOSPHORUS: CPT

## 2020-09-15 PROCEDURE — 80053 COMPREHEN METABOLIC PANEL: CPT

## 2020-09-15 PROCEDURE — 63600175 PHARM REV CODE 636 W HCPCS: Performed by: STUDENT IN AN ORGANIZED HEALTH CARE EDUCATION/TRAINING PROGRAM

## 2020-09-15 PROCEDURE — 85610 PROTHROMBIN TIME: CPT

## 2020-09-15 RX ORDER — LANOLIN ALCOHOL/MO/W.PET/CERES
100 CREAM (GRAM) TOPICAL DAILY
Qty: 30 TABLET | Refills: 3 | Status: SHIPPED | OUTPATIENT
Start: 2020-09-16 | End: 2021-04-06

## 2020-09-15 RX ORDER — ATORVASTATIN CALCIUM 20 MG/1
20 TABLET, FILM COATED ORAL DAILY
Qty: 90 TABLET | Refills: 3
Start: 2020-09-15 | End: 2021-03-22

## 2020-09-15 RX ORDER — HUMAN INSULIN 100 [IU]/ML
INJECTION, SUSPENSION SUBCUTANEOUS
Qty: 10 ML | Refills: 0 | Status: ON HOLD
Start: 2020-09-15 | End: 2022-03-20 | Stop reason: HOSPADM

## 2020-09-15 RX ORDER — INSULIN GLARGINE 100 [IU]/ML
10 INJECTION, SOLUTION SUBCUTANEOUS 2 TIMES DAILY
Qty: 15 ML | Refills: 3 | Status: SHIPPED | OUTPATIENT
Start: 2020-09-15 | End: 2020-09-15 | Stop reason: HOSPADM

## 2020-09-15 RX ORDER — THIAMINE HCL 100 MG
100 TABLET ORAL DAILY
Status: DISCONTINUED | OUTPATIENT
Start: 2020-09-15 | End: 2020-09-15 | Stop reason: HOSPADM

## 2020-09-15 RX ORDER — INSULIN ASPART 100 [IU]/ML
7 INJECTION, SOLUTION INTRAVENOUS; SUBCUTANEOUS
Status: DISCONTINUED | OUTPATIENT
Start: 2020-09-15 | End: 2020-09-15 | Stop reason: HOSPADM

## 2020-09-15 RX ORDER — FOLIC ACID 1 MG/1
1 TABLET ORAL DAILY
Qty: 30 TABLET | Refills: 3 | Status: SHIPPED | OUTPATIENT
Start: 2020-09-16 | End: 2021-04-06

## 2020-09-15 RX ORDER — INSULIN ASPART 100 [IU]/ML
7 INJECTION, SOLUTION INTRAVENOUS; SUBCUTANEOUS
Qty: 15 ML | Refills: 3 | Status: SHIPPED | OUTPATIENT
Start: 2020-09-15 | End: 2020-09-15 | Stop reason: HOSPADM

## 2020-09-15 RX ADMIN — INSULIN ASPART 5 UNITS: 100 INJECTION, SOLUTION INTRAVENOUS; SUBCUTANEOUS at 09:09

## 2020-09-15 RX ADMIN — Medication 100 MG: at 08:09

## 2020-09-15 RX ADMIN — THERA TABS 1 TABLET: TAB at 08:09

## 2020-09-15 RX ADMIN — FOLIC ACID 1 MG: 1 TABLET ORAL at 08:09

## 2020-09-15 RX ADMIN — PANTOPRAZOLE SODIUM 40 MG: 40 TABLET, DELAYED RELEASE ORAL at 08:09

## 2020-09-15 RX ADMIN — INSULIN ASPART 2 UNITS: 100 INJECTION, SOLUTION INTRAVENOUS; SUBCUTANEOUS at 05:09

## 2020-09-15 RX ADMIN — INFLUENZA VIRUS VACCINE 0.5 ML: 15; 15; 15; 15 SUSPENSION INTRAMUSCULAR at 12:09

## 2020-09-15 RX ADMIN — INSULIN ASPART 7 UNITS: 100 INJECTION, SOLUTION INTRAVENOUS; SUBCUTANEOUS at 09:09

## 2020-09-15 NOTE — ASSESSMENT & PLAN NOTE
AST and ALT normal/borderline. Per Hepatology its ok to D/C  Patient will need follow up as outpatient

## 2020-09-15 NOTE — PLAN OF CARE
Future Appointments   Date Time Provider Department Center   9/22/2020  2:00 PM Krysta Mercado MD [facility]   9/28/2020  2:00 PM Jennifer Mata MD [facility]           09/15/20 180   Final Note   Assessment Type Final Discharge Note   Anticipated Discharge Disposition Home   What phone number can be called within the next 1-3 days to see how you are doing after discharge? [phone]   Hospital Follow Up  Appt(s) scheduled? Yes   Right Care Referral Info   Post Acute Recommendation No Care   Post-Acute Status   Post-Acute Authorization Other   Other Status No Post-Acute Service Needs   Discharge Delays None known at this time

## 2020-09-15 NOTE — DISCHARGE SUMMARY
"[facility] Hospital Medicine  Discharge Summary      Patient Name: Cira Keys  MRN: [id number]  Admission Date: 9/11/2020  Hospital Length of Stay: 4 days  Discharge Date and Time:  09/15/2020 1:55 PM  Attending Physician: Behram Khan, MD   Discharging Provider: Timothy Ritter MD  Primary Care Provider: Krysta Mercado MD  Hospital Medicine Team: [facility] 5 Timothy Ritter MD    HPI:   39 y/o F with TIDM, HTN and heavy ETOH abuse transferred from [facility] ED after presenting with worsening nausea, vomiting, diaphoresis and low oral intake for the past 5 days. It started as dizziness when standing up that worsened to the extend of not being able to move on her bed, she had a decrease appetite, with no intake since 4 days ago and excessive vomiting "too many to count".  Her last drink was on Saturday, 1/5 vodka. This is the most she has been without drinking. She refers mild SOB and chills. Denies chest pain, dysuria, fever, weakness, numbness and tingling.     Patient with history of heavy alcohol use, drinks everyday since she was 17 y/o. She says she has increased the amount she drinks since March, can finish a gallon in 2.5 days.     On [facility] patient presented with acute hepatitis, anion gap metabolic acidosis secondary to mild DKA vs starvation ketoacidosis. Patient with no previous history of cirrhosis, liver US remarkable for hepatomegaly with fatty infiltration of the liver, for which patient was transferred for hepatology evaluation. Patient refers having 5-6 DKA in her life, last one 3 years ago, but has never presented as this.     * No surgery found *      Hospital Course:     40 years old female with history of T1DM, HTN and ETOH abuse transferred from [facility] ED. At [facility] she presented due to worsening nausea, vomiting, diaphoresis and low oral intake for 5 day. patient stated that she has been drinking everyday and more since March. work up revealed acute hepatitis " (LFTs >1000) and anion gap metabolic acidosis secondary to DKA. Patient was transfered here and admitted to internal medicine for hepatology evaluation. Patient came with insulin infusion drip. once gap closed, started on sub Q insulin. since alcohol abuse, there was concern for alcohol withdrawal and wernicke so CIWA protocol started and patient placed on thiamine and folate. Hepatology consulted and suggested inmune vs ischemic and suggested liver biopsy if no improvement. Doppler US negative. patient's glucose and liver enzymes trended down over the days and today patient was D/C with normal/borderline AST and ALT as well as 200s blood sugar with no ketoacidosis. this was done after hepatology decided not to order further studies since rapid improvement. patient was D/C with information on where and how to get help for alcohol abuse since she looked motivated to quit.     D/C plan:  follow up with hepatology in 2 weeks  Follow new insulin regimen  Get involved in alcohol abuse programs/help     Physical Exam  Vitals signs and nursing note reviewed.   Constitutional:       General: She is not in acute distress.     Appearance: Normal appearance. She is not toxic-appearing.   HENT:      Head: Normocephalic and atraumatic.   Eyes:      General: No scleral icterus.     Extraocular Movements: Extraocular movements intact.      Conjunctiva/sclera: Conjunctivae normal.   Neck:      Musculoskeletal: Normal range of motion.   Pulmonary:      Effort: Pulmonary effort is normal. No respiratory distress.   Abdominal:      General: There is no distension.      Palpations: Abdomen is soft.   Musculoskeletal: Normal range of motion.         General: No swelling.   Skin:     General: Skin is warm and dry.      Coloration: Skin is not jaundiced.   Neurological:      General: No focal deficit present.      Mental Status: She is alert and oriented to person, place, and time. Mental status is at baseline.   Psychiatric:         Mood  and Affect: Mood normal.         Behavior: Behavior normal.       Consults:   Consults (From admission, onward)        Status Ordering Provider     Inpatient consult to Hepatology  Once     Provider:  (Not yet assigned)    QUINETN Cervantes          * Acute hepatitis  AST and ALT normal/borderline. Per Hepatology its ok to D/C  Patient will need follow up as outpatient     UTI (urinary tract infection)  Ucx + gram negative rods patient treated with zosyn and rocephin on outside hospital. Denies urinary symptoms at the moment.      Plan:   --Will discontinue antibiotics since patient assymptomatic     Alcohol withdrawal  Patient with chronic alcohol abuse.   Will be D/C with tiamine PO. Given information on how and where to get help for her alcohol disorder     DKA (diabetic ketoacidoses)  Patient no longer on DKA, insuline regimen changed. D/C      Final Active Diagnoses:    Diagnosis Date Noted POA    PRINCIPAL PROBLEM:  Acute hepatitis [B17.9] 09/11/2020 Yes    DKA (diabetic ketoacidoses) [E11.10] 09/11/2020 Yes    Alcohol withdrawal [F10.239] 09/11/2020 No    UTI (urinary tract infection) [N39.0] 09/11/2020 Yes    Hypokalemia [E87.6] 08/10/2016 Yes    High anion gap metabolic acidosis [E87.2] 11/01/2015 Yes      Problems Resolved During this Admission:       Discharged Condition: good    Disposition: Home or Self Care    Follow Up:    Patient Instructions:      Diet diabetic     Notify your health care provider if you experience any of the following:  persistent nausea and vomiting or diarrhea     Notify your health care provider if you experience any of the following:  severe uncontrolled pain     Notify your health care provider if you experience any of the following:  temperature >100.4     Notify your health care provider if you experience any of the following:  persistent dizziness, light-headedness, or visual disturbances       Significant Diagnostic Studies: Labs: All labs within the past 24  "hours have been reviewed    Pending Diagnostic Studies:     Procedure Component Value Units Date/Time    Actin (Smooth Muscle) Antibody (IgG) [[id number]] Collected: 09/11/20 2028    Order Status: Sent Lab Status: In process Updated: 09/11/20 2052    Specimen: Blood     Anti-Humphrey antibody [[id number]] Collected: 09/12/20 1311    Order Status: Sent Lab Status: In process Updated: 09/12/20 1348    Specimen: Blood     Basic metabolic panel [[id number]]     Order Status: Sent Lab Status: No result     Specimen: Blood     Phosphatidylethanol (PETH) [[id number]] Collected: 09/14/20 0915    Order Status: Sent Lab Status: In process Updated: 09/14/20 0956    Specimen: Blood          Medications:  Reconciled Home Medications:      Medication List      START taking these medications    folic acid 1 MG tablet  Commonly known as: FOLVITE  Take 1 tablet (1 mg total) by mouth once daily.  Start taking on: September 16, 2020     thiamine 100 MG tablet  Take 1 tablet (100 mg total) by mouth once daily.  Start taking on: September 16, 2020        CONTINUE taking these medications    atorvastatin 20 MG tablet  Commonly known as: LIPITOR  Take 1 tablet (20 mg total) by mouth once daily.     esomeprazole 20 MG capsule  Commonly known as: NEXIUM  Take 20 mg by mouth once daily.     ferrous sulfate 325 mg (65 mg iron) Tab tablet  Commonly known as: FEOSOL  Take 325 mg by mouth once a week.     insulin regular 100 unit/mL injection  Inject 2-8 Units into the skin 3 (three) times daily before meals. If needed per sliding scale     lisinopriL 2.5 MG tablet  Commonly known as: PRINIVIL,ZESTRIL  Take 1 tablet (2.5 mg total) by mouth once daily.     loratadine 10 mg tablet  Commonly known as: CLARITIN  Take 10 mg by mouth daily as needed for Allergies.     NovoLIN N NPH U-100 Insulin 100 unit/mL injection  Generic drug: insulin NPH  Use 14-18 units in am and 4-6 units at night     pen needle, diabetic 32 gauge x 1/4" Ndle  Use to inject insulin 5 " times a day     SPRINTEC (28) 0.25-35 mg-mcg per tablet  Generic drug: norgestimate-ethinyl estradioL  TAKE 1 TABLET BY MOUTH ONCE DAILY        STOP taking these medications    omeprazole 20 MG capsule  Commonly known as: PRILOSEC            Indwelling Lines/Drains at time of discharge:   Lines/Drains/Airways     None                 Time spent on the discharge of patient: 45 minutes Patient was seen and examined on the date of discharge and determined to be suitable for discharge.         Timothy Ritter MD  Department of Hospital Medicine  [facility]

## 2020-09-15 NOTE — ASSESSMENT & PLAN NOTE
Patient with chronic alcohol abuse.   Will be D/C with tiamine PO. Given information on how and where to get help for her alcohol disorder

## 2020-09-16 ENCOUNTER — PATIENT OUTREACH (OUTPATIENT)
Dept: ADMINISTRATIVE | Facility: CLINIC | Age: 40
End: 2020-09-16

## 2020-09-16 NOTE — TELEPHONE ENCOUNTER
C3 nurse attempted to contact patient. No answer. The following message was left for the patient to return the call:  Good afternoon  I am a nurse calling on behalf of [facility] from the [facility].  This is a Transitional Care Call for Cira Keys. When you have a moment please contact us at [phone] or [phone] Monday through Friday, between the hours of 8 am to 4 pm. We look forward to speaking with you. On behalf of [facility] have a nice day.    The patient has a scheduled HOSFU appointment with Krysta Mercado MD on 9/22/2020 @ 1400. Message sent to Physician staff.    [phone]  Left voicemail

## 2020-09-17 DIAGNOSIS — E11.9 TYPE 2 DIABETES MELLITUS WITHOUT COMPLICATION, UNSPECIFIED WHETHER LONG TERM INSULIN USE: ICD-10-CM

## 2020-09-17 LAB — SMA IGG SER-ACNC: 5.1 U

## 2020-09-17 NOTE — PATIENT INSTRUCTIONS
Discharge Instructions for Cirrhosis of the Liver  You have been diagnosed with cirrhosis of the liver. This is a long-term (chronic) problem. It occurs when liver tissue is destroyed and replaced by scar tissue. Causes of cirrhosis include:  · Infection such as viral hepatitis  · Chronic alcoholism  · The bodys immune system attacks healthy cells (autoimmune disorders)  · Obesity  · Medicine side effects  · Genetic diseases  Sometimes the exact cause is unknown. You may not have any symptoms at first. Or your symptoms may be mild. But they usually get worse. Cirrhosis is likely to occur if you have a history of long-term alcohol abuse. Cirrhosis cant be cured. But it can be treated.   Home care  Alcohol  · People with liver disease should not drink alcohol. If you stop drinking, you may feel better and live longer.  · If you are a chronic alcohol user, you will have withdrawal symptoms. Talk with your healthcare provider for more information.    · If alcohol is a problem, ask your provider about medicine that can help you quit drinking.    · Find a local Alcoholics Anonymous support group online at www.aa.org.  Diet  · Ask your provider what kind of diet you should follow. You may be asked to limit or not eat certain foods. Do not limit your protein intake.  · Weigh yourself daily and keep a weight log. If you have a sudden change in weight, call your provider.  · Cut back on salt:  ¨ Limit canned, dried, packaged, and fast foods.  ¨ Dont add salt to your food at the table.  ¨ Season foods with herbs instead of salt when you cook.  Medicines, supplements, and vaccines  · Take your medicines exactly as directed.  · Talk to your provider before taking vitamins, over-the-counter medicines, or herbal supplements. Many herbal supplements may be poisonous (toxic) to the liver.  · Avoid aspirin and other blood-thinning medicines.  · Discuss vitamin supplements and deficiencies with your provider.  · Ask your provider  about getting vaccinations for viruses that can cause liver diseases.  Follow-up care  Follow up with your healthcare provider, or as advised. You will likely have the following tests:  · Lab tests  · Blood tests for liver cancer  · Ultrasound of your liver every 6 months  · Endoscopy to check for swollen veins (varices) in your digestive tract  When to call your provider  Call your healthcare provider right away if you have any of the following:  · Fever of 100.4°F (38.0°C) or higher  · Extreme tiredness (fatigue), weakness, or lack of appetite  · Vomiting (with or without blood)  · Yellowing of your skin or eyes (jaundice)  · Itching  · Swelling in your belly or legs  · Black or tarry stools  · Skin that bruises easily  · Confusion or trouble thinking clearly   Date Last Reviewed: 8/1/2016  © 4630-3376 [employer]. [address] [place] [address]. All rights reserved. This information is not intended as a substitute for professional medical care. Always follow your healthcare professional's instructions.        For Teens: Understanding Hepatitis    Hepatitis is a disease that harms the liver through inflammation. There are three main types of hepatitis viruses that can harm the liver. Hepatitis A spreads through sexual contact or food or water contaminated with feces. Types B and C spread through body fluids, sex, or infected needles. Hepatitis can be treated, but the virus often stays in the body. Hepatitis B is controllable and curable. In some cases, hepatitis can lead to severe liver damage and even death. There is a vaccine to help prevent hepatitis A and B. If youre at risk, ask your healthcare provider about the hepatitis vaccine. (Note: No vaccine is available to prevent hepatitis C.) Other types of hepatitis can be caused by fat in the liver, alcohol, drugs, herbs, medicines, toxins, or immune or genetic conditions.  What to look for  Hepatitis may not show symptoms for months, or  even years, after the start of the disease. But over time, liver damage may cause serious health problems.  · Early-stage symptoms can include tiredness, loss of appetite, nausea, muscle aches, fever, dark urine, and diarrhea.  · Later-stage symptoms can include yellow skin and eyes (jaundice), swollen legs and belly, dark urine, and internal bleeding.  Treatment  Hepatitis A can be treated with rest and medicines. For types B and C, you will be referred to a special healthcare provider. He or she can help you learn more about the disease and how to manage it. You will also have checkups to make sure your liver is still working properly.  If you dont get treated  Hepatitis B and C can stay in the body and keep damaging the liver. They also increase your risk of liver cancer. After many years, a liver transplant may be needed.  Never share piercing, tattoo, or drug needles. Hepatitis B and C can spread through infected needles. Never have unprotected sexual intercourse.   If you have another, nonviral hepatitis, see a specialist to help manage and treat your disease.   Date Last Reviewed: 7/1/2016  © 1020-0598 [employer]. [address] [place] [address], [place] [address]. All rights reserved. This information is not intended as a substitute for professional medical care. Always follow your healthcare professional's instructions.

## 2020-09-21 LAB — PHOSPHATIDYLETHANOL (PETH): 999 NG/ML

## 2020-09-24 LAB
ANTI SM ANTIBODY: 0.06 RATIO (ref 0–0.99)
ANTI-SM INTERPRETATION: NEGATIVE

## 2020-09-28 ENCOUNTER — LAB VISIT (OUTPATIENT)
Dept: LAB | Facility: HOSPITAL | Age: 40
End: 2020-09-28
Attending: INTERNAL MEDICINE
Payer: COMMERCIAL

## 2020-09-28 ENCOUNTER — OFFICE VISIT (OUTPATIENT)
Dept: HEPATOLOGY | Facility: CLINIC | Age: 40
End: 2020-09-28
Payer: COMMERCIAL

## 2020-09-28 VITALS
OXYGEN SATURATION: 100 % | SYSTOLIC BLOOD PRESSURE: 136 MMHG | HEIGHT: 67 IN | RESPIRATION RATE: 20 BRPM | HEART RATE: 84 BPM | DIASTOLIC BLOOD PRESSURE: 63 MMHG | BODY MASS INDEX: 25.96 KG/M2 | WEIGHT: 165.38 LBS

## 2020-09-28 DIAGNOSIS — K70.9 ALCOHOLIC LIVER DISEASE: Primary | ICD-10-CM

## 2020-09-28 DIAGNOSIS — K70.9 ALCOHOLIC LIVER DISEASE: ICD-10-CM

## 2020-09-28 DIAGNOSIS — R79.89 ELEVATED LIVER FUNCTION TESTS: ICD-10-CM

## 2020-09-28 PROBLEM — E11.10 DIABETIC KETOACIDOSIS WITHOUT COMA ASSOCIATED WITH TYPE 2 DIABETES MELLITUS: Status: RESOLVED | Noted: 2019-01-08 | Resolved: 2020-09-28

## 2020-09-28 PROBLEM — N39.0 UTI (URINARY TRACT INFECTION): Status: RESOLVED | Noted: 2020-09-11 | Resolved: 2020-09-28

## 2020-09-28 PROBLEM — R05.9 COUGH: Status: RESOLVED | Noted: 2018-12-12 | Resolved: 2020-09-28

## 2020-09-28 LAB
ALBUMIN SERPL BCP-MCNC: 3.2 G/DL (ref 3.5–5.2)
ALP SERPL-CCNC: 78 U/L (ref 55–135)
ALT SERPL W/O P-5'-P-CCNC: 21 U/L (ref 10–44)
ANION GAP SERPL CALC-SCNC: 10 MMOL/L (ref 8–16)
AST SERPL-CCNC: 34 U/L (ref 10–40)
BILIRUB SERPL-MCNC: 0.9 MG/DL (ref 0.1–1)
BUN SERPL-MCNC: 6 MG/DL (ref 6–20)
CALCIUM SERPL-MCNC: 9.5 MG/DL (ref 8.7–10.5)
CHLORIDE SERPL-SCNC: 99 MMOL/L (ref 95–110)
CO2 SERPL-SCNC: 23 MMOL/L (ref 23–29)
CREAT SERPL-MCNC: 0.7 MG/DL (ref 0.5–1.4)
ERYTHROCYTE [DISTWIDTH] IN BLOOD BY AUTOMATED COUNT: 15.4 % (ref 11.5–14.5)
EST. GFR  (AFRICAN AMERICAN): >60 ML/MIN/1.73 M^2
EST. GFR  (NON AFRICAN AMERICAN): >60 ML/MIN/1.73 M^2
GLUCOSE SERPL-MCNC: 337 MG/DL (ref 70–110)
HCT VFR BLD AUTO: 30.5 % (ref 37–48.5)
HGB BLD-MCNC: 8.9 G/DL (ref 12–16)
INR PPP: 1 (ref 0.8–1.2)
MCH RBC QN AUTO: 24.3 PG (ref 27–31)
MCHC RBC AUTO-ENTMCNC: 29.2 G/DL (ref 32–36)
MCV RBC AUTO: 83 FL (ref 82–98)
PLATELET # BLD AUTO: 399 K/UL (ref 150–350)
PMV BLD AUTO: 11 FL (ref 9.2–12.9)
POTASSIUM SERPL-SCNC: 4.2 MMOL/L (ref 3.5–5.1)
PROT SERPL-MCNC: 7.5 G/DL (ref 6–8.4)
PROTHROMBIN TIME: 10.6 SEC (ref 9–12.5)
RBC # BLD AUTO: 3.67 M/UL (ref 4–5.4)
SODIUM SERPL-SCNC: 132 MMOL/L (ref 136–145)
WBC # BLD AUTO: 6.89 K/UL (ref 3.9–12.7)

## 2020-09-28 PROCEDURE — 85610 PROTHROMBIN TIME: CPT

## 2020-09-28 PROCEDURE — 80321 ALCOHOLS BIOMARKERS 1OR 2: CPT

## 2020-09-28 PROCEDURE — 80053 COMPREHEN METABOLIC PANEL: CPT

## 2020-09-28 PROCEDURE — 99214 PR OFFICE/OUTPT VISIT, EST, LEVL IV, 30-39 MIN: ICD-10-PCS | Mod: S$PBB,,, | Performed by: INTERNAL MEDICINE

## 2020-09-28 PROCEDURE — 99999 PR PBB SHADOW E&M-EST. PATIENT-LVL V: ICD-10-PCS | Mod: PBBFAC,,, | Performed by: INTERNAL MEDICINE

## 2020-09-28 PROCEDURE — 99215 OFFICE O/P EST HI 40 MIN: CPT | Mod: PBBFAC | Performed by: INTERNAL MEDICINE

## 2020-09-28 PROCEDURE — 99214 OFFICE O/P EST MOD 30 MIN: CPT | Mod: S$PBB,,, | Performed by: INTERNAL MEDICINE

## 2020-09-28 PROCEDURE — 85027 COMPLETE CBC AUTOMATED: CPT

## 2020-09-28 PROCEDURE — 36415 COLL VENOUS BLD VENIPUNCTURE: CPT

## 2020-09-28 PROCEDURE — 99999 PR PBB SHADOW E&M-EST. PATIENT-LVL V: CPT | Mod: PBBFAC,,, | Performed by: INTERNAL MEDICINE

## 2020-09-28 NOTE — PROGRESS NOTES
Hepatology Consult Note    Referring provider: Dr. Hyun Abrams    Chief complaint:   Chief Complaint   Patient presents with    Elevated Hepatic Enzymes       HPI:  Cira Keys is a pleasant 40 y.o. female who was referred to Hepatology Clinic for consultation of had concerns including Elevated Hepatic Enzymes..      Hospital discharge follow up  [place] [facility] [place] with elevated liver tests AST > 1100 and then rapidly resolved. She has type 1 diabetes and risk factors for BROWN.  She was also drinking alcohol: a gallon of vodka over 2-3 days. However, her liver enzymes seemed too high for alcohol liver injury alone. Autoimmune markers were neg. It was unclear if there was any  superimposed secondary etiology.    Since she was discharged home, she has not consumed alcohol. She states that she has decided to be sober for life. She has not started IOP/AA.    Background  From hospital admission - Dr Andrew's note [She has been drinking for 20+ years and since the recent COVID pandemic has been drinking up to a gallon of vodka over 2-3 days.  Her last drink was a week ago prior to onset of recent symptoms.  She has been told about elevated liver enzymes in the past, but never been told about cirrhosis or alcohol-related liver disease. She otherwise denies any new medications other than Prilosec.  No over-the-counter or herbal meds.  No excessive Tylenol use.  No syncopal episodes at home, but did have some low blood pressures on presentation.  Her father  from alcohol-related liver disease.  Regarding her alcohol use, she has never been to rehab, she did have a DUI 14 years ago, she has never tried to quit in the past.]    Patient Active Problem List   Diagnosis    Type 1 diabetes mellitus without complication    Iron deficiency anemia due to chronic blood loss    Pyelonephritis    Hyponatremia    High anion gap metabolic acidosis    Respiratory alkalosis    Hypophosphatemia     Hypomagnesemia    Fever    Hypokalemia    Essential hypertension    Infection of right index finger    Cough    Diabetic ketoacidosis without coma associated with type 2 diabetes mellitus    Well woman exam with routine gynecological exam    Counseling for birth control, oral contraceptives    Skin yeast infection    DKA (diabetic ketoacidoses)    Acute hepatitis    Alcohol withdrawal    UTI (urinary tract infection)       Past Medical History:   Diagnosis Date    E coli bacteremia     GERD (gastroesophageal reflux disease)     Iron deficiency anemia due to chronic blood loss 10/31/2015    Type 1 diabetes mellitus without complication 10/31/2015       Past Surgical History:   Procedure Laterality Date     SECTION      TUBAL LIGATION         Family History   Problem Relation Age of Onset    No Known Problems Mother     No Known Problems Father     No Known Problems Sister     No Known Problems Brother     Breast cancer Neg Hx     Colon cancer Neg Hx     Ovarian cancer Neg Hx        Social History     Socioeconomic History    Marital status:      Spouse name: Not on file    Number of children: Not on file    Years of education: Not on file    Highest education level: Not on file   Occupational History    Not on file   Social Needs    Financial resource strain: Not on file    Food insecurity     Worry: Not on file     Inability: Not on file    Transportation needs     Medical: Not on file     Non-medical: Not on file   Tobacco Use    Smoking status: Former Smoker     Packs/day: 1.00     Start date: 1996     Quit date: 2014     Years since quittin.9    Smokeless tobacco: Never Used   Substance and Sexual Activity    Alcohol use: Yes     Alcohol/week: 4.0 standard drinks     Types: 4 Standard drinks or equivalent per week     Comment: occasionally    Drug use: No    Sexual activity: Yes     Partners: Male     Birth control/protection: See Surgical Hx  "    Comment:    Lifestyle    Physical activity     Days per week: Not on file     Minutes per session: Not on file    Stress: Not on file   Relationships    Social connections     Talks on phone: Not on file     Gets together: Not on file     Attends Protestant service: Not on file     Active member of club or organization: Not on file     Attends meetings of clubs or organizations: Not on file     Relationship status: Not on file   Other Topics Concern    Not on file   Social History Narrative    Not on file       Current Outpatient Medications   Medication Sig Dispense Refill    atorvastatin (LIPITOR) 20 MG tablet Take 1 tablet (20 mg total) by mouth once daily. 90 tablet 3    esomeprazole (NEXIUM) 20 MG capsule Take 20 mg by mouth once daily.      ferrous sulfate (FEOSOL) 325 mg (65 mg iron) Tab tablet Take 325 mg by mouth once a week.      folic acid (FOLVITE) 1 MG tablet Take 1 tablet (1 mg total) by mouth once daily. 30 tablet 3    insulin needles, disposable, 32 x 1/4 " Ndle Use to inject insulin 5 times a day 100 each 1    insulin NPH (NOVOLIN N NPH U-100 INSULIN) 100 unit/mL injection Use 14-18 units in am and 4-6 units at night 10 mL 0    insulin regular 100 unit/mL Inj injection Inject 2-8 Units into the skin 3 (three) times daily before meals. If needed per sliding scale      lisinopril (PRINIVIL,ZESTRIL) 2.5 MG tablet Take 1 tablet (2.5 mg total) by mouth once daily. 90 tablet 3    loratadine (CLARITIN) 10 mg tablet Take 10 mg by mouth daily as needed for Allergies.       SPRINTEC, 28, 0.25-35 mg-mcg per tablet TAKE 1 TABLET BY MOUTH ONCE DAILY 28 tablet 5    thiamine 100 MG tablet Take 1 tablet (100 mg total) by mouth once daily. 30 tablet 3     No current facility-administered medications for this visit.        Review of patient's allergies indicates:  No Known Allergies    Review of Systems   Constitutional: Negative for chills, fever, malaise/fatigue and weight loss.   HENT: " Negative for congestion, nosebleeds and sore throat.    Eyes: Negative for blurred vision, double vision and photophobia.   Respiratory: Negative for cough and shortness of breath.    Cardiovascular: Negative for chest pain, palpitations, orthopnea and leg swelling.   Gastrointestinal: Negative for abdominal pain, blood in stool, constipation, diarrhea, melena and vomiting.   Genitourinary: Negative for dysuria.   Musculoskeletal: Negative for falls and joint pain.   Skin: Negative for itching and rash.   Neurological: Negative for dizziness, tremors and weakness.   Endo/Heme/Allergies: Bruises/bleeds easily.   Psychiatric/Behavioral: Negative for depression and substance abuse. The patient is not nervous/anxious and does not have insomnia.        There were no vitals filed for this visit.    Physical Exam  Vitals signs and nursing note reviewed.   Constitutional:       Appearance: Normal appearance. She is well-developed.   HENT:      Head: Normocephalic and atraumatic.   Eyes:      General: No scleral icterus.     Conjunctiva/sclera: Conjunctivae normal.   Neck:      Musculoskeletal: Normal range of motion.   Cardiovascular:      Rate and Rhythm: Normal rate and regular rhythm.      Heart sounds: Normal heart sounds.   Pulmonary:      Effort: Pulmonary effort is normal. No respiratory distress.      Breath sounds: Normal breath sounds. No rales.   Abdominal:      General: Bowel sounds are normal. There is no distension.      Palpations: Abdomen is soft.      Tenderness: There is no abdominal tenderness.      Hernia: No hernia is present.   Lymphadenopathy:      Cervical: No cervical adenopathy.   Skin:     General: Skin is warm and dry.      Coloration: Skin is pale.      Findings: No rash.   Neurological:      Mental Status: She is alert and oriented to person, place, and time.   Psychiatric:         Mood and Affect: Mood is not anxious.         Behavior: Behavior normal.         LABS: I personally reviewed  pertinent laboratory findings.    Lab Results   Component Value Date     (H) 09/15/2020    AST 39 09/15/2020    ALKPHOS 119 09/15/2020    BILITOT 0.8 09/15/2020       Lab Results   Component Value Date    WBC 4.13 09/15/2020    HGB 8.9 (L) 09/15/2020    HCT 30.7 (L) 09/15/2020    MCV 85 09/15/2020     09/15/2020       Lab Results   Component Value Date     (L) 09/15/2020    K 3.9 09/15/2020     09/15/2020    CO2 25 09/15/2020    BUN 4 (L) 09/15/2020    CREATININE 0.7 09/15/2020    CALCIUM 8.3 (L) 09/15/2020    ANIONGAP 8 09/15/2020    ESTGFRAFRICA >60.0 09/15/2020    EGFRNONAA >60.0 09/15/2020       Lab Results   Component Value Date    HEPAIGM Negative 09/11/2020    HEPBIGM Negative 09/11/2020    HEPCAB Negative 09/11/2020       Lab Results   Component Value Date    SMOOTHMUSCAB Negative 1:40 09/14/2020       I personally reviewed all recent lab results.  I personally reviewed imaging studies.    Assessment:  40 y.o. female presenting with     1. Alcoholic liver disease    2. Elevated liver function tests      Alcohol-associated liver disease. Pt states that she stopped alcohol use completely since last admission in Sept 2020.    When she was admitted, her liver enzymes pattern/severity was not c/w alcohol hepatitis alone. However, it was unclear that if there was any superimposed etiology.   Autoimmune markers were neg.  Liver enzymes rapidly trended down over 5 days. No labs since hospital discharge.    Today, on exam - there is no evidence of advanced liver disease.    Recommendation(s):  - hepatic function panel, [facility]  - FibroScan as outpatient  - continue alcohol abstinence for life  - control diabetes  - can continue atorvastatin    A total of 25 minutes were spent face-to-face with the patient during this encounter and over half of that time was spent on counseling and coordination of care.  We discussed in depth the nature of the patient's liver disease, the management plan in  details. I also educated the patient about lifestyle modifications which may improve hepatic steatosis, overweight/obesity, insulin resistance and high blood pressure issues. I have provided the patient with an opportunity to ask questions and have all questions answered to his satisfaction.     I have sent communication to the referring physician and/or primary care provider.    Jennifer Mata MD  Staff Physician  Hepatology and Liver Transplant  [facility]  [facility]

## 2020-09-28 NOTE — LETTER
September 28, 2020      Hyun Abrams MD  [address]  [facility] [address]           [facility]st [facility]  [address]  [facility] [address]  Phone: [phone]  Fax: [phone]          Patient: Cira Keys   MR Number: [id number]   YOB: 1980   Date of Visit: 9/28/2020       Dear Dr. Hyun Abrams:    Thank you for referring Cira Keys to me for evaluation. Attached you will find relevant portions of my assessment and plan of care.    If you have questions, please do not hesitate to call me. I look forward to following Cira Keys along with you.    Sincerely,    Jennifer Mata MD    Enclosure  CC:  No Recipients    If you would like to receive this communication electronically, please contact [email or web address] or [phone] to request more information on [facility] access.    For providers and/or their staff who would like to refer a patient to [facility], please contact us through our one-stop-shop provider referral line, [facility], at [phone].    If you feel you have received this communication in error or would no longer like to receive these types of communications, please e-mail [email or web address]

## 2020-09-28 NOTE — PATIENT INSTRUCTIONS
- blood tests today to evaluate liver function  - we will schedule FibroScan to evaluate liver scarring stage

## 2020-10-05 LAB — PHOSPHATIDYLETHANOL (PETH): 265 NG/ML

## 2020-10-16 ENCOUNTER — PROCEDURE VISIT (OUTPATIENT)
Dept: HEPATOLOGY | Facility: CLINIC | Age: 40
End: 2020-10-16
Payer: COMMERCIAL

## 2020-10-16 DIAGNOSIS — K70.9 ALCOHOLIC LIVER DISEASE: ICD-10-CM

## 2020-10-16 PROCEDURE — 91200 FIBROSCAN (VIBRATION CONTROLLED TRANSIENT ELASTOGRAPHY): ICD-10-PCS | Mod: 26,S$PBB,, | Performed by: INTERNAL MEDICINE

## 2020-10-16 PROCEDURE — 91200 LIVER ELASTOGRAPHY: CPT | Mod: PBBFAC | Performed by: INTERNAL MEDICINE

## 2020-11-06 NOTE — PROCEDURES
FibroScan (Vibration Controlled Transient Elastography)    Date/Time: 10/16/2020 8:15 AM  Performed by: Brett Wang MD  Authorized by: Jennifer Mata MD     Diagnosis:  NAFLD    Probe:  M    Universal Protocol: Patient's identity, procedure and site were verified, confirmatory pause was performed.  Discussed procedure including risks and potential complications.  Questions answered.  Patient verbalizes understanding and wishes to proceed with VCTE.     Procedure: After providing explanations of the procedure, patient was placed in the supine position with right arm in maximum abduction to allow optimal exposure of right lateral abdomen.  Patient was briefly assessed, Testing was performed in the mid-axillary location, 50Hz Shear Wave pulses were applied and the resulting Shear Wave and Propagation Speed detected with a 3.5 MHz ultrasonic signal, using the FibroScan probe, Skin to liver capsule distance and liver parenchyma were accessed during the entire examination with the FibroScan probe, Patient was instructed to breathe normally and to abstain from sudden movements during the procedure, allowing for random measurements of liver stiffness. At least 10 Shear Waves were produced, Individual measurements of each Shear Wave were calculated.  Patient tolerated the procedure well with no complications.  Meets discharge criteria as was dismissed.  Rates pain 0 out of 10.  Patient will follow up with ordering provider to review results.      Findings  Median liver stiffness score:  5.5  CAP Reading: dB/m:  250    IQR/med %:  11  Interpretation  Fibrosis interpretation is based on medial liver stiffness - Kilopascal (kPa).    Fibrosis Stage:  F 0-1  Steatosis interpretation is based on controlled attenuation parameter - (dB/m).    Steatosis Grade:  S1

## 2020-11-09 ENCOUNTER — PATIENT MESSAGE (OUTPATIENT)
Dept: HEPATOLOGY | Facility: CLINIC | Age: 40
End: 2020-11-09

## 2020-11-10 ENCOUNTER — PATIENT OUTREACH (OUTPATIENT)
Dept: ADMINISTRATIVE | Facility: HOSPITAL | Age: 40
End: 2020-11-10

## 2020-11-27 ENCOUNTER — TELEPHONE (OUTPATIENT)
Dept: ADMINISTRATIVE | Facility: HOSPITAL | Age: 40
End: 2020-11-27

## 2020-12-15 ENCOUNTER — OFFICE VISIT (OUTPATIENT)
Dept: URGENT CARE | Facility: CLINIC | Age: 40
End: 2020-12-15
Payer: COMMERCIAL

## 2020-12-15 VITALS
BODY MASS INDEX: 25.9 KG/M2 | SYSTOLIC BLOOD PRESSURE: 132 MMHG | RESPIRATION RATE: 16 BRPM | DIASTOLIC BLOOD PRESSURE: 87 MMHG | TEMPERATURE: 97 F | HEIGHT: 67 IN | OXYGEN SATURATION: 98 % | WEIGHT: 165 LBS | HEART RATE: 97 BPM

## 2020-12-15 DIAGNOSIS — L02.91 ABSCESS: Primary | ICD-10-CM

## 2020-12-15 PROCEDURE — 99214 PR OFFICE/OUTPT VISIT, EST, LEVL IV, 30-39 MIN: ICD-10-PCS | Mod: S$GLB,,, | Performed by: NURSE PRACTITIONER

## 2020-12-15 PROCEDURE — 99214 OFFICE O/P EST MOD 30 MIN: CPT | Mod: S$GLB,,, | Performed by: NURSE PRACTITIONER

## 2020-12-15 RX ORDER — MUPIROCIN 20 MG/G
OINTMENT TOPICAL
Qty: 22 G | Refills: 0 | Status: SHIPPED | OUTPATIENT
Start: 2020-12-15 | End: 2021-04-06

## 2020-12-15 RX ORDER — SULFAMETHOXAZOLE AND TRIMETHOPRIM 800; 160 MG/1; MG/1
1 TABLET ORAL 2 TIMES DAILY
Qty: 20 TABLET | Refills: 0 | Status: SHIPPED | OUTPATIENT
Start: 2020-12-15 | End: 2020-12-25

## 2020-12-15 NOTE — PROGRESS NOTES
"Subjective:       Patient ID: Cira Keys is a 40 y.o. female.    Vitals:  height is 5' 7" (1.702 m) and weight is 74.8 kg (165 lb). Her temporal temperature is 97.4 °F (36.3 °C). Her blood pressure is 132/87 and her pulse is 97. Her respiration is 16 and oxygen saturation is 98%.     Chief Complaint: Abscess (On left side of face)    Abscess  Chronicity:  NewProgression Since Onset: worsening  Location:  Face  Associated Symptoms: no fever, no chills  Characteristics: painful, redness and swelling    Pain Scale:  2/10  Treatments Tried:  Nothing  Relieved by:  Nothing  Worsened by:  Nothing      Constitution: Negative for chills, fatigue and fever.   HENT: Negative for congestion and sore throat.    Neck: Negative for painful lymph nodes.   Cardiovascular: Negative for chest pain and leg swelling.   Eyes: Negative for double vision and blurred vision.   Respiratory: Negative for cough and shortness of breath.    Gastrointestinal: Negative for nausea, vomiting and diarrhea.   Genitourinary: Negative for dysuria, frequency, urgency and history of kidney stones.   Musculoskeletal: Negative for joint pain, joint swelling, muscle cramps and muscle ache.   Skin: Positive for abscess. Negative for color change, pale, rash and bruising.   Allergic/Immunologic: Negative for seasonal allergies.   Neurological: Negative for dizziness, history of vertigo, light-headedness, passing out and headaches.   Hematologic/Lymphatic: Negative for swollen lymph nodes.   Psychiatric/Behavioral: Negative for nervous/anxious, sleep disturbance and depression. The patient is not nervous/anxious.        Objective:      Physical Exam   Constitutional: She is oriented to person, place, and time. She appears well-developed. She is cooperative. No distress.   HENT:   Head: Normocephalic and atraumatic.       Nose: Nose normal.   Eyes: Conjunctivae and lids are normal.   Cardiovascular: Normal rate, regular rhythm, normal heart sounds and " normal pulses.   Pulmonary/Chest: Effort normal and breath sounds normal.   Musculoskeletal:         General: No deformity.   Neurological: She is alert and oriented to person, place, and time. She has normal strength and normal reflexes. She is not disoriented. No sensory deficit.   Skin: Skin is warm, dry, not diaphoretic and abscessed (2.5 cm non-movable abscess noted to left side of face. Erythema, edema, and TTP.  ). Psychiatric: Her speech is normal and behavior is normal. Judgment and thought content normal.   Nursing note and vitals reviewed.        Assessment:       1. Abscess        Plan:         Abscess  -     sulfamethoxazole-trimethoprim 800-160mg (BACTRIM DS) 800-160 mg Tab; Take 1 tablet by mouth 2 (two) times daily. for 10 days  Dispense: 20 tablet; Refill: 0  -     mupirocin (BACTROBAN) 2 % ointment; Apply to affected area 3 times daily  Dispense: 22 g; Refill: 0      Patient Instructions   Please follow up with your Primary care provider within 2-5 days if your signs and symptoms have not resolved or worsen.     If your condition worsens or fails to improve we recommend that you receive another evaluation at the emergency room immediately or contact your primary medical clinic to discuss your concerns.    You must understand that you have received an Urgent Care treatment only and that you may be released before all of your medical problems are known or treated.   You, the patient, will arrange for follow up care as instructed.       You have been given an antibiotic to treat your condition today.  Please complete the antibiotic as directed on the bottle.     As with any antibiotics, use probiotics and/or high culture yogurt about 2 hours apart from the antibiotic and about 1 week after the antibiotic to replace the gut soy lost with antibiotic use.      If you are female and on BCP use additional methods to prevent pregnancy while on antibiotics and for one cycle after.     You have been given  Bactrim for an infection.  Please take all the medication as directed on the bottle.     Bactrim should be taken with food due to stomach upset.     This medication has sun sensitivity, which means it can cause a severe sunburn if you are exposed to the sunlight.  Please avoid sunlight when on this medication.     As with any antibiotics, use probiotics and/or high culture yogurt about 2 hours apart from the antibiotic and about 1 week after the antibiotic to replace the gut soy lost with antibiotic use.      If you are female and on BCP use additional methods to prevent pregnancy while on antibiotics and for one cycle after.       Abscess (Antibiotic Treatment Only)  An abscess (sometimes called a boil) happens when bacteria get trapped under the skin and start to grow. Pus forms inside the abscess as the body responds to the bacteria. An abscess can happen with an insect bite, ingrown hair, blocked oil gland, pimple, cyst, or puncture wound.  In the early stages, your wound may be red and tender. For this stage, you may get antibiotics. If the abscess does not get better with antibiotics, it will need to be drained with a small cut.  Home care  These tips will help you care for your abscess at home:  · Soak the wound in hot water or apply hot packs (small towel soaked in hot water) to the area for 20 minutes at a time. Do this 3 to 4 times a day.  · Do not cut, squeeze, or pop the boil yourself.  · Apply antibiotic cream or ointment to the skin 3 to 4 times a day, unless something else was prescribed. Some ointments include an antibiotic plus a pain reliever.  · If your doctor prescribed antibiotics, do not stop taking them until you have finished the medicine or the doctor tells you to stop.  · You may use an over-the-counter pain medicine to control pain, unless another pain medicine was prescribed. If you have chronic liver or kidney disease or ever had a stomach ulcer or gastrointestinal bleeding, talk  with your doctor before using these any of these.  Follow-up care  Follow up with your healthcare provider, or as advised. Check your wound each day for the signs of worsening infection listed below.  When to seek medical advice  Get prompt medical attention if any of these occur:  · An increase in redness or swelling  · Red streaks in the skin leading away from the abscess  · An increase in local pain or swelling  · Fever of 100.4ºF (38ºC) or higher, or as directed by your healthcare provider  · Pus or fluid coming from the abscess  · Boil returns after getting better  Date Last Reviewed: 9/1/2016  © 8965-6546 [employer]. [address] [place] [address], [place] [address]. All rights reserved. This information is not intended as a substitute for professional medical care. Always follow your healthcare professional's instructions.

## 2020-12-15 NOTE — PATIENT INSTRUCTIONS
Please follow up with your Primary care provider within 2-5 days if your signs and symptoms have not resolved or worsen.     If your condition worsens or fails to improve we recommend that you receive another evaluation at the emergency room immediately or contact your primary medical clinic to discuss your concerns.    You must understand that you have received an Urgent Care treatment only and that you may be released before all of your medical problems are known or treated.   You, the patient, will arrange for follow up care as instructed.       You have been given an antibiotic to treat your condition today.  Please complete the antibiotic as directed on the bottle.     As with any antibiotics, use probiotics and/or high culture yogurt about 2 hours apart from the antibiotic and about 1 week after the antibiotic to replace the gut soy lost with antibiotic use.      If you are female and on BCP use additional methods to prevent pregnancy while on antibiotics and for one cycle after.     You have been given Bactrim for an infection.  Please take all the medication as directed on the bottle.     Bactrim should be taken with food due to stomach upset.     This medication has sun sensitivity, which means it can cause a severe sunburn if you are exposed to the sunlight.  Please avoid sunlight when on this medication.     As with any antibiotics, use probiotics and/or high culture yogurt about 2 hours apart from the antibiotic and about 1 week after the antibiotic to replace the gut soy lost with antibiotic use.      If you are female and on BCP use additional methods to prevent pregnancy while on antibiotics and for one cycle after.       Abscess (Antibiotic Treatment Only)  An abscess (sometimes called a boil) happens when bacteria get trapped under the skin and start to grow. Pus forms inside the abscess as the body responds to the bacteria. An abscess can happen with an insect bite, ingrown hair, blocked oil  gland, pimple, cyst, or puncture wound.  In the early stages, your wound may be red and tender. For this stage, you may get antibiotics. If the abscess does not get better with antibiotics, it will need to be drained with a small cut.  Home care  These tips will help you care for your abscess at home:  · Soak the wound in hot water or apply hot packs (small towel soaked in hot water) to the area for 20 minutes at a time. Do this 3 to 4 times a day.  · Do not cut, squeeze, or pop the boil yourself.  · Apply antibiotic cream or ointment to the skin 3 to 4 times a day, unless something else was prescribed. Some ointments include an antibiotic plus a pain reliever.  · If your doctor prescribed antibiotics, do not stop taking them until you have finished the medicine or the doctor tells you to stop.  · You may use an over-the-counter pain medicine to control pain, unless another pain medicine was prescribed. If you have chronic liver or kidney disease or ever had a stomach ulcer or gastrointestinal bleeding, talk with your doctor before using these any of these.  Follow-up care  Follow up with your healthcare provider, or as advised. Check your wound each day for the signs of worsening infection listed below.  When to seek medical advice  Get prompt medical attention if any of these occur:  · An increase in redness or swelling  · Red streaks in the skin leading away from the abscess  · An increase in local pain or swelling  · Fever of 100.4ºF (38ºC) or higher, or as directed by your healthcare provider  · Pus or fluid coming from the abscess  · Boil returns after getting better  Date Last Reviewed: 9/1/2016  © 4988-0355 [employer]. [address] [place] [address], [place] [address]. All rights reserved. This information is not intended as a substitute for professional medical care. Always follow your healthcare professional's instructions.

## 2021-02-19 DIAGNOSIS — Z12.31 OTHER SCREENING MAMMOGRAM: ICD-10-CM

## 2021-03-22 ENCOUNTER — PATIENT OUTREACH (OUTPATIENT)
Dept: ADMINISTRATIVE | Facility: HOSPITAL | Age: 41
End: 2021-03-22

## 2021-04-05 ENCOUNTER — PATIENT MESSAGE (OUTPATIENT)
Dept: ADMINISTRATIVE | Facility: HOSPITAL | Age: 41
End: 2021-04-05

## 2021-04-06 ENCOUNTER — OFFICE VISIT (OUTPATIENT)
Dept: INTERNAL MEDICINE | Facility: CLINIC | Age: 41
End: 2021-04-06
Payer: COMMERCIAL

## 2021-04-06 VITALS
HEIGHT: 67 IN | RESPIRATION RATE: 19 BRPM | WEIGHT: 153.25 LBS | DIASTOLIC BLOOD PRESSURE: 71 MMHG | BODY MASS INDEX: 24.05 KG/M2 | HEART RATE: 90 BPM | SYSTOLIC BLOOD PRESSURE: 122 MMHG | OXYGEN SATURATION: 98 %

## 2021-04-06 DIAGNOSIS — E10.9 TYPE 1 DIABETES MELLITUS WITHOUT COMPLICATION: ICD-10-CM

## 2021-04-06 DIAGNOSIS — Z09 HOSPITAL DISCHARGE FOLLOW-UP: ICD-10-CM

## 2021-04-06 DIAGNOSIS — G56.22 ULNAR TUNNEL SYNDROME, LEFT: ICD-10-CM

## 2021-04-06 DIAGNOSIS — M75.82 TENDINITIS OF LEFT ROTATOR CUFF: Primary | ICD-10-CM

## 2021-04-06 PROCEDURE — 3078F PR MOST RECENT DIASTOLIC BLOOD PRESSURE < 80 MM HG: ICD-10-PCS | Mod: CPTII,S$GLB,, | Performed by: INTERNAL MEDICINE

## 2021-04-06 PROCEDURE — 99214 PR OFFICE/OUTPT VISIT, EST, LEVL IV, 30-39 MIN: ICD-10-PCS | Mod: S$GLB,,, | Performed by: INTERNAL MEDICINE

## 2021-04-06 PROCEDURE — 3074F PR MOST RECENT SYSTOLIC BLOOD PRESSURE < 130 MM HG: ICD-10-PCS | Mod: CPTII,S$GLB,, | Performed by: INTERNAL MEDICINE

## 2021-04-06 PROCEDURE — 3078F DIAST BP <80 MM HG: CPT | Mod: CPTII,S$GLB,, | Performed by: INTERNAL MEDICINE

## 2021-04-06 PROCEDURE — 3052F HG A1C>EQUAL 8.0%<EQUAL 9.0%: CPT | Mod: CPTII,S$GLB,, | Performed by: INTERNAL MEDICINE

## 2021-04-06 PROCEDURE — 99999 PR PBB SHADOW E&M-EST. PATIENT-LVL IV: ICD-10-PCS | Mod: PBBFAC,,, | Performed by: INTERNAL MEDICINE

## 2021-04-06 PROCEDURE — 3074F SYST BP LT 130 MM HG: CPT | Mod: CPTII,S$GLB,, | Performed by: INTERNAL MEDICINE

## 2021-04-06 PROCEDURE — 1125F PR PAIN SEVERITY QUANTIFIED, PAIN PRESENT: ICD-10-PCS | Mod: S$GLB,,, | Performed by: INTERNAL MEDICINE

## 2021-04-06 PROCEDURE — 99999 PR PBB SHADOW E&M-EST. PATIENT-LVL IV: CPT | Mod: PBBFAC,,, | Performed by: INTERNAL MEDICINE

## 2021-04-06 PROCEDURE — 3052F PR MOST RECENT HEMOGLOBIN A1C LEVEL 8.0 - < 9.0%: ICD-10-PCS | Mod: CPTII,S$GLB,, | Performed by: INTERNAL MEDICINE

## 2021-04-06 PROCEDURE — 1125F AMNT PAIN NOTED PAIN PRSNT: CPT | Mod: S$GLB,,, | Performed by: INTERNAL MEDICINE

## 2021-04-06 PROCEDURE — 3008F PR BODY MASS INDEX (BMI) DOCUMENTED: ICD-10-PCS | Mod: CPTII,S$GLB,, | Performed by: INTERNAL MEDICINE

## 2021-04-06 PROCEDURE — 99214 OFFICE O/P EST MOD 30 MIN: CPT | Mod: S$GLB,,, | Performed by: INTERNAL MEDICINE

## 2021-04-06 PROCEDURE — 3008F BODY MASS INDEX DOCD: CPT | Mod: CPTII,S$GLB,, | Performed by: INTERNAL MEDICINE

## 2021-07-07 ENCOUNTER — PATIENT MESSAGE (OUTPATIENT)
Dept: ADMINISTRATIVE | Facility: HOSPITAL | Age: 41
End: 2021-07-07

## 2021-08-04 ENCOUNTER — PATIENT MESSAGE (OUTPATIENT)
Dept: ADMINISTRATIVE | Facility: HOSPITAL | Age: 41
End: 2021-08-04

## 2021-08-26 DIAGNOSIS — U07.1 COVID-19: Primary | ICD-10-CM

## 2021-08-27 ENCOUNTER — INFUSION (OUTPATIENT)
Dept: INFECTIOUS DISEASES | Facility: HOSPITAL | Age: 41
End: 2021-08-27
Attending: FAMILY MEDICINE
Payer: COMMERCIAL

## 2021-08-27 VITALS
SYSTOLIC BLOOD PRESSURE: 112 MMHG | OXYGEN SATURATION: 100 % | TEMPERATURE: 98 F | RESPIRATION RATE: 18 BRPM | DIASTOLIC BLOOD PRESSURE: 60 MMHG | HEART RATE: 81 BPM

## 2021-08-27 DIAGNOSIS — U07.1 COVID-19: Primary | ICD-10-CM

## 2021-08-27 PROCEDURE — 25000003 PHARM REV CODE 250: Performed by: FAMILY MEDICINE

## 2021-08-27 PROCEDURE — 63600175 PHARM REV CODE 636 W HCPCS: Performed by: FAMILY MEDICINE

## 2021-08-27 PROCEDURE — M0243 CASIRIVI AND IMDEVI INFUSION: HCPCS | Performed by: FAMILY MEDICINE

## 2021-08-27 RX ORDER — DIPHENHYDRAMINE HYDROCHLORIDE 50 MG/ML
25 INJECTION INTRAMUSCULAR; INTRAVENOUS ONCE AS NEEDED
Status: DISCONTINUED | OUTPATIENT
Start: 2021-08-27 | End: 2022-01-05

## 2021-08-27 RX ORDER — SODIUM CHLORIDE 0.9 % (FLUSH) 0.9 %
10 SYRINGE (ML) INJECTION
Status: DISCONTINUED | OUTPATIENT
Start: 2021-08-27 | End: 2022-01-05

## 2021-08-27 RX ORDER — ONDANSETRON 4 MG/1
4 TABLET, ORALLY DISINTEGRATING ORAL ONCE AS NEEDED
Status: DISCONTINUED | OUTPATIENT
Start: 2021-08-27 | End: 2022-01-05

## 2021-08-27 RX ORDER — EPINEPHRINE 0.3 MG/.3ML
0.3 INJECTION SUBCUTANEOUS
Status: DISCONTINUED | OUTPATIENT
Start: 2021-08-27 | End: 2022-01-05

## 2021-08-27 RX ORDER — ACETAMINOPHEN 325 MG/1
650 TABLET ORAL ONCE AS NEEDED
Status: DISCONTINUED | OUTPATIENT
Start: 2021-08-27 | End: 2022-01-05

## 2021-08-27 RX ORDER — ALBUTEROL SULFATE 90 UG/1
2 AEROSOL, METERED RESPIRATORY (INHALATION)
Status: DISCONTINUED | OUTPATIENT
Start: 2021-08-27 | End: 2022-01-05

## 2021-08-27 RX ADMIN — CASIRIVIMAB AND IMDEVIMAB 600 MG: 600; 600 INJECTION, SOLUTION, CONCENTRATE INTRAVENOUS at 08:08

## 2021-10-12 ENCOUNTER — OFFICE VISIT (OUTPATIENT)
Dept: FAMILY MEDICINE | Facility: CLINIC | Age: 41
End: 2021-10-12
Payer: COMMERCIAL

## 2021-10-12 VITALS
WEIGHT: 176.88 LBS | RESPIRATION RATE: 18 BRPM | DIASTOLIC BLOOD PRESSURE: 70 MMHG | HEART RATE: 100 BPM | OXYGEN SATURATION: 99 % | SYSTOLIC BLOOD PRESSURE: 150 MMHG | TEMPERATURE: 98 F | BODY MASS INDEX: 27.76 KG/M2 | HEIGHT: 67 IN

## 2021-10-12 DIAGNOSIS — M25.512 LEFT SHOULDER PAIN, UNSPECIFIED CHRONICITY: Primary | ICD-10-CM

## 2021-10-12 PROCEDURE — 99214 PR OFFICE/OUTPT VISIT, EST, LEVL IV, 30-39 MIN: ICD-10-PCS | Mod: S$GLB,,, | Performed by: FAMILY MEDICINE

## 2021-10-12 PROCEDURE — 99214 OFFICE O/P EST MOD 30 MIN: CPT | Mod: S$GLB,,, | Performed by: FAMILY MEDICINE

## 2021-10-12 PROCEDURE — 99999 PR PBB SHADOW E&M-EST. PATIENT-LVL V: CPT | Mod: PBBFAC,,, | Performed by: FAMILY MEDICINE

## 2021-10-12 PROCEDURE — 99999 PR PBB SHADOW E&M-EST. PATIENT-LVL V: ICD-10-PCS | Mod: PBBFAC,,, | Performed by: FAMILY MEDICINE

## 2021-10-12 RX ORDER — DICLOFENAC SODIUM 10 MG/G
2 GEL TOPICAL 4 TIMES DAILY PRN
Qty: 100 G | Refills: 0 | Status: ON HOLD | OUTPATIENT
Start: 2021-10-12 | End: 2022-03-18

## 2021-10-12 RX ORDER — DICLOFENAC SODIUM 10 MG/G
2 GEL TOPICAL 4 TIMES DAILY PRN
Qty: 100 G | Refills: 0 | Status: SHIPPED | OUTPATIENT
Start: 2021-10-12 | End: 2021-10-12

## 2021-10-12 RX ORDER — CYCLOBENZAPRINE HCL 10 MG
10 TABLET ORAL 3 TIMES DAILY PRN
Qty: 15 TABLET | Refills: 0 | Status: SHIPPED | OUTPATIENT
Start: 2021-10-12 | End: 2021-10-19 | Stop reason: SDUPTHER

## 2021-10-19 ENCOUNTER — TELEPHONE (OUTPATIENT)
Dept: FAMILY MEDICINE | Facility: CLINIC | Age: 41
End: 2021-10-19

## 2021-10-19 DIAGNOSIS — M25.512 LEFT SHOULDER PAIN, UNSPECIFIED CHRONICITY: ICD-10-CM

## 2021-10-19 RX ORDER — CYCLOBENZAPRINE HCL 10 MG
10 TABLET ORAL 3 TIMES DAILY PRN
Qty: 15 TABLET | Refills: 0 | Status: SHIPPED | OUTPATIENT
Start: 2021-10-19 | End: 2021-10-24

## 2021-10-21 RX ORDER — LORAZEPAM 0.5 MG/1
0.5 TABLET ORAL ONCE
Qty: 1 TABLET | Refills: 0 | Status: SHIPPED | OUTPATIENT
Start: 2021-10-21 | End: 2021-10-26

## 2021-10-22 ENCOUNTER — TELEPHONE (OUTPATIENT)
Dept: FAMILY MEDICINE | Facility: CLINIC | Age: 41
End: 2021-10-22

## 2021-10-23 ENCOUNTER — PATIENT MESSAGE (OUTPATIENT)
Dept: FAMILY MEDICINE | Facility: CLINIC | Age: 41
End: 2021-10-23
Payer: COMMERCIAL

## 2021-10-24 ENCOUNTER — PATIENT MESSAGE (OUTPATIENT)
Dept: ADMINISTRATIVE | Facility: HOSPITAL | Age: 41
End: 2021-10-24
Payer: COMMERCIAL

## 2021-10-25 ENCOUNTER — PATIENT OUTREACH (OUTPATIENT)
Dept: ADMINISTRATIVE | Facility: OTHER | Age: 41
End: 2021-10-25
Payer: COMMERCIAL

## 2021-10-25 DIAGNOSIS — E10.9 TYPE 1 DIABETES MELLITUS WITHOUT COMPLICATION: Primary | ICD-10-CM

## 2021-10-25 DIAGNOSIS — E11.9 TYPE 2 DIABETES MELLITUS WITHOUT COMPLICATION, UNSPECIFIED WHETHER LONG TERM INSULIN USE: ICD-10-CM

## 2021-10-26 ENCOUNTER — OFFICE VISIT (OUTPATIENT)
Dept: ORTHOPEDICS | Facility: CLINIC | Age: 41
End: 2021-10-26
Payer: COMMERCIAL

## 2021-10-26 VITALS
SYSTOLIC BLOOD PRESSURE: 100 MMHG | RESPIRATION RATE: 18 BRPM | WEIGHT: 178 LBS | DIASTOLIC BLOOD PRESSURE: 64 MMHG | BODY MASS INDEX: 27.88 KG/M2 | HEART RATE: 78 BPM

## 2021-10-26 DIAGNOSIS — M67.912 ROTATOR CUFF DYSFUNCTION, LEFT: Primary | ICD-10-CM

## 2021-10-26 DIAGNOSIS — M75.02 ADHESIVE CAPSULITIS OF LEFT SHOULDER: ICD-10-CM

## 2021-10-26 PROCEDURE — 99203 OFFICE O/P NEW LOW 30 MIN: CPT | Mod: 25,S$GLB,, | Performed by: PHYSICIAN ASSISTANT

## 2021-10-26 PROCEDURE — 20600 DRAIN/INJ JOINT/BURSA W/O US: CPT | Mod: LT,S$GLB,, | Performed by: PHYSICIAN ASSISTANT

## 2021-10-26 PROCEDURE — 99999 PR PBB SHADOW E&M-EST. PATIENT-LVL IV: CPT | Mod: PBBFAC,,, | Performed by: PHYSICIAN ASSISTANT

## 2021-10-26 PROCEDURE — 99203 PR OFFICE/OUTPT VISIT, NEW, LEVL III, 30-44 MIN: ICD-10-PCS | Mod: 25,S$GLB,, | Performed by: PHYSICIAN ASSISTANT

## 2021-10-26 PROCEDURE — 20610 DRAIN/INJ JOINT/BURSA W/O US: CPT | Mod: PBBFAC,PN | Performed by: PHYSICIAN ASSISTANT

## 2021-10-26 PROCEDURE — 99214 OFFICE O/P EST MOD 30 MIN: CPT | Mod: PBBFAC,PN | Performed by: PHYSICIAN ASSISTANT

## 2021-10-26 PROCEDURE — 20600 PR DRAIN/INJECT SMALL JOINT/BURSA: ICD-10-PCS | Mod: LT,S$GLB,, | Performed by: PHYSICIAN ASSISTANT

## 2021-10-26 PROCEDURE — 99999 PR PBB SHADOW E&M-EST. PATIENT-LVL IV: ICD-10-PCS | Mod: PBBFAC,,, | Performed by: PHYSICIAN ASSISTANT

## 2021-10-26 PROCEDURE — 96372 THER/PROPH/DIAG INJ SC/IM: CPT | Mod: PBBFAC,PN | Performed by: PHYSICIAN ASSISTANT

## 2021-10-26 RX ORDER — TRIAMCINOLONE ACETONIDE 40 MG/ML
40 INJECTION, SUSPENSION INTRA-ARTICULAR; INTRAMUSCULAR
Status: DISCONTINUED | OUTPATIENT
Start: 2021-10-26 | End: 2021-10-26 | Stop reason: HOSPADM

## 2021-10-26 RX ADMIN — TRIAMCINOLONE ACETONIDE 40 MG: 40 INJECTION, SUSPENSION INTRA-ARTICULAR; INTRAMUSCULAR at 09:10

## 2021-10-28 ENCOUNTER — TELEPHONE (OUTPATIENT)
Dept: ORTHOPEDICS | Facility: CLINIC | Age: 41
End: 2021-10-28
Payer: COMMERCIAL

## 2021-10-28 ENCOUNTER — PATIENT MESSAGE (OUTPATIENT)
Dept: ORTHOPEDICS | Facility: CLINIC | Age: 41
End: 2021-10-28
Payer: COMMERCIAL

## 2021-10-28 DIAGNOSIS — M67.912 ROTATOR CUFF DYSFUNCTION, LEFT: Primary | ICD-10-CM

## 2021-10-28 DIAGNOSIS — M75.02 ADHESIVE CAPSULITIS OF LEFT SHOULDER: ICD-10-CM

## 2021-10-28 PROBLEM — M25.612 SHOULDER STIFFNESS, LEFT: Status: ACTIVE | Noted: 2021-10-28

## 2021-10-28 PROBLEM — M25.512 LEFT SHOULDER PAIN: Status: ACTIVE | Noted: 2021-10-28

## 2021-10-28 RX ORDER — CYCLOBENZAPRINE HCL 10 MG
10 TABLET ORAL 3 TIMES DAILY PRN
Qty: 30 TABLET | Refills: 0 | Status: SHIPPED | OUTPATIENT
Start: 2021-10-28 | End: 2021-11-07

## 2021-11-18 DIAGNOSIS — E11.9 TYPE 2 DIABETES MELLITUS WITHOUT COMPLICATION, UNSPECIFIED WHETHER LONG TERM INSULIN USE: ICD-10-CM

## 2021-11-29 NOTE — TELEPHONE ENCOUNTER
Patient given results for urine culture.  Verbalized understanding.  Patient verbalized she is better.    Yes

## 2021-12-15 ENCOUNTER — PATIENT MESSAGE (OUTPATIENT)
Dept: FAMILY MEDICINE | Facility: CLINIC | Age: 41
End: 2021-12-15
Payer: COMMERCIAL

## 2021-12-15 RX ORDER — CYCLOBENZAPRINE HCL 10 MG
10 TABLET ORAL 3 TIMES DAILY PRN
Qty: 30 TABLET | Refills: 0 | Status: SHIPPED | OUTPATIENT
Start: 2021-12-15 | End: 2021-12-20

## 2021-12-27 ENCOUNTER — PATIENT MESSAGE (OUTPATIENT)
Dept: ADMINISTRATIVE | Facility: HOSPITAL | Age: 41
End: 2021-12-27
Payer: COMMERCIAL

## 2022-01-05 ENCOUNTER — OFFICE VISIT (OUTPATIENT)
Dept: ORTHOPEDICS | Facility: CLINIC | Age: 42
End: 2022-01-05
Payer: COMMERCIAL

## 2022-01-05 ENCOUNTER — PATIENT OUTREACH (OUTPATIENT)
Dept: ADMINISTRATIVE | Facility: OTHER | Age: 42
End: 2022-01-05
Payer: COMMERCIAL

## 2022-01-05 VITALS
HEIGHT: 67 IN | SYSTOLIC BLOOD PRESSURE: 150 MMHG | BODY MASS INDEX: 29.27 KG/M2 | OXYGEN SATURATION: 99 % | DIASTOLIC BLOOD PRESSURE: 84 MMHG | HEART RATE: 102 BPM | WEIGHT: 186.5 LBS

## 2022-01-05 DIAGNOSIS — M25.512 LEFT SHOULDER PAIN, UNSPECIFIED CHRONICITY: Primary | ICD-10-CM

## 2022-01-05 DIAGNOSIS — M75.02 ADHESIVE CAPSULITIS OF LEFT SHOULDER: ICD-10-CM

## 2022-01-05 PROCEDURE — 99213 OFFICE O/P EST LOW 20 MIN: CPT | Mod: S$GLB,,, | Performed by: PHYSICIAN ASSISTANT

## 2022-01-05 PROCEDURE — 99999 PR PBB SHADOW E&M-EST. PATIENT-LVL III: ICD-10-PCS | Mod: PBBFAC,,, | Performed by: PHYSICIAN ASSISTANT

## 2022-01-05 PROCEDURE — 99999 PR PBB SHADOW E&M-EST. PATIENT-LVL III: CPT | Mod: PBBFAC,,, | Performed by: PHYSICIAN ASSISTANT

## 2022-01-05 PROCEDURE — 99213 PR OFFICE/OUTPT VISIT, EST, LEVL III, 20-29 MIN: ICD-10-PCS | Mod: S$GLB,,, | Performed by: PHYSICIAN ASSISTANT

## 2022-01-05 NOTE — PROGRESS NOTES
"Subjective:      Patient ID: Cira Keys is a 41 y.o. female.    Chief Complaint: Follow-up (Left Shoulder)      HPI  (French)    Last seen by me on 10/26/21 for left shoulder pain with limited ROM. She is right hand dominant. MRI showed supraspinatus and infraspinatus tendinopathy. She has likely developed frozen shoulder as well.     She was given left shoulder injection on 10/26/21 and sent to PT. She did 12 visits of PT and was discharged to HEP. Pain and ROM were improved per PT notes.     She is here for follow up.     She is doing great and as above, did very well with PT. She has some left shoulder stiffness in the morning, but it resolves. She is able to do all of her activities with her left arm. She has intermittent muscle spasms- flexeril helps this. She rates her pain as a 0 on a scale of 1-10. She is still doing HEP from PT.     Past Medical History:   Diagnosis Date    E coli bacteremia 2011    GERD (gastroesophageal reflux disease)     Iron deficiency anemia due to chronic blood loss 10/31/2015    Type 1 diabetes mellitus without complication 10/31/2015         Current Outpatient Medications:     diclofenac sodium (VOLTAREN) 1 % Gel, Apply 2 g topically 4 (four) times daily as needed (shoulder pain)., Disp: 100 g, Rfl: 0    esomeprazole (NEXIUM) 20 MG capsule, Take 20 mg by mouth once daily., Disp: , Rfl:     ferrous sulfate (FEOSOL) 325 mg (65 mg iron) Tab tablet, Take 325 mg by mouth once a week., Disp: , Rfl:     insulin needles, disposable, 32 x 1/4 " Ndle, Use to inject insulin 5 times a day, Disp: 100 each, Rfl: 1    insulin NPH (NOVOLIN N NPH U-100 INSULIN) 100 unit/mL injection, Use 14-18 units in am and 4-6 units at night, Disp: 10 mL, Rfl: 0    insulin regular 100 unit/mL Inj injection, Inject 2-8 Units into the skin 3 (three) times daily before meals. If needed per sliding scale, Disp: , Rfl:     loratadine (CLARITIN) 10 mg tablet, Take 10 mg by mouth daily as needed for " "Allergies. , Disp: , Rfl:   No current facility-administered medications for this visit.    Review of patient's allergies indicates:  No Known Allergies    Review of Systems   Constitutional: Negative for chills, fever, night sweats and weight gain.   Gastrointestinal: Negative for bowel incontinence, nausea and vomiting.   Genitourinary: Negative for bladder incontinence.   Neurological: Negative for disturbances in coordination and loss of balance.         Objective:        BP (!) 150/84   Pulse 102   Ht 5' 7" (1.702 m)   Wt 84.6 kg (186 lb 8 oz)   SpO2 99%   BMI 29.21 kg/m²     Ortho/SPM Exam     ROM OF BOTH SHOULDERS:  Active flexion to 160° on left   Active abduction to 150° on left     LEFT SHOULDER EXAM:  Tenderness:  No tenderness at  AC joint  No tenderness over the clavicle   No tenderness over biceps tendon or bicipital groove  No tenderness over subacromial space    Special Tests:  Empty can test - negative  Full can test - negative  Resisted internal rotation - negative  Resisted external rotation - negative    Neer's test - negative  Hawkin's-Ming test - negative    Sensation intact to light touch in the distal median, radial, and ulnar nerve distribution  Capillary refill intact <2 seconds in all digits        Assessment:       Encounter Diagnoses   Name Primary?    Left shoulder pain, unspecified chronicity Yes    Adhesive capsulitis of left shoulder           Plan:       Cira was seen today for follow-up.    Diagnoses and all orders for this visit:    Left shoulder pain, unspecified chronicity    Adhesive capsulitis of left shoulder      Great improvement in left shoulder pain and ROM in PT. Mild stiffness in am that improves. Also with intermittent muscle spasms.     MRI showed supraspinatus and infraspinatus tendinopathy. Frozen shoulder is improved.     Treatment options reviewed with patient and following plan made:     - Continue with HEP from PT.   - Can repeat left shoulder " injection if pain returns.   - Muscle spasms should continue to improve. She is on flexeril from PCP.     Follow up if symptoms worsen or fail to improve.

## 2022-01-05 NOTE — PROGRESS NOTES
Health Maintenance Due   Topic Date Due    Diabetes Urine Screening  Never done    Foot Exam  Never done    Low Dose Statin  Never done    Eye Exam  08/21/2020    Hemoglobin A1c  12/10/2020    Mammogram  02/06/2021    Lipid Panel  02/12/2021    Pap Smear  12/03/2021     Updates were requested from care everywhere.  Chart was reviewed for overdue Proactive [facility] Encounters (BRIGHT) topics (CRS, Breast Cancer Screening, Eye exam)  Health Maintenance has been updated.  LINKS immunization registry triggered.  Immunizations were reconciled.

## 2022-01-10 ENCOUNTER — PATIENT MESSAGE (OUTPATIENT)
Dept: ADMINISTRATIVE | Facility: HOSPITAL | Age: 42
End: 2022-01-10
Payer: COMMERCIAL

## 2022-02-09 ENCOUNTER — CLINICAL SUPPORT (OUTPATIENT)
Dept: INTERNAL MEDICINE | Facility: CLINIC | Age: 42
End: 2022-02-09
Payer: COMMERCIAL

## 2022-02-09 DIAGNOSIS — Z23 IMMUNIZATION DUE: Primary | ICD-10-CM

## 2022-02-09 PROCEDURE — 90471 TDAP VACCINE GREATER THAN OR EQUAL TO 7YO IM: ICD-10-PCS | Mod: S$GLB,,, | Performed by: INTERNAL MEDICINE

## 2022-02-09 PROCEDURE — 90471 IMMUNIZATION ADMIN: CPT | Mod: S$GLB,,, | Performed by: INTERNAL MEDICINE

## 2022-02-09 PROCEDURE — 90715 TDAP VACCINE 7 YRS/> IM: CPT | Mod: S$GLB,,, | Performed by: INTERNAL MEDICINE

## 2022-02-09 PROCEDURE — 90715 TDAP VACCINE GREATER THAN OR EQUAL TO 7YO IM: ICD-10-PCS | Mod: S$GLB,,, | Performed by: INTERNAL MEDICINE

## 2022-03-18 ENCOUNTER — HOSPITAL ENCOUNTER (INPATIENT)
Facility: HOSPITAL | Age: 42
LOS: 2 days | Discharge: HOME | DRG: 638 | End: 2022-03-20
Attending: SURGERY | Admitting: STUDENT IN AN ORGANIZED HEALTH CARE EDUCATION/TRAINING PROGRAM
Payer: COMMERCIAL

## 2022-03-18 DIAGNOSIS — N17.9 AKI (ACUTE KIDNEY INJURY): ICD-10-CM

## 2022-03-18 DIAGNOSIS — R73.9 HYPERGLYCEMIA: ICD-10-CM

## 2022-03-18 DIAGNOSIS — E11.10 DKA (DIABETIC KETOACIDOSIS): ICD-10-CM

## 2022-03-18 DIAGNOSIS — E10.10 DIABETIC KETOACIDOSIS WITHOUT COMA ASSOCIATED WITH TYPE 1 DIABETES MELLITUS: Primary | ICD-10-CM

## 2022-03-18 PROBLEM — R06.02 SOB (SHORTNESS OF BREATH): Status: ACTIVE | Noted: 2022-03-18

## 2022-03-18 PROBLEM — F41.1 GAD (GENERALIZED ANXIETY DISORDER): Status: ACTIVE | Noted: 2022-03-18

## 2022-03-18 LAB
ALBUMIN SERPL BCP-MCNC: 4.2 G/DL (ref 3.5–5.2)
ALP SERPL-CCNC: 97 U/L (ref 55–135)
ALT SERPL W/O P-5'-P-CCNC: 14 U/L (ref 10–44)
ANION GAP SERPL CALC-SCNC: 16 MMOL/L (ref 8–16)
ANION GAP SERPL CALC-SCNC: 17 MMOL/L (ref 8–16)
ANION GAP SERPL CALC-SCNC: 20 MMOL/L (ref 8–16)
ANION GAP SERPL CALC-SCNC: ABNORMAL MMOL/L (ref 8–16)
ANION GAP SERPL CALC-SCNC: ABNORMAL MMOL/L (ref 8–16)
AST SERPL-CCNC: 18 U/L (ref 10–40)
B-OH-BUTYR BLD STRIP-SCNC: 5.1 MMOL/L (ref 0–0.5)
BACTERIA #/AREA URNS HPF: NORMAL /HPF
BASOPHILS # BLD AUTO: ABNORMAL K/UL (ref 0–0.2)
BASOPHILS NFR BLD: 0 % (ref 0–1.9)
BILIRUB SERPL-MCNC: 0.5 MG/DL (ref 0.1–1)
BILIRUB UR QL STRIP: ABNORMAL
BUN SERPL-MCNC: 11 MG/DL (ref 6–20)
BUN SERPL-MCNC: 12 MG/DL (ref 6–20)
BUN SERPL-MCNC: 14 MG/DL (ref 6–20)
BUN SERPL-MCNC: 18 MG/DL (ref 6–20)
BUN SERPL-MCNC: 19 MG/DL (ref 6–20)
CALCIUM SERPL-MCNC: 8.1 MG/DL (ref 8.7–10.5)
CALCIUM SERPL-MCNC: 8.3 MG/DL (ref 8.7–10.5)
CALCIUM SERPL-MCNC: 8.4 MG/DL (ref 8.7–10.5)
CALCIUM SERPL-MCNC: 8.7 MG/DL (ref 8.7–10.5)
CALCIUM SERPL-MCNC: 9.6 MG/DL (ref 8.7–10.5)
CHLORIDE SERPL-SCNC: 109 MMOL/L (ref 95–110)
CHLORIDE SERPL-SCNC: 110 MMOL/L (ref 95–110)
CHLORIDE SERPL-SCNC: 111 MMOL/L (ref 95–110)
CHLORIDE SERPL-SCNC: 111 MMOL/L (ref 95–110)
CHLORIDE SERPL-SCNC: 96 MMOL/L (ref 95–110)
CLARITY UR: CLEAR
CO2 SERPL-SCNC: 5 MMOL/L (ref 23–29)
CO2 SERPL-SCNC: 7 MMOL/L (ref 23–29)
CO2 SERPL-SCNC: 7 MMOL/L (ref 23–29)
CO2 SERPL-SCNC: <5 MMOL/L (ref 23–29)
CO2 SERPL-SCNC: <5 MMOL/L (ref 23–29)
COLOR UR: YELLOW
CREAT SERPL-MCNC: 1.2 MG/DL (ref 0.5–1.4)
CREAT SERPL-MCNC: 1.4 MG/DL (ref 0.5–1.4)
CREAT SERPL-MCNC: 1.4 MG/DL (ref 0.5–1.4)
CREAT SERPL-MCNC: 1.5 MG/DL (ref 0.5–1.4)
CREAT SERPL-MCNC: 2.1 MG/DL (ref 0.5–1.4)
DACRYOCYTES BLD QL SMEAR: ABNORMAL
DIFFERENTIAL METHOD: ABNORMAL
EOSINOPHIL # BLD AUTO: ABNORMAL K/UL (ref 0–0.5)
EOSINOPHIL NFR BLD: 0 % (ref 0–8)
ERYTHROCYTE [DISTWIDTH] IN BLOOD BY AUTOMATED COUNT: 12 % (ref 11.5–14.5)
EST. GFR  (AFRICAN AMERICAN): 33 ML/MIN/1.73 M^2
EST. GFR  (AFRICAN AMERICAN): 49 ML/MIN/1.73 M^2
EST. GFR  (AFRICAN AMERICAN): 53 ML/MIN/1.73 M^2
EST. GFR  (AFRICAN AMERICAN): 53 ML/MIN/1.73 M^2
EST. GFR  (AFRICAN AMERICAN): >60 ML/MIN/1.73 M^2
EST. GFR  (NON AFRICAN AMERICAN): 28 ML/MIN/1.73 M^2
EST. GFR  (NON AFRICAN AMERICAN): 43 ML/MIN/1.73 M^2
EST. GFR  (NON AFRICAN AMERICAN): 46 ML/MIN/1.73 M^2
EST. GFR  (NON AFRICAN AMERICAN): 46 ML/MIN/1.73 M^2
EST. GFR  (NON AFRICAN AMERICAN): 56 ML/MIN/1.73 M^2
ESTIMATED AVG GLUCOSE: 220 MG/DL (ref 68–131)
GLUCOSE SERPL-MCNC: 252 MG/DL (ref 70–110)
GLUCOSE SERPL-MCNC: 266 MG/DL (ref 70–110)
GLUCOSE SERPL-MCNC: 296 MG/DL (ref 70–110)
GLUCOSE SERPL-MCNC: 364 MG/DL (ref 70–110)
GLUCOSE SERPL-MCNC: 517 MG/DL (ref 70–110)
GLUCOSE SERPL-MCNC: 742 MG/DL (ref 70–110)
GLUCOSE UR QL STRIP: ABNORMAL
HBA1C MFR BLD: 9.3 % (ref 4–5.6)
HCT VFR BLD AUTO: 44.2 % (ref 37–48.5)
HGB BLD-MCNC: 13.1 G/DL (ref 12–16)
HGB UR QL STRIP: ABNORMAL
HYALINE CASTS #/AREA URNS LPF: 0 /LPF
IMM GRANULOCYTES # BLD AUTO: ABNORMAL K/UL (ref 0–0.04)
IMM GRANULOCYTES NFR BLD AUTO: ABNORMAL % (ref 0–0.5)
KETONES UR QL STRIP: ABNORMAL
LACTATE SERPL-SCNC: 0.9 MMOL/L (ref 0.5–2.2)
LACTATE SERPL-SCNC: 1.8 MMOL/L (ref 0.5–2.2)
LACTATE SERPL-SCNC: 2.7 MMOL/L (ref 0.5–2.2)
LEUKOCYTE ESTERASE UR QL STRIP: NEGATIVE
LYMPHOCYTES # BLD AUTO: ABNORMAL K/UL (ref 1–4.8)
LYMPHOCYTES NFR BLD: 3 % (ref 18–48)
MCH RBC QN AUTO: 28.5 PG (ref 27–31)
MCHC RBC AUTO-ENTMCNC: 29.6 G/DL (ref 32–36)
MCV RBC AUTO: 96 FL (ref 82–98)
MICROSCOPIC COMMENT: NORMAL
MONOCYTES # BLD AUTO: ABNORMAL K/UL (ref 0.3–1)
MONOCYTES NFR BLD: 5 % (ref 4–15)
NEUTROPHILS NFR BLD: 75 % (ref 38–73)
NEUTS BAND NFR BLD MANUAL: 17 %
NITRITE UR QL STRIP: NEGATIVE
NRBC BLD-RTO: 0 /100 WBC
PH UR STRIP: 5 [PH] (ref 5–8)
PLATELET # BLD AUTO: 503 K/UL (ref 150–450)
PLATELET BLD QL SMEAR: ABNORMAL
PMV BLD AUTO: 10.4 FL (ref 9.2–12.9)
POC PH VENOUS: 6.94
POC PH VENOUS: 7
POC PH VENOUS: ABNORMAL
POCT GLUCOSE: 242 MG/DL (ref 70–110)
POCT GLUCOSE: 250 MG/DL (ref 70–110)
POCT GLUCOSE: 266 MG/DL (ref 70–110)
POCT GLUCOSE: 268 MG/DL (ref 70–110)
POCT GLUCOSE: 274 MG/DL (ref 70–110)
POCT GLUCOSE: 292 MG/DL (ref 70–110)
POCT GLUCOSE: 294 MG/DL (ref 70–110)
POCT GLUCOSE: 296 MG/DL (ref 70–110)
POCT GLUCOSE: 297 MG/DL (ref 70–110)
POCT GLUCOSE: 302 MG/DL (ref 70–110)
POCT GLUCOSE: 312 MG/DL (ref 70–110)
POCT GLUCOSE: 315 MG/DL (ref 70–110)
POCT GLUCOSE: 349 MG/DL (ref 70–110)
POCT GLUCOSE: 355 MG/DL (ref 70–110)
POCT GLUCOSE: >500 MG/DL (ref 70–110)
POLYCHROMASIA BLD QL SMEAR: ABNORMAL
POTASSIUM SERPL-SCNC: 4 MMOL/L (ref 3.5–5.1)
POTASSIUM SERPL-SCNC: 4.4 MMOL/L (ref 3.5–5.1)
POTASSIUM SERPL-SCNC: 5 MMOL/L (ref 3.5–5.1)
POTASSIUM SERPL-SCNC: 5.3 MMOL/L (ref 3.5–5.1)
POTASSIUM SERPL-SCNC: 6.1 MMOL/L (ref 3.5–5.1)
PROT SERPL-MCNC: 9 G/DL (ref 6–8.4)
PROT UR QL STRIP: ABNORMAL
RBC # BLD AUTO: 4.59 M/UL (ref 4–5.4)
RBC #/AREA URNS HPF: 2 /HPF (ref 0–4)
SARS-COV-2 RDRP RESP QL NAA+PROBE: NEGATIVE
SODIUM SERPL-SCNC: 132 MMOL/L (ref 136–145)
SODIUM SERPL-SCNC: 134 MMOL/L (ref 136–145)
SODIUM SERPL-SCNC: 134 MMOL/L (ref 136–145)
SODIUM SERPL-SCNC: 135 MMOL/L (ref 136–145)
SODIUM SERPL-SCNC: 136 MMOL/L (ref 136–145)
SP GR UR STRIP: 1.02 (ref 1–1.03)
URN SPEC COLLECT METH UR: ABNORMAL
UROBILINOGEN UR STRIP-ACNC: NEGATIVE EU/DL
WBC # BLD AUTO: 28.02 K/UL (ref 3.9–12.7)
WBC #/AREA URNS HPF: 0 /HPF (ref 0–5)

## 2022-03-18 PROCEDURE — 82010 KETONE BODYS QUAN: CPT | Performed by: SURGERY

## 2022-03-18 PROCEDURE — 96366 THER/PROPH/DIAG IV INF ADDON: CPT

## 2022-03-18 PROCEDURE — 25000003 PHARM REV CODE 250: Performed by: SURGERY

## 2022-03-18 PROCEDURE — 93005 ELECTROCARDIOGRAM TRACING: CPT

## 2022-03-18 PROCEDURE — 20000000 HC ICU ROOM

## 2022-03-18 PROCEDURE — 36415 COLL VENOUS BLD VENIPUNCTURE: CPT | Performed by: FAMILY MEDICINE

## 2022-03-18 PROCEDURE — S5010 5% DEXTROSE AND 0.45% SALINE: HCPCS | Performed by: SURGERY

## 2022-03-18 PROCEDURE — 96361 HYDRATE IV INFUSION ADD-ON: CPT

## 2022-03-18 PROCEDURE — 96375 TX/PRO/DX INJ NEW DRUG ADDON: CPT

## 2022-03-18 PROCEDURE — 82962 GLUCOSE BLOOD TEST: CPT

## 2022-03-18 PROCEDURE — 96374 THER/PROPH/DIAG INJ IV PUSH: CPT | Mod: 59

## 2022-03-18 PROCEDURE — 25000003 PHARM REV CODE 250: Performed by: FAMILY MEDICINE

## 2022-03-18 PROCEDURE — 99291 CRITICAL CARE FIRST HOUR: CPT | Mod: 25

## 2022-03-18 PROCEDURE — 80048 BASIC METABOLIC PNL TOTAL CA: CPT | Mod: 91 | Performed by: FAMILY MEDICINE

## 2022-03-18 PROCEDURE — 93010 ELECTROCARDIOGRAM REPORT: CPT | Mod: ,,, | Performed by: INTERNAL MEDICINE

## 2022-03-18 PROCEDURE — 83605 ASSAY OF LACTIC ACID: CPT | Performed by: SURGERY

## 2022-03-18 PROCEDURE — 99900035 HC TECH TIME PER 15 MIN (STAT)

## 2022-03-18 PROCEDURE — C9113 INJ PANTOPRAZOLE SODIUM, VIA: HCPCS | Performed by: SURGERY

## 2022-03-18 PROCEDURE — 82800 BLOOD PH: CPT | Performed by: SURGERY

## 2022-03-18 PROCEDURE — 63600175 PHARM REV CODE 636 W HCPCS: Performed by: SURGERY

## 2022-03-18 PROCEDURE — 87040 BLOOD CULTURE FOR BACTERIA: CPT | Mod: 59 | Performed by: SURGERY

## 2022-03-18 PROCEDURE — 96365 THER/PROPH/DIAG IV INF INIT: CPT

## 2022-03-18 PROCEDURE — 94760 N-INVAS EAR/PLS OXIMETRY 1: CPT

## 2022-03-18 PROCEDURE — 81000 URINALYSIS NONAUTO W/SCOPE: CPT | Performed by: SURGERY

## 2022-03-18 PROCEDURE — 85027 COMPLETE CBC AUTOMATED: CPT | Performed by: SURGERY

## 2022-03-18 PROCEDURE — 82947 ASSAY GLUCOSE BLOOD QUANT: CPT | Performed by: SURGERY

## 2022-03-18 PROCEDURE — S5010 5% DEXTROSE AND 0.45% SALINE: HCPCS | Performed by: FAMILY MEDICINE

## 2022-03-18 PROCEDURE — 80053 COMPREHEN METABOLIC PANEL: CPT | Performed by: SURGERY

## 2022-03-18 PROCEDURE — 94761 N-INVAS EAR/PLS OXIMETRY MLT: CPT

## 2022-03-18 PROCEDURE — 80048 BASIC METABOLIC PNL TOTAL CA: CPT | Mod: 91 | Performed by: SURGERY

## 2022-03-18 PROCEDURE — 85007 BL SMEAR W/DIFF WBC COUNT: CPT | Performed by: SURGERY

## 2022-03-18 PROCEDURE — U0002 COVID-19 LAB TEST NON-CDC: HCPCS | Performed by: SURGERY

## 2022-03-18 PROCEDURE — 99223 1ST HOSP IP/OBS HIGH 75: CPT | Mod: ,,, | Performed by: FAMILY MEDICINE

## 2022-03-18 PROCEDURE — 93010 EKG 12-LEAD: ICD-10-PCS | Mod: ,,, | Performed by: INTERNAL MEDICINE

## 2022-03-18 PROCEDURE — 99223 PR INITIAL HOSPITAL CARE,LEVL III: ICD-10-PCS | Mod: ,,, | Performed by: FAMILY MEDICINE

## 2022-03-18 PROCEDURE — 25000003 PHARM REV CODE 250: Performed by: NURSE PRACTITIONER

## 2022-03-18 PROCEDURE — 63600175 PHARM REV CODE 636 W HCPCS: Performed by: FAMILY MEDICINE

## 2022-03-18 PROCEDURE — 83036 HEMOGLOBIN GLYCOSYLATED A1C: CPT | Performed by: SURGERY

## 2022-03-18 PROCEDURE — 36415 COLL VENOUS BLD VENIPUNCTURE: CPT | Performed by: SURGERY

## 2022-03-18 PROCEDURE — 25000003 PHARM REV CODE 250: Performed by: STUDENT IN AN ORGANIZED HEALTH CARE EDUCATION/TRAINING PROGRAM

## 2022-03-18 RX ORDER — ACETAMINOPHEN 325 MG/1
650 TABLET ORAL EVERY 8 HOURS PRN
Status: DISCONTINUED | OUTPATIENT
Start: 2022-03-18 | End: 2022-03-20 | Stop reason: HOSPADM

## 2022-03-18 RX ORDER — MUPIROCIN 20 MG/G
OINTMENT TOPICAL 2 TIMES DAILY
Status: DISCONTINUED | OUTPATIENT
Start: 2022-03-18 | End: 2022-03-20 | Stop reason: HOSPADM

## 2022-03-18 RX ORDER — CITALOPRAM 10 MG/1
10 TABLET ORAL DAILY
Status: DISCONTINUED | OUTPATIENT
Start: 2022-03-18 | End: 2022-03-20 | Stop reason: HOSPADM

## 2022-03-18 RX ORDER — TALC
6 POWDER (GRAM) TOPICAL NIGHTLY PRN
Status: DISCONTINUED | OUTPATIENT
Start: 2022-03-18 | End: 2022-03-20 | Stop reason: HOSPADM

## 2022-03-18 RX ORDER — SODIUM CHLORIDE 9 MG/ML
INJECTION, SOLUTION INTRAVENOUS CONTINUOUS
Status: DISCONTINUED | OUTPATIENT
Start: 2022-03-18 | End: 2022-03-18

## 2022-03-18 RX ORDER — SODIUM CHLORIDE 0.9 % (FLUSH) 0.9 %
10 SYRINGE (ML) INJECTION
Status: DISCONTINUED | OUTPATIENT
Start: 2022-03-18 | End: 2022-03-20 | Stop reason: HOSPADM

## 2022-03-18 RX ORDER — ONDANSETRON 2 MG/ML
4 INJECTION INTRAMUSCULAR; INTRAVENOUS
Status: COMPLETED | OUTPATIENT
Start: 2022-03-18 | End: 2022-03-18

## 2022-03-18 RX ORDER — DEXTROSE MONOHYDRATE, SODIUM CHLORIDE, AND POTASSIUM CHLORIDE 50; 1.49; 9 G/1000ML; G/1000ML; G/1000ML
INJECTION, SOLUTION INTRAVENOUS
Status: COMPLETED | OUTPATIENT
Start: 2022-03-18 | End: 2022-03-18

## 2022-03-18 RX ORDER — ONDANSETRON 2 MG/ML
4 INJECTION INTRAMUSCULAR; INTRAVENOUS EVERY 8 HOURS PRN
Status: DISCONTINUED | OUTPATIENT
Start: 2022-03-18 | End: 2022-03-18

## 2022-03-18 RX ORDER — HYDROCODONE BITARTRATE AND ACETAMINOPHEN 5; 325 MG/1; MG/1
1 TABLET ORAL EVERY 4 HOURS PRN
Status: DISCONTINUED | OUTPATIENT
Start: 2022-03-18 | End: 2022-03-20 | Stop reason: HOSPADM

## 2022-03-18 RX ORDER — ONDANSETRON 2 MG/ML
4 INJECTION INTRAMUSCULAR; INTRAVENOUS EVERY 4 HOURS PRN
Status: DISCONTINUED | OUTPATIENT
Start: 2022-03-18 | End: 2022-03-20 | Stop reason: HOSPADM

## 2022-03-18 RX ORDER — SODIUM BICARBONATE 1 MEQ/ML
50 SYRINGE (ML) INTRAVENOUS
Status: COMPLETED | OUTPATIENT
Start: 2022-03-18 | End: 2022-03-18

## 2022-03-18 RX ORDER — DEXTROSE MONOHYDRATE AND SODIUM CHLORIDE 5; .45 G/100ML; G/100ML
1000 INJECTION, SOLUTION INTRAVENOUS CONTINUOUS
Status: ACTIVE | OUTPATIENT
Start: 2022-03-18 | End: 2022-03-18

## 2022-03-18 RX ORDER — PANTOPRAZOLE SODIUM 40 MG/10ML
80 INJECTION, POWDER, LYOPHILIZED, FOR SOLUTION INTRAVENOUS
Status: COMPLETED | OUTPATIENT
Start: 2022-03-18 | End: 2022-03-18

## 2022-03-18 RX ORDER — DEXTROSE MONOHYDRATE AND SODIUM CHLORIDE 5; .45 G/100ML; G/100ML
1000 INJECTION, SOLUTION INTRAVENOUS CONTINUOUS
Status: DISCONTINUED | OUTPATIENT
Start: 2022-03-18 | End: 2022-03-18

## 2022-03-18 RX ADMIN — INSULIN HUMAN 1.32 UNITS/HR: 1 INJECTION, SOLUTION INTRAVENOUS at 01:03

## 2022-03-18 RX ADMIN — SODIUM CHLORIDE 1000 ML: 0.9 INJECTION, SOLUTION INTRAVENOUS at 04:03

## 2022-03-18 RX ADMIN — SODIUM BICARBONATE 50 MEQ: 84 INJECTION, SOLUTION INTRAVENOUS at 10:03

## 2022-03-18 RX ADMIN — INSULIN HUMAN 1.05 UNITS/HR: 1 INJECTION, SOLUTION INTRAVENOUS at 10:03

## 2022-03-18 RX ADMIN — SODIUM CHLORIDE 3000 ML: 0.9 INJECTION, SOLUTION INTRAVENOUS at 04:03

## 2022-03-18 RX ADMIN — INSULIN HUMAN 1.52 UNITS/HR: 1 INJECTION, SOLUTION INTRAVENOUS at 02:03

## 2022-03-18 RX ADMIN — INSULIN HUMAN 2.12 UNITS/HR: 1 INJECTION, SOLUTION INTRAVENOUS at 03:03

## 2022-03-18 RX ADMIN — ONDANSETRON HYDROCHLORIDE 4 MG: 2 SOLUTION INTRAMUSCULAR; INTRAVENOUS at 04:03

## 2022-03-18 RX ADMIN — DEXTROSE AND SODIUM CHLORIDE 1000 ML: 5; .45 INJECTION, SOLUTION INTRAVENOUS at 02:03

## 2022-03-18 RX ADMIN — INSULIN HUMAN 0.93 UNITS/HR: 1 INJECTION, SOLUTION INTRAVENOUS at 12:03

## 2022-03-18 RX ADMIN — ONDANSETRON HYDROCHLORIDE 4 MG: 2 SOLUTION INTRAMUSCULAR; INTRAVENOUS at 05:03

## 2022-03-18 RX ADMIN — DEXTROSE AND SODIUM CHLORIDE 1000 ML: 5; .45 INJECTION, SOLUTION INTRAVENOUS at 09:03

## 2022-03-18 RX ADMIN — MUPIROCIN: 20 OINTMENT TOPICAL at 09:03

## 2022-03-18 RX ADMIN — PANTOPRAZOLE SODIUM 80 MG: 40 INJECTION, POWDER, FOR SOLUTION INTRAVENOUS at 08:03

## 2022-03-18 RX ADMIN — INSULIN HUMAN 1.5 UNITS/HR: 1 INJECTION, SOLUTION INTRAVENOUS at 08:03

## 2022-03-18 RX ADMIN — INSULIN HUMAN 0.53 UNITS/HR: 1 INJECTION, SOLUTION INTRAVENOUS at 11:03

## 2022-03-18 RX ADMIN — INSULIN HUMAN 2.1 UNITS/HR: 1 INJECTION, SOLUTION INTRAVENOUS at 09:03

## 2022-03-18 RX ADMIN — POTASSIUM CHLORIDE, DEXTROSE MONOHYDRATE AND SODIUM CHLORIDE: 150; 5; 900 INJECTION, SOLUTION INTRAVENOUS at 11:03

## 2022-03-18 RX ADMIN — SODIUM CHLORIDE: 0.9 INJECTION, SOLUTION INTRAVENOUS at 10:03

## 2022-03-18 RX ADMIN — INSULIN HUMAN 6 UNITS/HR: 1 INJECTION, SOLUTION INTRAVENOUS at 04:03

## 2022-03-18 RX ADMIN — INSULIN HUMAN 3 UNITS/HR: 1 INJECTION, SOLUTION INTRAVENOUS at 07:03

## 2022-03-18 RX ADMIN — CITALOPRAM HYDROBROMIDE 10 MG: 10 TABLET ORAL at 01:03

## 2022-03-18 NOTE — HPI
Cira Keys is a 41 y.o. female with known GERD, LOVE,  DM type 1: LAST A1C was  8.4%; multiple admissions for DKA. Last DKA admission September 2021  associated with heavy alcohol abuse. She notes she has not had alcohol since then. Was recently caring for grandchildren when she developed nausea and vomiting two days ago. She reports 2 of her grandchildren recently had stomach bug and felt she had the same. However after once day the diarrhea stopped and she continued with worsening vomiting and vision changes that were more similar to her usual DKA symptoms, prompting  her to come to ED. She also notes SOB   + C/o significant anxiety and requesting Rx to help her stop smoking. She is motivated to quit for her grandchildren.

## 2022-03-18 NOTE — ED NOTES
Pt states that she took 15 units of insulin at home after checking her glucose. Pt stated glucose was greater than 600 at home.

## 2022-03-18 NOTE — H&P
[facility] Medicine  History & Physical    Patient Name: Cira Keys  MRN: [id number]  Patient Class: IP- Inpatient  Admission Date: 3/18/2022  Attending Physician: Juan Campoverde MD   Primary Care Provider: Krysta Mercado MD         Patient information was obtained from patient and ER records.     Subjective:     Principal Problem:DKA (diabetic ketoacidosis)    Chief Complaint:   Chief Complaint   Patient presents with    Hyperglycemia     Pt c/o n/v, weakness and cp starting about 2 hours pta. Pt reports hx of DM and has been hospitalized for DKA. Pt glucose in triage >500. NADN in triage.         HPI: Cira Keys is a 41 y.o. female with known GERD, LOVE,  DM type 1: LAST A1C was  8.4%; multiple admissions for DKA. Last DKA admission 2021  associated with heavy alcohol abuse. She notes she has not had alcohol since then. Was recently caring for grandchildren when she developed nausea and vomiting two days ago. She reports 2 of her grandchildren recently had stomach bug and felt she had the same. However after once day the diarrhea stopped and she continued with worsening vomiting and vision changes that were more similar to her usual DKA symptoms, prompting  her to come to ED. She also notes SOB   + C/o significant anxiety and requesting Rx to help her stop smoking. She is motivated to quit for her grandchildren.         Past Medical History:   Diagnosis Date    E coli bacteremia     GERD (gastroesophageal reflux disease)     Iron deficiency anemia due to chronic blood loss 10/31/2015    Type 1 diabetes mellitus without complication 10/31/2015       Past Surgical History:   Procedure Laterality Date     SECTION      TUBAL LIGATION         Review of patient's allergies indicates:  No Known Allergies    No current facility-administered medications on file prior to encounter.     Current Outpatient Medications on File Prior to Encounter   Medication Sig  "   diclofenac sodium (VOLTAREN) 1 % Gel Apply 2 g topically 4 (four) times daily as needed (shoulder pain).    esomeprazole (NEXIUM) 20 MG capsule Take 20 mg by mouth once daily.    ferrous sulfate (FEOSOL) 325 mg (65 mg iron) Tab tablet Take 325 mg by mouth once a week.    insulin needles, disposable, 32 x 1/4 " Ndle Use to inject insulin 5 times a day    insulin NPH (NOVOLIN N NPH U-100 INSULIN) 100 unit/mL injection Use 14-18 units in am and 4-6 units at night    insulin regular 100 unit/mL Inj injection Inject 2-8 Units into the skin 3 (three) times daily before meals. If needed per sliding scale    loratadine (CLARITIN) 10 mg tablet Take 10 mg by mouth daily as needed for Allergies.     [DISCONTINUED] insulin detemir (LEVEMIR FLEXTOUCH) 100 unit/mL (3 mL) SubQ InPn pen Inject 9 Units into the skin 2 (two) times daily.     Family History       Problem Relation (Age of Onset)    No Known Problems Mother, Father, Sister, Brother          Tobacco Use    Smoking status: Former Smoker     Packs/day: 1.00     Start date: 1996     Quit date: 2014     Years since quittin.3    Smokeless tobacco: Never Used   Substance and Sexual Activity    Alcohol use: Yes     Alcohol/week: 4.0 standard drinks     Types: 4 Standard drinks or equivalent per week     Comment: occasionally    Drug use: No    Sexual activity: Yes     Partners: Male     Birth control/protection: See Surgical Hx     Comment:      Review of Systems   Constitutional:  Negative for chills and fever.   HENT:  Negative for congestion, ear pain, postnasal drip, rhinorrhea, sore throat and trouble swallowing.    Eyes:  Negative for redness and itching.   Respiratory:  Positive for shortness of breath. Negative for cough and wheezing.    Cardiovascular:  Negative for chest pain and palpitations.   Gastrointestinal:  Positive for diarrhea and vomiting. Negative for abdominal pain and nausea.   Genitourinary:  Negative for dysuria and " frequency.   Skin:  Negative for rash.   Neurological:  Negative for weakness and headaches.   Psychiatric/Behavioral:  Positive for decreased concentration and dysphoric mood.    Objective:     Vital Signs (Most Recent):  Temp: 96.7 °F (35.9 °C) (03/18/22 0818)  Pulse: (!) 127 (03/18/22 1104)  Resp: (!) 33 (03/18/22 1104)  BP: (!) 153/74 (03/18/22 1103)  SpO2: 100 % (03/18/22 1103)   Vital Signs (24h Range):  Temp:  [94.6 °F (34.8 °C)-96.7 °F (35.9 °C)] 96.7 °F (35.9 °C)  Pulse:  [122-129] 127  Resp:  [30-40] 33  SpO2:  [98 %-100 %] 100 %  BP: (114-160)/(57-77) 153/74     Weight: 76.6 kg (168 lb 14 oz)  Body mass index is 26.45 kg/m².    Physical Exam  Vitals and nursing note reviewed.   Constitutional:       General: She is not in acute distress.     Appearance: She is well-developed.   HENT:      Head: Normocephalic and atraumatic.   Eyes:      Conjunctiva/sclera: Conjunctivae normal.      Pupils: Pupils are equal, round, and reactive to light.   Neck:      Thyroid: No thyromegaly.   Cardiovascular:      Rate and Rhythm: Normal rate and regular rhythm.      Heart sounds: Normal heart sounds.   Pulmonary:      Effort: Pulmonary effort is normal. No respiratory distress.      Breath sounds: Normal breath sounds. No wheezing.   Abdominal:      General: Bowel sounds are normal.      Palpations: Abdomen is soft.      Tenderness: There is no abdominal tenderness.   Musculoskeletal:         General: Normal range of motion.      Cervical back: Normal range of motion and neck supple.   Lymphadenopathy:      Cervical: No cervical adenopathy.   Skin:     General: Skin is warm and dry.      Findings: No rash.   Neurological:      Mental Status: She is alert and oriented to person, place, and time.   Psychiatric:         Behavior: Behavior normal.         CRANIAL NERVES     CN III, IV, VI   Pupils are equal, round, and reactive to light.     Significant Labs: All pertinent labs within the past 24 hours have been  reviewed.  A1C:   Recent Labs   Lab 03/18/22  0334   HGBA1C 9.3*     ABGs: No results for input(s): PH, PCO2, HCO3, POCSATURATED, BE, TOTALHB, COHB, METHB, O2HB, POCFIO2, PO2 in the last 48 hours.  Bilirubin:   Recent Labs   Lab 03/18/22  0334   BILITOT 0.5     Blood Culture: No results for input(s): LABBLOO in the last 48 hours.  CBC:   Recent Labs   Lab 03/18/22  0334   WBC 28.02*   HGB 13.1   HCT 44.2   *     CMP:   Recent Labs   Lab 03/18/22  0334 03/18/22  0623 03/18/22  0754   *  --  135*   K 6.1*  --  5.0   CL 96  --  109   CO2 <5*  --  <5*   * 517* 364*   BUN 19  --  18   CREATININE 2.1*  --  1.5*   CALCIUM 9.6  --  8.1*   PROT 9.0*  --   --    ALBUMIN 4.2  --   --    BILITOT 0.5  --   --    ALKPHOS 97  --   --    AST 18  --   --    ALT 14  --   --    ANIONGAP Unable to calculate  --  Unable to calculate   EGFRNONAA 28*  --  43*     Cardiac Markers: No results for input(s): CKMB, MYOGLOBIN, BNP, TROPISTAT in the last 48 hours.  Lactic Acid:   Recent Labs   Lab 03/18/22  0404 03/18/22  0754   LACTATE 2.7* 1.8     Lipase: No results for input(s): LIPASE in the last 48 hours.  Lipid Panel: No results for input(s): CHOL, HDL, LDLCALC, TRIG, CHOLHDL in the last 48 hours.  Magnesium: No results for input(s): MG in the last 48 hours.  Troponin: No results for input(s): TROPONINI in the last 48 hours.  TSH: No results for input(s): TSH in the last 4320 hours.  Urine Culture: No results for input(s): LABURIN in the last 48 hours.  Urine Studies:   Recent Labs   Lab 03/18/22  0558   COLORU Yellow   APPEARANCEUA Clear   PHUR 5.0   SPECGRAV 1.025   PROTEINUA 2+*   GLUCUA 2+*   KETONESU 3+*   BILIRUBINUA 1+*   OCCULTUA 2+*   NITRITE Negative   UROBILINOGEN Negative   LEUKOCYTESUR Negative   RBCUA 2   WBCUA 0   BACTERIA None   HYALINECASTS 0       Significant Imaging: I have reviewed all pertinent imaging results/findings within the past 24 hours.    Assessment/Plan:     * DKA (diabetic  ketoacidoses)  Significant DKA.  Insulin drip, NS.  With undetectable ph and co2, give bicarb and recheck ph.  Fluid replete.  Sounds like viral gastroenteritis pushed her over.  Will resume insulin once gap closes.    WALTER (generalized anxiety disorder)  Start celexa   buspar as needed       Essential hypertension  In hx but no meds at home.        VTE Risk Mitigation (From admission, onward)         Ordered     IP VTE LOW RISK PATIENT  Once         03/18/22 0659     Place sequential compression device  Until discontinued         03/18/22 0659                   Manisha Medina MD  Department of Hospital Medicine   [facility] - Emergency Dept

## 2022-03-18 NOTE — ED NOTES
"Pt reports improvement in her reflux, states "pain almost gone". Denies nausea or any other complaints at this time. VSS. Will continue to monitor.  "

## 2022-03-18 NOTE — ED NOTES
"Pt requested medicine for "acid reflux", states didn't take her nexium at home, ER MD notified and protonix ordered. Temperature increased to 96.7 tympanic, bear hugger turned off, pt given warm blankets, call light in reach, denies any needs at this time, will continue to monitor.  "

## 2022-03-18 NOTE — ASSESSMENT & PLAN NOTE
Significant DKA.  Insulin drip, NS.  With undetectable ph and co2, give bicarb and recheck ph.  Fluid replete.  Sounds like viral gastroenteritis pushed her over.  Will resume insulin once gap closes.

## 2022-03-18 NOTE — ED PROVIDER NOTES
[facility]                                                 It was a pleasure treating Cira Keys in the ED at [facility] in [place]:    Chief Complaint  41 y.o. female with Hyperglycemia     History of Present Illness  Cira Keys presents to the emergency room with nausea vomiting tonight  Patient presents to ER stating that she is in diabetic ketoacidosis this evening  Patient has been hospitalized several times in the past with diabetic ketoacidosis  Patient gets nausea vomiting and then cannot stop throwing up like this evening  Emesis eventually leads to her sugars being elevated with DKA onset afterwards  Patient states that this is her typical DKA presentation over several years now    The history is provided by the patient  Previous medical records were obtained from [facility]  Previous records are summarized from prior ER visits and hospitalizations  Medical history significant for E coli, GERD, anemia, type 1 diabetes  Surgical history significant for  section & tubal ligation  No Known Allergies   No significant family history  No significant social history, nonsmoker    I reviewed the ER triage nurse's note before evaluating the patient  I have reviewed all of this patient's past medical, surgical, family, and social   histories as well as active allergies and medications documented in the  electronic medical record    Review of Systems and Physical Exam      Review of Systems (all other ROS are otherwise negative)  -- Constitution - weakness and fatigue with no fever chills or night sweats  -- Eyes - no tearing or redness, no visual disturbance  -- Ear, Nose - no tinnitus or earache, no nasal congestion or discharge  -- Mouth,Throat - no sore throat, no toothache, normal voice, normal swallowing  -- Respiratory - denies cough and congestion, no shortness of breath, no SUGGS  -- Cardiovascular - denies chest pain, no palpitations, denies claudication  --  Gastrointestinal - nausea vomiting with no abdominal pain or diarrhea  -- Genitourinary - no dysuria, no hematuria, no flank pain, no bladder pain  -- Musculoskeletal - denies back pain, negative for trauma or injury  -- Neurological - no headache, no numbness, tingling, seizure, balance issues  -- Hematologic- no bruising, no bleeding, nose bleed, bleeding disorders  -- Lymphatics - no leg swelling, no tender nodes, no swollen nodes or LAD    Vital Signs (reviewed by the physician)  Her tympanic temperature is 96.5 °F (35.8 °C).   Her blood pressure is 126/75 and her pulse is 126  Her respiration is 38 and oxygen saturation is 100%.     Physical Exam  -- Nursing note and vitals reviewed  -- Constitutional: Patient appears toxic and dehydrated  -- Head: Atraumatic. Normocephalic. No obvious abnormality  -- Eyes: Pupils are equal and reactive to light. Normal conjunctiva and lids  -- Cardiac: Tachycardic with no heart murmur or arrhythmia  -- Respiratory: Normal respiratory effort, breath sounds clear to auscultation  -- Gastrointestinal: Soft, no tenderness. Normal bowel sounds. Normal liver edge  -- Musculoskeletal: Normal range of motion, no effusions. Joints stable   -- Neurological: No focal deficits. Showed good interaction with staff  -- Vascular: Posterior tibial, dorsalis pedis and radial pulses 2+ bilaterally      Emergency Room Course      Lab Results (reviewed by the physician)   (L)   K 6.1 (H)   CL 96   CO2 <5 (LL)   BUN 19   CREATININE 2.1 (H)    (HH)   ALKPHOS 97   AST 18   ALT 14   BILITOT 0.5   ALBUMIN 4.2   PROT 9.0 (H)   WBC 28.02 (H)   HGB 13.1   HCT 44.2    (H)   LACTATE 2.7 (H)     Urinalysis (reviewed by the physician)  -- has not been able to make urine yet    EKG (interpreted by the physician)  Overall Interpretation: normal rate and rhythm with no obvious ischemic changes  Normal sinus rhythm @ 126 bpm  No ST elevation or depression  No arrhythmia or QRS change noted  No  previous EKG available for comparison    Radiology (images visualized & reports reviewed by the physician)  -- Chest x-ray showed no infiltrate and showed no acute pathology     Additional Work up (reviewed by the physician)  -- rapid Coronavirus PCR was negative  -- Blood cultures have also been drawn, results are pending     Medications Given  sodium chloride 0.9% bolus 1,000 mL (1,000 mLs Intravenous New Bag 3/18/22 0411)   sodium chloride 0.9% bolus 3,000 mL (has no administration in time range)   sodium chloride 0.9% bolus 1,000 mL (has no administration in time range)   insulin regular bolus from bag/infusion 7.66 Units (has no administration in time range)   insulin regular in 0.9 % NaCl 100 unit/100 mL (1 unit/mL) infusion    dextrose 10% bolus 125 mL (has no administration in time range)   dextrose 10% bolus 250 mL (has no administration in time range)   ondansetron injection 4 mg (has no administration in time range)     Critical Care ED Physician Time (minutes):  -- Performed by: Juan Campoverde M.D.  -- Date/Time: 4:58 AM 3/18/2022   -- Direct Patient Care (Face Time): 5  -- Additional History from Records or Taking Additional History: 5  -- Ordering, Reviewing, and Interpreting Diagnostic Studies: 5  -- Total Time in Documentation: 11  -- Consultation with Other Physicians: 5  -- Consultation with Family Related to Condition: 0  -- Total Critical Care Time: 31  -- Critical care was necessary to treat diabetic ketoacidosis  -- Critical care was time spent personally by me on the following activities:   -- blood draw for specimens discussions with consultants,   -- development of treatment plan with patient or surrogate,   -- examination of patient, ordering and performing treatments   -- review of radiographic studies, re-evaluation of pt's condition  -- review of labs and evaluation of response to treatment    Medical Decision Making     History  -- History was obtained from the patient  -- Previous  medical records were obtained from [facility]  -- Previous records are summarized from prior ER visits and hospitalizations    Initial Assessment  -- patient presents with nausea vomiting with high blood sugars at home  -- pt states that she is in diabetic ketoacidosis, several previous issues    Differential Diagnosis  -- gastritis, gastroenteritis, viral illness, DKA, sepsis, nausea vomiting NOS    Clinical Tests  -- Lab tests were ordered, interpreted, and reviewed in the ER today   -- EKG was ordered, visualized, interpreted, reviewed in the ER today  -- Radiology tests were ordered, visualized, interpreted, reviewed in the ER today    ED Course/ED Management  -- patient with a blood sugar greater than 500 with a pH of less than 6.9 today  -- patient given copious amounts of IV fluids, CO2 is less than 5 on the CMP today  -- insulin drip initiated, blood cultures drawn as precaution, patient is in DKA today  -- will reach out to the transfer center for critical care bed placement at this time    Addendum  -- 5:23 AM: Patient discussed at length with the transfer center this morning  -- there are no ICU beds in the system, the transfer was declined at this time  -- patient will be admitted into the ER as ICU overflow until a bed is available  -- discussed with Dr. Lopez Pride who agrees to accept the patient tonight    Assessment, Disposition, & Plan      Diagnosis  [R73.9] Hyperglycemia  [E10.10] Diabetic ketoacidosis   [N17.9] ASIA (acute kidney injury)    Disposition and Plan  -- Disposition: Admit  -- Condition: stable    This note is dictated on M*Modal word recognition program.  There are word recognition mistakes that are occasionally missed on review.           Juan Campoverde MD  03/18/22 8754

## 2022-03-18 NOTE — NURSING
Arrived to CCU bed 2 from ER via stretcher by DERRICK Ivan    Physical to follow    Insulin gtt and IVF noted     in lobby.    Ambulated to toilet with standby assist x 1 upon arrival to room

## 2022-03-18 NOTE — Clinical Note
Diagnosis: Diabetic ketoacidosis without coma associated with type 1 diabetes mellitus [[id number]]   Future Attending Provider: EMELY CHERRY [[id number]]   Admitting Provider:: EMELY CHERRY [[id number]]   Special Needs:: No Special Needs [1]

## 2022-03-18 NOTE — SUBJECTIVE & OBJECTIVE
"Past Medical History:   Diagnosis Date    E coli bacteremia     GERD (gastroesophageal reflux disease)     Iron deficiency anemia due to chronic blood loss 10/31/2015    Type 1 diabetes mellitus without complication 10/31/2015       Past Surgical History:   Procedure Laterality Date     SECTION      TUBAL LIGATION         Review of patient's allergies indicates:  No Known Allergies    No current facility-administered medications on file prior to encounter.     Current Outpatient Medications on File Prior to Encounter   Medication Sig    diclofenac sodium (VOLTAREN) 1 % Gel Apply 2 g topically 4 (four) times daily as needed (shoulder pain).    esomeprazole (NEXIUM) 20 MG capsule Take 20 mg by mouth once daily.    ferrous sulfate (FEOSOL) 325 mg (65 mg iron) Tab tablet Take 325 mg by mouth once a week.    insulin needles, disposable, 32 x 1/4 " Ndle Use to inject insulin 5 times a day    insulin NPH (NOVOLIN N NPH U-100 INSULIN) 100 unit/mL injection Use 14-18 units in am and 4-6 units at night    insulin regular 100 unit/mL Inj injection Inject 2-8 Units into the skin 3 (three) times daily before meals. If needed per sliding scale    loratadine (CLARITIN) 10 mg tablet Take 10 mg by mouth daily as needed for Allergies.     [DISCONTINUED] insulin detemir (LEVEMIR FLEXTOUCH) 100 unit/mL (3 mL) SubQ InPn pen Inject 9 Units into the skin 2 (two) times daily.     Family History       Problem Relation (Age of Onset)    No Known Problems Mother, Father, Sister, Brother          Tobacco Use    Smoking status: Former Smoker     Packs/day: 1.00     Start date: 1996     Quit date: 2014     Years since quittin.3    Smokeless tobacco: Never Used   Substance and Sexual Activity    Alcohol use: Yes     Alcohol/week: 4.0 standard drinks     Types: 4 Standard drinks or equivalent per week     Comment: occasionally    Drug use: No    Sexual activity: Yes     Partners: Male     Birth control/protection: See " Surgical Hx     Comment:      Review of Systems   Constitutional:  Negative for chills and fever.   HENT:  Negative for congestion, ear pain, postnasal drip, rhinorrhea, sore throat and trouble swallowing.    Eyes:  Negative for redness and itching.   Respiratory:  Positive for shortness of breath. Negative for cough and wheezing.    Cardiovascular:  Negative for chest pain and palpitations.   Gastrointestinal:  Positive for diarrhea and vomiting. Negative for abdominal pain and nausea.   Genitourinary:  Negative for dysuria and frequency.   Skin:  Negative for rash.   Neurological:  Negative for weakness and headaches.   Psychiatric/Behavioral:  Positive for decreased concentration and dysphoric mood.    Objective:     Vital Signs (Most Recent):  Temp: 96.7 °F (35.9 °C) (03/18/22 0818)  Pulse: (!) 127 (03/18/22 1104)  Resp: (!) 33 (03/18/22 1104)  BP: (!) 153/74 (03/18/22 1103)  SpO2: 100 % (03/18/22 1103)   Vital Signs (24h Range):  Temp:  [94.6 °F (34.8 °C)-96.7 °F (35.9 °C)] 96.7 °F (35.9 °C)  Pulse:  [122-129] 127  Resp:  [30-40] 33  SpO2:  [98 %-100 %] 100 %  BP: (114-160)/(57-77) 153/74     Weight: 76.6 kg (168 lb 14 oz)  Body mass index is 26.45 kg/m².    Physical Exam  Vitals and nursing note reviewed.   Constitutional:       General: She is not in acute distress.     Appearance: She is well-developed.   HENT:      Head: Normocephalic and atraumatic.   Eyes:      Conjunctiva/sclera: Conjunctivae normal.      Pupils: Pupils are equal, round, and reactive to light.   Neck:      Thyroid: No thyromegaly.   Cardiovascular:      Rate and Rhythm: Normal rate and regular rhythm.      Heart sounds: Normal heart sounds.   Pulmonary:      Effort: Pulmonary effort is normal. No respiratory distress.      Breath sounds: Normal breath sounds. No wheezing.   Abdominal:      General: Bowel sounds are normal.      Palpations: Abdomen is soft.      Tenderness: There is no abdominal tenderness.   Musculoskeletal:          General: Normal range of motion.      Cervical back: Normal range of motion and neck supple.   Lymphadenopathy:      Cervical: No cervical adenopathy.   Skin:     General: Skin is warm and dry.      Findings: No rash.   Neurological:      Mental Status: She is alert and oriented to person, place, and time.   Psychiatric:         Behavior: Behavior normal.         CRANIAL NERVES     CN III, IV, VI   Pupils are equal, round, and reactive to light.     Significant Labs: All pertinent labs within the past 24 hours have been reviewed.  A1C:   Recent Labs   Lab 03/18/22 0334   HGBA1C 9.3*     ABGs: No results for input(s): PH, PCO2, HCO3, POCSATURATED, BE, TOTALHB, COHB, METHB, O2HB, POCFIO2, PO2 in the last 48 hours.  Bilirubin:   Recent Labs   Lab 03/18/22 0334   BILITOT 0.5     Blood Culture: No results for input(s): LABBLOO in the last 48 hours.  CBC:   Recent Labs   Lab 03/18/22 0334   WBC 28.02*   HGB 13.1   HCT 44.2   *     CMP:   Recent Labs   Lab 03/18/22  0334 03/18/22  0623 03/18/22  0754   *  --  135*   K 6.1*  --  5.0   CL 96  --  109   CO2 <5*  --  <5*   * 517* 364*   BUN 19  --  18   CREATININE 2.1*  --  1.5*   CALCIUM 9.6  --  8.1*   PROT 9.0*  --   --    ALBUMIN 4.2  --   --    BILITOT 0.5  --   --    ALKPHOS 97  --   --    AST 18  --   --    ALT 14  --   --    ANIONGAP Unable to calculate  --  Unable to calculate   EGFRNONAA 28*  --  43*     Cardiac Markers: No results for input(s): CKMB, MYOGLOBIN, BNP, TROPISTAT in the last 48 hours.  Lactic Acid:   Recent Labs   Lab 03/18/22  0404 03/18/22  0754   LACTATE 2.7* 1.8     Lipase: No results for input(s): LIPASE in the last 48 hours.  Lipid Panel: No results for input(s): CHOL, HDL, LDLCALC, TRIG, CHOLHDL in the last 48 hours.  Magnesium: No results for input(s): MG in the last 48 hours.  Troponin: No results for input(s): TROPONINI in the last 48 hours.  TSH: No results for input(s): TSH in the last 4320 hours.  Urine Culture: No  results for input(s): LABURIN in the last 48 hours.  Urine Studies:   Recent Labs   Lab 03/18/22  0558   COLORU Yellow   APPEARANCEUA Clear   PHUR 5.0   SPECGRAV 1.025   PROTEINUA 2+*   GLUCUA 2+*   KETONESU 3+*   BILIRUBINUA 1+*   OCCULTUA 2+*   NITRITE Negative   UROBILINOGEN Negative   LEUKOCYTESUR Negative   RBCUA 2   WBCUA 0   BACTERIA None   HYALINECASTS 0       Significant Imaging: I have reviewed all pertinent imaging results/findings within the past 24 hours.

## 2022-03-18 NOTE — NURSING
Called and updated Dr Medina on vitals, status, condition, new orders noted, MD aware of recent BMP results (see labs), will continue to monitor and update MD as needed.

## 2022-03-18 NOTE — PLAN OF CARE
03/18/22 0922   ED Admissions Case Approval   ED Admissions Case Approval CM Approved       Patient meets admission criteria at Critical level of care.     LIZZ will notify MD of this and ask that inpatient order be written.     1005: Discussed with Dr. Medina who agrees inpatient is appropriate. Order written.

## 2022-03-19 LAB
ALBUMIN SERPL BCP-MCNC: 3.1 G/DL (ref 3.5–5.2)
ALP SERPL-CCNC: 62 U/L (ref 55–135)
ALT SERPL W/O P-5'-P-CCNC: 9 U/L (ref 10–44)
ANION GAP SERPL CALC-SCNC: 11 MMOL/L (ref 8–16)
ANION GAP SERPL CALC-SCNC: 11 MMOL/L (ref 8–16)
ANION GAP SERPL CALC-SCNC: 13 MMOL/L (ref 8–16)
AST SERPL-CCNC: 17 U/L (ref 10–40)
BASOPHILS # BLD AUTO: 0.02 K/UL (ref 0–0.2)
BASOPHILS NFR BLD: 0.2 % (ref 0–1.9)
BILIRUB SERPL-MCNC: 0.4 MG/DL (ref 0.1–1)
BUN SERPL-MCNC: 7 MG/DL (ref 6–20)
BUN SERPL-MCNC: 8 MG/DL (ref 6–20)
BUN SERPL-MCNC: 9 MG/DL (ref 6–20)
CALCIUM SERPL-MCNC: 8.7 MG/DL (ref 8.7–10.5)
CALCIUM SERPL-MCNC: 8.8 MG/DL (ref 8.7–10.5)
CALCIUM SERPL-MCNC: 9.1 MG/DL (ref 8.7–10.5)
CHLORIDE SERPL-SCNC: 108 MMOL/L (ref 95–110)
CHLORIDE SERPL-SCNC: 108 MMOL/L (ref 95–110)
CHLORIDE SERPL-SCNC: 109 MMOL/L (ref 95–110)
CO2 SERPL-SCNC: 11 MMOL/L (ref 23–29)
CO2 SERPL-SCNC: 13 MMOL/L (ref 23–29)
CO2 SERPL-SCNC: 15 MMOL/L (ref 23–29)
CREAT SERPL-MCNC: 1 MG/DL (ref 0.5–1.4)
CREAT SERPL-MCNC: 1 MG/DL (ref 0.5–1.4)
CREAT SERPL-MCNC: 1.2 MG/DL (ref 0.5–1.4)
DIFFERENTIAL METHOD: ABNORMAL
EOSINOPHIL # BLD AUTO: 0 K/UL (ref 0–0.5)
EOSINOPHIL NFR BLD: 0 % (ref 0–8)
ERYTHROCYTE [DISTWIDTH] IN BLOOD BY AUTOMATED COUNT: 12.5 % (ref 11.5–14.5)
EST. GFR  (AFRICAN AMERICAN): >60 ML/MIN/1.73 M^2
EST. GFR  (NON AFRICAN AMERICAN): 56 ML/MIN/1.73 M^2
EST. GFR  (NON AFRICAN AMERICAN): >60 ML/MIN/1.73 M^2
EST. GFR  (NON AFRICAN AMERICAN): >60 ML/MIN/1.73 M^2
GLUCOSE SERPL-MCNC: 203 MG/DL (ref 70–110)
GLUCOSE SERPL-MCNC: 271 MG/DL (ref 70–110)
GLUCOSE SERPL-MCNC: 317 MG/DL (ref 70–110)
HCT VFR BLD AUTO: 34 % (ref 37–48.5)
HGB BLD-MCNC: 11 G/DL (ref 12–16)
IMM GRANULOCYTES # BLD AUTO: 0.08 K/UL (ref 0–0.04)
IMM GRANULOCYTES NFR BLD AUTO: 0.6 % (ref 0–0.5)
LYMPHOCYTES # BLD AUTO: 1.2 K/UL (ref 1–4.8)
LYMPHOCYTES NFR BLD: 9.1 % (ref 18–48)
MCH RBC QN AUTO: 28.6 PG (ref 27–31)
MCHC RBC AUTO-ENTMCNC: 32.4 G/DL (ref 32–36)
MCV RBC AUTO: 88 FL (ref 82–98)
MONOCYTES # BLD AUTO: 1 K/UL (ref 0.3–1)
MONOCYTES NFR BLD: 7.7 % (ref 4–15)
NEUTROPHILS # BLD AUTO: 10.4 K/UL (ref 1.8–7.7)
NEUTROPHILS NFR BLD: 82.4 % (ref 38–73)
NRBC BLD-RTO: 0 /100 WBC
PLATELET # BLD AUTO: 234 K/UL (ref 150–450)
PMV BLD AUTO: 9.9 FL (ref 9.2–12.9)
POCT GLUCOSE: 152 MG/DL (ref 70–110)
POCT GLUCOSE: 201 MG/DL (ref 70–110)
POCT GLUCOSE: 214 MG/DL (ref 70–110)
POCT GLUCOSE: 218 MG/DL (ref 70–110)
POCT GLUCOSE: 225 MG/DL (ref 70–110)
POCT GLUCOSE: 225 MG/DL (ref 70–110)
POCT GLUCOSE: 261 MG/DL (ref 70–110)
POCT GLUCOSE: 268 MG/DL (ref 70–110)
POCT GLUCOSE: 270 MG/DL (ref 70–110)
POCT GLUCOSE: 274 MG/DL (ref 70–110)
POCT GLUCOSE: 276 MG/DL (ref 70–110)
POCT GLUCOSE: 279 MG/DL (ref 70–110)
POCT GLUCOSE: 287 MG/DL (ref 70–110)
POCT GLUCOSE: 304 MG/DL (ref 70–110)
POCT GLUCOSE: 348 MG/DL (ref 70–110)
POTASSIUM SERPL-SCNC: 3.4 MMOL/L (ref 3.5–5.1)
POTASSIUM SERPL-SCNC: 3.5 MMOL/L (ref 3.5–5.1)
POTASSIUM SERPL-SCNC: 3.8 MMOL/L (ref 3.5–5.1)
PROT SERPL-MCNC: 6.6 G/DL (ref 6–8.4)
RBC # BLD AUTO: 3.85 M/UL (ref 4–5.4)
SODIUM SERPL-SCNC: 132 MMOL/L (ref 136–145)
SODIUM SERPL-SCNC: 133 MMOL/L (ref 136–145)
SODIUM SERPL-SCNC: 134 MMOL/L (ref 136–145)
WBC # BLD AUTO: 12.63 K/UL (ref 3.9–12.7)

## 2022-03-19 PROCEDURE — S5010 5% DEXTROSE AND 0.45% SALINE: HCPCS | Performed by: FAMILY MEDICINE

## 2022-03-19 PROCEDURE — 25000003 PHARM REV CODE 250: Performed by: FAMILY MEDICINE

## 2022-03-19 PROCEDURE — 85025 COMPLETE CBC W/AUTO DIFF WBC: CPT | Performed by: FAMILY MEDICINE

## 2022-03-19 PROCEDURE — 99233 PR SUBSEQUENT HOSPITAL CARE,LEVL III: ICD-10-PCS | Mod: ,,, | Performed by: FAMILY MEDICINE

## 2022-03-19 PROCEDURE — 80053 COMPREHEN METABOLIC PANEL: CPT | Performed by: FAMILY MEDICINE

## 2022-03-19 PROCEDURE — 99900035 HC TECH TIME PER 15 MIN (STAT)

## 2022-03-19 PROCEDURE — 63600175 PHARM REV CODE 636 W HCPCS: Performed by: FAMILY MEDICINE

## 2022-03-19 PROCEDURE — 80048 BASIC METABOLIC PNL TOTAL CA: CPT | Mod: 91 | Performed by: FAMILY MEDICINE

## 2022-03-19 PROCEDURE — 36415 COLL VENOUS BLD VENIPUNCTURE: CPT | Performed by: FAMILY MEDICINE

## 2022-03-19 PROCEDURE — 99233 SBSQ HOSP IP/OBS HIGH 50: CPT | Mod: ,,, | Performed by: FAMILY MEDICINE

## 2022-03-19 PROCEDURE — 94761 N-INVAS EAR/PLS OXIMETRY MLT: CPT

## 2022-03-19 PROCEDURE — 25000003 PHARM REV CODE 250: Performed by: STUDENT IN AN ORGANIZED HEALTH CARE EDUCATION/TRAINING PROGRAM

## 2022-03-19 PROCEDURE — 99900031 HC PATIENT EDUCATION (STAT)

## 2022-03-19 PROCEDURE — 80048 BASIC METABOLIC PNL TOTAL CA: CPT | Performed by: FAMILY MEDICINE

## 2022-03-19 PROCEDURE — 11000001 HC ACUTE MED/SURG PRIVATE ROOM

## 2022-03-19 RX ORDER — INSULIN ASPART 100 [IU]/ML
5 INJECTION, SOLUTION INTRAVENOUS; SUBCUTANEOUS
Status: DISCONTINUED | OUTPATIENT
Start: 2022-03-19 | End: 2022-03-20 | Stop reason: HOSPADM

## 2022-03-19 RX ORDER — POTASSIUM CHLORIDE 20 MEQ/1
40 TABLET, EXTENDED RELEASE ORAL DAILY
Status: DISCONTINUED | OUTPATIENT
Start: 2022-03-19 | End: 2022-03-20 | Stop reason: HOSPADM

## 2022-03-19 RX ORDER — IBUPROFEN 200 MG
16 TABLET ORAL
Status: DISCONTINUED | OUTPATIENT
Start: 2022-03-19 | End: 2022-03-20 | Stop reason: HOSPADM

## 2022-03-19 RX ORDER — DEXTROSE MONOHYDRATE AND SODIUM CHLORIDE 5; .45 G/100ML; G/100ML
INJECTION, SOLUTION INTRAVENOUS CONTINUOUS
Status: DISCONTINUED | OUTPATIENT
Start: 2022-03-18 | End: 2022-03-19

## 2022-03-19 RX ORDER — INSULIN ASPART 100 [IU]/ML
0-5 INJECTION, SOLUTION INTRAVENOUS; SUBCUTANEOUS
Status: DISCONTINUED | OUTPATIENT
Start: 2022-03-19 | End: 2022-03-20 | Stop reason: HOSPADM

## 2022-03-19 RX ORDER — GLUCAGON 1 MG
1 KIT INJECTION
Status: DISCONTINUED | OUTPATIENT
Start: 2022-03-19 | End: 2022-03-20 | Stop reason: HOSPADM

## 2022-03-19 RX ORDER — LISINOPRIL 2.5 MG/1
5 TABLET ORAL DAILY
Status: DISCONTINUED | OUTPATIENT
Start: 2022-03-19 | End: 2022-03-20 | Stop reason: HOSPADM

## 2022-03-19 RX ORDER — BUSPIRONE HYDROCHLORIDE 5 MG/1
5 TABLET ORAL 2 TIMES DAILY PRN
Status: DISCONTINUED | OUTPATIENT
Start: 2022-03-19 | End: 2022-03-20 | Stop reason: HOSPADM

## 2022-03-19 RX ORDER — IBUPROFEN 200 MG
24 TABLET ORAL
Status: DISCONTINUED | OUTPATIENT
Start: 2022-03-19 | End: 2022-03-20 | Stop reason: HOSPADM

## 2022-03-19 RX ADMIN — MUPIROCIN: 20 OINTMENT TOPICAL at 08:03

## 2022-03-19 RX ADMIN — POTASSIUM CHLORIDE 40 MEQ: 1500 TABLET, EXTENDED RELEASE ORAL at 09:03

## 2022-03-19 RX ADMIN — MUPIROCIN: 20 OINTMENT TOPICAL at 09:03

## 2022-03-19 RX ADMIN — INSULIN ASPART 5 UNITS: 100 INJECTION, SOLUTION INTRAVENOUS; SUBCUTANEOUS at 04:03

## 2022-03-19 RX ADMIN — HUMAN INSULIN 15 UNITS: 100 INJECTION, SUSPENSION SUBCUTANEOUS at 08:03

## 2022-03-19 RX ADMIN — INSULIN ASPART 5 UNITS: 100 INJECTION, SOLUTION INTRAVENOUS; SUBCUTANEOUS at 11:03

## 2022-03-19 RX ADMIN — INSULIN ASPART 2 UNITS: 100 INJECTION, SOLUTION INTRAVENOUS; SUBCUTANEOUS at 11:03

## 2022-03-19 RX ADMIN — INSULIN HUMAN 2.81 UNITS/HR: 1 INJECTION, SOLUTION INTRAVENOUS at 05:03

## 2022-03-19 RX ADMIN — LISINOPRIL 5 MG: 2.5 TABLET ORAL at 09:03

## 2022-03-19 RX ADMIN — DEXTROSE AND SODIUM CHLORIDE: 5; .45 INJECTION, SOLUTION INTRAVENOUS at 07:03

## 2022-03-19 RX ADMIN — INSULIN ASPART 2 UNITS: 100 INJECTION, SOLUTION INTRAVENOUS; SUBCUTANEOUS at 08:03

## 2022-03-19 RX ADMIN — CITALOPRAM HYDROBROMIDE 10 MG: 10 TABLET ORAL at 09:03

## 2022-03-19 RX ADMIN — BUSPIRONE HYDROCHLORIDE 5 MG: 5 TABLET ORAL at 09:03

## 2022-03-19 RX ADMIN — HUMAN INSULIN 20 UNITS: 100 INJECTION, SUSPENSION SUBCUTANEOUS at 09:03

## 2022-03-19 RX ADMIN — INSULIN ASPART 3 UNITS: 100 INJECTION, SOLUTION INTRAVENOUS; SUBCUTANEOUS at 04:03

## 2022-03-19 NOTE — HOSPITAL COURSE
After bicarb and continued insulin drip, co2 improved. Gap slowly closing. Nausea and vomiting are improving. She was able to tolerate clear liquids this am. Urine output is good. HR improving.     3/20 patient's gap closed. Vision returned to normal. Eating well for lunch. Stepped down to floor. Blood sugars rising during the day, but tolerated nph at night.

## 2022-03-19 NOTE — PLAN OF CARE
No falls or near misses noted on shift, No skin issues or wounds, No pain complaints, personal preferences provided as requested for food/drinks as diet orders allow, plan of care discussed with patient and  at bedside, verbalized understanding, evidence of learning noted, saline locked as ordered, plan is to transfer to med/surg unit per Dr Medina.

## 2022-03-19 NOTE — ASSESSMENT & PLAN NOTE
Significant DKA.  Insulin drip, NS.  With undetectable ph and co2, give bicarb and recheck ph.  Fluid replete.  Sounds like viral gastroenteritis pushed her over.  Will resume insulin once gap closes.    3/19  Will resume home meds after BMP this am as I anticipate that clinically her gap has closed.

## 2022-03-19 NOTE — NURSING
Called and gave report to DERRICK Meneses on Med/Surg, patient will be transferred to bed 302 with telemetry, updated patient and  at bedside on status of transfer.

## 2022-03-19 NOTE — EICU
Rounding (Video Assessment):  Yes    Comments: Rounds completed. Pt lying in bed w b eyes closed. Insulin gtt infusing. Hr 122, bp 116/53 resp 24 o2 sats 99% per monitor. No distress noted @ this time

## 2022-03-19 NOTE — NURSING
Called and updated Dr Medina on last WBG finger stick of 152 mg/dl, status and condition, new orders noted. Will continue to monitor and update MD as needed.

## 2022-03-19 NOTE — PROGRESS NOTES
[facility] Medicine  Progress Note    Patient Name: Cira Keys  MRN: [id number]  Patient Class: IP- Inpatient   Admission Date: 3/18/2022  Length of Stay: 1 days  Attending Physician: Lopez Pride MD  Primary Care Provider: Krysta Mercado MD        Subjective:     Principal Problem:DKA (diabetic ketoacidosis)        HPI:  Cira Keys is a 41 y.o. female with known GERD, LOVE,  DM type 1: LAST A1C was  8.4%; multiple admissions for DKA. Last DKA admission September 2021  associated with heavy alcohol abuse. She notes she has not had alcohol since then. Was recently caring for grandchildren when she developed nausea and vomiting two days ago. She reports 2 of her grandchildren recently had stomach bug and felt she had the same. However after once day the diarrhea stopped and she continued with worsening vomiting and vision changes that were more similar to her usual DKA symptoms, prompting  her to come to ED. She also notes SOB   + C/o significant anxiety and requesting Rx to help her stop smoking. She is motivated to quit for her grandchildren.         Overview/Hospital Course:  After bicarb and continued insulin drip, co2 improved. Gap slowly closing. Nausea and vomiting are improving. She was able to tolerate clear liquids this am. Urine output is good. HR improving.           Review of Systems   Constitutional:  Negative for chills and fever.   HENT:  Negative for congestion, ear pain, postnasal drip, rhinorrhea, sore throat and trouble swallowing.    Eyes:  Negative for redness, itching and visual disturbance.   Respiratory:  Negative for cough, shortness of breath and wheezing.    Cardiovascular:  Negative for chest pain and palpitations.   Gastrointestinal:  Positive for vomiting. Negative for abdominal pain, diarrhea and nausea.   Genitourinary:  Negative for dysuria and frequency.   Skin:  Negative for rash.   Neurological:  Negative for weakness and headaches.    Psychiatric/Behavioral:  Positive for decreased concentration and dysphoric mood.    Objective:     Vital Signs (Most Recent):  Temp: 97.8 °F (36.6 °C) (03/19/22 0710)  Pulse: (!) 111 (03/19/22 0800)  Resp: 19 (03/19/22 0800)  BP: 131/63 (03/19/22 0800)  SpO2: 98 % (03/19/22 0800)   Vital Signs (24h Range):  Temp:  [97 °F (36.1 °C)-98.3 °F (36.8 °C)] 97.8 °F (36.6 °C)  Pulse:  [106-136] 111  Resp:  [17-33] 19  SpO2:  [97 %-100 %] 98 %  BP: (112-153)/(52-80) 131/63     Weight: 86.2 kg (190 lb 0.6 oz)  Body mass index is 29.76 kg/m².    Physical Exam  Vitals and nursing note reviewed.   Constitutional:       General: She is not in acute distress.     Appearance: She is well-developed.   HENT:      Head: Normocephalic and atraumatic.   Eyes:      Conjunctiva/sclera: Conjunctivae normal.      Pupils: Pupils are equal, round, and reactive to light.   Neck:      Thyroid: No thyromegaly.   Cardiovascular:      Rate and Rhythm: Normal rate and regular rhythm.      Heart sounds: Normal heart sounds.   Pulmonary:      Effort: Pulmonary effort is normal. No respiratory distress.      Breath sounds: Normal breath sounds. No wheezing.   Abdominal:      General: Bowel sounds are normal.      Palpations: Abdomen is soft.      Tenderness: There is no abdominal tenderness.   Musculoskeletal:         General: Normal range of motion.      Cervical back: Normal range of motion and neck supple.   Lymphadenopathy:      Cervical: No cervical adenopathy.   Skin:     General: Skin is warm and dry.      Findings: No rash.   Neurological:      Mental Status: She is alert and oriented to person, place, and time.   Psychiatric:         Behavior: Behavior normal.         CRANIAL NERVES     CN III, IV, VI   Pupils are equal, round, and reactive to light.     Significant Labs: All pertinent labs within the past 24 hours have been reviewed.  A1C:   Recent Labs   Lab 03/18/22  0334   HGBA1C 9.3*       ABGs: No results for input(s): PH, PCO2, HCO3,  POCSATURATED, BE, TOTALHB, COHB, METHB, O2HB, POCFIO2, PO2 in the last 48 hours.  Bilirubin:   Recent Labs   Lab 03/18/22  0334 03/19/22  0359   BILITOT 0.5 0.4       Blood Culture:   Recent Labs   Lab 03/18/22  0404   LABBLOO No Growth to date  No Growth to date     CBC:   Recent Labs   Lab 03/18/22  0334 03/19/22  0359   WBC 28.02* 12.63   HGB 13.1 11.0*   HCT 44.2 34.0*   * 234       CMP:   Recent Labs   Lab 03/18/22  0334 03/18/22  0623 03/18/22 2001 03/19/22  0014 03/19/22 0359   *   < > 134* 133* 132*   K 6.1*   < > 4.0 3.8 3.5   CL 96   < > 110 109 108   CO2 <5*   < > 7* 11* 13*   *   < > 252* 317* 271*   BUN 19   < > 11 9 8   CREATININE 2.1*   < > 1.2 1.2 1.0   CALCIUM 9.6   < > 8.7 8.7 8.8   PROT 9.0*  --   --   --  6.6   ALBUMIN 4.2  --   --   --  3.1*   BILITOT 0.5  --   --   --  0.4   ALKPHOS 97  --   --   --  62   AST 18  --   --   --  17   ALT 14  --   --   --  9*   ANIONGAP Unable to calculate   < > 17* 13 11   EGFRNONAA 28*   < > 56* 56* >60    < > = values in this interval not displayed.       Cardiac Markers: No results for input(s): CKMB, MYOGLOBIN, BNP, TROPISTAT in the last 48 hours.  Lactic Acid:   Recent Labs   Lab 03/18/22  0404 03/18/22  0754 03/18/22  1203   LACTATE 2.7* 1.8 0.9       Lipase: No results for input(s): LIPASE in the last 48 hours.  Lipid Panel: No results for input(s): CHOL, HDL, LDLCALC, TRIG, CHOLHDL in the last 48 hours.  Magnesium: No results for input(s): MG in the last 48 hours.  Troponin: No results for input(s): TROPONINI in the last 48 hours.  TSH: No results for input(s): TSH in the last 4320 hours.  Urine Culture: No results for input(s): LABURIN in the last 48 hours.  Urine Studies:   Recent Labs   Lab 03/18/22  0558   COLORU Yellow   APPEARANCEUA Clear   PHUR 5.0   SPECGRAV 1.025   PROTEINUA 2+*   GLUCUA 2+*   KETONESU 3+*   BILIRUBINUA 1+*   OCCULTUA 2+*   NITRITE Negative   UROBILINOGEN Negative   LEUKOCYTESUR Negative   RBCUA 2   WBCUA 0    BACTERIA None   HYALINECASTS 0         Significant Imaging: I have reviewed all pertinent imaging results/findings within the past 24 hours.      Assessment/Plan:      * DKA (diabetic ketoacidoses)  Significant DKA.  Insulin drip, NS.  With undetectable ph and co2, give bicarb and recheck ph.  Fluid replete.  Sounds like viral gastroenteritis pushed her over.  Will resume insulin once gap closes.    3/19  Will resume home meds after BMP this am as I anticipate that clinically her gap has closed.    SOB (shortness of breath)  Improved with closing her gap.      WALTER (generalized anxiety disorder)  Start celexa   buspar as needed       Alcoholic liver disease  H/o this.      Essential hypertension  In hx but no meds at home.    bp 131/63      VTE Risk Mitigation (From admission, onward)         Ordered     IP VTE LOW RISK PATIENT  Once         03/18/22 0659     Place sequential compression device  Until discontinued         03/18/22 0659                Discharge Planning   JUVENTINO:      Code Status: Full Code   Is the patient medically ready for discharge?:     Reason for patient still in hospital (select all that apply): Treatment               Critical care time spent on the evaluation and treatment of severe organ dysfunction, review of pertinent labs and imaging studies, discussions with consulting providers and discussions with patient/family: 25 minutes.      Mansiha Medina MD  Department of Hospital Medicine   [place] - Intensive Care

## 2022-03-19 NOTE — NURSING
Report called to DERRICK Meneses at 1657pm    Transferred to med/surg bed 302    Transfer via wheelchair    All personal items sent with patient: YES    Transported by Adan,PCT    Pharmacy non-stocked medicines sent: YES    Chart sent with patient: YES    Notified family: YES,  and mother at bedside    Patient reassessed at: transfer, stable    Upon arrival to floor: verified telemetry with virgen Vang/surg monitor tech

## 2022-03-19 NOTE — SUBJECTIVE & OBJECTIVE
Review of Systems   Constitutional:  Negative for chills and fever.   HENT:  Negative for congestion, ear pain, postnasal drip, rhinorrhea, sore throat and trouble swallowing.    Eyes:  Negative for redness, itching and visual disturbance.   Respiratory:  Negative for cough, shortness of breath and wheezing.    Cardiovascular:  Negative for chest pain and palpitations.   Gastrointestinal:  Positive for vomiting. Negative for abdominal pain, diarrhea and nausea.   Genitourinary:  Negative for dysuria and frequency.   Skin:  Negative for rash.   Neurological:  Negative for weakness and headaches.   Psychiatric/Behavioral:  Positive for decreased concentration and dysphoric mood.    Objective:     Vital Signs (Most Recent):  Temp: 97.8 °F (36.6 °C) (03/19/22 0710)  Pulse: (!) 111 (03/19/22 0800)  Resp: 19 (03/19/22 0800)  BP: 131/63 (03/19/22 0800)  SpO2: 98 % (03/19/22 0800)   Vital Signs (24h Range):  Temp:  [97 °F (36.1 °C)-98.3 °F (36.8 °C)] 97.8 °F (36.6 °C)  Pulse:  [106-136] 111  Resp:  [17-33] 19  SpO2:  [97 %-100 %] 98 %  BP: (112-153)/(52-80) 131/63     Weight: 86.2 kg (190 lb 0.6 oz)  Body mass index is 29.76 kg/m².    Physical Exam  Vitals and nursing note reviewed.   Constitutional:       General: She is not in acute distress.     Appearance: She is well-developed.   HENT:      Head: Normocephalic and atraumatic.   Eyes:      Conjunctiva/sclera: Conjunctivae normal.      Pupils: Pupils are equal, round, and reactive to light.   Neck:      Thyroid: No thyromegaly.   Cardiovascular:      Rate and Rhythm: Normal rate and regular rhythm.      Heart sounds: Normal heart sounds.   Pulmonary:      Effort: Pulmonary effort is normal. No respiratory distress.      Breath sounds: Normal breath sounds. No wheezing.   Abdominal:      General: Bowel sounds are normal.      Palpations: Abdomen is soft.      Tenderness: There is no abdominal tenderness.   Musculoskeletal:         General: Normal range of motion.       Cervical back: Normal range of motion and neck supple.   Lymphadenopathy:      Cervical: No cervical adenopathy.   Skin:     General: Skin is warm and dry.      Findings: No rash.   Neurological:      Mental Status: She is alert and oriented to person, place, and time.   Psychiatric:         Behavior: Behavior normal.         CRANIAL NERVES     CN III, IV, VI   Pupils are equal, round, and reactive to light.     Significant Labs: All pertinent labs within the past 24 hours have been reviewed.  A1C:   Recent Labs   Lab 03/18/22 0334   HGBA1C 9.3*       ABGs: No results for input(s): PH, PCO2, HCO3, POCSATURATED, BE, TOTALHB, COHB, METHB, O2HB, POCFIO2, PO2 in the last 48 hours.  Bilirubin:   Recent Labs   Lab 03/18/22 0334 03/19/22  0359   BILITOT 0.5 0.4       Blood Culture:   Recent Labs   Lab 03/18/22  0404   LABBLOO No Growth to date  No Growth to date     CBC:   Recent Labs   Lab 03/18/22 0334 03/19/22  0359   WBC 28.02* 12.63   HGB 13.1 11.0*   HCT 44.2 34.0*   * 234       CMP:   Recent Labs   Lab 03/18/22 0334 03/18/22  0623 03/18/22 2001 03/19/22  0014 03/19/22  0359   *   < > 134* 133* 132*   K 6.1*   < > 4.0 3.8 3.5   CL 96   < > 110 109 108   CO2 <5*   < > 7* 11* 13*   *   < > 252* 317* 271*   BUN 19   < > 11 9 8   CREATININE 2.1*   < > 1.2 1.2 1.0   CALCIUM 9.6   < > 8.7 8.7 8.8   PROT 9.0*  --   --   --  6.6   ALBUMIN 4.2  --   --   --  3.1*   BILITOT 0.5  --   --   --  0.4   ALKPHOS 97  --   --   --  62   AST 18  --   --   --  17   ALT 14  --   --   --  9*   ANIONGAP Unable to calculate   < > 17* 13 11   EGFRNONAA 28*   < > 56* 56* >60    < > = values in this interval not displayed.       Cardiac Markers: No results for input(s): CKMB, MYOGLOBIN, BNP, TROPISTAT in the last 48 hours.  Lactic Acid:   Recent Labs   Lab 03/18/22  0404 03/18/22  0754 03/18/22  1203   LACTATE 2.7* 1.8 0.9       Lipase: No results for input(s): LIPASE in the last 48 hours.  Lipid Panel: No results for  input(s): CHOL, HDL, LDLCALC, TRIG, CHOLHDL in the last 48 hours.  Magnesium: No results for input(s): MG in the last 48 hours.  Troponin: No results for input(s): TROPONINI in the last 48 hours.  TSH: No results for input(s): TSH in the last 4320 hours.  Urine Culture: No results for input(s): LABURIN in the last 48 hours.  Urine Studies:   Recent Labs   Lab 03/18/22  0558   COLORU Yellow   APPEARANCEUA Clear   PHUR 5.0   SPECGRAV 1.025   PROTEINUA 2+*   GLUCUA 2+*   KETONESU 3+*   BILIRUBINUA 1+*   OCCULTUA 2+*   NITRITE Negative   UROBILINOGEN Negative   LEUKOCYTESUR Negative   RBCUA 2   WBCUA 0   BACTERIA None   HYALINECASTS 0         Significant Imaging: I have reviewed all pertinent imaging results/findings within the past 24 hours.

## 2022-03-19 NOTE — NURSING
Dr Medina called, updated provider as requested on status, plan is to transfer to med/surg unit.    Called and updated House Supervisor on status (DERRICK Riley)

## 2022-03-19 NOTE — PROGRESS NOTES
2031- Adriel from [facility] called about patient's critical value of C02 being 7. Dr. Medina was called and notified.  Dr. Medina gave a verbal order to increase patient's IV fluid rate from 75ml/hr to 125ml/hr. Monitoring continues.

## 2022-03-19 NOTE — EICU
Rounding (Video Assessment):  Video rounding completed.  Pt awake & talking.  No c/o's.  Infusing D51/2NS at 125 cc/hr & Insulin gtt at 2.3 cc/hr.  B/P 136/65, , RR 17, POx 99%.

## 2022-03-20 VITALS
BODY MASS INDEX: 29.83 KG/M2 | OXYGEN SATURATION: 98 % | HEART RATE: 105 BPM | SYSTOLIC BLOOD PRESSURE: 134 MMHG | WEIGHT: 190.06 LBS | HEIGHT: 67 IN | TEMPERATURE: 97 F | RESPIRATION RATE: 17 BRPM | DIASTOLIC BLOOD PRESSURE: 66 MMHG

## 2022-03-20 LAB
ANION GAP SERPL CALC-SCNC: 12 MMOL/L (ref 8–16)
BUN SERPL-MCNC: 6 MG/DL (ref 6–20)
CALCIUM SERPL-MCNC: 9 MG/DL (ref 8.7–10.5)
CHLORIDE SERPL-SCNC: 101 MMOL/L (ref 95–110)
CO2 SERPL-SCNC: 19 MMOL/L (ref 23–29)
CREAT SERPL-MCNC: 0.9 MG/DL (ref 0.5–1.4)
EST. GFR  (AFRICAN AMERICAN): >60 ML/MIN/1.73 M^2
EST. GFR  (NON AFRICAN AMERICAN): >60 ML/MIN/1.73 M^2
GLUCOSE SERPL-MCNC: 336 MG/DL (ref 70–110)
POCT GLUCOSE: 311 MG/DL (ref 70–110)
POTASSIUM SERPL-SCNC: 3.6 MMOL/L (ref 3.5–5.1)
SODIUM SERPL-SCNC: 132 MMOL/L (ref 136–145)

## 2022-03-20 PROCEDURE — 25000003 PHARM REV CODE 250: Performed by: FAMILY MEDICINE

## 2022-03-20 PROCEDURE — 36415 COLL VENOUS BLD VENIPUNCTURE: CPT | Performed by: FAMILY MEDICINE

## 2022-03-20 PROCEDURE — 80048 BASIC METABOLIC PNL TOTAL CA: CPT | Performed by: FAMILY MEDICINE

## 2022-03-20 PROCEDURE — 94760 N-INVAS EAR/PLS OXIMETRY 1: CPT

## 2022-03-20 PROCEDURE — 99238 HOSP IP/OBS DSCHRG MGMT 30/<: CPT | Mod: ,,, | Performed by: FAMILY MEDICINE

## 2022-03-20 PROCEDURE — 99238 PR HOSPITAL DISCHARGE DAY,<30 MIN: ICD-10-PCS | Mod: ,,, | Performed by: FAMILY MEDICINE

## 2022-03-20 RX ORDER — LISINOPRIL 5 MG/1
5 TABLET ORAL DAILY
Qty: 90 TABLET | Refills: 3 | Status: SHIPPED | OUTPATIENT
Start: 2022-03-21 | End: 2023-01-03

## 2022-03-20 RX ORDER — CITALOPRAM 10 MG/1
10 TABLET ORAL DAILY
Qty: 30 TABLET | Refills: 11 | Status: SHIPPED | OUTPATIENT
Start: 2022-03-21 | End: 2023-04-12 | Stop reason: SDUPTHER

## 2022-03-20 RX ORDER — BUSPIRONE HYDROCHLORIDE 5 MG/1
5 TABLET ORAL 2 TIMES DAILY PRN
Qty: 60 TABLET | Refills: 1 | Status: SHIPPED | OUTPATIENT
Start: 2022-03-20 | End: 2023-03-06

## 2022-03-20 RX ADMIN — LISINOPRIL 5 MG: 2.5 TABLET ORAL at 08:03

## 2022-03-20 RX ADMIN — POTASSIUM CHLORIDE 40 MEQ: 1500 TABLET, EXTENDED RELEASE ORAL at 08:03

## 2022-03-20 RX ADMIN — INSULIN ASPART 5 UNITS: 100 INJECTION, SOLUTION INTRAVENOUS; SUBCUTANEOUS at 07:03

## 2022-03-20 RX ADMIN — CITALOPRAM HYDROBROMIDE 10 MG: 10 TABLET ORAL at 08:03

## 2022-03-20 RX ADMIN — INSULIN ASPART 4 UNITS: 100 INJECTION, SOLUTION INTRAVENOUS; SUBCUTANEOUS at 07:03

## 2022-03-20 RX ADMIN — HUMAN INSULIN 20 UNITS: 100 INJECTION, SUSPENSION SUBCUTANEOUS at 07:03

## 2022-03-20 NOTE — PROGRESS NOTES
[place] - [facility] ([facility])  [facility] Medicine  Progress Note    Patient Name: Cira Keys  MRN: [id number]  Patient Class: IP- Inpatient   Admission Date: 3/18/2022  Length of Stay: 2 days  Attending Physician: Lopez Pride MD  Primary Care Provider: Krysta Mercado MD        Subjective:     Principal Problem:DKA (diabetic ketoacidosis)        HPI:  Cira Keys is a 41 y.o. female with known GERD, LOVE,  DM type 1: LAST A1C was  8.4%; multiple admissions for DKA. Last DKA admission September 2021  associated with heavy alcohol abuse. She notes she has not had alcohol since then. Was recently caring for grandchildren when she developed nausea and vomiting two days ago. She reports 2 of her grandchildren recently had stomach bug and felt she had the same. However after once day the diarrhea stopped and she continued with worsening vomiting and vision changes that were more similar to her usual DKA symptoms, prompting  her to come to ED. She also notes SOB   + C/o significant anxiety and requesting Rx to help her stop smoking. She is motivated to quit for her grandchildren.         Overview/Hospital Course:  After bicarb and continued insulin drip, co2 improved. Gap slowly closing. Nausea and vomiting are improving. She was able to tolerate clear liquids this am. Urine output is good. HR improving.     3/20 patient's gap closed. Vision returned to normal. Eating well for lunch. Stepped down to floor. Blood sugars rising during the day, but tolerated nph at night.           Review of Systems   Constitutional:  Negative for chills and fever.   HENT:  Negative for congestion, ear pain, postnasal drip, rhinorrhea, sore throat and trouble swallowing.    Eyes:  Negative for redness, itching and visual disturbance.   Respiratory:  Negative for cough, shortness of breath and wheezing.    Cardiovascular:  Negative for chest pain and palpitations.   Gastrointestinal:  Negative for abdominal pain, diarrhea, nausea  and vomiting.   Genitourinary:  Negative for dysuria and frequency.   Skin:  Negative for rash.   Neurological:  Negative for weakness and headaches.   Psychiatric/Behavioral:  Positive for dysphoric mood. Negative for decreased concentration.    Objective:     Vital Signs (Most Recent):  Temp: 96.5 °F (35.8 °C) (03/20/22 0743)  Pulse: 89 (03/20/22 0743)  Resp: 17 (03/20/22 0743)  BP: 134/66 (03/20/22 0743)  SpO2: 98 % (03/20/22 0743)   Vital Signs (24h Range):  Temp:  [96.3 °F (35.7 °C)-98.2 °F (36.8 °C)] 96.5 °F (35.8 °C)  Pulse:  [] 89  Resp:  [12-24] 17  SpO2:  [96 %-100 %] 98 %  BP: (103-138)/(49-79) 134/66     Weight: 86.2 kg (190 lb 0.6 oz)  Body mass index is 29.76 kg/m².    Physical Exam  Vitals and nursing note reviewed.   Constitutional:       General: She is not in acute distress.     Appearance: She is well-developed.   HENT:      Head: Normocephalic and atraumatic.   Eyes:      Conjunctiva/sclera: Conjunctivae normal.      Pupils: Pupils are equal, round, and reactive to light.   Neck:      Thyroid: No thyromegaly.   Cardiovascular:      Rate and Rhythm: Normal rate and regular rhythm.      Heart sounds: Normal heart sounds.   Pulmonary:      Effort: Pulmonary effort is normal. No respiratory distress.      Breath sounds: Normal breath sounds. No wheezing.   Abdominal:      General: Bowel sounds are normal.      Palpations: Abdomen is soft.      Tenderness: There is no abdominal tenderness.   Musculoskeletal:         General: Normal range of motion.      Cervical back: Normal range of motion and neck supple.   Lymphadenopathy:      Cervical: No cervical adenopathy.   Skin:     General: Skin is warm and dry.      Findings: No rash.   Neurological:      Mental Status: She is alert and oriented to person, place, and time.   Psychiatric:         Behavior: Behavior normal.         CRANIAL NERVES     CN III, IV, VI   Pupils are equal, round, and reactive to light.     Significant Labs: All pertinent  labs within the past 24 hours have been reviewed.  A1C:   Recent Labs   Lab 03/18/22  0334   HGBA1C 9.3*     ABGs: No results for input(s): PH, PCO2, HCO3, POCSATURATED, BE, TOTALHB, COHB, METHB, O2HB, POCFIO2, PO2 in the last 48 hours.  Bilirubin:   Recent Labs   Lab 03/18/22  0334 03/19/22  0359   BILITOT 0.5 0.4     Blood Culture: No results for input(s): LABBLOO in the last 48 hours.    CBC:   Recent Labs   Lab 03/19/22  0359   WBC 12.63   HGB 11.0*   HCT 34.0*        CMP:   Recent Labs   Lab 03/19/22  0359 03/19/22  0807 03/20/22  0555   * 134* 132*   K 3.5 3.4* 3.6    108 101   CO2 13* 15* 19*   * 203* 336*   BUN 8 7 6   CREATININE 1.0 1.0 0.9   CALCIUM 8.8 9.1 9.0   PROT 6.6  --   --    ALBUMIN 3.1*  --   --    BILITOT 0.4  --   --    ALKPHOS 62  --   --    AST 17  --   --    ALT 9*  --   --    ANIONGAP 11 11 12   EGFRNONAA >60 >60 >60     Cardiac Markers: No results for input(s): CKMB, MYOGLOBIN, BNP, TROPISTAT in the last 48 hours.  Lactic Acid:   Recent Labs   Lab 03/18/22  1203   LACTATE 0.9     Lipase: No results for input(s): LIPASE in the last 48 hours.  Lipid Panel: No results for input(s): CHOL, HDL, LDLCALC, TRIG, CHOLHDL in the last 48 hours.  Magnesium: No results for input(s): MG in the last 48 hours.  Troponin: No results for input(s): TROPONINI in the last 48 hours.  TSH: No results for input(s): TSH in the last 4320 hours.  Urine Culture: No results for input(s): LABURIN in the last 48 hours.  Urine Studies:   No results for input(s): COLORU, APPEARANCEUA, PHUR, SPECGRAV, PROTEINUA, GLUCUA, KETONESU, BILIRUBINUA, OCCULTUA, NITRITE, UROBILINOGEN, LEUKOCYTESUR, RBCUA, WBCUA, BACTERIA, SQUAMEPITHEL, HYALINECASTS in the last 48 hours.    Invalid input(s): TANJA    Significant Imaging: I have reviewed all pertinent imaging results/findings within the past 24 hours.      Assessment/Plan:      * DKA (diabetic ketoacidoses)  Significant DKA.  Insulin drip, NS.  With  undetectable ph and co2, give bicarb and recheck ph.  Fluid replete.  Sounds like viral gastroenteritis pushed her over.  Will resume insulin once gap closes.    3/19  Will resume home meds after BMP this am as I anticipate that clinically her gap has closed.    3/20  Back up to 12 this morning, but she is eating and says she feels well and is ready to go home. May consider 70/30 in future. The cost of her meds is prohibitive. Her nausea and diarrhea are resolved.    SOB (shortness of breath)  Improved with closing her gap.      WALTER (generalized anxiety disorder)  Start celexa   buspar as needed       Alcoholic liver disease  H/o this.      Essential hypertension  In hx but no meds at home.    bp 134/66      VTE Risk Mitigation (From admission, onward)         Ordered     IP VTE LOW RISK PATIENT  Once         03/18/22 0659     Place sequential compression device  Until discontinued         03/18/22 0659                Discharge Planning   JUVENTINO:      Code Status: Full Code   Is the patient medically ready for discharge?:     Reason for patient still in hospital (select all that apply): Pending disposition  Discharge Plan A: Home, Home with family                  Manisha Medina MD  Department of Hospital Medicine   [place] - [facility] [place] Surg (3rd Fl)

## 2022-03-20 NOTE — ASSESSMENT & PLAN NOTE
Significant DKA.  Insulin drip, NS.  With undetectable ph and co2, give bicarb and recheck ph.  Fluid replete.  Sounds like viral gastroenteritis pushed her over.  Will resume insulin once gap closes.    3/19  Will resume home meds after BMP this am as I anticipate that clinically her gap has closed.    3/20  Back up to 12 this morning, but she is eating and says she feels well and is ready to go home. May consider 70/30 in future. The cost of her meds is prohibitive. Her nausea and diarrhea are resolved.   NOT well controlled. Finally this is an issue.

## 2022-03-20 NOTE — SUBJECTIVE & OBJECTIVE
Review of Systems   Constitutional:  Negative for chills and fever.   HENT:  Negative for congestion, ear pain, postnasal drip, rhinorrhea, sore throat and trouble swallowing.    Eyes:  Negative for redness, itching and visual disturbance.   Respiratory:  Negative for cough, shortness of breath and wheezing.    Cardiovascular:  Negative for chest pain and palpitations.   Gastrointestinal:  Negative for abdominal pain, diarrhea, nausea and vomiting.   Genitourinary:  Negative for dysuria and frequency.   Skin:  Negative for rash.   Neurological:  Negative for weakness and headaches.   Psychiatric/Behavioral:  Positive for dysphoric mood. Negative for decreased concentration.    Objective:     Vital Signs (Most Recent):  Temp: 96.5 °F (35.8 °C) (03/20/22 0743)  Pulse: 89 (03/20/22 0743)  Resp: 17 (03/20/22 0743)  BP: 134/66 (03/20/22 0743)  SpO2: 98 % (03/20/22 0743)   Vital Signs (24h Range):  Temp:  [96.3 °F (35.7 °C)-98.2 °F (36.8 °C)] 96.5 °F (35.8 °C)  Pulse:  [] 89  Resp:  [12-24] 17  SpO2:  [96 %-100 %] 98 %  BP: (103-138)/(49-79) 134/66     Weight: 86.2 kg (190 lb 0.6 oz)  Body mass index is 29.76 kg/m².    Physical Exam  Vitals and nursing note reviewed.   Constitutional:       General: She is not in acute distress.     Appearance: She is well-developed.   HENT:      Head: Normocephalic and atraumatic.   Eyes:      Conjunctiva/sclera: Conjunctivae normal.      Pupils: Pupils are equal, round, and reactive to light.   Neck:      Thyroid: No thyromegaly.   Cardiovascular:      Rate and Rhythm: Normal rate and regular rhythm.      Heart sounds: Normal heart sounds.   Pulmonary:      Effort: Pulmonary effort is normal. No respiratory distress.      Breath sounds: Normal breath sounds. No wheezing.   Abdominal:      General: Bowel sounds are normal.      Palpations: Abdomen is soft.      Tenderness: There is no abdominal tenderness.   Musculoskeletal:         General: Normal range of motion.       Cervical back: Normal range of motion and neck supple.   Lymphadenopathy:      Cervical: No cervical adenopathy.   Skin:     General: Skin is warm and dry.      Findings: No rash.   Neurological:      Mental Status: She is alert and oriented to person, place, and time.   Psychiatric:         Behavior: Behavior normal.         CRANIAL NERVES     CN III, IV, VI   Pupils are equal, round, and reactive to light.     Significant Labs: All pertinent labs within the past 24 hours have been reviewed.  A1C:   Recent Labs   Lab 03/18/22  0334   HGBA1C 9.3*     ABGs: No results for input(s): PH, PCO2, HCO3, POCSATURATED, BE, TOTALHB, COHB, METHB, O2HB, POCFIO2, PO2 in the last 48 hours.  Bilirubin:   Recent Labs   Lab 03/18/22  0334 03/19/22  0359   BILITOT 0.5 0.4     Blood Culture: No results for input(s): LABBLOO in the last 48 hours.    CBC:   Recent Labs   Lab 03/19/22 0359   WBC 12.63   HGB 11.0*   HCT 34.0*        CMP:   Recent Labs   Lab 03/19/22  0359 03/19/22  0807 03/20/22  0555   * 134* 132*   K 3.5 3.4* 3.6    108 101   CO2 13* 15* 19*   * 203* 336*   BUN 8 7 6   CREATININE 1.0 1.0 0.9   CALCIUM 8.8 9.1 9.0   PROT 6.6  --   --    ALBUMIN 3.1*  --   --    BILITOT 0.4  --   --    ALKPHOS 62  --   --    AST 17  --   --    ALT 9*  --   --    ANIONGAP 11 11 12   EGFRNONAA >60 >60 >60     Cardiac Markers: No results for input(s): CKMB, MYOGLOBIN, BNP, TROPISTAT in the last 48 hours.  Lactic Acid:   Recent Labs   Lab 03/18/22  1203   LACTATE 0.9     Lipase: No results for input(s): LIPASE in the last 48 hours.  Lipid Panel: No results for input(s): CHOL, HDL, LDLCALC, TRIG, CHOLHDL in the last 48 hours.  Magnesium: No results for input(s): MG in the last 48 hours.  Troponin: No results for input(s): TROPONINI in the last 48 hours.  TSH: No results for input(s): TSH in the last 4320 hours.  Urine Culture: No results for input(s): LABURIN in the last 48 hours.  Urine Studies:   No results for  input(s): COLORU, APPEARANCEUA, PHUR, SPECGRAV, PROTEINUA, GLUCUA, KETONESU, BILIRUBINUA, OCCULTUA, NITRITE, UROBILINOGEN, LEUKOCYTESUR, RBCUA, WBCUA, BACTERIA, SQUAMEPITHEL, HYALINECASTS in the last 48 hours.    Invalid input(s): TANJA    Significant Imaging: I have reviewed all pertinent imaging results/findings within the past 24 hours.

## 2022-03-20 NOTE — PLAN OF CARE
[place] - Med Surg (3rd Fl)  Discharge Final Note    Primary Care Provider: Krysta Mercado MD    Expected Discharge Date: 3/20/2022    Final Discharge Note (most recent)       Final Note - 03/20/22 0936          Final Note    Assessment Type Final Discharge Note (P)      Anticipated Discharge Disposition Home or Self Care (P)      What phone number can be called within the next 1-3 days to see how you are doing after discharge? [phone] (P)      Hospital Resources/Appts/Education Provided Appointments scheduled and added to AVS;Provided education on problems/symptoms using teachback;Provided patient/caregiver with written discharge plan information (P)         Post-Acute Status    Post-Acute Authorization Other (P)      Other Status No Post-Acute Service Needs (P)      Discharge Delays None known at this time (P)                             Contact Info       Krysta Mercado MD   Specialty: Internal Medicine   Relationship: PCP - General    [address] [place] [address]   Phone: [phone]       Next Steps: Go on 3/25/2022    Instructions: at 1:30 PM            Patient discharged today. Patient discharged before CM was able to make follow up appointment. CM contacted patient via phone to discuss date and time for appt. CM was able to make hospital follow up for date and time above, and patient states she fine with it.

## 2022-03-20 NOTE — DISCHARGE SUMMARY
[facility] Surg ([facility]  [facility] Medicine  Discharge Summary      Patient Name: Cira Keys  MRN: [id number]  Patient Class: IP- Inpatient  Admission Date: 3/18/2022  Hospital Length of Stay: 2 days  Discharge Date and Time:  03/20/2022 8:22 AM  Attending Physician: Lopez Pride MD   Discharging Provider: Manisha Medina MD  Primary Care Provider: Krysta Mercado MD      HPI:   Cira Keys is a 41 y.o. female with known GERD, LOVE,  DM type 1: LAST A1C was  8.4%; multiple admissions for DKA. Last DKA admission September 2021  associated with heavy alcohol abuse. She notes she has not had alcohol since then. Was recently caring for grandchildren when she developed nausea and vomiting two days ago. She reports 2 of her grandchildren recently had stomach bug and felt she had the same. However after once day the diarrhea stopped and she continued with worsening vomiting and vision changes that were more similar to her usual DKA symptoms, prompting  her to come to ED. She also notes SOB   + C/o significant anxiety and requesting Rx to help her stop smoking. She is motivated to quit for her grandchildren.         * No surgery found *      Hospital Course:   After bicarb and continued insulin drip, co2 improved. Gap slowly closing. Nausea and vomiting are improving. She was able to tolerate clear liquids this am. Urine output is good. HR improving.     3/20 patient's gap closed. Vision returned to normal. Eating well for lunch. Stepped down to floor. Blood sugars rising during the day, but tolerated nph at night.        Goals of Care Treatment Preferences:  Code Status: Full Code      Consults:     * DKA (diabetic ketoacidoses)  Significant DKA.  Insulin drip, NS.  With undetectable ph and co2, give bicarb and recheck ph.  Fluid replete.  Sounds like viral gastroenteritis pushed her over.  Will resume insulin once gap closes.    3/19  Will resume home meds after BMP this am as I anticipate that  clinically her gap has closed.    3/20  Back up to 12 this morning, but she is eating and says she feels well and is ready to go home. May consider 70/30 in future. The cost of her meds is prohibitive. Her nausea and diarrhea are resolved.   NOT well controlled. Finally this is an issue.    WALTER (generalized anxiety disorder)  Start celexa   buspar as needed       Essential hypertension  In hx but no meds at home.    bp 134/66    Started on lisinopril again. Renal function stable. BP okay.      Final Active Diagnoses:    Diagnosis Date Noted POA    PRINCIPAL PROBLEM:  DKA (diabetic ketoacidoses) [E11.10] 09/11/2020 Yes    WALTER (generalized anxiety disorder) [F41.1] 03/18/2022 Yes    SOB (shortness of breath) [R06.02] 03/18/2022 Yes    Alcoholic liver disease [K70.9] 09/28/2020 Yes    Essential hypertension [I10] 03/22/2017 Yes      Problems Resolved During this Admission:       Discharged Condition: fair    Disposition: Home or Self Care    Follow Up:   Follow-up Information     Krysta Mercado MD Follow up in 1 week(s).    Specialty: Internal Medicine  Why: please call on monday to make appt for this week.  Contact information:  [address] [place] [id number]  [phone]                       Patient Instructions:   No discharge procedures on file.    Significant Diagnostic Studies: Labs:   BMP:   Recent Labs   Lab 03/19/22  0359 03/19/22  0807 03/20/22  0555   * 203* 336*   * 134* 132*   K 3.5 3.4* 3.6    108 101   CO2 13* 15* 19*   BUN 8 7 6   CREATININE 1.0 1.0 0.9   CALCIUM 8.8 9.1 9.0       Pending Diagnostic Studies:     None         Medications:  Reconciled Home Medications:      Medication List      START taking these medications    busPIRone 5 MG Tab  Commonly known as: BUSPAR  Take 1 tablet (5 mg total) by mouth 2 (two) times daily as needed (anxiety).     citalopram 10 MG tablet  Commonly known as: CeleXA  Take 1 tablet (10 mg total) by mouth once daily.  Start taking on:  "March 21, 2022     lisinopriL 5 MG tablet  Commonly known as: PRINIVIL,ZESTRIL  Take 1 tablet (5 mg total) by mouth once daily.  Start taking on: March 21, 2022        CHANGE how you take these medications    * insulin  unit/mL injection  Inject 15 Units into the skin every evening.  What changed: You were already taking a medication with the same name, and this prescription was added. Make sure you understand how and when to take each.     * insulin  unit/mL injection  Inject 20 Units into the skin before breakfast.  Start taking on: March 21, 2022  What changed:   · how much to take  · how to take this  · when to take this  · additional instructions         * This list has 2 medication(s) that are the same as other medications prescribed for you. Read the directions carefully, and ask your doctor or other care provider to review them with you.            CONTINUE taking these medications    esomeprazole 20 MG capsule  Commonly known as: NEXIUM  Take 20 mg by mouth once daily.     ferrous sulfate 325 mg (65 mg iron) Tab tablet  Commonly known as: FEOSOL  Take 325 mg by mouth once a week.     insulin regular 100 unit/mL injection  Inject 2-8 Units into the skin 3 (three) times daily before meals. If needed per sliding scale     loratadine 10 mg tablet  Commonly known as: CLARITIN  Take 10 mg by mouth daily as needed for Allergies.     pen needle, diabetic 32 gauge x 1/4" Ndle  Use to inject insulin 5 times a day        STOP taking these medications    insulin detemir U-100 100 unit/mL (3 mL) Inpn pen  Commonly known as: Levemir FLEXTOUCH            Indwelling Lines/Drains at time of discharge:   Lines/Drains/Airways     None                 Time spent on the discharge of patient: 25 minutes         Manisha Medina MD  Department of Hospital Medicine  [place] - [facility] Surg (3rd Fl)  "

## 2022-03-20 NOTE — NURSING
Discharge education completed with patient. All questions answered. Pt educated on new medications. Pt educated on s/s of both low and high blood sugar. All questions answered.

## 2022-03-20 NOTE — ASSESSMENT & PLAN NOTE
In hx but no meds at home.    bp 134/66    Started on lisinopril again. Renal function stable. BP okay.

## 2022-03-20 NOTE — PLAN OF CARE
[place] - Med Surg (3rd Fl)  Discharge Assessment    Primary Care Provider: Krysta Mercado MD     Discharge Assessment (most recent)     BRIEF DISCHARGE ASSESSMENT - 03/20/22 0719        Discharge Planning    Assessment Type Discharge Planning Brief Assessment     Support Systems Spouse/significant other     Equipment Currently Used at Home none     Current Living Arrangements home/apartment/condo     Patient/Family Anticipates Transition to home;home with family     Patient/Family Anticipated Services at Transition none     DME Needed Upon Discharge  none     Discharge Plan A Home;Home with family     Discharge Plan B Home with family;Home               Discharge assessment completed.  Patient does not have any  needs at this time. SW Will follow as needed.

## 2022-03-20 NOTE — ASSESSMENT & PLAN NOTE
Significant DKA.  Insulin drip, NS.  With undetectable ph and co2, give bicarb and recheck ph.  Fluid replete.  Sounds like viral gastroenteritis pushed her over.  Will resume insulin once gap closes.    3/19  Will resume home meds after BMP this am as I anticipate that clinically her gap has closed.    3/20  Back up to 12 this morning, but she is eating and says she feels well and is ready to go home. May consider 70/30 in future. The cost of her meds is prohibitive. Her nausea and diarrhea are resolved.

## 2022-03-23 LAB
BACTERIA BLD CULT: NORMAL
BACTERIA BLD CULT: NORMAL

## 2022-03-25 ENCOUNTER — OFFICE VISIT (OUTPATIENT)
Dept: INTERNAL MEDICINE | Facility: CLINIC | Age: 42
End: 2022-03-25
Payer: COMMERCIAL

## 2022-03-25 VITALS
BODY MASS INDEX: 28.16 KG/M2 | DIASTOLIC BLOOD PRESSURE: 64 MMHG | WEIGHT: 179.44 LBS | OXYGEN SATURATION: 98 % | RESPIRATION RATE: 16 BRPM | SYSTOLIC BLOOD PRESSURE: 110 MMHG | HEIGHT: 67 IN | HEART RATE: 85 BPM

## 2022-03-25 DIAGNOSIS — E10.9 TYPE 1 DIABETES MELLITUS WITHOUT COMPLICATION: ICD-10-CM

## 2022-03-25 DIAGNOSIS — I10 ESSENTIAL HYPERTENSION: ICD-10-CM

## 2022-03-25 DIAGNOSIS — F41.1 GAD (GENERALIZED ANXIETY DISORDER): ICD-10-CM

## 2022-03-25 DIAGNOSIS — Z09 HOSPITAL DISCHARGE FOLLOW-UP: Primary | ICD-10-CM

## 2022-03-25 DIAGNOSIS — Z12.31 BREAST CANCER SCREENING BY MAMMOGRAM: ICD-10-CM

## 2022-03-25 DIAGNOSIS — Z12.4 CERVICAL CANCER SCREENING: ICD-10-CM

## 2022-03-25 PROBLEM — M25.512 LEFT SHOULDER PAIN: Status: RESOLVED | Noted: 2021-10-28 | Resolved: 2022-03-25

## 2022-03-25 PROBLEM — B37.2 SKIN YEAST INFECTION: Status: RESOLVED | Noted: 2020-01-29 | Resolved: 2022-03-25

## 2022-03-25 PROBLEM — E11.10 DKA (DIABETIC KETOACIDOSIS): Status: RESOLVED | Noted: 2020-09-11 | Resolved: 2022-03-25

## 2022-03-25 PROBLEM — Z01.419 WELL WOMAN EXAM WITH ROUTINE GYNECOLOGICAL EXAM: Status: RESOLVED | Noted: 2020-01-29 | Resolved: 2022-03-25

## 2022-03-25 PROBLEM — M25.612 SHOULDER STIFFNESS, LEFT: Status: RESOLVED | Noted: 2021-10-28 | Resolved: 2022-03-25

## 2022-03-25 PROBLEM — L08.9 INFECTION OF INDEX FINGER: Status: RESOLVED | Noted: 2017-10-23 | Resolved: 2022-03-25

## 2022-03-25 PROBLEM — R06.02 SOB (SHORTNESS OF BREATH): Status: RESOLVED | Noted: 2022-03-18 | Resolved: 2022-03-25

## 2022-03-25 PROCEDURE — 99214 OFFICE O/P EST MOD 30 MIN: CPT | Mod: S$GLB,,, | Performed by: INTERNAL MEDICINE

## 2022-03-25 PROCEDURE — 99999 PR PBB SHADOW E&M-EST. PATIENT-LVL V: CPT | Mod: PBBFAC,,, | Performed by: INTERNAL MEDICINE

## 2022-03-25 PROCEDURE — 99999 PR PBB SHADOW E&M-EST. PATIENT-LVL V: ICD-10-PCS | Mod: PBBFAC,,, | Performed by: INTERNAL MEDICINE

## 2022-03-25 PROCEDURE — 99214 PR OFFICE/OUTPT VISIT, EST, LEVL IV, 30-39 MIN: ICD-10-PCS | Mod: S$GLB,,, | Performed by: INTERNAL MEDICINE

## 2022-03-25 NOTE — PROGRESS NOTES
Subjective:       Patient ID: Cira Keys is a 41 y.o. female.    Chief Complaint: Hospital Follow Up      HPI:  Cira Keys is a 41 y.o. female with known GERD, LOVE,  DM type 1 and multiple admissions for DKA. Last DKA admission September 2021  associated with heavy alcohol abuse. She was admitted 3/2022 for DKA. She reports 2 of her grandchildren recently had stomach bug and felt she had the same. However after once day the diarrhea stopped and she continued with worsening vomiting and vision changes that were more similar to her usual DKA symptoms, prompting  her to come to ED. She also notes SOB   + C/o significant anxiety and requesting Rx to help her stop smoking. She is motivated to quit for her grandchildren. Treated with insulin gtt, gap closed and transitioned back to insulin SQ; discharged with glucose in 200s.   Of note, she has not seen me in almost 1 year for diabetes management.     Since discharge she is taking 20 units QAM and 15 units QPM. Home blood sugars are 200-300.     Her significant other diagnosed with cancer so she was smoking and drinking again prior to admission. She was started on Celexa 10mg and  buspar 5mg BID at discharge and she reports this helps a lot and she completely stopped EtOH and cigerettes.     She was restarted on lisinopril for renal protection. BP is stable.     Past Medical History:   Diagnosis Date    E coli bacteremia 2011    GERD (gastroesophageal reflux disease)     Iron deficiency anemia due to chronic blood loss 10/31/2015    Type 1 diabetes mellitus without complication 10/31/2015       Family History   Problem Relation Age of Onset    Hyperlipidemia Mother     Hypertension Father     No Known Problems Sister     No Known Problems Brother     Breast cancer Neg Hx     Colon cancer Neg Hx     Ovarian cancer Neg Hx        Social History     Socioeconomic History    Marital status:    Tobacco Use    Smoking status: Current Every Day Smoker  "    Packs/day: 1.00     Start date: 12/1/2021    Smokeless tobacco: Never Used   Substance and Sexual Activity    Alcohol use: Not Currently    Drug use: No     Comment: "20-30 years ago drug use"    Sexual activity: Yes     Partners: Male     Birth control/protection: See Surgical Hx     Comment:        Review of Systems   Constitutional: Negative for activity change, fatigue, fever and unexpected weight change.   HENT: Negative for congestion, ear pain, hearing loss, rhinorrhea, sore throat and tinnitus.    Eyes: Negative for pain, redness and visual disturbance.   Respiratory: Negative for cough, shortness of breath and wheezing.    Cardiovascular: Negative for chest pain, palpitations and leg swelling.   Gastrointestinal: Negative for abdominal pain, blood in stool, constipation, diarrhea, nausea and vomiting.   Genitourinary: Negative for dysuria, frequency, pelvic pain and urgency.   Musculoskeletal: Negative for back pain, joint swelling and neck pain.   Skin: Negative for color change, rash and wound.   Neurological: Negative for dizziness, tremors, weakness, light-headedness and headaches.         Objective:      Physical Exam  Vitals reviewed.   Constitutional:       General: She is not in acute distress.     Appearance: She is well-developed.   HENT:      Head: Normocephalic and atraumatic.      Right Ear: External ear normal.      Left Ear: External ear normal.      Nose: Nose normal.   Eyes:      General:         Right eye: No discharge.         Left eye: No discharge.      Extraocular Movements: Extraocular movements intact.      Conjunctiva/sclera: Conjunctivae normal.      Pupils: Pupils are equal, round, and reactive to light.   Neck:      Thyroid: No thyromegaly.   Cardiovascular:      Rate and Rhythm: Normal rate and regular rhythm.      Heart sounds: No murmur heard.  Pulmonary:      Effort: Pulmonary effort is normal. No respiratory distress.      Breath sounds: Normal breath sounds. " "No wheezing or rales.   Abdominal:      General: Bowel sounds are normal. There is no distension.      Palpations: Abdomen is soft.      Tenderness: There is no abdominal tenderness.   Skin:     General: Skin is warm and dry.   Neurological:      Mental Status: She is alert and oriented to person, place, and time.      Cranial Nerves: No cranial nerve deficit.   Psychiatric:         Behavior: Behavior normal.         Thought Content: Thought content normal.         Assessment:       1. Hospital discharge follow-up    2. Type 1 diabetes mellitus without complication    3. Breast cancer screening by mammogram    4. Cervical cancer screening    5. WALTER (generalized anxiety disorder)    6. Essential hypertension        Plan:       Cira was seen today for hospital follow up.    Diagnoses and all orders for this visit:    Hospital discharge follow-up  meds reconciled  Problem list reviewed and updated  Discharge summary, labs reviewed personally    Type 1 diabetes mellitus without complication  -     Ambulatory referral/consult to Endocrinology; Future  -     CBC Auto Differential; Future  -     Comprehensive Metabolic Panel; Future  -     Hemoglobin A1C; Future  -     Lipid Panel; Future  -     Microalbumin/Creatinine Ratio, Urine; Future  Chronic uncontrolled  She is on NPH currently--states she was having difficulty getting insurance coverage for other insulins  Will get list of covered medications from her insurance co to better guide treatment by Monday  Cont same for now pending this  However, she is also not compliant with follow ups, meds, diet, etc  Referred to endo for assistance with me. Maybe better with pump??? Continuous glucose monitoring???  She reports she is finally motivated to get on track "for my grandkids"    Breast cancer screening by mammogram  -     Mammo Digital Screening Bilat w/ Isael; Future    Cervical cancer screening  -     Ambulatory referral/consult to Obstetrics / Gynecology; " Future    WALTER (generalized anxiety disorder)  Chronic stable  Cont celexa and buspar same dose for now    Essential hypertension  Chronic stable  Cont lisinopril 5mg--started for renal protective effect  Low Na diet  Exercise, weight loss  Check BP and keep log for next visit      RTC 3 months with labs and PRN pending endo eval

## 2022-04-04 ENCOUNTER — HOSPITAL ENCOUNTER (OUTPATIENT)
Dept: RADIOLOGY | Facility: HOSPITAL | Age: 42
Discharge: HOME | End: 2022-04-04
Attending: INTERNAL MEDICINE
Payer: COMMERCIAL

## 2022-04-04 ENCOUNTER — PATIENT MESSAGE (OUTPATIENT)
Dept: ADMINISTRATIVE | Facility: HOSPITAL | Age: 42
End: 2022-04-04
Payer: COMMERCIAL

## 2022-04-04 DIAGNOSIS — Z12.31 BREAST CANCER SCREENING BY MAMMOGRAM: ICD-10-CM

## 2022-06-08 ENCOUNTER — PATIENT OUTREACH (OUTPATIENT)
Dept: ADMINISTRATIVE | Facility: HOSPITAL | Age: 42
End: 2022-06-08
Payer: COMMERCIAL

## 2022-06-21 ENCOUNTER — PATIENT OUTREACH (OUTPATIENT)
Dept: ADMINISTRATIVE | Facility: HOSPITAL | Age: 42
End: 2022-06-21
Payer: COMMERCIAL

## 2022-06-21 NOTE — PROGRESS NOTES
Chart reviewed, immunization record updated.  No new results noted on [facility](unable to complete request at this time) or prollie web site.  Care Everywhere updated.   Patient care coordination note and upcoming PCP visit updated.  Patient has scheduled PCP visit on 6/24/2022.  Attempted to contact patient to discuss: Cervical Cancer Screening, scheduling MMG and scheduling for HBA1C and Urine Micro albumin.

## 2022-07-11 ENCOUNTER — PATIENT MESSAGE (OUTPATIENT)
Dept: ADMINISTRATIVE | Facility: HOSPITAL | Age: 42
End: 2022-07-11
Payer: COMMERCIAL

## 2022-07-14 ENCOUNTER — PATIENT OUTREACH (OUTPATIENT)
Dept: ADMINISTRATIVE | Facility: HOSPITAL | Age: 42
End: 2022-07-14
Payer: COMMERCIAL

## 2022-08-22 ENCOUNTER — PATIENT OUTREACH (OUTPATIENT)
Dept: ADMINISTRATIVE | Facility: HOSPITAL | Age: 42
End: 2022-08-22
Payer: COMMERCIAL

## 2022-08-22 NOTE — PROGRESS NOTES
Chart reviewed, immunization record updated.  No new results noted on [facility] or [facility] web site.  Care Everywhere updated, no new records noted.  Patient care coordination note updated.  LOV with PCP 3/25/2022.  Left detailed message for patient to return call to discuss scheduling routine DM lab visit/PCP visit, MMG and Cervical Cancer Screening and to discuss if up to date on eye exam.

## 2022-08-23 PROBLEM — R11.2 NAUSEA AND VOMITING: Status: ACTIVE | Noted: 2022-08-23

## 2022-08-23 PROBLEM — R82.90 ABNORMAL URINALYSIS: Status: ACTIVE | Noted: 2022-08-23

## 2022-08-23 PROBLEM — R19.7 NAUSEA VOMITING AND DIARRHEA: Status: ACTIVE | Noted: 2022-08-23

## 2022-08-27 ENCOUNTER — HOSPITAL ENCOUNTER (EMERGENCY)
Facility: HOSPITAL | Age: 42
Discharge: HOME | End: 2022-08-27
Attending: SURGERY
Payer: COMMERCIAL

## 2022-08-27 VITALS
OXYGEN SATURATION: 99 % | WEIGHT: 153.69 LBS | BODY MASS INDEX: 24.07 KG/M2 | SYSTOLIC BLOOD PRESSURE: 164 MMHG | HEART RATE: 100 BPM | RESPIRATION RATE: 20 BRPM | TEMPERATURE: 99 F | DIASTOLIC BLOOD PRESSURE: 73 MMHG

## 2022-08-27 DIAGNOSIS — R73.9 HYPERGLYCEMIA: ICD-10-CM

## 2022-08-27 DIAGNOSIS — R11.2 NON-INTRACTABLE VOMITING WITH NAUSEA, UNSPECIFIED VOMITING TYPE: ICD-10-CM

## 2022-08-27 DIAGNOSIS — K29.70 GASTRITIS, PRESENCE OF BLEEDING UNSPECIFIED, UNSPECIFIED CHRONICITY, UNSPECIFIED GASTRITIS TYPE: Primary | ICD-10-CM

## 2022-08-27 DIAGNOSIS — R10.13 EPIGASTRIC PAIN: ICD-10-CM

## 2022-08-27 DIAGNOSIS — E87.6 HYPOKALEMIA: ICD-10-CM

## 2022-08-27 LAB
ALBUMIN SERPL BCP-MCNC: 4 G/DL (ref 3.5–5.2)
ALP SERPL-CCNC: 76 U/L (ref 55–135)
ALT SERPL W/O P-5'-P-CCNC: 16 U/L (ref 10–44)
AMPHET+METHAMPHET UR QL: NEGATIVE
ANION GAP SERPL CALC-SCNC: 19 MMOL/L (ref 8–16)
AST SERPL-CCNC: 21 U/L (ref 10–40)
B-HCG UR QL: NEGATIVE
B-OH-BUTYR BLD STRIP-SCNC: 4.7 MMOL/L (ref 0–0.5)
BARBITURATES UR QL SCN>200 NG/ML: NEGATIVE
BASOPHILS # BLD AUTO: 0.05 K/UL (ref 0–0.2)
BASOPHILS NFR BLD: 0.6 % (ref 0–1.9)
BENZODIAZ UR QL SCN>200 NG/ML: NEGATIVE
BILIRUB SERPL-MCNC: 1.1 MG/DL (ref 0.1–1)
BILIRUB UR QL STRIP: ABNORMAL
BUN SERPL-MCNC: 9 MG/DL (ref 6–20)
BZE UR QL SCN: NEGATIVE
CALCIUM SERPL-MCNC: 9.5 MG/DL (ref 8.7–10.5)
CANNABINOIDS UR QL SCN: ABNORMAL
CHLORIDE SERPL-SCNC: 94 MMOL/L (ref 95–110)
CLARITY UR: CLEAR
CO2 SERPL-SCNC: 18 MMOL/L (ref 23–29)
COLOR UR: YELLOW
CREAT SERPL-MCNC: 0.8 MG/DL (ref 0.5–1.4)
CREAT UR-MCNC: 120.2 MG/DL (ref 15–325)
DIFFERENTIAL METHOD: ABNORMAL
EOSINOPHIL # BLD AUTO: 0 K/UL (ref 0–0.5)
EOSINOPHIL NFR BLD: 0.5 % (ref 0–8)
ERYTHROCYTE [DISTWIDTH] IN BLOOD BY AUTOMATED COUNT: 12.3 % (ref 11.5–14.5)
EST. GFR  (NO RACE VARIABLE): >60 ML/MIN/1.73 M^2
GLUCOSE SERPL-MCNC: 327 MG/DL (ref 70–110)
GLUCOSE UR QL STRIP: ABNORMAL
HCT VFR BLD AUTO: 43.8 % (ref 37–48.5)
HGB BLD-MCNC: 15.9 G/DL (ref 12–16)
HGB UR QL STRIP: NEGATIVE
IMM GRANULOCYTES # BLD AUTO: 0.04 K/UL (ref 0–0.04)
IMM GRANULOCYTES NFR BLD AUTO: 0.5 % (ref 0–0.5)
KETONES UR QL STRIP: ABNORMAL
LEUKOCYTE ESTERASE UR QL STRIP: NEGATIVE
LYMPHOCYTES # BLD AUTO: 1.6 K/UL (ref 1–4.8)
LYMPHOCYTES NFR BLD: 19.4 % (ref 18–48)
MAGNESIUM SERPL-MCNC: 1.7 MG/DL (ref 1.6–2.6)
MCH RBC QN AUTO: 31.4 PG (ref 27–31)
MCHC RBC AUTO-ENTMCNC: 36.3 G/DL (ref 32–36)
MCV RBC AUTO: 86 FL (ref 82–98)
METHADONE UR QL SCN>300 NG/ML: NEGATIVE
MONOCYTES # BLD AUTO: 0.9 K/UL (ref 0.3–1)
MONOCYTES NFR BLD: 11 % (ref 4–15)
NEUTROPHILS # BLD AUTO: 5.5 K/UL (ref 1.8–7.7)
NEUTROPHILS NFR BLD: 68 % (ref 38–73)
NITRITE UR QL STRIP: NEGATIVE
NRBC BLD-RTO: 0 /100 WBC
OPIATES UR QL SCN: NEGATIVE
PCP UR QL SCN>25 NG/ML: NEGATIVE
PH UR STRIP: 6 [PH] (ref 5–8)
PLATELET # BLD AUTO: 342 K/UL (ref 150–450)
PMV BLD AUTO: 9.5 FL (ref 9.2–12.9)
POC PH VENOUS: 7.61
POCT GLUCOSE: 204 MG/DL (ref 70–110)
POCT GLUCOSE: 309 MG/DL (ref 70–110)
POTASSIUM SERPL-SCNC: 3 MMOL/L (ref 3.5–5.1)
PROT SERPL-MCNC: 7.6 G/DL (ref 6–8.4)
PROT UR QL STRIP: NEGATIVE
RBC # BLD AUTO: 5.07 M/UL (ref 4–5.4)
SODIUM SERPL-SCNC: 131 MMOL/L (ref 136–145)
SP GR UR STRIP: 1.02 (ref 1–1.03)
TOXICOLOGY INFORMATION: ABNORMAL
TROPONIN I SERPL DL<=0.01 NG/ML-MCNC: 0.01 NG/ML (ref 0–0.03)
URN SPEC COLLECT METH UR: ABNORMAL
UROBILINOGEN UR STRIP-ACNC: NEGATIVE EU/DL
WBC # BLD AUTO: 8.15 K/UL (ref 3.9–12.7)

## 2022-08-27 PROCEDURE — 81003 URINALYSIS AUTO W/O SCOPE: CPT | Mod: 59 | Performed by: NURSE PRACTITIONER

## 2022-08-27 PROCEDURE — 96367 TX/PROPH/DG ADDL SEQ IV INF: CPT

## 2022-08-27 PROCEDURE — 82800 BLOOD PH: CPT | Performed by: NURSE PRACTITIONER

## 2022-08-27 PROCEDURE — 96366 THER/PROPH/DIAG IV INF ADDON: CPT

## 2022-08-27 PROCEDURE — 93010 EKG 12-LEAD: ICD-10-PCS | Mod: ,,, | Performed by: INTERNAL MEDICINE

## 2022-08-27 PROCEDURE — 80307 DRUG TEST PRSMV CHEM ANLYZR: CPT | Performed by: NURSE PRACTITIONER

## 2022-08-27 PROCEDURE — 83735 ASSAY OF MAGNESIUM: CPT | Performed by: NURSE PRACTITIONER

## 2022-08-27 PROCEDURE — 82010 KETONE BODYS QUAN: CPT | Performed by: NURSE PRACTITIONER

## 2022-08-27 PROCEDURE — 93005 ELECTROCARDIOGRAM TRACING: CPT

## 2022-08-27 PROCEDURE — 93010 ELECTROCARDIOGRAM REPORT: CPT | Mod: ,,, | Performed by: INTERNAL MEDICINE

## 2022-08-27 PROCEDURE — 25500020 PHARM REV CODE 255: Performed by: SURGERY

## 2022-08-27 PROCEDURE — 81025 URINE PREGNANCY TEST: CPT | Performed by: NURSE PRACTITIONER

## 2022-08-27 PROCEDURE — 63600175 PHARM REV CODE 636 W HCPCS: Performed by: NURSE PRACTITIONER

## 2022-08-27 PROCEDURE — 36415 COLL VENOUS BLD VENIPUNCTURE: CPT | Performed by: NURSE PRACTITIONER

## 2022-08-27 PROCEDURE — 99900035 HC TECH TIME PER 15 MIN (STAT)

## 2022-08-27 PROCEDURE — 96365 THER/PROPH/DIAG IV INF INIT: CPT

## 2022-08-27 PROCEDURE — 96361 HYDRATE IV INFUSION ADD-ON: CPT

## 2022-08-27 PROCEDURE — 84484 ASSAY OF TROPONIN QUANT: CPT | Performed by: NURSE PRACTITIONER

## 2022-08-27 PROCEDURE — 85025 COMPLETE CBC W/AUTO DIFF WBC: CPT | Performed by: NURSE PRACTITIONER

## 2022-08-27 PROCEDURE — 25000003 PHARM REV CODE 250: Performed by: NURSE PRACTITIONER

## 2022-08-27 PROCEDURE — 96375 TX/PRO/DX INJ NEW DRUG ADDON: CPT

## 2022-08-27 PROCEDURE — 99285 EMERGENCY DEPT VISIT HI MDM: CPT | Mod: 25

## 2022-08-27 PROCEDURE — 80053 COMPREHEN METABOLIC PANEL: CPT | Performed by: NURSE PRACTITIONER

## 2022-08-27 RX ORDER — ONDANSETRON 2 MG/ML
4 INJECTION INTRAMUSCULAR; INTRAVENOUS
Status: COMPLETED | OUTPATIENT
Start: 2022-08-27 | End: 2022-08-27

## 2022-08-27 RX ORDER — POTASSIUM CHLORIDE 7.45 MG/ML
10 INJECTION INTRAVENOUS
Status: COMPLETED | OUTPATIENT
Start: 2022-08-27 | End: 2022-08-27

## 2022-08-27 RX ORDER — MAG HYDROX/ALUMINUM HYD/SIMETH 200-200-20
30 SUSPENSION, ORAL (FINAL DOSE FORM) ORAL ONCE
Status: COMPLETED | OUTPATIENT
Start: 2022-08-27 | End: 2022-08-27

## 2022-08-27 RX ORDER — POTASSIUM CHLORIDE 20 MEQ/1
40 TABLET, EXTENDED RELEASE ORAL
Status: DISCONTINUED | OUTPATIENT
Start: 2022-08-27 | End: 2022-08-27 | Stop reason: HOSPADM

## 2022-08-27 RX ORDER — LIDOCAINE HYDROCHLORIDE 20 MG/ML
15 SOLUTION OROPHARYNGEAL ONCE
Status: COMPLETED | OUTPATIENT
Start: 2022-08-27 | End: 2022-08-27

## 2022-08-27 RX ORDER — SUCRALFATE 1 G/1
1 TABLET ORAL
Qty: 120 TABLET | Refills: 0 | Status: ON HOLD | OUTPATIENT
Start: 2022-08-27 | End: 2023-12-28 | Stop reason: HOSPADM

## 2022-08-27 RX ADMIN — ALUMINUM HYDROXIDE, MAGNESIUM HYDROXIDE, AND SIMETHICONE 30 ML: 200; 200; 20 SUSPENSION ORAL at 02:08

## 2022-08-27 RX ADMIN — IOHEXOL 75 ML: 350 INJECTION, SOLUTION INTRAVENOUS at 03:08

## 2022-08-27 RX ADMIN — ONDANSETRON HYDROCHLORIDE 4 MG: 2 SOLUTION INTRAMUSCULAR; INTRAVENOUS at 01:08

## 2022-08-27 RX ADMIN — SODIUM CHLORIDE 1000 ML: 0.9 INJECTION, SOLUTION INTRAVENOUS at 02:08

## 2022-08-27 RX ADMIN — POTASSIUM CHLORIDE 10 MEQ: 7.46 INJECTION, SOLUTION INTRAVENOUS at 04:08

## 2022-08-27 RX ADMIN — LIDOCAINE HYDROCHLORIDE 15 ML: 20 SOLUTION ORAL; TOPICAL at 02:08

## 2022-08-27 RX ADMIN — SODIUM CHLORIDE 1000 ML: 0.9 INJECTION, SOLUTION INTRAVENOUS at 01:08

## 2022-08-27 RX ADMIN — PROMETHAZINE HYDROCHLORIDE 12.5 MG: 25 INJECTION INTRAMUSCULAR; INTRAVENOUS at 02:08

## 2022-08-27 RX ADMIN — SODIUM CHLORIDE 1000 ML: 0.9 INJECTION, SOLUTION INTRAVENOUS at 03:08

## 2022-08-27 NOTE — ED NOTES
Patient lying in bed, awake, alert, and anxious, NADN, RR even and unlabored, call bell within reach, bed in lowest position and locked. Patient denies needs at this time, family at bedside, instructed to call for needs, patient verbalized understanding

## 2022-08-27 NOTE — ED NOTES
Patient is awake, alert and agitated, NADN, RR even and unlabored, call be within reach, instructed on how to use call bell. Instructed to call for assistance and/or needs. Patient verbalized understanding. Patient denies needs or concerns at this time.

## 2022-08-27 NOTE — ED NOTES
Patient is awake, alert and calm, lying in bed, NADN, RR even and unlabored, call be within reach, instructed on how to use call bell. Instructed to call for assistance and/or needs. Patient verbalized understanding. Patient denies needs or concerns at this time.

## 2022-08-27 NOTE — ED PROVIDER NOTES
Encounter Date: 2022       History   No chief complaint on file.    Cira Keys is a 42 y.o. female with PMH of GERD, Iron deficiency anemia, DMI who presents to the ED for evaluation of vomiting and epigastric pain, nausea and vomiting.   Patient with recent dc on 22 from [facility] for intractable vomiting and elevated blood sugar. She reports feeling better after discharge and was told that she likely has gastroparesis. She started feeling bad again today with a burning pain in her upper abdomen that radiates to the right side of her abdomen and back. She denies alleviating or exacerbating factors. She currently rates pain 8/10 in severity. She reports associated nausea with vomiting that she attributes to pain. Denies loose stool. Denies fever, chills or body aches. Denies excessive use of NSAIDS. + hx of GERD and takes Nexium daily. + hx IDDM, reports that she is compliant with medication.   She notes a 12 lb wt loss due to decreased appetite and nausea; she is concerned that she may have gallbladder problems.       The history is provided by the patient.   Review of patient's allergies indicates:  No Known Allergies  Past Medical History:   Diagnosis Date    E coli bacteremia     GERD (gastroesophageal reflux disease)     Iron deficiency anemia due to chronic blood loss 10/31/2015    Type 1 diabetes mellitus without complication 10/31/2015     Past Surgical History:   Procedure Laterality Date     SECTION      TUBAL LIGATION       Family History   Problem Relation Age of Onset    Hyperlipidemia Mother     Hypertension Father     No Known Problems Sister     No Known Problems Brother     Breast cancer Neg Hx     Colon cancer Neg Hx     Ovarian cancer Neg Hx      Social History     Tobacco Use    Smoking status: Every Day     Packs/day: 1.00     Types: Cigarettes     Start date: 2021    Smokeless tobacco: Never   Substance Use Topics    Alcohol use: Not Currently    Drug use: No      "Comment: "20-30 years ago drug use"     Review of Systems   Constitutional:  Negative for activity change, chills and fever.   HENT:  Negative for congestion, ear discharge, ear pain, postnasal drip, sinus pressure, sinus pain and sore throat.    Respiratory:  Negative for cough, chest tightness and shortness of breath.    Cardiovascular:  Negative for chest pain.   Gastrointestinal:  Positive for abdominal pain, nausea and vomiting. Negative for abdominal distention.   Genitourinary:  Negative for dysuria, frequency and urgency.   Musculoskeletal:  Negative for back pain.   Skin:  Negative for rash.   Neurological:  Negative for dizziness, weakness, light-headedness and numbness.   Hematological:  Does not bruise/bleed easily.     Physical Exam     Initial Vitals [08/27/22 1306]   BP Pulse Resp Temp SpO2   (!) 172/87 (!) 118 20 98.5 °F (36.9 °C) 99 %      MAP       --         Physical Exam    Nursing note and vitals reviewed.  Constitutional: She appears well-developed and well-nourished.   HENT:   Head: Normocephalic and atraumatic.   Right Ear: Tympanic membrane, external ear and ear canal normal. Tympanic membrane is not erythematous. No middle ear effusion.   Left Ear: Tympanic membrane, external ear and ear canal normal. Tympanic membrane is not erythematous.  No middle ear effusion.   Nose: Nose normal.   Mouth/Throat: Uvula is midline, oropharynx is clear and moist and mucous membranes are normal. Mucous membranes are not pale and not dry.   Eyes: Conjunctivae and EOM are normal. Pupils are equal, round, and reactive to light.   Neck: Neck supple.   Normal range of motion.  Cardiovascular:  Normal rate, regular rhythm, normal heart sounds and intact distal pulses.           Pulmonary/Chest: Effort normal and breath sounds normal. She has no decreased breath sounds. She has no wheezes. She has no rhonchi. She has no rales.   Abdominal: Abdomen is soft. Bowel sounds are normal. There is abdominal tenderness in " the right upper quadrant and epigastric area. There is negative Sow's sign.   Musculoskeletal:         General: Normal range of motion.      Cervical back: Normal range of motion and neck supple.     Neurological: She is alert and oriented to person, place, and time. She has normal strength. She displays normal reflexes. No cranial nerve deficit or sensory deficit.   Skin: Skin is warm and dry. Capillary refill takes less than 2 seconds. No rash noted.   Psychiatric: She has a normal mood and affect. Her behavior is normal. Judgment and thought content normal.       ED Course   Procedures  Labs Reviewed   CBC W/ AUTO DIFFERENTIAL - Abnormal; Notable for the following components:       Result Value    MCH 31.4 (*)     MCHC 36.3 (*)     All other components within normal limits   COMPREHENSIVE METABOLIC PANEL - Abnormal; Notable for the following components:    Sodium 131 (*)     Potassium 3.0 (*)     Chloride 94 (*)     CO2 18 (*)     Glucose 327 (*)     Total Bilirubin 1.1 (*)     Anion Gap 19 (*)     All other components within normal limits   BETA - HYDROXYBUTYRATE, SERUM - Abnormal; Notable for the following components:    Beta-Hydroxybutyrate 4.7 (*)     All other components within normal limits   URINALYSIS, REFLEX TO URINE CULTURE - Abnormal; Notable for the following components:    Glucose, UA 2+ (*)     Ketones, UA 3+ (*)     Bilirubin (UA) 1+ (*)     All other components within normal limits    Narrative:     Specimen Source->Urine   DRUG SCREEN PANEL, URINE EMERGENCY - Abnormal; Notable for the following components:    THC Presumptive Positive (*)     All other components within normal limits    Narrative:     Specimen Source->Urine   POCT GLUCOSE - Abnormal; Notable for the following components:    POCT Glucose 309 (*)     All other components within normal limits   POCT GLUCOSE - Abnormal; Notable for the following components:    POCT Glucose 204 (*)     All other components within normal limits    PREGNANCY TEST, URINE RAPID    Narrative:     Specimen Source->Urine   MAGNESIUM   TROPONIN I   POCT GLUCOSE MONITORING CONTINUOUS   POCT GLUCOSE MONITORING CONTINUOUS          Imaging Results              US Abdomen Limited (Final result)  Result time 08/27/22 17:52:19      Final result by Kashif Jorge MD (08/27/22 17:52:19)                   Impression:      1. No acute sonographic abnormality  2. Mild hepatomegaly.  3. Contracted gallbladder with no definite focal abnormality.      Electronically signed by: Kashif Jorge  Date:    08/27/2022  Time:    17:52               Narrative:    EXAMINATION:  US ABDOMEN LIMITED    CLINICAL HISTORY:  RUQ abdominal pain;    TECHNIQUE:  Limited abdominal ultrasound.    COMPARISON:  Correlation with CT 08/27/2022    FINDINGS:  Liver: Liver is mildly enlarged measuring 18.3 cm. Homogeneous echotexture. No focal hepatic lesions.    Gallbladder: Gallbladder is mildly contracted.  No stones are detected.  No significant wall thickening is identified.  No mass is identified.    Biliary system: The common duct is not dilated, measuring 2.6 mm.  No intrahepatic ductal dilatation.    No focal abnormality of the pancreas    Miscellaneous: No upper abdominal ascites.    Right kidney measures 12.1 cm.  No stone, mass or hydronephrosis.                                        CT Abdomen Pelvis With Contrast (Final result)  Result time 08/27/22 16:08:35      Final result by Zaid Kngiht MD (08/27/22 16:08:35)                   Impression:      1. Irregularity and questionable wall thickening of the gallbladder fundus.  A gallbladder mass is not excludable.  Further evaluation with gallbladder ultrasound as deemed clinically necessary.  2. Mild circumferential wall thickening of the gastric antrum.  This may be secondary to lack of adequate distention.  Differential diagnosis includes antritis.  Further evaluation with endoscopy as deemed clinically necessary.  3. Mild  circumferential bladder wall thickening.  This may be secondary to lack of adequate distention.  Differential diagnosis includes cystitis.  Correlate clinically with UA.  4. Small right ovarian cyst.  This report was flagged in Epic as abnormal.      Electronically signed by: Zaid Knight  Date:    08/27/2022  Time:    16:08               Narrative:    EXAMINATION:  CT ABDOMEN PELVIS WITH CONTRAST    CLINICAL HISTORY:  Nausea/vomiting;Epigastric pain;    TECHNIQUE:  Low dose axial images, sagittal and coronal reformations were obtained from the lung bases to the pubic symphysis following the IV administration of 75 mL of Omnipaque 350 .  Oral contrast was not given.    COMPARISON:  None.    FINDINGS:  The liver and spleen are normal in size and attenuation.  Gallbladder is distended with irregularity and questionable wall thickening of the gallbladder fundus.  A gallbladder mass is not excludable.  No definite calcified gallstones.  The pancreas and adrenal glands are unremarkable.    Kidneys are normal in size and enhance homogeneously.  No renal calculi.  No changes of hydronephrosis.  No perinephric inflammatory change.    Air and stool throughout the loops of colon.  No mesenteric inflammatory change.  No CT evidence for bowel obstruction.  Appendix is normal in caliber.  There is mild circumferential wall thickening of the gastric antrum measuring 10 mm.    The bladder is partially distended.  Mild circumferential bladder wall thickening measuring up to 9 mm.  Uterus and ovaries are normal in size and attenuation.  There is a small cyst of the right ovary measuring 1.8 cm.  Small amount of free fluid in cul-de-sac.    No significant mesenteric or retroperitoneal lymphadenopathy.                                       Medications   potassium chloride SA CR tablet 40 mEq (40 mEq Oral Not Given 8/27/22 1530)   sodium chloride 0.9% bolus 1,000 mL (0 mLs Intravenous Stopped 8/27/22 1428)   ondansetron injection 4  mg (4 mg Intravenous Given 8/27/22 1327)   promethazine (PHENERGAN) 12.5 mg in dextrose 5 % 50 mL IVPB (0 mg Intravenous Stopped 8/27/22 1444)   sodium chloride 0.9% bolus 1,000 mL (0 mLs Intravenous Stopped 8/27/22 1533)   aluminum-magnesium hydroxide-simethicone 200-200-20 mg/5 mL suspension 30 mL (30 mLs Oral Given 8/27/22 1431)     And   LIDOcaine HCl 2% oral solution 15 mL (15 mLs Oral Given 8/27/22 1431)   sodium chloride 0.9% bolus 1,000 mL (0 mLs Intravenous Stopped 8/27/22 1640)   potassium chloride 10 mEq in 100 mL IVPB (0 mEq Intravenous Stopped 8/27/22 1822)   iohexoL (OMNIPAQUE 350) injection 75 mL (75 mLs Intravenous Given 8/27/22 1558)     Medical Decision Making:   Differential Diagnosis:   Gastroparesis, GERD, gastritis, cholecystitis, cholelithiasis, Hyperemesis canniboid syndrome, viral syndrome, DKA  Independently Interpreted Test(s):   I have ordered and independently interpreted EKG Reading(s) - see summary below       <> Summary of EKG Reading(s): NSR, rate 76. Normal MT interval. No STEMI.   When compared to EKG of 03/21', SR has replaced ST.  Clinical Tests:   Lab Tests: Ordered and Reviewed  Radiological Study: Ordered and Reviewed  ED Management:  Evaluation of 41 yo female with vomiting and abdominal pain.   She presents with stable VS.   Her physical exam reveals + epigastric and RUQ ttp with guarding.   CBG of 309.   IV fluids initiated in addition to anti-emetics. Pain is a burning sensation; GI cocktail given.   Lab kate K+ 3.0, , CO2 18, Glucose 327, GaP 19. Venous ph 7.6  No leukocytosis.   Unable to tolerate po K+. IV K+ rider given.  CT abdomen/pelvis completed to r/o cholecystitis given pain> Irregularity and questionable wall thickening of the gallbladder fundus.  A gallbladder mass is not excludable.  Further evaluation with gallbladder ultrasound as deemed clinically necessary.  2. Mild circumferential wall thickening of the gastric antrum.  This may be secondary to lack  of adequate distention.  Differential diagnosis includes antritis.  Further evaluation with endoscopy as deemed clinically necessary    US abdomen is negative.     Patient with complete resolution of pain with GI Cocktail. Symptoms likely s/t gastritis. 3 L IV fluids given. No further episodes of emesis in the ED. Repeat blood glucose 204.  Workup reviewed with patient; referral to GI placed for follow-up. Will dc with rx carafate; continue nexium. Patient/caregiver voices understanding and feels comfortable with discharge plan.      The patient acknowledges that close follow up with medical provider is required. Instructed to follow up with PCP within 2 days. Patient was given specific return precautions. The patient agrees to comply with all instruction and directions given in the ER.                      Clinical Impression:   Final diagnoses:  [R10.13] Epigastric pain  [E87.6] Hypokalemia  [K29.70] Gastritis, presence of bleeding unspecified, unspecified chronicity, unspecified gastritis type (Primary)  [R11.2] Non-intractable vomiting with nausea, unspecified vomiting type  [R73.9] Hyperglycemia        ED Disposition Condition    Discharge Stable          ED Prescriptions       Medication Sig Dispense Start Date End Date Auth. Provider    sucralfate (CARAFATE) 1 gram tablet Take 1 tablet (1 g total) by mouth 4 (four) times daily before meals and nightly. 120 tablet 8/27/2022 -- Beatriz Perez NP          Follow-up Information    None          Beatriz Perez NP  08/27/22 [id number]

## 2022-08-27 NOTE — ED TRIAGE NOTES
C/o vomiting, SOB, and pain to epigastric area radiating to right side of back. Patient reports just discharged from [facility] Friday. Patient reports sent home with Reglan, but no relief of vomiting.

## 2022-08-27 NOTE — ED NOTES
Patient lying in bed, awake, alert, and calm, NADN, RR even and unlabored, call bell within reach, bed in lowest position and locked. Patient denies needs at this time, family at bedside, instructed to call for needs, patient verbalized understanding

## 2022-08-29 ENCOUNTER — OFFICE VISIT (OUTPATIENT)
Dept: INTERNAL MEDICINE | Facility: CLINIC | Age: 42
End: 2022-08-29
Payer: COMMERCIAL

## 2022-08-29 VITALS
RESPIRATION RATE: 16 BRPM | WEIGHT: 157.19 LBS | HEART RATE: 90 BPM | HEIGHT: 67 IN | SYSTOLIC BLOOD PRESSURE: 116 MMHG | OXYGEN SATURATION: 99 % | DIASTOLIC BLOOD PRESSURE: 68 MMHG | BODY MASS INDEX: 24.67 KG/M2

## 2022-08-29 DIAGNOSIS — E10.65 TYPE 1 DIABETES MELLITUS WITH HYPERGLYCEMIA: ICD-10-CM

## 2022-08-29 DIAGNOSIS — F41.1 GAD (GENERALIZED ANXIETY DISORDER): ICD-10-CM

## 2022-08-29 DIAGNOSIS — I10 ESSENTIAL HYPERTENSION: ICD-10-CM

## 2022-08-29 DIAGNOSIS — Z09 HOSPITAL DISCHARGE FOLLOW-UP: Primary | ICD-10-CM

## 2022-08-29 DIAGNOSIS — K70.9 ALCOHOLIC LIVER DISEASE: ICD-10-CM

## 2022-08-29 DIAGNOSIS — K29.00 ACUTE GASTRITIS WITHOUT HEMORRHAGE, UNSPECIFIED GASTRITIS TYPE: ICD-10-CM

## 2022-08-29 PROCEDURE — 99214 OFFICE O/P EST MOD 30 MIN: CPT | Mod: S$GLB,,, | Performed by: INTERNAL MEDICINE

## 2022-08-29 PROCEDURE — 99214 PR OFFICE/OUTPT VISIT, EST, LEVL IV, 30-39 MIN: ICD-10-PCS | Mod: S$GLB,,, | Performed by: INTERNAL MEDICINE

## 2022-08-29 PROCEDURE — 99999 PR PBB SHADOW E&M-EST. PATIENT-LVL IV: CPT | Mod: PBBFAC,,, | Performed by: INTERNAL MEDICINE

## 2022-08-29 PROCEDURE — 99999 PR PBB SHADOW E&M-EST. PATIENT-LVL IV: ICD-10-PCS | Mod: PBBFAC,,, | Performed by: INTERNAL MEDICINE

## 2022-08-29 NOTE — PROGRESS NOTES
Subjective:       Patient ID: Cira Keys is a 42 y.o. female.    Chief Complaint: Follow-up      HPI:    Cira Keys is a 41 y.o. female with known GERD, DM type 1 and multiple admissions for DKA who presents for hospital follow up nausea, vomiting.     She was admitted to [facility] then returned to [facility] ED with continued sx after discharge. She was started on Reglan during admit but stopped taking due to side effects (abnormal arm movements). ED work up included CT showing changes of gastritis/antritis and started on PPI and carafate. Gallbladder US normal. Referred to GI and appt pending. She reports on these meds sx are improved. Eating bland diet last 2 days but feeling hungry today. No pain, nausea or vomiting reported. She is completing keflex for possible UTI from Ed as well.      Since discharge she is taking 20 units BID of NPH. Home blood sugars are better controlled on this regimen but also not eating as she normally does.    A1C 8.7% 8/2022.     Her significant other was diagnosed with cancer. She was started on Celexa 10mg and  buspar 5mg BID around that time and she reports this helps a lot and she completely stopped EtOH and cigerettes.      She was restarted on lisinopril for renal protection. BP is stable.     Past Medical History:   Diagnosis Date    E coli bacteremia 2011    GERD (gastroesophageal reflux disease)     Iron deficiency anemia due to chronic blood loss 10/31/2015    Type 1 diabetes mellitus without complication 10/31/2015       Family History   Problem Relation Age of Onset    Hyperlipidemia Mother     Hypertension Father     No Known Problems Sister     No Known Problems Brother     Breast cancer Neg Hx     Colon cancer Neg Hx     Ovarian cancer Neg Hx        Social History     Socioeconomic History    Marital status:    Tobacco Use    Smoking status: Every Day     Packs/day: 1.00     Types: Cigarettes     Start date: 12/1/2021    Smokeless tobacco: Never  "  Substance and Sexual Activity    Alcohol use: Not Currently    Drug use: No     Comment: "20-30 years ago drug use"    Sexual activity: Yes     Partners: Male     Birth control/protection: See Surgical Hx     Comment:        Review of Systems   Constitutional:  Negative for activity change, fatigue, fever and unexpected weight change.   HENT:  Negative for congestion, ear pain, hearing loss, rhinorrhea and sore throat.    Eyes:  Negative for pain, redness and visual disturbance.   Respiratory:  Negative for cough, shortness of breath and wheezing.    Cardiovascular:  Negative for chest pain, palpitations and leg swelling.   Gastrointestinal:  Negative for abdominal pain, constipation, diarrhea, nausea and vomiting.   Genitourinary:  Negative for dysuria, frequency and urgency.   Musculoskeletal:  Negative for back pain, joint swelling and neck pain.   Skin:  Negative for color change, rash and wound.   Neurological:  Negative for dizziness, tremors, weakness, light-headedness and headaches.       Objective:      Physical Exam  Vitals reviewed.   Constitutional:       General: She is not in acute distress.     Appearance: She is well-developed.   HENT:      Head: Normocephalic and atraumatic.      Right Ear: External ear normal.      Left Ear: External ear normal.      Nose: Nose normal.   Eyes:      General:         Right eye: No discharge.         Left eye: No discharge.      Extraocular Movements: Extraocular movements intact.      Conjunctiva/sclera: Conjunctivae normal.      Pupils: Pupils are equal, round, and reactive to light.   Neck:      Thyroid: No thyromegaly.   Cardiovascular:      Rate and Rhythm: Normal rate and regular rhythm.      Heart sounds: No murmur heard.  Pulmonary:      Effort: Pulmonary effort is normal. No respiratory distress.      Breath sounds: Normal breath sounds. No wheezing or rales.   Abdominal:      General: Bowel sounds are normal. There is no distension.      Palpations: " Abdomen is soft.      Tenderness: There is no abdominal tenderness.   Skin:     General: Skin is warm and dry.   Neurological:      Mental Status: She is alert and oriented to person, place, and time.      Cranial Nerves: No cranial nerve deficit.   Psychiatric:         Behavior: Behavior normal.         Thought Content: Thought content normal.       Assessment:       1. Hospital discharge follow-up    2. Essential hypertension    3. Acute gastritis without hemorrhage, unspecified gastritis type    4. Type 1 diabetes mellitus with hyperglycemia    5. WALTER (generalized anxiety disorder)    6. Alcoholic liver disease        Plan:       Cira was seen today for follow-up.    Diagnoses and all orders for this visit:    Hospital discharge follow-up  Meds reconciled  Problem list reviewed and updated  D/c summary and ED notes reviewed  ED labs and radiology reports from 8/27/22 personally reviewed and discussed with the patient    Essential hypertension  -     Hemoglobin A1C; Future  -     Comprehensive Metabolic Panel; Future  Chronic stable  Cont ACEi    Acute gastritis without hemorrhage, unspecified gastritis type  Improving  Cont PPI and PRN carafate  Ok to advance diet. Avoid caffeine, aciditic foods   Agree with GI follow up for possible EGD +/- gastric emptying study    Type 1 diabetes mellitus with hyperglycemia  -     Hemoglobin A1C; Future  -     Comprehensive Metabolic Panel; Future  Chronic uncontrolled  NPH increased to 10 units BID  Blood sugars improved but not eating regular diet yet--advance diet and see me 3 months wt A1C for diabetes follow up. Sooner if home blood sugar is elevated on regular diet    WALTER (generalized anxiety disorder)  Chronic stable  Cont meds same dose    Alcoholic liver disease  +hepatomegaly  Last saw hepatology 2020  Fibroscan OK but + fatty liver from EtOH use  Cont healthy diet, exercise, weight loss and no EtOH  She was recommended to follow up with heaptology at that time  and has not; will refer out next visit for her regular appt  LFTs have normalized    RTC 3 months with labs and PRN

## 2022-10-03 ENCOUNTER — PATIENT MESSAGE (OUTPATIENT)
Dept: ADMINISTRATIVE | Facility: HOSPITAL | Age: 42
End: 2022-10-03
Payer: COMMERCIAL

## 2022-10-04 ENCOUNTER — HOSPITAL ENCOUNTER (OUTPATIENT)
Dept: RADIOLOGY | Facility: HOSPITAL | Age: 42
Discharge: HOME | End: 2022-10-04
Attending: INTERNAL MEDICINE
Payer: COMMERCIAL

## 2022-10-04 VITALS — HEIGHT: 67 IN | BODY MASS INDEX: 24.64 KG/M2 | WEIGHT: 157 LBS

## 2022-10-04 PROCEDURE — 77063 BREAST TOMOSYNTHESIS BI: CPT | Mod: 26,,, | Performed by: RADIOLOGY

## 2022-10-04 PROCEDURE — 77063 BREAST TOMOSYNTHESIS BI: CPT | Mod: TC

## 2022-10-04 PROCEDURE — 77067 SCR MAMMO BI INCL CAD: CPT | Mod: 26,,, | Performed by: RADIOLOGY

## 2022-10-04 PROCEDURE — 77067 SCR MAMMO BI INCL CAD: CPT | Mod: TC

## 2022-10-04 PROCEDURE — 77067 MAMMO DIGITAL SCREENING BILAT WITH TOMO: ICD-10-PCS | Mod: 26,,, | Performed by: RADIOLOGY

## 2022-10-04 PROCEDURE — 77063 MAMMO DIGITAL SCREENING BILAT WITH TOMO: ICD-10-PCS | Mod: 26,,, | Performed by: RADIOLOGY

## 2022-10-21 ENCOUNTER — TELEPHONE (OUTPATIENT)
Dept: ADMINISTRATIVE | Facility: HOSPITAL | Age: 42
End: 2022-10-21
Payer: COMMERCIAL

## 2022-11-09 DIAGNOSIS — E11.9 TYPE 2 DIABETES MELLITUS WITHOUT COMPLICATION, UNSPECIFIED WHETHER LONG TERM INSULIN USE: ICD-10-CM

## 2022-11-22 ENCOUNTER — LAB VISIT (OUTPATIENT)
Dept: LAB | Facility: HOSPITAL | Age: 42
End: 2022-11-22
Attending: INTERNAL MEDICINE
Payer: COMMERCIAL

## 2022-11-22 DIAGNOSIS — I10 ESSENTIAL HYPERTENSION: ICD-10-CM

## 2022-11-22 DIAGNOSIS — E10.9 TYPE 1 DIABETES MELLITUS WITHOUT COMPLICATION: ICD-10-CM

## 2022-11-22 DIAGNOSIS — E10.65 TYPE 1 DIABETES MELLITUS WITH HYPERGLYCEMIA: ICD-10-CM

## 2022-11-22 LAB
ALBUMIN SERPL BCP-MCNC: 3.8 G/DL (ref 3.5–5.2)
ALBUMIN/CREAT UR: 48.5 UG/MG (ref 0–30)
ALP SERPL-CCNC: 73 U/L (ref 55–135)
ALT SERPL W/O P-5'-P-CCNC: 10 U/L (ref 10–44)
ANION GAP SERPL CALC-SCNC: 11 MMOL/L (ref 8–16)
AST SERPL-CCNC: 18 U/L (ref 10–40)
BASOPHILS # BLD AUTO: 0.04 K/UL (ref 0–0.2)
BASOPHILS NFR BLD: 0.8 % (ref 0–1.9)
BILIRUB SERPL-MCNC: 0.4 MG/DL (ref 0.1–1)
BUN SERPL-MCNC: 10 MG/DL (ref 6–20)
CALCIUM SERPL-MCNC: 9.5 MG/DL (ref 8.7–10.5)
CHLORIDE SERPL-SCNC: 101 MMOL/L (ref 95–110)
CHOLEST SERPL-MCNC: 235 MG/DL (ref 120–199)
CHOLEST/HDLC SERPL: 2.5 {RATIO} (ref 2–5)
CO2 SERPL-SCNC: 27 MMOL/L (ref 23–29)
CREAT SERPL-MCNC: 0.8 MG/DL (ref 0.5–1.4)
CREAT UR-MCNC: 74.2 MG/DL (ref 15–325)
DIFFERENTIAL METHOD: NORMAL
EOSINOPHIL # BLD AUTO: 0 K/UL (ref 0–0.5)
EOSINOPHIL NFR BLD: 0.6 % (ref 0–8)
ERYTHROCYTE [DISTWIDTH] IN BLOOD BY AUTOMATED COUNT: 12.2 % (ref 11.5–14.5)
EST. GFR  (NO RACE VARIABLE): >60 ML/MIN/1.73 M^2
GLUCOSE SERPL-MCNC: 342 MG/DL (ref 70–110)
HCT VFR BLD AUTO: 38.4 % (ref 37–48.5)
HDLC SERPL-MCNC: 94 MG/DL (ref 40–75)
HDLC SERPL: 40 % (ref 20–50)
HGB BLD-MCNC: 12.9 G/DL (ref 12–16)
IMM GRANULOCYTES # BLD AUTO: 0.02 K/UL (ref 0–0.04)
IMM GRANULOCYTES NFR BLD AUTO: 0.4 % (ref 0–0.5)
LDLC SERPL CALC-MCNC: 128.4 MG/DL (ref 63–159)
LYMPHOCYTES # BLD AUTO: 1.1 K/UL (ref 1–4.8)
LYMPHOCYTES NFR BLD: 21.8 % (ref 18–48)
MCH RBC QN AUTO: 29.9 PG (ref 27–31)
MCHC RBC AUTO-ENTMCNC: 33.6 G/DL (ref 32–36)
MCV RBC AUTO: 89 FL (ref 82–98)
MICROALBUMIN UR DL<=1MG/L-MCNC: 36 UG/ML
MONOCYTES # BLD AUTO: 0.3 K/UL (ref 0.3–1)
MONOCYTES NFR BLD: 6.3 % (ref 4–15)
NEUTROPHILS # BLD AUTO: 3.6 K/UL (ref 1.8–7.7)
NEUTROPHILS NFR BLD: 70.1 % (ref 38–73)
NONHDLC SERPL-MCNC: 141 MG/DL
NRBC BLD-RTO: 0 /100 WBC
PLATELET # BLD AUTO: 381 K/UL (ref 150–450)
PMV BLD AUTO: 9.6 FL (ref 9.2–12.9)
POTASSIUM SERPL-SCNC: 4.3 MMOL/L (ref 3.5–5.1)
PROT SERPL-MCNC: 7.8 G/DL (ref 6–8.4)
RBC # BLD AUTO: 4.31 M/UL (ref 4–5.4)
SODIUM SERPL-SCNC: 139 MMOL/L (ref 136–145)
TRIGL SERPL-MCNC: 63 MG/DL (ref 30–150)
WBC # BLD AUTO: 5.1 K/UL (ref 3.9–12.7)

## 2022-11-22 PROCEDURE — 85025 COMPLETE CBC W/AUTO DIFF WBC: CPT | Performed by: INTERNAL MEDICINE

## 2022-11-22 PROCEDURE — 83036 HEMOGLOBIN GLYCOSYLATED A1C: CPT | Performed by: INTERNAL MEDICINE

## 2022-11-22 PROCEDURE — 82570 ASSAY OF URINE CREATININE: CPT | Performed by: INTERNAL MEDICINE

## 2022-11-22 PROCEDURE — 82043 UR ALBUMIN QUANTITATIVE: CPT | Performed by: INTERNAL MEDICINE

## 2022-11-22 PROCEDURE — 36415 COLL VENOUS BLD VENIPUNCTURE: CPT | Performed by: INTERNAL MEDICINE

## 2022-11-22 PROCEDURE — 80053 COMPREHEN METABOLIC PANEL: CPT | Performed by: INTERNAL MEDICINE

## 2022-11-22 PROCEDURE — 80061 LIPID PANEL: CPT | Performed by: INTERNAL MEDICINE

## 2022-11-23 LAB
ESTIMATED AVG GLUCOSE: 189 MG/DL (ref 68–131)
HBA1C MFR BLD: 8.2 % (ref 4–5.6)

## 2022-11-29 ENCOUNTER — OFFICE VISIT (OUTPATIENT)
Dept: INTERNAL MEDICINE | Facility: CLINIC | Age: 42
End: 2022-11-29
Payer: COMMERCIAL

## 2022-11-29 VITALS
HEIGHT: 67 IN | WEIGHT: 170.44 LBS | BODY MASS INDEX: 26.75 KG/M2 | DIASTOLIC BLOOD PRESSURE: 72 MMHG | RESPIRATION RATE: 16 BRPM | OXYGEN SATURATION: 99 % | SYSTOLIC BLOOD PRESSURE: 114 MMHG | HEART RATE: 76 BPM

## 2022-11-29 DIAGNOSIS — E66.3 OVERWEIGHT (BMI 25.0-29.9): ICD-10-CM

## 2022-11-29 DIAGNOSIS — F41.1 GAD (GENERALIZED ANXIETY DISORDER): ICD-10-CM

## 2022-11-29 DIAGNOSIS — I10 ESSENTIAL HYPERTENSION: Primary | ICD-10-CM

## 2022-11-29 DIAGNOSIS — E10.65 TYPE 1 DIABETES MELLITUS WITH HYPERGLYCEMIA: ICD-10-CM

## 2022-11-29 DIAGNOSIS — M67.919 TENDINOPATHY OF ROTATOR CUFF, UNSPECIFIED LATERALITY: ICD-10-CM

## 2022-11-29 DIAGNOSIS — Z23 NEED FOR VACCINATION: ICD-10-CM

## 2022-11-29 DIAGNOSIS — Z12.4 CERVICAL CANCER SCREENING: ICD-10-CM

## 2022-11-29 PROCEDURE — 90471 IMMUNIZATION ADMIN: CPT | Mod: S$GLB,,, | Performed by: INTERNAL MEDICINE

## 2022-11-29 PROCEDURE — 3052F PR MOST RECENT HEMOGLOBIN A1C LEVEL 8.0 - < 9.0%: ICD-10-PCS | Mod: CPTII,S$GLB,, | Performed by: INTERNAL MEDICINE

## 2022-11-29 PROCEDURE — 99999 PR PBB SHADOW E&M-EST. PATIENT-LVL V: ICD-10-PCS | Mod: PBBFAC,,, | Performed by: INTERNAL MEDICINE

## 2022-11-29 PROCEDURE — 3074F SYST BP LT 130 MM HG: CPT | Mod: CPTII,S$GLB,, | Performed by: INTERNAL MEDICINE

## 2022-11-29 PROCEDURE — 4010F PR ACE/ARB THEARPY RXD/TAKEN: ICD-10-PCS | Mod: CPTII,S$GLB,, | Performed by: INTERNAL MEDICINE

## 2022-11-29 PROCEDURE — 3008F BODY MASS INDEX DOCD: CPT | Mod: CPTII,S$GLB,, | Performed by: INTERNAL MEDICINE

## 2022-11-29 PROCEDURE — 4010F ACE/ARB THERAPY RXD/TAKEN: CPT | Mod: CPTII,S$GLB,, | Performed by: INTERNAL MEDICINE

## 2022-11-29 PROCEDURE — 99999 PR PBB SHADOW E&M-EST. PATIENT-LVL V: CPT | Mod: PBBFAC,,, | Performed by: INTERNAL MEDICINE

## 2022-11-29 PROCEDURE — 3078F DIAST BP <80 MM HG: CPT | Mod: CPTII,S$GLB,, | Performed by: INTERNAL MEDICINE

## 2022-11-29 PROCEDURE — 3060F POS MICROALBUMINURIA REV: CPT | Mod: CPTII,S$GLB,, | Performed by: INTERNAL MEDICINE

## 2022-11-29 PROCEDURE — 90471 FLU VACCINE (QUAD) GREATER THAN OR EQUAL TO 3YO PRESERVATIVE FREE IM: ICD-10-PCS | Mod: S$GLB,,, | Performed by: INTERNAL MEDICINE

## 2022-11-29 PROCEDURE — 3074F PR MOST RECENT SYSTOLIC BLOOD PRESSURE < 130 MM HG: ICD-10-PCS | Mod: CPTII,S$GLB,, | Performed by: INTERNAL MEDICINE

## 2022-11-29 PROCEDURE — 1159F MED LIST DOCD IN RCRD: CPT | Mod: CPTII,S$GLB,, | Performed by: INTERNAL MEDICINE

## 2022-11-29 PROCEDURE — 90686 IIV4 VACC NO PRSV 0.5 ML IM: CPT | Mod: S$GLB,,, | Performed by: INTERNAL MEDICINE

## 2022-11-29 PROCEDURE — 3066F NEPHROPATHY DOC TX: CPT | Mod: CPTII,S$GLB,, | Performed by: INTERNAL MEDICINE

## 2022-11-29 PROCEDURE — 3066F PR DOCUMENTATION OF TREATMENT FOR NEPHROPATHY: ICD-10-PCS | Mod: CPTII,S$GLB,, | Performed by: INTERNAL MEDICINE

## 2022-11-29 PROCEDURE — 3008F PR BODY MASS INDEX (BMI) DOCUMENTED: ICD-10-PCS | Mod: CPTII,S$GLB,, | Performed by: INTERNAL MEDICINE

## 2022-11-29 PROCEDURE — 1160F RVW MEDS BY RX/DR IN RCRD: CPT | Mod: CPTII,S$GLB,, | Performed by: INTERNAL MEDICINE

## 2022-11-29 PROCEDURE — 99214 PR OFFICE/OUTPT VISIT, EST, LEVL IV, 30-39 MIN: ICD-10-PCS | Mod: 25,S$GLB,, | Performed by: INTERNAL MEDICINE

## 2022-11-29 PROCEDURE — 90686 FLU VACCINE (QUAD) GREATER THAN OR EQUAL TO 3YO PRESERVATIVE FREE IM: ICD-10-PCS | Mod: S$GLB,,, | Performed by: INTERNAL MEDICINE

## 2022-11-29 PROCEDURE — 1159F PR MEDICATION LIST DOCUMENTED IN MEDICAL RECORD: ICD-10-PCS | Mod: CPTII,S$GLB,, | Performed by: INTERNAL MEDICINE

## 2022-11-29 PROCEDURE — 99214 OFFICE O/P EST MOD 30 MIN: CPT | Mod: 25,S$GLB,, | Performed by: INTERNAL MEDICINE

## 2022-11-29 PROCEDURE — 3052F HG A1C>EQUAL 8.0%<EQUAL 9.0%: CPT | Mod: CPTII,S$GLB,, | Performed by: INTERNAL MEDICINE

## 2022-11-29 PROCEDURE — 3060F PR POS MICROALBUMINURIA RESULT DOCUMENTED/REVIEW: ICD-10-PCS | Mod: CPTII,S$GLB,, | Performed by: INTERNAL MEDICINE

## 2022-11-29 PROCEDURE — 1160F PR REVIEW ALL MEDS BY PRESCRIBER/CLIN PHARMACIST DOCUMENTED: ICD-10-PCS | Mod: CPTII,S$GLB,, | Performed by: INTERNAL MEDICINE

## 2022-11-29 PROCEDURE — 3078F PR MOST RECENT DIASTOLIC BLOOD PRESSURE < 80 MM HG: ICD-10-PCS | Mod: CPTII,S$GLB,, | Performed by: INTERNAL MEDICINE

## 2022-11-29 RX ORDER — GABAPENTIN 300 MG/1
300 CAPSULE ORAL DAILY
Qty: 30 CAPSULE | Refills: 11 | Status: SHIPPED | OUTPATIENT
Start: 2022-11-29 | End: 2024-01-30

## 2022-11-29 RX ORDER — ATORVASTATIN CALCIUM 20 MG/1
20 TABLET, FILM COATED ORAL DAILY
Qty: 90 TABLET | Refills: 3 | Status: SHIPPED | OUTPATIENT
Start: 2022-11-29 | End: 2023-12-27 | Stop reason: SDUPTHER

## 2022-11-29 NOTE — PROGRESS NOTES
Subjective:       Patient ID: Cira Keys is a 42 y.o. female.    Chief Complaint: Follow-up      HPI:  Cira Keys is a 41 y.o. female with known GERD, DM type 1 and multiple admissions for DKA who presents for follow up chronic medical issues. Labs from 11/22/22 personally reviewed and discussed with the patient today.      She was admitted to [place] then returned to [facility] ED with continued sx after discharge. She was started on Reglan during admit but stopped taking due to side effects (abnormal arm movements). ED work up included CT showing changes of gastritis/antritis and started on PPI and carafate. Gallbladder US normal. Referred to GI and appt pending. She reports on these meds sx are improved.      Since discharge she was taking 20 units BID of NPH. Home blood sugars werebetter controlled on this regimen but then developed night time hypoglycemia. Taking care of baby who cries and wakes her up then she feels shaky and sweaty; she is worried if baby didn't cry she would not wake up. Sugars in 40s and eat a little debbit snack cake--sometimes two. Morning sugar are then 200-300. She self started taking nPH 5 units at night and hypoglycemia resolved.   A1C 8.2%. Missed endo appt last time due to needing to take  to appointment.    Mirocalb 11/2022-on ACEi    Her significant other was diagnosed with cancer. She was started on Celexa 10mg and  buspar 5mg BID around that time and she reports this helps a lot and she completely stopped EtOH and cigerettes.      She was restarted on lisinopril for renal protection. BP is stable.   Agreeable to starting statin for prevention as well. LDl 120s.     She continues with b/l shoulder pain. No improvement with PT> MRI showing tendinopathy. She tried 's old script of gabapentin 300mg in AM which is helping a lot. Wants her own prescription.     Past Medical History:   Diagnosis Date    E coli bacteremia 2011    GERD (gastroesophageal reflux  "disease)     Iron deficiency anemia due to chronic blood loss 10/31/2015    Type 1 diabetes mellitus without complication 10/31/2015       Family History   Problem Relation Age of Onset    Hyperlipidemia Mother     Hypertension Father     No Known Problems Sister     No Known Problems Brother     Breast cancer Neg Hx     Colon cancer Neg Hx     Ovarian cancer Neg Hx        Social History     Socioeconomic History    Marital status:    Tobacco Use    Smoking status: Every Day     Packs/day: 1.00     Types: Cigarettes     Start date: 12/1/2021    Smokeless tobacco: Never   Substance and Sexual Activity    Alcohol use: Not Currently    Drug use: No     Comment: "20-30 years ago drug use"    Sexual activity: Yes     Partners: Male     Birth control/protection: See Surgical Hx     Comment:        Review of Systems   Constitutional:  Negative for activity change, fatigue, fever and unexpected weight change.   HENT:  Negative for congestion, ear pain, hearing loss, rhinorrhea and sore throat.    Eyes:  Negative for redness and visual disturbance.   Respiratory:  Negative for cough, shortness of breath and wheezing.    Cardiovascular:  Negative for chest pain, palpitations and leg swelling.   Gastrointestinal:  Negative for abdominal pain, constipation, diarrhea, nausea and vomiting.   Genitourinary:  Negative for dysuria, frequency, pelvic pain and urgency.   Musculoskeletal:  Positive for arthralgias. Negative for back pain, joint swelling and neck pain.   Skin:  Negative for color change, rash and wound.   Neurological:  Negative for dizziness, tremors, weakness, light-headedness and headaches.       Objective:      Physical Exam  Vitals reviewed.   Constitutional:       General: She is not in acute distress.     Appearance: She is well-developed.   HENT:      Head: Normocephalic and atraumatic.      Right Ear: External ear normal.      Left Ear: External ear normal.      Nose: Nose normal.   Eyes:      " General:         Right eye: No discharge.         Left eye: No discharge.      Extraocular Movements: Extraocular movements intact.      Conjunctiva/sclera: Conjunctivae normal.      Pupils: Pupils are equal, round, and reactive to light.   Neck:      Thyroid: No thyromegaly.   Cardiovascular:      Rate and Rhythm: Normal rate and regular rhythm.      Heart sounds: No murmur heard.  Pulmonary:      Effort: Pulmonary effort is normal. No respiratory distress.      Breath sounds: Normal breath sounds. No wheezing.   Abdominal:      General: Bowel sounds are normal. There is no distension.      Palpations: Abdomen is soft.      Tenderness: There is no abdominal tenderness.   Skin:     General: Skin is warm and dry.   Neurological:      Mental Status: She is alert and oriented to person, place, and time.      Cranial Nerves: No cranial nerve deficit.   Psychiatric:         Behavior: Behavior normal.         Thought Content: Thought content normal.       Assessment:       1. Essential hypertension    2. Type 1 diabetes mellitus with hyperglycemia    3. WALTER (generalized anxiety disorder)    4. Cervical cancer screening    5. Tendinopathy of rotator cuff, unspecified laterality    6. Need for vaccination    7. Overweight (BMI 25.0-29.9)        Plan:       Cira was seen today for follow-up.    Diagnoses and all orders for this visit:    Essential hypertension  -     CBC Auto Differential; Future  -     Comprehensive Metabolic Panel; Future  -     Hemoglobin A1C; Future  -     Lipid Panel; Future  Chronic stable  Continue medications at same dose  Low Na diet  Exercise, weight loss  Check BP and keep log for next visit    Type 1 diabetes mellitus with hyperglycemia  -     Ambulatory referral/consult to Endocrinology; Future  -     insulin  unit/mL injection; 30 units QAM and 10 units QPM  -     atorvastatin (LIPITOR) 20 MG tablet; Take 1 tablet (20 mg total) by mouth once daily.  -     CBC Auto Differential;  Future  -     Comprehensive Metabolic Panel; Future  -     Hemoglobin A1C; Future  -     Lipid Panel; Future  Chronic uncontrolled  Insulin adjusted but really needs endo and likely benefit from Dexcom given hypoglycemia at night which is not always waking her from sleep  ADA diet  Check sugars and keep log    WALTER (generalized anxiety disorder)  Chronic satble  Cont meds same dose    Cervical cancer screening  -     Ambulatory referral/consult to Obstetrics / Gynecology; Future    Tendinopathy of rotator cuff, unspecified laterality  -     gabapentin (NEURONTIN) 300 MG capsule; Take 1 capsule (300 mg total) by mouth once daily.  Chronic unimproved  Failed PT  PRN NSAIDs  Add gabapentin in AM, lower dose. GFR > 60    Need for vaccination  Flu vaccine today    Overweight (BMI 25.0-29.9)  Diet, exercise, weight loss    RTC 3months with labs (after sees endo) and PRN

## 2023-01-09 ENCOUNTER — PATIENT MESSAGE (OUTPATIENT)
Dept: ADMINISTRATIVE | Facility: HOSPITAL | Age: 43
End: 2023-01-09
Payer: COMMERCIAL

## 2023-01-11 ENCOUNTER — TELEPHONE (OUTPATIENT)
Dept: ENDOCRINOLOGY | Facility: CLINIC | Age: 43
End: 2023-01-11

## 2023-01-11 ENCOUNTER — OFFICE VISIT (OUTPATIENT)
Dept: ENDOCRINOLOGY | Facility: CLINIC | Age: 43
End: 2023-01-11
Payer: COMMERCIAL

## 2023-01-11 VITALS
SYSTOLIC BLOOD PRESSURE: 119 MMHG | HEIGHT: 67 IN | HEART RATE: 68 BPM | DIASTOLIC BLOOD PRESSURE: 68 MMHG | BODY MASS INDEX: 27.15 KG/M2 | RESPIRATION RATE: 14 BRPM | WEIGHT: 173 LBS

## 2023-01-11 DIAGNOSIS — I10 ESSENTIAL HYPERTENSION: ICD-10-CM

## 2023-01-11 DIAGNOSIS — E66.3 OVERWEIGHT (BMI 25.0-29.9): ICD-10-CM

## 2023-01-11 DIAGNOSIS — E10.65 TYPE 1 DIABETES MELLITUS WITH HYPERGLYCEMIA: ICD-10-CM

## 2023-01-11 PROBLEM — F10.939 ALCOHOL WITHDRAWAL: Status: RESOLVED | Noted: 2020-09-11 | Resolved: 2023-01-11

## 2023-01-11 PROCEDURE — 1159F MED LIST DOCD IN RCRD: CPT | Mod: CPTII,S$GLB,, | Performed by: STUDENT IN AN ORGANIZED HEALTH CARE EDUCATION/TRAINING PROGRAM

## 2023-01-11 PROCEDURE — 3008F BODY MASS INDEX DOCD: CPT | Mod: CPTII,S$GLB,, | Performed by: STUDENT IN AN ORGANIZED HEALTH CARE EDUCATION/TRAINING PROGRAM

## 2023-01-11 PROCEDURE — 1159F PR MEDICATION LIST DOCUMENTED IN MEDICAL RECORD: ICD-10-PCS | Mod: CPTII,S$GLB,, | Performed by: STUDENT IN AN ORGANIZED HEALTH CARE EDUCATION/TRAINING PROGRAM

## 2023-01-11 PROCEDURE — 3008F PR BODY MASS INDEX (BMI) DOCUMENTED: ICD-10-PCS | Mod: CPTII,S$GLB,, | Performed by: STUDENT IN AN ORGANIZED HEALTH CARE EDUCATION/TRAINING PROGRAM

## 2023-01-11 PROCEDURE — 99999 PR PBB SHADOW E&M-EST. PATIENT-LVL IV: ICD-10-PCS | Mod: PBBFAC,,, | Performed by: STUDENT IN AN ORGANIZED HEALTH CARE EDUCATION/TRAINING PROGRAM

## 2023-01-11 PROCEDURE — 99204 PR OFFICE/OUTPT VISIT, NEW, LEVL IV, 45-59 MIN: ICD-10-PCS | Mod: S$GLB,,, | Performed by: STUDENT IN AN ORGANIZED HEALTH CARE EDUCATION/TRAINING PROGRAM

## 2023-01-11 PROCEDURE — 1160F RVW MEDS BY RX/DR IN RCRD: CPT | Mod: CPTII,S$GLB,, | Performed by: STUDENT IN AN ORGANIZED HEALTH CARE EDUCATION/TRAINING PROGRAM

## 2023-01-11 PROCEDURE — 3074F SYST BP LT 130 MM HG: CPT | Mod: CPTII,S$GLB,, | Performed by: STUDENT IN AN ORGANIZED HEALTH CARE EDUCATION/TRAINING PROGRAM

## 2023-01-11 PROCEDURE — 4010F ACE/ARB THERAPY RXD/TAKEN: CPT | Mod: CPTII,S$GLB,, | Performed by: STUDENT IN AN ORGANIZED HEALTH CARE EDUCATION/TRAINING PROGRAM

## 2023-01-11 PROCEDURE — 4010F PR ACE/ARB THEARPY RXD/TAKEN: ICD-10-PCS | Mod: CPTII,S$GLB,, | Performed by: STUDENT IN AN ORGANIZED HEALTH CARE EDUCATION/TRAINING PROGRAM

## 2023-01-11 PROCEDURE — 3078F DIAST BP <80 MM HG: CPT | Mod: CPTII,S$GLB,, | Performed by: STUDENT IN AN ORGANIZED HEALTH CARE EDUCATION/TRAINING PROGRAM

## 2023-01-11 PROCEDURE — 1160F PR REVIEW ALL MEDS BY PRESCRIBER/CLIN PHARMACIST DOCUMENTED: ICD-10-PCS | Mod: CPTII,S$GLB,, | Performed by: STUDENT IN AN ORGANIZED HEALTH CARE EDUCATION/TRAINING PROGRAM

## 2023-01-11 PROCEDURE — 3074F PR MOST RECENT SYSTOLIC BLOOD PRESSURE < 130 MM HG: ICD-10-PCS | Mod: CPTII,S$GLB,, | Performed by: STUDENT IN AN ORGANIZED HEALTH CARE EDUCATION/TRAINING PROGRAM

## 2023-01-11 PROCEDURE — 99999 PR PBB SHADOW E&M-EST. PATIENT-LVL IV: CPT | Mod: PBBFAC,,, | Performed by: STUDENT IN AN ORGANIZED HEALTH CARE EDUCATION/TRAINING PROGRAM

## 2023-01-11 PROCEDURE — 99204 OFFICE O/P NEW MOD 45 MIN: CPT | Mod: S$GLB,,, | Performed by: STUDENT IN AN ORGANIZED HEALTH CARE EDUCATION/TRAINING PROGRAM

## 2023-01-11 PROCEDURE — 3078F PR MOST RECENT DIASTOLIC BLOOD PRESSURE < 80 MM HG: ICD-10-PCS | Mod: CPTII,S$GLB,, | Performed by: STUDENT IN AN ORGANIZED HEALTH CARE EDUCATION/TRAINING PROGRAM

## 2023-01-11 RX ORDER — BLOOD-GLUCOSE,RECEIVER,CONT
1 EACH MISCELLANEOUS
Qty: 1 EACH | Refills: 0 | Status: CANCELLED | OUTPATIENT
Start: 2023-01-11 | End: 2024-01-11

## 2023-01-11 RX ORDER — INSULIN DEGLUDEC 100 U/ML
20 INJECTION, SOLUTION SUBCUTANEOUS DAILY
Qty: 10 ML | Refills: 5 | Status: ON HOLD | OUTPATIENT
Start: 2023-01-11 | End: 2023-03-02 | Stop reason: SDUPTHER

## 2023-01-11 RX ORDER — BLOOD-GLUCOSE TRANSMITTER
1 EACH MISCELLANEOUS ONCE
Qty: 1 EACH | Refills: 3 | Status: CANCELLED | OUTPATIENT
Start: 2023-01-11 | End: 2023-01-11

## 2023-01-11 RX ORDER — BLOOD-GLUCOSE SENSOR
1 EACH MISCELLANEOUS
Qty: 3 EACH | Refills: 11 | Status: CANCELLED | OUTPATIENT
Start: 2023-01-11 | End: 2024-01-11

## 2023-01-11 NOTE — ASSESSMENT & PLAN NOTE
Uncontrolled based on A1c and reported POCT glucose with significant glycemic variability.    Patient is on an intensive insulin regimen with 4 daily injections and is testing glucose 4+ times daily. Patient has demonstrated an understanding of technology and is motivated to use the device correctly. Patient is expected to adhere to a diabetes treatment plan and is capable of recognizing alerts and alarms. Patient has recurrent, severe level 2 (BG <54) hypoglycemia.    Patient's insulin regimen requires frequent adjustments based on BG results. Patient will receive an in-person visit every 6 months to assess adherence to CGM regimen and diabetes treatment.    Will switch insulin to ultra long lasting to achieve more steady levels. She prefers vials.     Continue regular insulin but will likely plan to switch to a more rapid acting one pending CGM review.    Plan  - Stop insulin NPH  - Start Tresiba 20 U daily - vials  - Continue insulin regular 2-8 U ACTID pending intake and glucose  - Start Dexcom G6 CGM    F/u 4 weeks

## 2023-01-11 NOTE — PROGRESS NOTES
"Subjective:      Patient ID: Cira Keys is a 42 y.o. female.    Chief Complaint:  Type 1 diabetes mellitus      History of Present Illness  This is a 42 y.o. female. with a past medical history of Type 1 diabetes mellitus here for evaluation.    Type 1 diabetes mellitus  Diagnosed around age 9     11/03/15 04:28   C-Peptide <0.1 (L)   Glutamic Acid Decarb Ab 0.12 (H)       Current diabetes medications:  - Insulin NPH 20 U AM and 5 U PM  - Insulin regular 2-8 U ACTID    Past diabetes medications:  - Lantus  - Levemir  - Toujeo  - Novolog  - Insulin pump    Lab Results   Component Value Date    CREATININE 0.8 11/22/2022    EGFRNORACEVR >60 11/22/2022       Known diabetic complications:  hypoglycemia    Weight trend:  Wt Readings from Last 8 Encounters:   01/11/23 78.5 kg (173 lb)   11/29/22 77.3 kg (170 lb 6.7 oz)   10/04/22 71.2 kg (157 lb)   08/29/22 71.3 kg (157 lb 3 oz)   08/27/22 69.7 kg (153 lb 10.6 oz)   08/23/22 71.7 kg (158 lb)   03/25/22 81.4 kg (179 lb 7.3 oz)   03/18/22 86.2 kg (190 lb 0.6 oz)       Family history of diabetes:  No    Prior visit with diabetes education: yes    Current diet: 3 meals per day      Blood glucose monitoring at home: 4x/day  Home blood sugar records: 28 - 334  Any episodes of hypoglycemia? Yes, level 2      Diabetes Management Status  Statin: Taking  ACE/ARB: Taking    Screening or Prevention Patient's value   HgA1C Testing and Control   Lab Results   Component Value Date    HGBA1C 8.2 (H) 11/22/2022        LDL control Lab Results   Component Value Date    LDLCALC 128.4 11/22/2022      Nephropathy screening Lab Results   Component Value Date    MICALBCREAT 48.5 (H) 11/22/2022            Review of Systems  As above    Social and family history reviewed  Current medications and allergies reviewed    Objective:   /68 (BP Location: Right arm, Patient Position: Sitting, BP Method: Medium (Manual))   Pulse 68   Resp 14   Ht 5' 7" (1.702 m)   Wt 78.5 kg (173 lb)   BMI " 27.10 kg/m²   Physical Exam  Alert, oriented    BP Readings from Last 1 Encounters:   01/11/23 119/68      Wt Readings from Last 1 Encounters:   01/11/23 0842 78.5 kg (173 lb)     Body mass index is 27.1 kg/m².    Lab Review:   Lab Results   Component Value Date    HGBA1C 8.2 (H) 11/22/2022     Lab Results   Component Value Date    CHOL 235 (H) 11/22/2022    HDL 94 (H) 11/22/2022    LDLCALC 128.4 11/22/2022    TRIG 63 11/22/2022    CHOLHDL 40.0 11/22/2022     Lab Results   Component Value Date     11/22/2022    K 4.3 11/22/2022     11/22/2022    CO2 27 11/22/2022     (H) 11/22/2022    BUN 10 11/22/2022    CREATININE 0.8 11/22/2022    CALCIUM 9.5 11/22/2022    PROT 7.8 11/22/2022    ALBUMIN 3.8 11/22/2022    BILITOT 0.4 11/22/2022    ALKPHOS 73 11/22/2022    AST 18 11/22/2022    ALT 10 11/22/2022    ANIONGAP 11 11/22/2022    ESTGFRAFRICA >60 03/20/2022    EGFRNONAA >60 03/20/2022    TSH 1.154 09/11/2020       All pertinent labs reviewed    Assessment and Plan     Type 1 diabetes mellitus with hyperglycemia  Uncontrolled based on A1c and reported POCT glucose with significant glycemic variability.    Patient is on an intensive insulin regimen with 4 daily injections and is testing glucose 4+ times daily. Patient has demonstrated an understanding of technology and is motivated to use the device correctly. Patient is expected to adhere to a diabetes treatment plan and is capable of recognizing alerts and alarms. Patient has recurrent, severe level 2 (BG <54) hypoglycemia.    Patient's insulin regimen requires frequent adjustments based on BG results. Patient will receive an in-person visit every 6 months to assess adherence to CGM regimen and diabetes treatment.    Will switch insulin to ultra long lasting to achieve more steady levels. She prefers vials.     Continue regular insulin but will likely plan to switch to a more rapid acting one pending CGM review.    Plan  - Stop insulin NPH  - Start  Tresiba 20 U daily - vials  - Continue insulin regular 2-8 U ACTID pending intake and glucose  - Start Dexcom G6 CGM    F/u 4 weeks    Essential hypertension  Continue antihypertensive regimen including ARB/ACEi    Overweight (BMI 25.0-29.9)  Lifestyle changes      Follow-up in 4 weeks    Jhonny Mao MD  Endocrinology

## 2023-01-11 NOTE — PATIENT INSTRUCTIONS
You have been prescribed a Dexcom glucose monitor. Orders have been sent to [employer] and they will be forwarded to a medical supplies company. They will reach out to you within 2 weeks once approved to coordinate shipment to your house.    If you do not hear from anyone within 2 weeks please let us know.

## 2023-01-13 ENCOUNTER — TELEPHONE (OUTPATIENT)
Dept: ENDOCRINOLOGY | Facility: CLINIC | Age: 43
End: 2023-01-13
Payer: COMMERCIAL

## 2023-01-24 ENCOUNTER — PATIENT OUTREACH (OUTPATIENT)
Dept: ADMINISTRATIVE | Facility: HOSPITAL | Age: 43
End: 2023-01-24
Payer: COMMERCIAL

## 2023-01-25 ENCOUNTER — PATIENT MESSAGE (OUTPATIENT)
Dept: ENDOCRINOLOGY | Facility: CLINIC | Age: 43
End: 2023-01-25
Payer: COMMERCIAL

## 2023-02-07 ENCOUNTER — PATIENT OUTREACH (OUTPATIENT)
Dept: ADMINISTRATIVE | Facility: HOSPITAL | Age: 43
End: 2023-02-07
Payer: COMMERCIAL

## 2023-02-07 NOTE — PROGRESS NOTES
Chart reviewed, immunization record updated.  No new results noted on [facility] or [facility] web site.  Care Everywhere updated.   Patient care coordination note  Upcoming PCP visit updated.  Next PCP visit 03/27/2023.  LOV with PCP 11/29/2022.  Attempted to contact patient to discuss Diabetic Eye screening and Cervical cancer screening.  No answer, left voicemail for patient to return call to clinic.

## 2023-03-02 PROBLEM — E11.10 DKA (DIABETIC KETOACIDOSIS): Status: RESOLVED | Noted: 2020-09-11 | Resolved: 2023-03-02

## 2023-03-03 ENCOUNTER — PATIENT OUTREACH (OUTPATIENT)
Dept: ADMINISTRATIVE | Facility: CLINIC | Age: 43
End: 2023-03-03
Payer: COMMERCIAL

## 2023-03-03 ENCOUNTER — PATIENT MESSAGE (OUTPATIENT)
Dept: ORTHOPEDICS | Facility: CLINIC | Age: 43
End: 2023-03-03
Payer: COMMERCIAL

## 2023-03-03 NOTE — PROGRESS NOTES
C3 nurse spoke with Cira Keys  for a TCC post hospital discharge follow up call. The patient has a scheduled [facility] appointment with Krysta Mercado MD  on 03/08/2023 @ 0800.

## 2023-03-07 ENCOUNTER — OFFICE VISIT (OUTPATIENT)
Dept: ENDOCRINOLOGY | Facility: CLINIC | Age: 43
End: 2023-03-07
Payer: COMMERCIAL

## 2023-03-07 VITALS
HEIGHT: 67 IN | SYSTOLIC BLOOD PRESSURE: 115 MMHG | HEART RATE: 84 BPM | WEIGHT: 167.56 LBS | RESPIRATION RATE: 16 BRPM | BODY MASS INDEX: 26.3 KG/M2 | DIASTOLIC BLOOD PRESSURE: 64 MMHG

## 2023-03-07 DIAGNOSIS — E66.3 OVERWEIGHT (BMI 25.0-29.9): ICD-10-CM

## 2023-03-07 DIAGNOSIS — I10 ESSENTIAL HYPERTENSION: ICD-10-CM

## 2023-03-07 DIAGNOSIS — E10.65 TYPE 1 DIABETES MELLITUS WITH HYPERGLYCEMIA: Primary | ICD-10-CM

## 2023-03-07 PROCEDURE — 99214 PR OFFICE/OUTPT VISIT, EST, LEVL IV, 30-39 MIN: ICD-10-PCS | Mod: 25,S$GLB,, | Performed by: STUDENT IN AN ORGANIZED HEALTH CARE EDUCATION/TRAINING PROGRAM

## 2023-03-07 PROCEDURE — 3078F PR MOST RECENT DIASTOLIC BLOOD PRESSURE < 80 MM HG: ICD-10-PCS | Mod: CPTII,S$GLB,, | Performed by: STUDENT IN AN ORGANIZED HEALTH CARE EDUCATION/TRAINING PROGRAM

## 2023-03-07 PROCEDURE — 95251 PR GLUCOSE MONITOR, 72 HOUR, PHYS INTERP: ICD-10-PCS | Mod: S$GLB,,, | Performed by: STUDENT IN AN ORGANIZED HEALTH CARE EDUCATION/TRAINING PROGRAM

## 2023-03-07 PROCEDURE — 3074F SYST BP LT 130 MM HG: CPT | Mod: CPTII,S$GLB,, | Performed by: STUDENT IN AN ORGANIZED HEALTH CARE EDUCATION/TRAINING PROGRAM

## 2023-03-07 PROCEDURE — 1160F PR REVIEW ALL MEDS BY PRESCRIBER/CLIN PHARMACIST DOCUMENTED: ICD-10-PCS | Mod: CPTII,S$GLB,, | Performed by: STUDENT IN AN ORGANIZED HEALTH CARE EDUCATION/TRAINING PROGRAM

## 2023-03-07 PROCEDURE — 4010F PR ACE/ARB THEARPY RXD/TAKEN: ICD-10-PCS | Mod: CPTII,S$GLB,, | Performed by: STUDENT IN AN ORGANIZED HEALTH CARE EDUCATION/TRAINING PROGRAM

## 2023-03-07 PROCEDURE — 99214 OFFICE O/P EST MOD 30 MIN: CPT | Mod: 25,S$GLB,, | Performed by: STUDENT IN AN ORGANIZED HEALTH CARE EDUCATION/TRAINING PROGRAM

## 2023-03-07 PROCEDURE — 99999 PR PBB SHADOW E&M-EST. PATIENT-LVL IV: CPT | Mod: PBBFAC,,, | Performed by: STUDENT IN AN ORGANIZED HEALTH CARE EDUCATION/TRAINING PROGRAM

## 2023-03-07 PROCEDURE — 95251 CONT GLUC MNTR ANALYSIS I&R: CPT | Mod: S$GLB,,, | Performed by: STUDENT IN AN ORGANIZED HEALTH CARE EDUCATION/TRAINING PROGRAM

## 2023-03-07 PROCEDURE — 3051F PR MOST RECENT HEMOGLOBIN A1C LEVEL 7.0 - < 8.0%: ICD-10-PCS | Mod: CPTII,S$GLB,, | Performed by: STUDENT IN AN ORGANIZED HEALTH CARE EDUCATION/TRAINING PROGRAM

## 2023-03-07 PROCEDURE — 3008F BODY MASS INDEX DOCD: CPT | Mod: CPTII,S$GLB,, | Performed by: STUDENT IN AN ORGANIZED HEALTH CARE EDUCATION/TRAINING PROGRAM

## 2023-03-07 PROCEDURE — 3074F PR MOST RECENT SYSTOLIC BLOOD PRESSURE < 130 MM HG: ICD-10-PCS | Mod: CPTII,S$GLB,, | Performed by: STUDENT IN AN ORGANIZED HEALTH CARE EDUCATION/TRAINING PROGRAM

## 2023-03-07 PROCEDURE — 1160F RVW MEDS BY RX/DR IN RCRD: CPT | Mod: CPTII,S$GLB,, | Performed by: STUDENT IN AN ORGANIZED HEALTH CARE EDUCATION/TRAINING PROGRAM

## 2023-03-07 PROCEDURE — 1111F DSCHRG MED/CURRENT MED MERGE: CPT | Mod: CPTII,S$GLB,, | Performed by: STUDENT IN AN ORGANIZED HEALTH CARE EDUCATION/TRAINING PROGRAM

## 2023-03-07 PROCEDURE — 3008F PR BODY MASS INDEX (BMI) DOCUMENTED: ICD-10-PCS | Mod: CPTII,S$GLB,, | Performed by: STUDENT IN AN ORGANIZED HEALTH CARE EDUCATION/TRAINING PROGRAM

## 2023-03-07 PROCEDURE — 99999 PR PBB SHADOW E&M-EST. PATIENT-LVL IV: ICD-10-PCS | Mod: PBBFAC,,, | Performed by: STUDENT IN AN ORGANIZED HEALTH CARE EDUCATION/TRAINING PROGRAM

## 2023-03-07 PROCEDURE — 1111F PR DISCHARGE MEDS RECONCILED W/ CURRENT OUTPATIENT MED LIST: ICD-10-PCS | Mod: CPTII,S$GLB,, | Performed by: STUDENT IN AN ORGANIZED HEALTH CARE EDUCATION/TRAINING PROGRAM

## 2023-03-07 PROCEDURE — 1159F PR MEDICATION LIST DOCUMENTED IN MEDICAL RECORD: ICD-10-PCS | Mod: CPTII,S$GLB,, | Performed by: STUDENT IN AN ORGANIZED HEALTH CARE EDUCATION/TRAINING PROGRAM

## 2023-03-07 PROCEDURE — 4010F ACE/ARB THERAPY RXD/TAKEN: CPT | Mod: CPTII,S$GLB,, | Performed by: STUDENT IN AN ORGANIZED HEALTH CARE EDUCATION/TRAINING PROGRAM

## 2023-03-07 PROCEDURE — 1159F MED LIST DOCD IN RCRD: CPT | Mod: CPTII,S$GLB,, | Performed by: STUDENT IN AN ORGANIZED HEALTH CARE EDUCATION/TRAINING PROGRAM

## 2023-03-07 PROCEDURE — 3078F DIAST BP <80 MM HG: CPT | Mod: CPTII,S$GLB,, | Performed by: STUDENT IN AN ORGANIZED HEALTH CARE EDUCATION/TRAINING PROGRAM

## 2023-03-07 PROCEDURE — 3051F HG A1C>EQUAL 7.0%<8.0%: CPT | Mod: CPTII,S$GLB,, | Performed by: STUDENT IN AN ORGANIZED HEALTH CARE EDUCATION/TRAINING PROGRAM

## 2023-03-07 RX ORDER — HUMAN INSULIN 100 [IU]/ML
INJECTION, SOLUTION SUBCUTANEOUS
COMMUNITY
Start: 2023-03-02 | End: 2023-03-07

## 2023-03-07 RX ORDER — INSULIN PMP CART,AUT,G6/7,CNTR
1 EACH SUBCUTANEOUS
Qty: 10 EACH | Refills: 11 | Status: SHIPPED | OUTPATIENT
Start: 2023-03-07 | End: 2023-05-03 | Stop reason: SDUPTHER

## 2023-03-07 RX ORDER — INSULIN PMP CART,AUT,G6/7,CNTR
1 EACH SUBCUTANEOUS ONCE
Qty: 1 EACH | Refills: 0 | Status: SHIPPED | OUTPATIENT
Start: 2023-03-07 | End: 2023-03-07

## 2023-03-07 NOTE — ASSESSMENT & PLAN NOTE
Uncontrolled based on A1c and CGM. Recent admission from DKA.    She used an insulin pump in the past and would like to try tubeless insulin pump. Advised she needs CDE evaluation. Also advised she needs a compatible phone for auto mode - she is okay with changing her phone which will be more comfortable to avoid using Dexcom .    Patient requires CSII with 0.05 incremental and variable basal/bolus dosing; every 5 minute automated insulin delivery decisions based on CGM value, 1 hour BG trend and set glucose target(s) combined with historical adaptivity/algorithmic decision making to achieve optimum glycemic control.    In the meantime, increase basal insulin to closer to weight based needs. Continue prandial insulin with SSI.       Plan  - Increase Lantus to 8 U BID  - Continue Humalog 5 U ACTID + SSI  - CDE referral for Omnipod 5 evaluation/start    Tentative settings:  Basal: 0.8 U/h  ICR: 12  ISF: 45  Target 110  IAT 5h    - Continue Dexcom G6 CGM    F/u 2 weeks after pump training

## 2023-03-07 NOTE — PATIENT INSTRUCTIONS
Increase Lantus to 8 units twice a day    For Humalog: use 5 units before meals plus sliding scale    INSULIN CORRECTION SCALE    Glucose                 Insulin           181-200               +2 units                     201-250               +4 units                      251-300               +6 units                     301-350               +8 units                      >350                    +10 units

## 2023-03-10 ENCOUNTER — OFFICE VISIT (OUTPATIENT)
Dept: OBSTETRICS AND GYNECOLOGY | Facility: CLINIC | Age: 43
End: 2023-03-10
Payer: COMMERCIAL

## 2023-03-10 VITALS
WEIGHT: 181.31 LBS | BODY MASS INDEX: 28.46 KG/M2 | DIASTOLIC BLOOD PRESSURE: 74 MMHG | HEART RATE: 69 BPM | SYSTOLIC BLOOD PRESSURE: 116 MMHG | HEIGHT: 67 IN

## 2023-03-10 DIAGNOSIS — R23.2 HOT FLASHES: ICD-10-CM

## 2023-03-10 DIAGNOSIS — Z12.4 CERVICAL CANCER SCREENING: Primary | ICD-10-CM

## 2023-03-10 DIAGNOSIS — Z12.31 ENCOUNTER FOR SCREENING MAMMOGRAM FOR MALIGNANT NEOPLASM OF BREAST: ICD-10-CM

## 2023-03-10 PROCEDURE — 3074F SYST BP LT 130 MM HG: CPT | Mod: CPTII,S$GLB,, | Performed by: OBSTETRICS & GYNECOLOGY

## 2023-03-10 PROCEDURE — 3074F PR MOST RECENT SYSTOLIC BLOOD PRESSURE < 130 MM HG: ICD-10-PCS | Mod: CPTII,S$GLB,, | Performed by: OBSTETRICS & GYNECOLOGY

## 2023-03-10 PROCEDURE — 1159F MED LIST DOCD IN RCRD: CPT | Mod: CPTII,S$GLB,, | Performed by: OBSTETRICS & GYNECOLOGY

## 2023-03-10 PROCEDURE — 1160F PR REVIEW ALL MEDS BY PRESCRIBER/CLIN PHARMACIST DOCUMENTED: ICD-10-PCS | Mod: CPTII,S$GLB,, | Performed by: OBSTETRICS & GYNECOLOGY

## 2023-03-10 PROCEDURE — 99386 PREV VISIT NEW AGE 40-64: CPT | Mod: S$GLB,,, | Performed by: OBSTETRICS & GYNECOLOGY

## 2023-03-10 PROCEDURE — 1111F DSCHRG MED/CURRENT MED MERGE: CPT | Mod: CPTII,S$GLB,, | Performed by: OBSTETRICS & GYNECOLOGY

## 2023-03-10 PROCEDURE — 3051F HG A1C>EQUAL 7.0%<8.0%: CPT | Mod: CPTII,S$GLB,, | Performed by: OBSTETRICS & GYNECOLOGY

## 2023-03-10 PROCEDURE — 1160F RVW MEDS BY RX/DR IN RCRD: CPT | Mod: CPTII,S$GLB,, | Performed by: OBSTETRICS & GYNECOLOGY

## 2023-03-10 PROCEDURE — 3008F PR BODY MASS INDEX (BMI) DOCUMENTED: ICD-10-PCS | Mod: CPTII,S$GLB,, | Performed by: OBSTETRICS & GYNECOLOGY

## 2023-03-10 PROCEDURE — 88175 CYTOPATH C/V AUTO FLUID REDO: CPT | Performed by: OBSTETRICS & GYNECOLOGY

## 2023-03-10 PROCEDURE — 1159F PR MEDICATION LIST DOCUMENTED IN MEDICAL RECORD: ICD-10-PCS | Mod: CPTII,S$GLB,, | Performed by: OBSTETRICS & GYNECOLOGY

## 2023-03-10 PROCEDURE — 4010F PR ACE/ARB THEARPY RXD/TAKEN: ICD-10-PCS | Mod: CPTII,S$GLB,, | Performed by: OBSTETRICS & GYNECOLOGY

## 2023-03-10 PROCEDURE — 3051F PR MOST RECENT HEMOGLOBIN A1C LEVEL 7.0 - < 8.0%: ICD-10-PCS | Mod: CPTII,S$GLB,, | Performed by: OBSTETRICS & GYNECOLOGY

## 2023-03-10 PROCEDURE — 3008F BODY MASS INDEX DOCD: CPT | Mod: CPTII,S$GLB,, | Performed by: OBSTETRICS & GYNECOLOGY

## 2023-03-10 PROCEDURE — 3078F PR MOST RECENT DIASTOLIC BLOOD PRESSURE < 80 MM HG: ICD-10-PCS | Mod: CPTII,S$GLB,, | Performed by: OBSTETRICS & GYNECOLOGY

## 2023-03-10 PROCEDURE — 99386 PR PREVENTIVE VISIT,NEW,40-64: ICD-10-PCS | Mod: S$GLB,,, | Performed by: OBSTETRICS & GYNECOLOGY

## 2023-03-10 PROCEDURE — 3078F DIAST BP <80 MM HG: CPT | Mod: CPTII,S$GLB,, | Performed by: OBSTETRICS & GYNECOLOGY

## 2023-03-10 PROCEDURE — 99999 PR PBB SHADOW E&M-EST. PATIENT-LVL IV: CPT | Mod: PBBFAC,,, | Performed by: OBSTETRICS & GYNECOLOGY

## 2023-03-10 PROCEDURE — 4010F ACE/ARB THERAPY RXD/TAKEN: CPT | Mod: CPTII,S$GLB,, | Performed by: OBSTETRICS & GYNECOLOGY

## 2023-03-10 PROCEDURE — 99999 PR PBB SHADOW E&M-EST. PATIENT-LVL IV: ICD-10-PCS | Mod: PBBFAC,,, | Performed by: OBSTETRICS & GYNECOLOGY

## 2023-03-10 PROCEDURE — 1111F PR DISCHARGE MEDS RECONCILED W/ CURRENT OUTPATIENT MED LIST: ICD-10-PCS | Mod: CPTII,S$GLB,, | Performed by: OBSTETRICS & GYNECOLOGY

## 2023-03-10 NOTE — PROGRESS NOTES
Subjective:       Patient ID: Cira Keys is a 42 y.o. female.    Chief Complaint:  Annual Exam (Well woman exam, Pt would like to discuss hysterectomy )      History of Present Illness  Patient presents for annual exam.  Patient had a recent mammogram which was read as within normal limits.  Patient reports irregular cycles.  Patient in the past has had very heavy cycles and attempted birth control pills without success.  Lately patient has been skipping cycles and sometimes has light cycles.  She reports that her blood sugar has been struggling to be in range and it is severely out of range with her menstrual cycles.  Patient is also experiencing hot flashes.  Counseling was done.    Menstrual History:  OB History          2    Para   2    Term   2            AB        Living   1         SAB        IAB        Ectopic        Multiple        Live Births   2                Menarche age:  Patient's last menstrual period was 2023.         Review of Systems  Review of Systems   Constitutional:  Negative for activity change, appetite change, chills, diaphoresis, fatigue, fever and unexpected weight change.   HENT:  Negative for congestion, dental problem, drooling, ear discharge, ear pain, facial swelling, hearing loss, mouth sores, nosebleeds, postnasal drip, rhinorrhea, sinus pressure, sneezing, sore throat, tinnitus, trouble swallowing and voice change.    Eyes:  Negative for photophobia, pain, discharge, redness, itching and visual disturbance.   Respiratory:  Negative for apnea, cough, choking, chest tightness, shortness of breath, wheezing and stridor.    Cardiovascular:  Negative for chest pain, palpitations and leg swelling.   Gastrointestinal:  Negative for abdominal distention, abdominal pain, anal bleeding, blood in stool, constipation, diarrhea, nausea, rectal pain and vomiting.   Endocrine: Negative for cold intolerance, heat intolerance, polydipsia, polyphagia and polyuria.    Genitourinary:  Negative for decreased urine volume, difficulty urinating, dyspareunia, dysuria, enuresis, flank pain, frequency, genital sores, hematuria, menstrual problem, pelvic pain, urgency, vaginal bleeding, vaginal discharge and vaginal pain.   Musculoskeletal:  Negative for arthralgias, back pain, gait problem, joint swelling, myalgias, neck pain and neck stiffness.   Skin:  Negative for color change, pallor, rash and wound.   Allergic/Immunologic: Negative for environmental allergies, food allergies and immunocompromised state.   Neurological:  Negative for dizziness, tremors, seizures, syncope, facial asymmetry, speech difficulty, weakness, light-headedness, numbness and headaches.   Hematological:  Negative for adenopathy. Does not bruise/bleed easily.   Psychiatric/Behavioral:  Negative for agitation, behavioral problems, confusion, decreased concentration, dysphoric mood, hallucinations, self-injury, sleep disturbance and suicidal ideas. The patient is not nervous/anxious and is not hyperactive.          Objective:      Physical Exam  Vitals and nursing note reviewed. Exam conducted with a chaperone present.   Constitutional:       Appearance: She is well-developed.   Neck:      Thyroid: No thyromegaly.   Cardiovascular:      Rate and Rhythm: Normal rate and regular rhythm.   Pulmonary:      Effort: Pulmonary effort is normal.      Breath sounds: Normal breath sounds.   Chest:   Breasts:     Breasts are symmetrical.      Right: No inverted nipple, mass, nipple discharge, skin change or tenderness.      Left: No inverted nipple, mass, nipple discharge, skin change or tenderness.   Abdominal:      General: Bowel sounds are normal.      Palpations: Abdomen is soft. There is no mass.      Tenderness: There is no abdominal tenderness.      Hernia: There is no hernia in the left inguinal area or right inguinal area.   Genitourinary:     General: Normal vulva.      Labia:         Right: No rash, tenderness,  lesion or injury.         Left: No rash, tenderness, lesion or injury.       Urethra: No prolapse, urethral pain, urethral swelling or urethral lesion.      Vagina: No signs of injury and foreign body. No vaginal discharge, erythema, tenderness, bleeding, lesions or prolapsed vaginal walls.      Cervix: No cervical motion tenderness, discharge, friability, lesion, erythema, cervical bleeding or eversion.      Uterus: Not deviated, not enlarged, not fixed, not tender and no uterine prolapse.       Adnexa:         Right: No mass, tenderness or fullness.          Left: No mass, tenderness or fullness.        Rectum: No external hemorrhoid.   Musculoskeletal:         General: Normal range of motion.   Lymphadenopathy:      Lower Body: No right inguinal adenopathy. No left inguinal adenopathy.   Skin:     General: Skin is dry.   Neurological:      Mental Status: She is alert and oriented to person, place, and time.      Deep Tendon Reflexes: Reflexes are normal and symmetric.   Psychiatric:         Behavior: Behavior normal.         Thought Content: Thought content normal.         Judgment: Judgment normal.         Assessment:        1. Cervical cancer screening    2. Hot flashes                Plan:         Cira was seen today for annual exam.    Diagnoses and all orders for this visit:    Cervical cancer screening  -     Liquid-Based Pap Smear, Screening    Hot flashes  -     Follicle Stimulating Hormone; Future

## 2023-03-15 ENCOUNTER — CLINICAL SUPPORT (OUTPATIENT)
Dept: DIABETES | Facility: CLINIC | Age: 43
End: 2023-03-15
Payer: COMMERCIAL

## 2023-03-15 DIAGNOSIS — E10.65 TYPE 1 DIABETES MELLITUS WITH HYPERGLYCEMIA: Primary | ICD-10-CM

## 2023-03-15 DIAGNOSIS — E10.65 TYPE 1 DIABETES MELLITUS WITH HYPERGLYCEMIA: ICD-10-CM

## 2023-03-15 PROCEDURE — G0108 DIAB MANAGE TRN  PER INDIV: HCPCS | Mod: S$GLB,,, | Performed by: STUDENT IN AN ORGANIZED HEALTH CARE EDUCATION/TRAINING PROGRAM

## 2023-03-15 PROCEDURE — 99999 PR PBB SHADOW E&M-EST. PATIENT-LVL I: ICD-10-PCS | Mod: PBBFAC,,,

## 2023-03-15 PROCEDURE — G0108 PR DIAB MANAGE TRN  PER INDIV: ICD-10-PCS | Mod: S$GLB,,, | Performed by: STUDENT IN AN ORGANIZED HEALTH CARE EDUCATION/TRAINING PROGRAM

## 2023-03-15 PROCEDURE — 99999 PR PBB SHADOW E&M-EST. PATIENT-LVL I: CPT | Mod: PBBFAC,,,

## 2023-03-15 RX ORDER — INSULIN PMP CART,AUT,G6/7,CNTR
1 EACH SUBCUTANEOUS ONCE
Qty: 1 EACH | Refills: 0 | Status: SHIPPED | OUTPATIENT
Start: 2023-03-15 | End: 2023-03-15

## 2023-03-15 RX ORDER — INSULIN LISPRO 100 [IU]/ML
INJECTION, SOLUTION INTRAVENOUS; SUBCUTANEOUS
Qty: 30 ML | Refills: 11 | Status: SHIPPED | OUTPATIENT
Start: 2023-03-15 | End: 2023-03-16

## 2023-03-15 NOTE — PROGRESS NOTES
Diabetes Care Specialist Progress Note  Author: Georgina Keating RN  Date: 3/15/2023    Program Intake  Reason for Diabetes Program Visit:: (P) Intervention  Type of Intervention:: (P) Individual  Individual: (P) Device Training, Education  Education: (P) Insulin Pump Evaluation  In the last 12 months, have you:: (P) been admitted to a hospital (DKA   1/23)    Lab Results   Component Value Date    HGBA1C 7.3 (H) 02/28/2023       Clinical    Patient Health Rating  Compared to other people your age, how would you rate your health?: Fair    Problem Review  Active comorbidities affecting diabetes self-care.: yes  Comorbidities: Hypertension, Other (comment) (hepatitis)    Clinical Assessment  Current Diabetes Treatment: Insulin  Have you ever experienced hypoglycemia (low blood sugar)?: yes  In the last month, how often have you experienced low blood sugar?:  (occassional)  Are you able to tell when your blood sugar is low?: Yes  What symptoms do you experience?: Dizzy/Light-headed, Shaky, Sweaty  Have you ever been hospitalized because your blood sugar was too low?: no  How do you treat hypoglycemia (low blood sugar)?: 1/2 can soda/fruit juice, 5-6 pieces of hard candy  Have you ever experienced hyperglycemia (high blood sugar)?: yes  In the last month, how often have you experienced high blood sugar?: more than once a week  Are you able to tell when your blood sugar is high?: No (comment)  Have you ever been hospitalized because your blood sugar was high?: yes (see comments) (DKA)    Medication Information  How do you obtain your medications?: Patient drives              Additional Social History    Support  Does anyone support you with your diabetes care?: yes  Who supports you?: spouse  Who takes you to your medical appointments?: self  Does the current support meet the patient's needs?: Yes  Is Support an area impacting ability to self-manage diabetes?: No    Access to Mass Media & Technology  Does the patient have  access to any of the following devices or technologies?: Smart phone  Media or technology needs impacting ability to self-manage diabetes?: No    Cognitive/Behavioral Health  Alert and Oriented: Yes  Difficulty Thinking: No  Requires Prompting: No  Requires assistance for routine expression?: No  Cognitive or behavioral barriers impacting ability to self-manage diabetes?: No    Culture/Adventist  Culture or Mormon beliefs that may impact ability to access healthcare: No    Communication  Language preference: English  Hearing Problems: No  Vision Problems: No  Communication needs impacting ability to self-manage diabetes?: No    Health Literacy  Preferred Learning Method: Face to Face, Group Education, Reading Materials  How often do you need to have someone help you read instructions, pamphlets, or written material from your doctor or pharmacy?: Sometimes  Health literacy needs impacting ability to self-manage diabetes?: No      Diabetes Self-Management Skills Assessment                                              Diabetes Self Support Plan         Assessment Summary and Plan    Based on today's diabetes care assessment, the following areas of need were identified:      Social 3/15/2023   Support No   Access to Mass Media/Tech No   Cognitive/Behavioral Health No   Culture/Adventist No   Communication No   Health Literacy No                      Today's interventions were provided through individual discussion, instruction, and written materials were provided.      Patient verbalized understanding of instruction and written materials.  Pt was able to return back demonstration of instructions today. Patient understood key points, needs reinforcement and further instruction.     Diabetes Self-Management Care Plan:    Today's Diabetes Self-Management Care Plan was developed with Cira's input. Cira has agreed to work toward the following goal(s) to improve his/her overall diabetes control.      Care Plan:  Diabetes Management   Updates made since 2/13/2023 12:00 AM        Problem: Healthy Eating         Goal: Carbohydrate counting    Start Date: 3/15/2023   Expected End Date: 3/22/2023   Priority: Medium   Note:    Pt states she carb counted years ago but needs a review.  I practiced carb counting with her several times, and encouraged to start practicing awaiting her Omnipod 5.       Task: Reviewed the sources and role of Carbohydrate, Protein, and Fat and how each nutrient impacts blood sugar. Completed 3/15/2023          Follow Up Plan     No follow-ups on file.    Today's care plan and follow up schedule was discussed with patient.  Cira verbalized understanding of the care plan, goals, and agrees to follow up plan.        The patient was encouraged to communicate with his/her health care provider/physician and care team regarding his/her condition(s) and treatment.  I provided the patient with my contact information today and encouraged to contact me via phone or [facility] Patient Portal as needed.     Length of Visit   Total Time: 0 Minutes Diabetes Care Specialist Progress Note  Author: Georgina Keating RN  Date: 3/15/2023    Program Intake  Reason for Diabetes Program Visit:: (P) Intervention  Type of Intervention:: (P) Individual  Individual: (P) Device Training, Education  Education: (P) Insulin Pump Evaluation  In the last 12 months, have you:: (P) been admitted to a hospital (DKA   1/23)    Lab Results   Component Value Date    HGBA1C 7.3 (H) 02/28/2023       Clinical    Patient Health Rating  Compared to other people your age, how would you rate your health?: Fair    Problem Review  Active comorbidities affecting diabetes self-care.: yes  Comorbidities: Hypertension, Other (comment) (hepatitis)    Clinical Assessment  Current Diabetes Treatment: Insulin  Have you ever experienced hypoglycemia (low blood sugar)?: yes  In the last month, how often have you experienced low blood sugar?:  (occassional)  Are you  able to tell when your blood sugar is low?: Yes  What symptoms do you experience?: Dizzy/Light-headed, Shaky, Sweaty  Have you ever been hospitalized because your blood sugar was too low?: no  How do you treat hypoglycemia (low blood sugar)?: 1/2 can soda/fruit juice, 5-6 pieces of hard candy  Have you ever experienced hyperglycemia (high blood sugar)?: yes  In the last month, how often have you experienced high blood sugar?: more than once a week  Are you able to tell when your blood sugar is high?: No (comment)  Have you ever been hospitalized because your blood sugar was high?: yes (see comments) (DKA)    Medication Information  How do you obtain your medications?: Patient drives              Additional Social History    Support  Does anyone support you with your diabetes care?: yes  Who supports you?: spouse  Who takes you to your medical appointments?: self  Does the current support meet the patient's needs?: Yes  Is Support an area impacting ability to self-manage diabetes?: No    Access to Mass Media & Technology  Does the patient have access to any of the following devices or technologies?: Smart phone  Media or technology needs impacting ability to self-manage diabetes?: No    Cognitive/Behavioral Health  Alert and Oriented: Yes  Difficulty Thinking: No  Requires Prompting: No  Requires assistance for routine expression?: No  Cognitive or behavioral barriers impacting ability to self-manage diabetes?: No    Culture/Baptism  Culture or Sikh beliefs that may impact ability to access healthcare: No    Communication  Language preference: English  Hearing Problems: No  Vision Problems: No  Communication needs impacting ability to self-manage diabetes?: No    Health Literacy  Preferred Learning Method: Face to Face, Group Education, Reading Materials  How often do you need to have someone help you read instructions, pamphlets, or written material from your doctor or pharmacy?: Sometimes  Health literacy  needs impacting ability to self-manage diabetes?: No      Diabetes Self-Management Skills Assessment      Pt came to the clinic today for Insulin pump evaluation.  Pt wants to use the Omnipod 5 insulin pump. Pt was on an insulin pump years ago but doesn't remember which one. Pt was able to get another cell phone that we will connect to the The Dexcom G6. Reviewed counting carbs and encouraged to start practicing. Pt states she is now taking Lantus 8 units BID and Humalog 2-6 units before meals.                                         Diabetes Self Support Plan         Assessment Summary and Plan    Based on today's diabetes care assessment, the following areas of need were identified:      Social 3/15/2023   Support No   Access to Mass Media/Tech No   Cognitive/Behavioral Health No   Culture/Hinduism No   Communication No   Health Literacy No                      Today's interventions were provided through individual discussion, instruction, and written materials were provided.      Patient verbalized understanding of instruction and written materials.  Pt was able to return back demonstration of instructions today. Patient understood key points, needs reinforcement and further instruction.     Diabetes Self-Management Care Plan:    Today's Diabetes Self-Management Care Plan was developed with Cira's input. Cira has agreed to work toward the following goal(s) to improve his/her overall diabetes control.      There are no recently modified care plans to display for this patient.      Follow Up Plan     No follow-ups on file.    Today's care plan and follow up schedule was discussed with patient.  Cira verbalized understanding of the care plan, goals, and agrees to follow up plan.        The patient was encouraged to communicate with his/her health care provider/physician and care team regarding his/her condition(s) and treatment.  I provided the patient with my contact information today and encouraged to  contact me via phone or [facility] Patient Portal as needed.     Length of Visit   Total Time: 0 Minutes

## 2023-03-16 DIAGNOSIS — E10.65 TYPE 1 DIABETES MELLITUS WITH HYPERGLYCEMIA: Primary | ICD-10-CM

## 2023-03-16 LAB
FINAL PATHOLOGIC DIAGNOSIS: NORMAL
Lab: NORMAL

## 2023-03-16 RX ORDER — INSULIN ASPART 100 [IU]/ML
INJECTION, SOLUTION INTRAVENOUS; SUBCUTANEOUS
Qty: 30 ML | Refills: 11 | Status: SHIPPED | OUTPATIENT
Start: 2023-03-16 | End: 2023-05-03 | Stop reason: SDUPTHER

## 2023-03-17 ENCOUNTER — HOSPITAL ENCOUNTER (OUTPATIENT)
Dept: RADIOLOGY | Facility: HOSPITAL | Age: 43
Discharge: HOME | End: 2023-03-17
Attending: OBSTETRICS & GYNECOLOGY
Payer: COMMERCIAL

## 2023-03-17 VITALS — WEIGHT: 181 LBS | HEIGHT: 67 IN | BODY MASS INDEX: 28.41 KG/M2

## 2023-03-17 DIAGNOSIS — Z12.31 ENCOUNTER FOR SCREENING MAMMOGRAM FOR MALIGNANT NEOPLASM OF BREAST: ICD-10-CM

## 2023-03-17 PROCEDURE — 77063 BREAST TOMOSYNTHESIS BI: CPT | Mod: 26,,, | Performed by: RADIOLOGY

## 2023-03-17 PROCEDURE — 77067 MAMMO DIGITAL SCREENING BILAT WITH TOMO: ICD-10-PCS | Mod: 26,,, | Performed by: RADIOLOGY

## 2023-03-17 PROCEDURE — 77067 SCR MAMMO BI INCL CAD: CPT | Mod: TC

## 2023-03-17 PROCEDURE — 77067 SCR MAMMO BI INCL CAD: CPT | Mod: 26,,, | Performed by: RADIOLOGY

## 2023-03-17 PROCEDURE — 77063 MAMMO DIGITAL SCREENING BILAT WITH TOMO: ICD-10-PCS | Mod: 26,,, | Performed by: RADIOLOGY

## 2023-03-20 ENCOUNTER — PATIENT MESSAGE (OUTPATIENT)
Dept: OBSTETRICS AND GYNECOLOGY | Facility: CLINIC | Age: 43
End: 2023-03-20
Payer: COMMERCIAL

## 2023-03-21 NOTE — TELEPHONE ENCOUNTER
If patient is wishes to have hysterectomy secondary to irregularity in her cycles and difficulty maintaining her blood sugars during her cycles we can attempt to get improved with her insurance and proceed again if she wishes

## 2023-03-22 ENCOUNTER — PATIENT MESSAGE (OUTPATIENT)
Dept: OBSTETRICS AND GYNECOLOGY | Facility: CLINIC | Age: 43
End: 2023-03-22
Payer: COMMERCIAL

## 2023-03-28 ENCOUNTER — OFFICE VISIT (OUTPATIENT)
Dept: INTERNAL MEDICINE | Facility: CLINIC | Age: 43
End: 2023-03-28
Payer: COMMERCIAL

## 2023-03-28 VITALS
DIASTOLIC BLOOD PRESSURE: 72 MMHG | RESPIRATION RATE: 18 BRPM | HEIGHT: 67 IN | SYSTOLIC BLOOD PRESSURE: 128 MMHG | HEART RATE: 82 BPM | WEIGHT: 170.63 LBS | BODY MASS INDEX: 26.78 KG/M2

## 2023-03-28 DIAGNOSIS — E10.65 TYPE 1 DIABETES MELLITUS WITH HYPERGLYCEMIA: ICD-10-CM

## 2023-03-28 DIAGNOSIS — Z09 HOSPITAL DISCHARGE FOLLOW-UP: Primary | ICD-10-CM

## 2023-03-28 PROCEDURE — 99213 OFFICE O/P EST LOW 20 MIN: CPT | Mod: S$GLB,,, | Performed by: INTERNAL MEDICINE

## 2023-03-28 PROCEDURE — 3051F PR MOST RECENT HEMOGLOBIN A1C LEVEL 7.0 - < 8.0%: ICD-10-PCS | Mod: CPTII,S$GLB,, | Performed by: INTERNAL MEDICINE

## 2023-03-28 PROCEDURE — 3078F PR MOST RECENT DIASTOLIC BLOOD PRESSURE < 80 MM HG: ICD-10-PCS | Mod: CPTII,S$GLB,, | Performed by: INTERNAL MEDICINE

## 2023-03-28 PROCEDURE — 1159F PR MEDICATION LIST DOCUMENTED IN MEDICAL RECORD: ICD-10-PCS | Mod: CPTII,S$GLB,, | Performed by: INTERNAL MEDICINE

## 2023-03-28 PROCEDURE — 99213 PR OFFICE/OUTPT VISIT, EST, LEVL III, 20-29 MIN: ICD-10-PCS | Mod: S$GLB,,, | Performed by: INTERNAL MEDICINE

## 2023-03-28 PROCEDURE — 99999 PR PBB SHADOW E&M-EST. PATIENT-LVL IV: ICD-10-PCS | Mod: PBBFAC,,, | Performed by: INTERNAL MEDICINE

## 2023-03-28 PROCEDURE — 3074F SYST BP LT 130 MM HG: CPT | Mod: CPTII,S$GLB,, | Performed by: INTERNAL MEDICINE

## 2023-03-28 PROCEDURE — 3008F PR BODY MASS INDEX (BMI) DOCUMENTED: ICD-10-PCS | Mod: CPTII,S$GLB,, | Performed by: INTERNAL MEDICINE

## 2023-03-28 PROCEDURE — 3074F PR MOST RECENT SYSTOLIC BLOOD PRESSURE < 130 MM HG: ICD-10-PCS | Mod: CPTII,S$GLB,, | Performed by: INTERNAL MEDICINE

## 2023-03-28 PROCEDURE — 99999 PR PBB SHADOW E&M-EST. PATIENT-LVL IV: CPT | Mod: PBBFAC,,, | Performed by: INTERNAL MEDICINE

## 2023-03-28 PROCEDURE — 1159F MED LIST DOCD IN RCRD: CPT | Mod: CPTII,S$GLB,, | Performed by: INTERNAL MEDICINE

## 2023-03-28 PROCEDURE — 3078F DIAST BP <80 MM HG: CPT | Mod: CPTII,S$GLB,, | Performed by: INTERNAL MEDICINE

## 2023-03-28 PROCEDURE — 4010F PR ACE/ARB THEARPY RXD/TAKEN: ICD-10-PCS | Mod: CPTII,S$GLB,, | Performed by: INTERNAL MEDICINE

## 2023-03-28 PROCEDURE — 1160F RVW MEDS BY RX/DR IN RCRD: CPT | Mod: CPTII,S$GLB,, | Performed by: INTERNAL MEDICINE

## 2023-03-28 PROCEDURE — 4010F ACE/ARB THERAPY RXD/TAKEN: CPT | Mod: CPTII,S$GLB,, | Performed by: INTERNAL MEDICINE

## 2023-03-28 PROCEDURE — 3051F HG A1C>EQUAL 7.0%<8.0%: CPT | Mod: CPTII,S$GLB,, | Performed by: INTERNAL MEDICINE

## 2023-03-28 PROCEDURE — 1160F PR REVIEW ALL MEDS BY PRESCRIBER/CLIN PHARMACIST DOCUMENTED: ICD-10-PCS | Mod: CPTII,S$GLB,, | Performed by: INTERNAL MEDICINE

## 2023-03-28 PROCEDURE — 3008F BODY MASS INDEX DOCD: CPT | Mod: CPTII,S$GLB,, | Performed by: INTERNAL MEDICINE

## 2023-03-28 RX ORDER — INSULIN PMP CART,AUT,G6/7,CNTR
EACH SUBCUTANEOUS
COMMUNITY
Start: 2023-03-16 | End: 2023-12-06 | Stop reason: SDUPTHER

## 2023-03-28 NOTE — PROGRESS NOTES
"Subjective:       Patient ID: Cira Keys is a 42 y.o. female.    Chief Complaint: Hospital Follow Up      HPI:  Patient is known to me and presents for hospital follow up. She was admitted 23 for DKA. Dischargedon Lantus 6 units BID, regular 2-8 units TID with meals. Followed up with endo post discharge and increased Lantus to 8 units BID and humalog 5 units TID with meals + SSI.   She has started using DEXCOM and she is "picking up my pump today"--plans to start insulin pump. DEXCOM shows sugars remains consistently elevated however.   No sx of DKA (abd pain, nausea, vomiting) and no clinical signs of acute infections.  No home health services required after discharge.     Past Medical History:   Diagnosis Date    E coli bacteremia     GERD (gastroesophageal reflux disease)     Hyperlipidemia     Iron deficiency anemia due to chronic blood loss 10/31/2015    Type 1 diabetes mellitus without complication 10/31/2015       Family History   Problem Relation Age of Onset    Hyperlipidemia Mother     Hypertension Father     No Known Problems Sister     No Known Problems Brother     Breast cancer Neg Hx     Colon cancer Neg Hx     Ovarian cancer Neg Hx        Social History     Socioeconomic History    Marital status:    Tobacco Use    Smoking status: Former     Packs/day: 1.00     Types: Cigarettes     Quit date: 12/10/2021     Years since quittin.2    Smokeless tobacco: Never   Substance and Sexual Activity    Alcohol use: Not Currently     Alcohol/week: 5.0 standard drinks     Types: 5 Drinks containing 0.5 oz of alcohol per week    Drug use: No     Comment: "20-30 years ago drug use"    Sexual activity: Yes     Partners: Male     Birth control/protection: See Surgical Hx     Comment:      Social Determinants of Health     Financial Resource Strain: Medium Risk    Difficulty of Paying Living Expenses: Somewhat hard   Food Insecurity: Unknown    Ran Out of Food in the Last Year: " Never true   Transportation Needs: No Transportation Needs    Lack of Transportation (Medical): No    Lack of Transportation (Non-Medical): No   Physical Activity: Unknown    Days of Exercise per Week: 4 days   Housing Stability: Unknown    Unable to Pay for Housing in the Last Year: Patient refused    Unstable Housing in the Last Year: Patient refused       Review of Systems   Constitutional:  Negative for activity change, fatigue, fever and unexpected weight change.   HENT:  Negative for congestion, ear pain, hearing loss, rhinorrhea and sore throat.    Eyes:  Negative for redness and visual disturbance.   Respiratory:  Negative for cough, shortness of breath and wheezing.    Cardiovascular:  Negative for chest pain, palpitations and leg swelling.   Gastrointestinal:  Negative for abdominal pain, constipation, diarrhea, nausea and vomiting.   Genitourinary:  Negative for dysuria, frequency and urgency.   Musculoskeletal:  Negative for back pain, joint swelling and neck pain.   Skin:  Negative for color change, rash and wound.   Neurological:  Negative for dizziness, tremors, weakness, light-headedness and headaches.       Objective:      Physical Exam  Vitals reviewed.   Constitutional:       General: She is not in acute distress.     Appearance: She is well-developed.   HENT:      Head: Normocephalic and atraumatic.      Right Ear: External ear normal.      Left Ear: External ear normal.      Nose: Nose normal.   Eyes:      General:         Right eye: No discharge.         Left eye: No discharge.      Extraocular Movements: Extraocular movements intact.      Conjunctiva/sclera: Conjunctivae normal.      Pupils: Pupils are equal, round, and reactive to light.   Neck:      Thyroid: No thyromegaly.   Cardiovascular:      Rate and Rhythm: Normal rate and regular rhythm.      Heart sounds: No murmur heard.  Pulmonary:      Effort: Pulmonary effort is normal. No respiratory distress.      Breath sounds: Normal breath  sounds. No wheezing.   Abdominal:      General: Bowel sounds are normal. There is no distension.      Palpations: Abdomen is soft.      Tenderness: There is no abdominal tenderness.   Skin:     General: Skin is warm and dry.   Neurological:      Mental Status: She is alert and oriented to person, place, and time.      Cranial Nerves: No cranial nerve deficit.   Psychiatric:         Behavior: Behavior normal.         Thought Content: Thought content normal.       Assessment:       1. Hospital discharge follow-up    2. Type 1 diabetes mellitus with hyperglycemia        Plan:       1. Hospital discharge follow-up  D/c summary reviewed  Inpatient labs reviewed  A1C 2/2023 reviewed  Endocrinology notes reviewed  Meds reconciled today    2. Type 1 diabetes mellitus with hyperglycemia  Chronic uncontrolled  DEXCOM reviewed and consistently with elevated glucose  However, she is getting her new pump today and making adjustments with endo so no med adjustments from me today  Start pump per endo plans and follow up as scheduled  I will see her again after next endo appt with labs for routine follow up  -     CBC Auto Differential; Future; Expected date: 06/26/2023  -     Comprehensive Metabolic Panel; Future; Expected date: 06/26/2023  -     Hemoglobin A1C; Future; Expected date: 06/26/2023  -     Lipid Panel; Future; Expected date: 06/26/2023  -     Microalbumin/Creatinine Ratio, Urine; Future; Expected date: 06/26/2023

## 2023-03-29 ENCOUNTER — CLINICAL SUPPORT (OUTPATIENT)
Dept: DIABETES | Facility: CLINIC | Age: 43
End: 2023-03-29
Payer: COMMERCIAL

## 2023-03-29 DIAGNOSIS — Z46.81 INSULIN PUMP TRAINING: ICD-10-CM

## 2023-03-29 DIAGNOSIS — E10.65 POOR CONTROL TYPE I DIABETES MELLITUS: Primary | ICD-10-CM

## 2023-03-29 PROCEDURE — G0108 PR DIAB MANAGE TRN  PER INDIV: ICD-10-PCS | Mod: S$GLB,,, | Performed by: STUDENT IN AN ORGANIZED HEALTH CARE EDUCATION/TRAINING PROGRAM

## 2023-03-29 PROCEDURE — G0108 DIAB MANAGE TRN  PER INDIV: HCPCS | Mod: S$GLB,,, | Performed by: STUDENT IN AN ORGANIZED HEALTH CARE EDUCATION/TRAINING PROGRAM

## 2023-03-29 NOTE — PROGRESS NOTES
Diabetes Care Specialist Progress Note  Author: Georgina Keating RN  Date: 3/29/2023    Program Intake  Reason for Diabetes Program Visit:: Intervention  Type of Intervention:: Individual  Individual: Device Training  Device Training: Insulin Pump Start (Omnipod5)  Current diabetes risk level:: high  In the last 12 months, have you:: none    Lab Results   Component Value Date    HGBA1C 7.3 (H) 02/28/2023       Clinical     OMNI POD 5 INSULIN PUMP START    Pump training was provided per Omni Pod protocol.  Patient has used an insulin pump in the past years ago    Patient is not new to insulin pump therapy but this will be first Automated system she/he will be using.     Pt has Dexcom G6 on      Basal:  12AM-12a: 0.80 units/hr     Max basal rate: 1.5 u/hr     Bolus:  CHO ratio: 1:12  ISF: 1:45  Glucose target : 120 mg/dL  Correct  over: 120mg/dl  Active insulin time: 5 hours  Max bolus: 8 units     Low reservoir insulin alarm: 10 units  Change pod alarm: every 3 days       Details of pump therapy were covered included following controller features and programming, pod activation, pod site selection and rotation, automatic pod priming and insertion, setting & editing basal rates in manual mode, giving bolus and other features in the set-up menu.  The following regarding the Omnipod 5 was covered:  During your first Pod wear, since no recent history is available, the Omnipod 5 System uses only your active Basal Program from Manual Mode as a starting point to adjust your insulin. After 48 hours of history is collected, which usually happens with the first Pod wear, SmartStepcaseust technology stops adjusting insulin against your active Basal Program and starts using the Adaptive Basal Rate for your automated insulin delivery with your next Pod change. With each Pod change, insulin delivery information is sent and saved in the Omnipod 5 Vitaly so that the next Pod that is started is updated with the new Adaptive Basal Rate. With each  new Pod activation, the system adapts insulin delivery based on physiological needs and total daily insulin (TDI) delivered. After 2-3 Pod changes, adaptation generally stabilizes and automated insulin delivery is based on this adaptation.  Patient demonstrated ability to program controller, activate and insert pod using aseptic technique.  Patient demonstrated ability to program Dexcom transmitter into controller and start automated limited mode.    Instructed pt on use of basic pump features ie...give a bolus, pause insulin, switch from manual to automated mode.  Reviewed features available in manual mode verses automated mode.   Reviewed when and how to use activity function in automated mode.  Reviewed site selection of pods, rotation of sites and hard stop to change pod every 72 hrs.   Instructed that insulin vial is good out of refrigeration for up to 28-30 days.   Reviewed treatment of hypoglycemia, hyperglycemia; sick day care, DKA, and troubleshooting of pump.  Omni Pod 24-hour support line provided.      Instructed pt to no longer use any other insulins.  Instructed and practiced a couple of times to give a bolus for a meal or a correction.     Podder username: Jeaneth Hernandez password: [email or web address]     Greciao username: [email or web address]  Balwinder password: [email or web address]                                      Diabetes Self-Management Skills Assessment                                              Diabetes Self Support Plan         Assessment Summary and Plan    Based on today's diabetes care assessment, the following areas of need were identified:      Social 3/15/2023   Support No   Access to Mass Media/Tech No   Cognitive/Behavioral Health No   Culture/Temple No   Communication No   Health Literacy No                      Today's interventions were provided through individual discussion, instruction, and written materials were provided.      Patient verbalized understanding of instruction and written  materials.  Pt was able to return back demonstration of instructions today. Patient understood key points, needs reinforcement and further instruction.     Diabetes Self-Management Care Plan:    Today's Diabetes Self-Management Care Plan was developed with Cira's input. Cira has agreed to work toward the following goal(s) to improve his/her overall diabetes control.      Care Plan: Diabetes Management   Updates made since 2/27/2023 12:00 AM        Problem: Healthy Eating         Goal: Carbohydrate counting    Start Date: 3/15/2023   Expected End Date: 3/22/2023   Priority: Medium   Note:    Pt states she carb counted years ago but needs a review.  I practiced carb counting with her several times, and encouraged to start practicing awaiting her Omnipod 5.       Task: Reviewed the sources and role of Carbohydrate, Protein, and Fat and how each nutrient impacts blood sugar. Completed 3/15/2023          Follow Up Plan     No follow-ups on file.    Today's care plan and follow up schedule was discussed with patient.  Cira verbalized understanding of the care plan, goals, and agrees to follow up plan.        The patient was encouraged to communicate with his/her health care provider/physician and care team regarding his/her condition(s) and treatment.  I provided the patient with my contact information today and encouraged to contact me via phone or [facility] Patient Portal as needed.     Length of Visit   Total Time: 40 Minutes

## 2023-04-02 PROBLEM — R11.2 NAUSEA & VOMITING: Status: ACTIVE | Noted: 2023-04-02

## 2023-04-03 ENCOUNTER — PATIENT MESSAGE (OUTPATIENT)
Dept: ADMINISTRATIVE | Facility: HOSPITAL | Age: 43
End: 2023-04-03
Payer: MEDICAID

## 2023-04-03 PROBLEM — R11.2 NAUSEA & VOMITING: Status: RESOLVED | Noted: 2023-04-02 | Resolved: 2023-04-03

## 2023-04-11 ENCOUNTER — PATIENT MESSAGE (OUTPATIENT)
Dept: INTERNAL MEDICINE | Facility: CLINIC | Age: 43
End: 2023-04-11
Payer: MEDICAID

## 2023-04-12 NOTE — TELEPHONE ENCOUNTER
No new care gaps identified.  [facility] Embedded Care Gaps. Reference number: [id number]. 4/12/2023   8:02:29 AM CDT

## 2023-04-17 RX ORDER — CITALOPRAM 10 MG/1
10 TABLET ORAL DAILY
Qty: 30 TABLET | Refills: 5 | Status: SHIPPED | OUTPATIENT
Start: 2023-04-17 | End: 2023-12-06 | Stop reason: SDUPTHER

## 2023-04-24 ENCOUNTER — PATIENT MESSAGE (OUTPATIENT)
Dept: INTERNAL MEDICINE | Facility: CLINIC | Age: 43
End: 2023-04-24
Payer: MEDICAID

## 2023-04-24 ENCOUNTER — PATIENT MESSAGE (OUTPATIENT)
Dept: OBSTETRICS AND GYNECOLOGY | Facility: CLINIC | Age: 43
End: 2023-04-24
Payer: MEDICAID

## 2023-04-24 DIAGNOSIS — N92.1 MENORRHAGIA WITH IRREGULAR CYCLE: Primary | ICD-10-CM

## 2023-04-26 NOTE — TELEPHONE ENCOUNTER
Pt inquiring about surgery but there's been no surgery case request sent, Please advise. Pt is aware that it will be next week that we will reach back out to her.

## 2023-05-02 NOTE — TELEPHONE ENCOUNTER
Attempted to contact pt via phone, unable to leave vm.   Re:  Schedule surgery.   [facility] message sent.

## 2023-05-02 NOTE — TELEPHONE ENCOUNTER
[facility] BS scheduled for 5/29/23 with pre-op, pre-admit and post op appointments scheduled and discussed with patient.  Pt verbalized understanding.  Case request number [id number].  Claudette in surgery department notified of date and time.

## 2023-05-03 ENCOUNTER — TELEPHONE (OUTPATIENT)
Dept: ENDOCRINOLOGY | Facility: CLINIC | Age: 43
End: 2023-05-03
Payer: MEDICAID

## 2023-05-03 DIAGNOSIS — E10.65 TYPE 1 DIABETES MELLITUS WITH HYPERGLYCEMIA: ICD-10-CM

## 2023-05-03 RX ORDER — INSULIN PMP CART,AUT,G6/7,CNTR
1 EACH SUBCUTANEOUS
Qty: 10 EACH | Refills: 11 | Status: ON HOLD | OUTPATIENT
Start: 2023-05-03 | End: 2023-12-28 | Stop reason: HOSPADM

## 2023-05-03 RX ORDER — INSULIN ASPART 100 [IU]/ML
INJECTION, SOLUTION INTRAVENOUS; SUBCUTANEOUS
Qty: 30 ML | Refills: 11 | Status: ON HOLD | OUTPATIENT
Start: 2023-05-03 | End: 2023-10-14 | Stop reason: HOSPADM

## 2023-05-03 NOTE — TELEPHONE ENCOUNTER
----- Message from Kiana Jacinto sent at 5/3/2023 11:36 AM CDT -----  Contact: PATIENT  Cira Keys  MRN: [id number]  : 1980  PCP: Krysta Mercado  Home Phone      [phone]  Work Phone      Not on file.  Mobile          [phone]  Home Phone      [phone]      MESSAGE: Patient needs a refill on the insulin that goes in her insulin pump to [facility].        Phone: [phone]

## 2023-05-22 NOTE — TELEPHONE ENCOUNTER
[facility] BS rescheduled to 7/13/2023 per patient's request.  Spouse currently inpatient.  Rescheduled all appointments to coin with new surgery date of 7/13/2023.  Claudette in surgery notified.

## 2023-06-01 ENCOUNTER — PATIENT OUTREACH (OUTPATIENT)
Dept: ADMINISTRATIVE | Facility: HOSPITAL | Age: 43
End: 2023-06-01
Payer: MEDICAID

## 2023-06-14 ENCOUNTER — OFFICE VISIT (OUTPATIENT)
Dept: ENDOCRINOLOGY | Facility: CLINIC | Age: 43
End: 2023-06-14
Payer: MEDICAID

## 2023-06-14 VITALS
WEIGHT: 159 LBS | HEIGHT: 67 IN | SYSTOLIC BLOOD PRESSURE: 150 MMHG | BODY MASS INDEX: 24.96 KG/M2 | DIASTOLIC BLOOD PRESSURE: 70 MMHG | RESPIRATION RATE: 18 BRPM

## 2023-06-14 DIAGNOSIS — Z96.41 INSULIN PUMP IN PLACE: ICD-10-CM

## 2023-06-14 DIAGNOSIS — E10.65 TYPE 1 DIABETES MELLITUS WITH HYPERGLYCEMIA: ICD-10-CM

## 2023-06-14 DIAGNOSIS — I10 ESSENTIAL HYPERTENSION: ICD-10-CM

## 2023-06-14 PROCEDURE — 99214 PR OFFICE/OUTPT VISIT, EST, LEVL IV, 30-39 MIN: ICD-10-PCS | Mod: 25,S$PBB,, | Performed by: STUDENT IN AN ORGANIZED HEALTH CARE EDUCATION/TRAINING PROGRAM

## 2023-06-14 PROCEDURE — 3008F BODY MASS INDEX DOCD: CPT | Mod: CPTII,,, | Performed by: STUDENT IN AN ORGANIZED HEALTH CARE EDUCATION/TRAINING PROGRAM

## 2023-06-14 PROCEDURE — 3051F PR MOST RECENT HEMOGLOBIN A1C LEVEL 7.0 - < 8.0%: ICD-10-PCS | Mod: CPTII,,, | Performed by: STUDENT IN AN ORGANIZED HEALTH CARE EDUCATION/TRAINING PROGRAM

## 2023-06-14 PROCEDURE — 95251 CONT GLUC MNTR ANALYSIS I&R: CPT | Mod: ,,, | Performed by: STUDENT IN AN ORGANIZED HEALTH CARE EDUCATION/TRAINING PROGRAM

## 2023-06-14 PROCEDURE — 99999 PR PBB SHADOW E&M-EST. PATIENT-LVL III: CPT | Mod: PBBFAC,,, | Performed by: STUDENT IN AN ORGANIZED HEALTH CARE EDUCATION/TRAINING PROGRAM

## 2023-06-14 PROCEDURE — 3008F PR BODY MASS INDEX (BMI) DOCUMENTED: ICD-10-PCS | Mod: CPTII,,, | Performed by: STUDENT IN AN ORGANIZED HEALTH CARE EDUCATION/TRAINING PROGRAM

## 2023-06-14 PROCEDURE — 4010F ACE/ARB THERAPY RXD/TAKEN: CPT | Mod: CPTII,,, | Performed by: STUDENT IN AN ORGANIZED HEALTH CARE EDUCATION/TRAINING PROGRAM

## 2023-06-14 PROCEDURE — 99213 OFFICE O/P EST LOW 20 MIN: CPT | Mod: PBBFAC | Performed by: STUDENT IN AN ORGANIZED HEALTH CARE EDUCATION/TRAINING PROGRAM

## 2023-06-14 PROCEDURE — 99214 OFFICE O/P EST MOD 30 MIN: CPT | Mod: 25,S$PBB,, | Performed by: STUDENT IN AN ORGANIZED HEALTH CARE EDUCATION/TRAINING PROGRAM

## 2023-06-14 PROCEDURE — 99999 PR PBB SHADOW E&M-EST. PATIENT-LVL III: ICD-10-PCS | Mod: PBBFAC,,, | Performed by: STUDENT IN AN ORGANIZED HEALTH CARE EDUCATION/TRAINING PROGRAM

## 2023-06-14 PROCEDURE — 3077F SYST BP >= 140 MM HG: CPT | Mod: CPTII,,, | Performed by: STUDENT IN AN ORGANIZED HEALTH CARE EDUCATION/TRAINING PROGRAM

## 2023-06-14 PROCEDURE — 95251 PR GLUCOSE MONITOR, 72 HOUR, PHYS INTERP: ICD-10-PCS | Mod: ,,, | Performed by: STUDENT IN AN ORGANIZED HEALTH CARE EDUCATION/TRAINING PROGRAM

## 2023-06-14 PROCEDURE — 3078F DIAST BP <80 MM HG: CPT | Mod: CPTII,,, | Performed by: STUDENT IN AN ORGANIZED HEALTH CARE EDUCATION/TRAINING PROGRAM

## 2023-06-14 PROCEDURE — 3077F PR MOST RECENT SYSTOLIC BLOOD PRESSURE >= 140 MM HG: ICD-10-PCS | Mod: CPTII,,, | Performed by: STUDENT IN AN ORGANIZED HEALTH CARE EDUCATION/TRAINING PROGRAM

## 2023-06-14 PROCEDURE — 1159F PR MEDICATION LIST DOCUMENTED IN MEDICAL RECORD: ICD-10-PCS | Mod: CPTII,,, | Performed by: STUDENT IN AN ORGANIZED HEALTH CARE EDUCATION/TRAINING PROGRAM

## 2023-06-14 PROCEDURE — 1159F MED LIST DOCD IN RCRD: CPT | Mod: CPTII,,, | Performed by: STUDENT IN AN ORGANIZED HEALTH CARE EDUCATION/TRAINING PROGRAM

## 2023-06-14 PROCEDURE — 4010F PR ACE/ARB THEARPY RXD/TAKEN: ICD-10-PCS | Mod: CPTII,,, | Performed by: STUDENT IN AN ORGANIZED HEALTH CARE EDUCATION/TRAINING PROGRAM

## 2023-06-14 PROCEDURE — 3078F PR MOST RECENT DIASTOLIC BLOOD PRESSURE < 80 MM HG: ICD-10-PCS | Mod: CPTII,,, | Performed by: STUDENT IN AN ORGANIZED HEALTH CARE EDUCATION/TRAINING PROGRAM

## 2023-06-14 PROCEDURE — 3051F HG A1C>EQUAL 7.0%<8.0%: CPT | Mod: CPTII,,, | Performed by: STUDENT IN AN ORGANIZED HEALTH CARE EDUCATION/TRAINING PROGRAM

## 2023-06-14 NOTE — PROGRESS NOTES
"Subjective:      Patient ID: Cira Keys is a 42 y.o. female.    Chief Complaint:  Type 1 diabetes mellitus      History of Present Illness  This is a 42 y.o. female. with a past medical history of type 1 diabetes mellitus here for evaluation.      Type 1 diabetes mellitus  Diagnosed around age 9     11/03/15 04:28   C-Peptide <0.1 (L)   Glutamic Acid Decarb Ab 0.12 (H)       Current diabetes medications:  - Insulin Novolog via Omnipod 5 insulin pump  Basal rate  12A: 0.8 U/h    ICR  12A: 12    ISF  12A: 45    Target: 120  IAT: 5h        Past diabetes medications:  - Lantus  - Levemir  - Toujeo  - Novolog  - Insulin pump    Lab Results   Component Value Date    CREATININE 0.49 (L) 04/03/2023    EGFRNORACEVR >60.0 04/03/2023       Known diabetic complications:  hypoglycemia    Weight trend:  Wt Readings from Last 8 Encounters:   06/14/23 72.1 kg (159 lb)   04/02/23 75.1 kg (165 lb 8 oz)   04/01/23 77.4 kg (170 lb 10.2 oz)   03/28/23 77.4 kg (170 lb 10.2 oz)   03/17/23 82.1 kg (181 lb)   03/10/23 82.2 kg (181 lb 4.8 oz)   03/07/23 76 kg (167 lb 8.8 oz)   02/28/23 79.2 kg (174 lb 9.7 oz)       Family history of diabetes:  No    Prior visit with diabetes education: yes    Current diet: 3 meals per day      Blood glucose monitoring at home:               Diabetes Management Status  Statin: Taking  ACE/ARB: Taking    Screening or Prevention Patient's value   HgA1C Testing and Control   Lab Results   Component Value Date    HGBA1C 7.3 (H) 02/28/2023        LDL control Lab Results   Component Value Date    LDLCALC 128.4 11/22/2022      Nephropathy screening Lab Results   Component Value Date    MICALBCREAT 48.5 (H) 11/22/2022            Review of Systems  As above    Social and family history reviewed  Current medications and allergies reviewed    Objective:   BP (!) 150/70 (BP Location: Right arm, Patient Position: Sitting, BP Method: Medium (Manual))   Resp 18   Ht 5' 7" (1.702 m)   Wt 72.1 kg (159 lb)   " BMI 24.90 kg/m²   Physical Exam  Alert, oriented    BP Readings from Last 1 Encounters:   06/14/23 (!) 150/70      Wt Readings from Last 1 Encounters:   06/14/23 0859 72.1 kg (159 lb)     Body mass index is 24.9 kg/m².    Lab Review:   Lab Results   Component Value Date    HGBA1C 7.3 (H) 02/28/2023     Lab Results   Component Value Date    CHOL 235 (H) 11/22/2022    HDL 94 (H) 11/22/2022    LDLCALC 128.4 11/22/2022    TRIG 63 11/22/2022    CHOLHDL 40.0 11/22/2022     Lab Results   Component Value Date     (L) 04/03/2023    K 4.1 04/03/2023     04/03/2023    CO2 18 (L) 04/03/2023     (H) 04/03/2023    BUN 7 04/03/2023    CREATININE 0.49 (L) 04/03/2023    CALCIUM 8.8 04/03/2023    PROT 8.3 04/01/2023    ALBUMIN 4.6 04/01/2023    BILITOT 1.1 (H) 04/01/2023    ALKPHOS 70 04/01/2023    AST 22 04/01/2023    ALT 19 04/01/2023    ANIONGAP 15 04/03/2023    ESTGFRAFRICA >60 03/20/2022    EGFRNONAA >60 03/20/2022    TSH 1.154 09/11/2020       All pertinent labs reviewed    Assessment and Plan     Type 1 diabetes mellitus with hyperglycemia  Control has improved with Omnipod 5. She has 0% hypoglycemia now. She is still above range with average glucose 210 (compared to 270). Main issue are prandial rises related to late bolusing. I counseled on bolusing before meal to cover for those prandial rises so will work on that.    She was on Manual mode and had been on it for over 24 hours. Advised to always check and go back to Auto Mode (SmartAdjust).      Plan  - Continue insulin Novolog via Omnipod 5 with above settings    Basal rate  12A: 0.8 U/h    ICR  12A: 12    ISF  12A: 45    Target: 120  IAT: 5h    - Continue Dexcom G6 CGM    F/u 3 months    Labs per PCP - she has upcoming appointment    Insulin pump in place  Continue insulin Novolog via Omnipod 5    Essential hypertension  Continue antihypertensive regimen including ARB/ACEi      Jhonny Mao MD  Endocrinology

## 2023-06-14 NOTE — ASSESSMENT & PLAN NOTE
Control has improved with Omnipod 5. She has 0% hypoglycemia now. She is still above range with average glucose 210 (compared to 270). Main issue are prandial rises related to late bolusing. I counseled on bolusing before meal to cover for those prandial rises so will work on that.    She was on Manual mode and had been on it for over 24 hours. Advised to always check and go back to Auto Mode (SmartAdjust).      Plan  - Continue insulin Novolog via Omnipod 5 with above settings    Basal rate  12A: 0.8 U/h    ICR  12A: 12    ISF  12A: 45    Target: 120  IAT: 5h    - Continue Dexcom G6 CGM    F/u 3 months    Labs per PCP - she has upcoming appointment

## 2023-06-16 ENCOUNTER — PATIENT MESSAGE (OUTPATIENT)
Dept: PODIATRY | Facility: CLINIC | Age: 43
End: 2023-06-16
Payer: MEDICAID

## 2023-07-07 ENCOUNTER — OFFICE VISIT (OUTPATIENT)
Dept: OBSTETRICS AND GYNECOLOGY | Facility: CLINIC | Age: 43
End: 2023-07-07
Attending: OBSTETRICS & GYNECOLOGY
Payer: MEDICAID

## 2023-07-07 VITALS
WEIGHT: 163.38 LBS | SYSTOLIC BLOOD PRESSURE: 122 MMHG | HEIGHT: 67 IN | BODY MASS INDEX: 25.64 KG/M2 | HEART RATE: 86 BPM | DIASTOLIC BLOOD PRESSURE: 70 MMHG

## 2023-07-07 DIAGNOSIS — N92.1 MENORRHAGIA WITH IRREGULAR CYCLE: Primary | ICD-10-CM

## 2023-07-07 DIAGNOSIS — Z01.818 PREOP TESTING: ICD-10-CM

## 2023-07-07 PROCEDURE — 99499 UNLISTED E&M SERVICE: CPT | Mod: S$PBB,,, | Performed by: OBSTETRICS & GYNECOLOGY

## 2023-07-07 PROCEDURE — 99499 NO LOS: ICD-10-PCS | Mod: S$PBB,,, | Performed by: OBSTETRICS & GYNECOLOGY

## 2023-07-07 PROCEDURE — 99213 OFFICE O/P EST LOW 20 MIN: CPT | Mod: PBBFAC | Performed by: OBSTETRICS & GYNECOLOGY

## 2023-07-07 PROCEDURE — 99999 PR PBB SHADOW E&M-EST. PATIENT-LVL III: CPT | Mod: PBBFAC,,, | Performed by: OBSTETRICS & GYNECOLOGY

## 2023-07-07 PROCEDURE — 99999 PR PBB SHADOW E&M-EST. PATIENT-LVL III: ICD-10-PCS | Mod: PBBFAC,,, | Performed by: OBSTETRICS & GYNECOLOGY

## 2023-07-07 RX ORDER — DIPHENHYDRAMINE HCL 25 MG
25 CAPSULE ORAL EVERY 4 HOURS PRN
Status: CANCELLED | OUTPATIENT
Start: 2023-07-07

## 2023-07-07 RX ORDER — SODIUM CHLORIDE, SODIUM LACTATE, POTASSIUM CHLORIDE, CALCIUM CHLORIDE 600; 310; 30; 20 MG/100ML; MG/100ML; MG/100ML; MG/100ML
INJECTION, SOLUTION INTRAVENOUS CONTINUOUS
Status: CANCELLED | OUTPATIENT
Start: 2023-07-07

## 2023-07-07 RX ORDER — LIDOCAINE HYDROCHLORIDE 10 MG/ML
0.5 INJECTION, SOLUTION EPIDURAL; INFILTRATION; INTRACAUDAL; PERINEURAL
Status: CANCELLED | OUTPATIENT
Start: 2023-07-07

## 2023-07-07 RX ORDER — HYDROMORPHONE HYDROCHLORIDE 2 MG/ML
0.2 INJECTION, SOLUTION INTRAMUSCULAR; INTRAVENOUS; SUBCUTANEOUS EVERY 5 MIN PRN
Status: CANCELLED | OUTPATIENT
Start: 2023-07-07

## 2023-07-07 RX ORDER — HYDROMORPHONE HYDROCHLORIDE 2 MG/ML
1 INJECTION, SOLUTION INTRAMUSCULAR; INTRAVENOUS; SUBCUTANEOUS EVERY 4 HOURS PRN
Status: CANCELLED | OUTPATIENT
Start: 2023-07-07

## 2023-07-07 RX ORDER — AMOXICILLIN 250 MG
1 CAPSULE ORAL 2 TIMES DAILY
Status: CANCELLED | OUTPATIENT
Start: 2023-07-07

## 2023-07-07 RX ORDER — HYDROCODONE BITARTRATE AND ACETAMINOPHEN 5; 325 MG/1; MG/1
1 TABLET ORAL EVERY 4 HOURS PRN
Status: CANCELLED | OUTPATIENT
Start: 2023-07-07

## 2023-07-07 RX ORDER — PROCHLORPERAZINE EDISYLATE 5 MG/ML
5 INJECTION INTRAMUSCULAR; INTRAVENOUS EVERY 6 HOURS PRN
Status: CANCELLED | OUTPATIENT
Start: 2023-07-07

## 2023-07-07 RX ORDER — SODIUM CHLORIDE 0.9 % (FLUSH) 0.9 %
3 SYRINGE (ML) INJECTION
Status: CANCELLED | OUTPATIENT
Start: 2023-07-07

## 2023-07-07 RX ORDER — ACETAMINOPHEN 325 MG/1
650 TABLET ORAL EVERY 4 HOURS PRN
Status: CANCELLED | OUTPATIENT
Start: 2023-07-07

## 2023-07-07 RX ORDER — PROCHLORPERAZINE EDISYLATE 5 MG/ML
5 INJECTION INTRAMUSCULAR; INTRAVENOUS EVERY 10 MIN PRN
Status: CANCELLED | OUTPATIENT
Start: 2023-07-07

## 2023-07-07 RX ORDER — PREGABALIN 25 MG/1
50 CAPSULE ORAL
Status: CANCELLED | OUTPATIENT
Start: 2023-07-07

## 2023-07-07 RX ORDER — DIPHENHYDRAMINE HYDROCHLORIDE 50 MG/ML
25 INJECTION INTRAMUSCULAR; INTRAVENOUS EVERY 4 HOURS PRN
Status: CANCELLED | OUTPATIENT
Start: 2023-07-07

## 2023-07-07 RX ORDER — ONDANSETRON 2 MG/ML
4 INJECTION INTRAMUSCULAR; INTRAVENOUS DAILY PRN
Status: CANCELLED | OUTPATIENT
Start: 2023-07-07

## 2023-07-07 RX ORDER — HYDROCODONE BITARTRATE AND ACETAMINOPHEN 10; 325 MG/1; MG/1
1 TABLET ORAL EVERY 4 HOURS PRN
Status: CANCELLED | OUTPATIENT
Start: 2023-07-07

## 2023-07-07 RX ORDER — KETOROLAC TROMETHAMINE 30 MG/ML
30 INJECTION, SOLUTION INTRAMUSCULAR; INTRAVENOUS
Status: CANCELLED | OUTPATIENT
Start: 2023-07-07 | End: 2023-07-08

## 2023-07-07 RX ORDER — FAMOTIDINE 20 MG/1
20 TABLET, FILM COATED ORAL
Status: CANCELLED | OUTPATIENT
Start: 2023-07-07

## 2023-07-07 RX ORDER — ACETAMINOPHEN 500 MG
1000 TABLET ORAL
Status: CANCELLED | OUTPATIENT
Start: 2023-07-07

## 2023-07-07 RX ORDER — IBUPROFEN 200 MG
800 TABLET ORAL
Status: CANCELLED | OUTPATIENT
Start: 2023-07-08

## 2023-07-07 RX ORDER — ONDANSETRON 4 MG/1
8 TABLET, ORALLY DISINTEGRATING ORAL EVERY 8 HOURS PRN
Status: CANCELLED | OUTPATIENT
Start: 2023-07-07

## 2023-07-07 RX ORDER — POLYETHYLENE GLYCOL 3350 17 G/17G
17 POWDER, FOR SOLUTION ORAL DAILY
Status: CANCELLED | OUTPATIENT
Start: 2023-07-07

## 2023-07-07 RX ORDER — MUPIROCIN 20 MG/G
OINTMENT TOPICAL
Status: CANCELLED | OUTPATIENT
Start: 2023-07-07

## 2023-07-07 RX ORDER — MEPERIDINE HYDROCHLORIDE 100 MG/ML
12.5 INJECTION INTRAMUSCULAR; INTRAVENOUS; SUBCUTANEOUS ONCE AS NEEDED
Status: CANCELLED | OUTPATIENT
Start: 2023-07-07 | End: 2023-07-08

## 2023-07-07 RX ORDER — CELECOXIB 100 MG/1
400 CAPSULE ORAL
Status: CANCELLED | OUTPATIENT
Start: 2023-07-07

## 2023-07-07 NOTE — H&P (VIEW-ONLY)
Subjective:       Patient ID: Cira Keys is a 42 y.o. female.    Chief Complaint:  Pre-op Exam (Pt here to Pre op for TLH )      History of Present Illness  Patient presents for preop for TLH and bilateral salpingectomy. Patient reports irregular cycles.  Patient in the past has had very heavy cycles and attempted birth control pills without success.  Lately patient has been skipping cycles and sometimes has light cycles.  She reports that her blood sugar has been struggling to be in range and it is severely out of range with her menstrual cycles.  Patient is also experiencing hot flashes.  Counseling was done.  Informed consents obtained    Menstrual History:  OB History          2    Para   2    Term   2            AB        Living   1         SAB        IAB        Ectopic        Multiple        Live Births   2                Menarche age:  Patient's last menstrual period was 2023.         Review of Systems  Review of Systems   Constitutional:  Negative for activity change, appetite change, chills, diaphoresis, fatigue, fever and unexpected weight change.   HENT:  Negative for congestion, dental problem, drooling, ear discharge, ear pain, facial swelling, hearing loss, mouth sores, nosebleeds, postnasal drip, rhinorrhea, sinus pressure, sneezing, sore throat, tinnitus, trouble swallowing and voice change.    Eyes:  Negative for photophobia, pain, discharge, redness, itching and visual disturbance.   Respiratory:  Negative for apnea, cough, choking, chest tightness, shortness of breath, wheezing and stridor.    Cardiovascular:  Negative for chest pain, palpitations and leg swelling.   Gastrointestinal:  Negative for abdominal distention, abdominal pain, anal bleeding, blood in stool, constipation, diarrhea, nausea, rectal pain and vomiting.   Endocrine: Negative for cold intolerance, heat intolerance, polydipsia, polyphagia and polyuria.   Genitourinary:  Positive for menstrual  problem and vaginal bleeding. Negative for decreased urine volume, difficulty urinating, dyspareunia, dysuria, enuresis, flank pain, frequency, genital sores, hematuria, pelvic pain, urgency, vaginal discharge and vaginal pain.   Musculoskeletal:  Negative for arthralgias, back pain, gait problem, joint swelling, myalgias, neck pain and neck stiffness.   Skin:  Negative for color change, pallor, rash and wound.   Allergic/Immunologic: Negative for environmental allergies, food allergies and immunocompromised state.   Neurological:  Negative for dizziness, tremors, seizures, syncope, facial asymmetry, speech difficulty, weakness, light-headedness, numbness and headaches.   Hematological:  Negative for adenopathy. Does not bruise/bleed easily.   Psychiatric/Behavioral:  Negative for agitation, behavioral problems, confusion, decreased concentration, dysphoric mood, hallucinations, self-injury, sleep disturbance and suicidal ideas. The patient is not nervous/anxious and is not hyperactive.          Objective:      Physical Exam  Vitals and nursing note reviewed.   Constitutional:       Appearance: She is well-developed.   HENT:      Head: Normocephalic.   Neck:      Thyroid: No thyromegaly.   Cardiovascular:      Rate and Rhythm: Normal rate and regular rhythm.   Pulmonary:      Effort: Pulmonary effort is normal.      Breath sounds: Normal breath sounds.   Abdominal:      General: Bowel sounds are normal.      Palpations: Abdomen is soft. There is no mass.      Tenderness: There is no abdominal tenderness.      Hernia: There is no hernia in the left inguinal area.   Genitourinary:     Vagina: Normal. No foreign body. No vaginal discharge or tenderness.      Cervix: No cervical motion tenderness, discharge or friability.      Uterus: Not enlarged and not tender.       Adnexa:         Right: No mass, tenderness or fullness.          Left: No mass, tenderness or fullness.        Rectum: No external hemorrhoid.    Musculoskeletal:         General: Normal range of motion.      Cervical back: Normal range of motion.   Skin:     General: Skin is dry.   Neurological:      Mental Status: She is alert and oriented to person, place, and time.      Deep Tendon Reflexes: Reflexes are normal and symmetric.   Psychiatric:         Behavior: Behavior normal.         Thought Content: Thought content normal.         Judgment: Judgment normal.           Assessment:        1. Menorrhagia with irregular cycle                Plan         Cira was seen today for pre-op exam.    Diagnoses and all orders for this visit:    Menorrhagia with irregular cycle  -     Full code; Standing  -     Vital signs; Standing  -     Recinos to Gravity; Standing  -     Insert peripheral IV; Standing  -     POCT glucose; Standing  -     Notify physician if BS > 180 for hysterectomy patients; Standing  -     POCT urine pregnancy; Standing  -     Notify Physician/Vital Signs Parameters Urine output less than 0.5mL/kg/hr (with indwelling catheter) or 30 mL/hr (without indwelling catheter) or blood glucose greater than 200 mg/dL; Standing  -     Notify physician; Standing  -     Diet NPO Except for: Medication; Standing  -     Place sequential compression device; Standing  -     Transfer patient; Standing  -     Admit to Phase I PACU, transfer to phase II level of care when Lena score is 9 out of 10; Standing  -     Vital signs; Standing  -     Straight Cath; Standing  -     Intake and output Per protocol; Standing  -     Apply warming blanket; Standing  -     POCT glucose; Standing  -     Notify Physician (specify); Standing  -     Notify Physician; Standing  -     Notify Physician (specify); Standing  -     Notify Anesthesiologist; Standing  -     Oxygen Continuous; Standing  -     Pulse Oximetry Continuous; Standing  -     Vital signs; Standing  -     Pulse Oximetry Q12H PRN; Standing  -     Remove dressing; Standing  -     Recinos catheter - discontinue;  Standing  -     Diet Adult Regular (IDDSI Level 7) [facility]; Standing  -     DISCHARGE PATIENT 1) Tolerating PO, No emesis x 2 hours 2) Ambulates with assistance appropriate to age and health status (to baseline ability) 3) Voided or has been instructed on how to respond if difficulty voiding 4) Vital signs stable (within ...; Standing  -     Discontinue IV; Standing  -     Place in Outpatient - Extended Recovery; Standing    Other orders  -     Progressive Mobility Protocol (mobilize patient to their highest level of functioning at least twice daily); Standing  -     LIDOcaine (PF) 10 mg/ml (1%) injection 5 mg  -     lactated ringers infusion  -     mupirocin 2 % ointment  -     acetaminophen tablet 1,000 mg  -     celecoxib capsule 400 mg  -     pregabalin capsule 50 mg  -     famotidine tablet 20 mg  -     sodium chloride 0.9% flush 3 mL  -     HYDROmorphone (PF) injection 0.2 mg  -     HYDROcodone-acetaminophen 5-325 mg per tablet 1 tablet  -     meperidine (PF) injection 12.5 mg  -     ondansetron injection 4 mg  -     prochlorperazine injection Soln 5 mg  -     Progressive Mobility Protocol (mobilize patient to their highest level of functioning at least twice daily); Standing  -     lactated ringers infusion  -     ondansetron disintegrating tablet 8 mg  -     prochlorperazine injection Soln 5 mg  -     diphenhydrAMINE capsule 25 mg  -     diphenhydrAMINE injection 25 mg  -     acetaminophen tablet 650 mg  -     HYDROcodone-acetaminophen 5-325 mg per tablet 1 tablet  -     HYDROcodone-acetaminophen  mg per tablet 1 tablet  -     HYDROmorphone (PF) injection 1 mg  -     ketorolac injection 30 mg  -     ibuprofen tablet 800 mg  -     senna-docusate 8.6-50 mg per tablet 1 tablet  -     polyethylene glycol packet 17 g

## 2023-07-07 NOTE — TELEPHONE ENCOUNTER
No care due was identified.  [facility] Embedded Care Due Messages. Reference number: [id number].   7/07/2023 6:26:19 PM CDT

## 2023-07-10 ENCOUNTER — PATIENT MESSAGE (OUTPATIENT)
Dept: ADMINISTRATIVE | Facility: HOSPITAL | Age: 43
End: 2023-07-10
Payer: MEDICAID

## 2023-07-10 RX ORDER — LISINOPRIL 5 MG/1
TABLET ORAL
Qty: 90 TABLET | Refills: 1 | Status: SHIPPED | OUTPATIENT
Start: 2023-07-10 | End: 2024-04-02

## 2023-07-11 ENCOUNTER — HOSPITAL ENCOUNTER (OUTPATIENT)
Dept: PREADMISSION TESTING | Facility: HOSPITAL | Age: 43
Discharge: HOME | End: 2023-07-11
Attending: OBSTETRICS & GYNECOLOGY
Payer: MEDICAID

## 2023-07-11 NOTE — DISCHARGE INSTRUCTIONS
[facility]  Pre Admit Instructions    Day and Date of Procedure:      Call your doctor if you become ill, have an infection, or are taking antibiotics before your surgery  Someone will call you between 1 p.m. And 5 p.m. the workday before the procedure to give you an arrival time       - If your procedure is before 7 a.m. enter through Emergency Room door and check in with them       - 7 a.m. to 5 p.m. Enter through main entrance and check in with registration  You must have a responsible  to bring you home  Only one person will be allowed per patient due to Covid-19 restrictions  You must wear a mask at all times. If you do not have a mask, we will provide you with one. No Exception.   Cafeteria hours for guest: 7am to 10am; 11am to 1:30 pm.    Do NOT eat anything past:   Clear liquids until:  No dairy, creamers, gum or mints the morning of your procedure  You must drink one sports drink before _______    Please    Do not wear makeup, jewelry, nail polish or body piercings  Bring containers/solution for contacts, dentures, bridges - these and hearing aids will be removed before your procedure  Do not bring cash, jewelry or valuables the day of your procedure   No smoking at least 24 hours before your procedure  Wear clothing that is comfortable and easy to take off and put on  Do NOT shave for at least 5 days before your surgery    PRE-OP Hibiclens bath Instructions:    Shower with Hibiclens the Night before and morning of the procedure.   Wash your face with your regular cleanser. DO NOT use Hibiclens on your face.  Thoroughly rinse your body with warm water from the neck down  Apply Hibiclens directly to your skin or on a wet washcloth and wash gently. Do not stand under the water while washing with Hibiclens as you will   remove the wash too soon.  Rinse thoroughly with warm water  DO NOT use your regular soap after you have bathed with the Hibiclens  Do not apply lotion or deodorant   Put on  a clean pair of clothes after each bath          Information about your stay (Please Review)    Before Surgery  Your doctor may order and review labs, x-rays, ECG or other tests as a pre-surgery workup and will call you if there is need for follow up.  If you did not get a Covid test before your procedure, one will be done when you arrive. You must have a negative Covid test result before your procedure.  No smoking inside or outside the hospital. You must leave the campus to smoke.  Wear clothing that is easy to take off and put on.  The hospital will provide you with a gown.  You may bring robe, slippers, nightwear, and toiletries (toothbrush, toothpaste, makeup) if needed.  If your doctor orders a Fleets Enema or other prep, follow package and/or doctors orders.  Brush your teeth and rinse your mouth the morning of surgery, but dont swallow the water.  The nurse will ask questions and check your condition.  The doctor may clint your surgical site.  Compression boots will be placed on your calves to reduce the risk of blood clots.  An IV will be started in pre-procedure area.  The doctor may order medicine to help you relax before surgery.  After Surgery  After you recover in post-procedure area you will go to the medicine floor to recover for a few more hours.  The nurse will check your temperature, breathing, blood pressure, heart rate, IV site, and surgery site.  A diet will be ordered-most start with ice chips and then advance slowly to other foods.  If you have IV fluids the IV pump will beep to let the nurse know that she needs to check it.  You may have a urinary catheter and staff may measure your oral intake and urine output.  Pain medication may be ordered by the doctor after surgery.  If you have a pain management device tell your caretakers not to press the button because of OVERDOSE RISK.  When the nurse or doctor tells you it is okay to get out of bed, ask for help until you are stable.  The nurse  may ask you to turn, cough, and deep breathe to prevent lung problems.  You can use a pillow to hold your incision when you deep breathe or cough to reduce pain.  The nurse will give you discharge instructions--incision care, symptoms to report to your doctor, and your follow-up appointment when you are discharged. You cannot drive yourself home.  Only one person is allowed during your stay due to Covid-19 restrictions. Visiting hours are 8 am to 6 pm.    Goals for Discharge from One Day Surgery  Control pain with an oral medication  Walk without feeling dizzy or weak  Tolerate liquids well  Urinate without difficulty    Things you can do to  Reduce the Risk of Infections or Complications  Wash Hands and use Waterless Hand Sanitizers  Wash hands frequently with soap and warm water for at least 15 seconds.   Use hand sanitizers (alcohol based) often at home and in public if hands are not visibly soiled  Take Antibiotic Exactly as Prescribed  Do not stop antibiotics too soon; you risk developing infection resistant to antibiotics  Take your antibiotic even if you are feeling better and even if they upset your stomach  Call the doctor if you cant tolerate the antibiotic or you have an allergic reaction  Stay Healthy  Take medicines as prescribed by your doctor  Keep your diabetes under control - diet and medication  Get enough rest, exercise and eat a healthy diet  Keep the Wound Clean and Dry  Wash hands before and after taking care of the incision (cut)  Wash hands when you remove a dressing, before you touch/apply a new dressing  Shower and clean incision with antibacterial soap and rinse well if the doctor approves  Allow the cut to dry completely before putting on a clean dressing  Do not touch the part of the bandage that will cover the incision  Do not use ointments unless your doctor tells you to-can promote bacterial growth  If ordered, put ointment directly on the dressing-do not touch the end of the  tube  Do not scrub, remove scabs, or leave a damp dressing on the incision  Do not use peroxide or alcohol to clean the incision unless the doctor tells you to   Do not let children, pets or anyone else contaminate the incision  Stop Smoking To Prevent Infection  Stop smoking-Centers for Disease Control recommends 30 days before surgery  Smokers get more infections after surgery-studies have shown 6 times the risk  Smokers have more scarring and heal slower-open wounds get infected easier  Prevent Respiratory complications  Stop smoking  Turn, cough, and deep breathe even if you have some pain when you do so.  Splint your incision with a pillow when you cough/deep breath, to help control pain.  Do not lie in one position for long periods of time.   Prevent Blood Clots  When you wake move your legs, flex your feet, rotate your ankles, wiggle your toes  Get up when the doctor says its ok.  Dangle your feet from the side of the bed  Report symptoms-leg pain, redness/swelling, warm to touch; fever; shortness of breath, chest pain, severe upper back pain.

## 2023-07-13 ENCOUNTER — ANESTHESIA EVENT (OUTPATIENT)
Dept: SURGERY | Facility: HOSPITAL | Age: 43
End: 2023-07-13
Payer: MEDICAID

## 2023-07-13 ENCOUNTER — ANESTHESIA (OUTPATIENT)
Dept: SURGERY | Facility: HOSPITAL | Age: 43
End: 2023-07-13
Payer: MEDICAID

## 2023-07-13 ENCOUNTER — HOSPITAL ENCOUNTER (OUTPATIENT)
Facility: HOSPITAL | Age: 43
Discharge: HOME | End: 2023-07-13
Attending: OBSTETRICS & GYNECOLOGY | Admitting: OBSTETRICS & GYNECOLOGY
Payer: MEDICAID

## 2023-07-13 VITALS
OXYGEN SATURATION: 96 % | TEMPERATURE: 98 F | RESPIRATION RATE: 20 BRPM | SYSTOLIC BLOOD PRESSURE: 126 MMHG | DIASTOLIC BLOOD PRESSURE: 62 MMHG | HEART RATE: 70 BPM

## 2023-07-13 DIAGNOSIS — N92.1 MENORRHAGIA WITH IRREGULAR CYCLE: ICD-10-CM

## 2023-07-13 LAB
GLUCOSE SERPL-MCNC: 165 MG/DL (ref 70–110)
POCT GLUCOSE: 151 MG/DL (ref 70–110)

## 2023-07-13 PROCEDURE — 58571 PR LAPAROSCOPY W TOT HYSTERECTUTERUS <=250 GRAM  W TUBE/OVARY: ICD-10-PCS | Mod: SPCCPT,,, | Performed by: OBSTETRICS & GYNECOLOGY

## 2023-07-13 PROCEDURE — 00840 ANES IPER PX LOWER ABD NOS: CPT | Performed by: OBSTETRICS & GYNECOLOGY

## 2023-07-13 PROCEDURE — D9220AH HC ANESTHESIA PROFESSIONAL FEE: Mod: QZ,P3 | Performed by: NURSE ANESTHETIST, CERTIFIED REGISTERED

## 2023-07-13 PROCEDURE — 88307 TISSUE EXAM BY PATHOLOGIST: CPT | Performed by: PATHOLOGY

## 2023-07-13 PROCEDURE — 88307 PR  SURG PATH,LEVEL V: ICD-10-PCS | Mod: 26,,, | Performed by: PATHOLOGY

## 2023-07-13 PROCEDURE — 25000003 PHARM REV CODE 250: Performed by: NURSE ANESTHETIST, CERTIFIED REGISTERED

## 2023-07-13 PROCEDURE — 58571 TLH W/T/O 250 G OR LESS: CPT | Mod: 80,,, | Performed by: OBSTETRICS & GYNECOLOGY

## 2023-07-13 PROCEDURE — 37000008 HC ANESTHESIA 1ST 15 MINUTES: Performed by: OBSTETRICS & GYNECOLOGY

## 2023-07-13 PROCEDURE — 71000033 HC RECOVERY, INTIAL HOUR: Performed by: OBSTETRICS & GYNECOLOGY

## 2023-07-13 PROCEDURE — 00840 ANES IPER PX LOWER ABD NOS: CPT | Mod: QZ,P3 | Performed by: NURSE ANESTHETIST, CERTIFIED REGISTERED

## 2023-07-13 PROCEDURE — 37000009 HC ANESTHESIA EA ADD 15 MINS: Performed by: OBSTETRICS & GYNECOLOGY

## 2023-07-13 PROCEDURE — 25000003 PHARM REV CODE 250: Performed by: OBSTETRICS & GYNECOLOGY

## 2023-07-13 PROCEDURE — 27201423 OPTIME MED/SURG SUP & DEVICES STERILE SUPPLY: Performed by: OBSTETRICS & GYNECOLOGY

## 2023-07-13 PROCEDURE — 58571 TLH W/T/O 250 G OR LESS: CPT | Mod: SPCCPT,,, | Performed by: OBSTETRICS & GYNECOLOGY

## 2023-07-13 PROCEDURE — 82962 GLUCOSE BLOOD TEST: CPT | Performed by: OBSTETRICS & GYNECOLOGY

## 2023-07-13 PROCEDURE — 88307 TISSUE EXAM BY PATHOLOGIST: CPT | Mod: 26,,, | Performed by: PATHOLOGY

## 2023-07-13 PROCEDURE — 36000711: Performed by: OBSTETRICS & GYNECOLOGY

## 2023-07-13 PROCEDURE — 63600175 PHARM REV CODE 636 W HCPCS: Performed by: OBSTETRICS & GYNECOLOGY

## 2023-07-13 PROCEDURE — 58571 PR LAPAROSCOPY W TOT HYSTERECTUTERUS <=250 GRAM  W TUBE/OVARY: ICD-10-PCS | Mod: 80,,, | Performed by: OBSTETRICS & GYNECOLOGY

## 2023-07-13 PROCEDURE — 63600175 PHARM REV CODE 636 W HCPCS: Performed by: NURSE ANESTHETIST, CERTIFIED REGISTERED

## 2023-07-13 PROCEDURE — 36000710: Performed by: OBSTETRICS & GYNECOLOGY

## 2023-07-13 RX ORDER — OXYCODONE AND ACETAMINOPHEN 5; 325 MG/1; MG/1
1 TABLET ORAL EVERY 4 HOURS PRN
Qty: 20 TABLET | Refills: 0 | Status: SHIPPED | OUTPATIENT
Start: 2023-07-13 | End: 2023-08-31

## 2023-07-13 RX ORDER — PREGABALIN 50 MG/1
50 CAPSULE ORAL
Status: COMPLETED | OUTPATIENT
Start: 2023-07-13 | End: 2023-07-13

## 2023-07-13 RX ORDER — HYDROCODONE BITARTRATE AND ACETAMINOPHEN 10; 325 MG/1; MG/1
1 TABLET ORAL EVERY 4 HOURS PRN
Status: DISCONTINUED | OUTPATIENT
Start: 2023-07-13 | End: 2023-07-13 | Stop reason: HOSPADM

## 2023-07-13 RX ORDER — ACETAMINOPHEN 500 MG
1000 TABLET ORAL
Status: COMPLETED | OUTPATIENT
Start: 2023-07-13 | End: 2023-07-13

## 2023-07-13 RX ORDER — HYDROMORPHONE HYDROCHLORIDE 1 MG/ML
1 INJECTION, SOLUTION INTRAMUSCULAR; INTRAVENOUS; SUBCUTANEOUS EVERY 4 HOURS PRN
Status: DISCONTINUED | OUTPATIENT
Start: 2023-07-13 | End: 2023-07-13 | Stop reason: HOSPADM

## 2023-07-13 RX ORDER — PROCHLORPERAZINE EDISYLATE 5 MG/ML
5 INJECTION INTRAMUSCULAR; INTRAVENOUS EVERY 6 HOURS PRN
Status: DISCONTINUED | OUTPATIENT
Start: 2023-07-13 | End: 2023-07-13 | Stop reason: HOSPADM

## 2023-07-13 RX ORDER — MIDAZOLAM HYDROCHLORIDE 1 MG/ML
INJECTION INTRAMUSCULAR; INTRAVENOUS
Status: DISCONTINUED | OUTPATIENT
Start: 2023-07-13 | End: 2023-07-13

## 2023-07-13 RX ORDER — HYDROCODONE BITARTRATE AND ACETAMINOPHEN 5; 325 MG/1; MG/1
1 TABLET ORAL EVERY 4 HOURS PRN
Status: DISCONTINUED | OUTPATIENT
Start: 2023-07-13 | End: 2023-07-13 | Stop reason: HOSPADM

## 2023-07-13 RX ORDER — PROPOFOL 10 MG/ML
VIAL (ML) INTRAVENOUS
Status: DISCONTINUED | OUTPATIENT
Start: 2023-07-13 | End: 2023-07-13

## 2023-07-13 RX ORDER — IBUPROFEN 800 MG/1
800 TABLET ORAL
Status: DISCONTINUED | OUTPATIENT
Start: 2023-07-14 | End: 2023-07-13 | Stop reason: HOSPADM

## 2023-07-13 RX ORDER — AMOXICILLIN 250 MG
1 CAPSULE ORAL 2 TIMES DAILY
Status: DISCONTINUED | OUTPATIENT
Start: 2023-07-13 | End: 2023-07-13 | Stop reason: HOSPADM

## 2023-07-13 RX ORDER — SUCCINYLCHOLINE CHLORIDE 20 MG/ML
INJECTION INTRAMUSCULAR; INTRAVENOUS
Status: DISCONTINUED | OUTPATIENT
Start: 2023-07-13 | End: 2023-07-13

## 2023-07-13 RX ORDER — LIDOCAINE HYDROCHLORIDE 20 MG/ML
INJECTION, SOLUTION EPIDURAL; INFILTRATION; INTRACAUDAL; PERINEURAL
Status: DISCONTINUED | OUTPATIENT
Start: 2023-07-13 | End: 2023-07-13

## 2023-07-13 RX ORDER — KETAMINE HCL IN 0.9 % NACL 50 MG/5 ML
SYRINGE (ML) INTRAVENOUS
Status: DISCONTINUED | OUTPATIENT
Start: 2023-07-13 | End: 2023-07-13

## 2023-07-13 RX ORDER — SODIUM CHLORIDE, SODIUM LACTATE, POTASSIUM CHLORIDE, CALCIUM CHLORIDE 600; 310; 30; 20 MG/100ML; MG/100ML; MG/100ML; MG/100ML
INJECTION, SOLUTION INTRAVENOUS CONTINUOUS
Status: DISCONTINUED | OUTPATIENT
Start: 2023-07-13 | End: 2023-07-13 | Stop reason: HOSPADM

## 2023-07-13 RX ORDER — MUPIROCIN 20 MG/G
OINTMENT TOPICAL
Status: DISCONTINUED | OUTPATIENT
Start: 2023-07-13 | End: 2023-07-13 | Stop reason: HOSPADM

## 2023-07-13 RX ORDER — LIDOCAINE HYDROCHLORIDE 10 MG/ML
0.5 INJECTION, SOLUTION EPIDURAL; INFILTRATION; INTRACAUDAL; PERINEURAL
Status: DISCONTINUED | OUTPATIENT
Start: 2023-07-13 | End: 2023-07-13 | Stop reason: HOSPADM

## 2023-07-13 RX ORDER — ONDANSETRON 8 MG/1
8 TABLET, ORALLY DISINTEGRATING ORAL EVERY 8 HOURS PRN
Status: DISCONTINUED | OUTPATIENT
Start: 2023-07-13 | End: 2023-07-13 | Stop reason: HOSPADM

## 2023-07-13 RX ORDER — DEXAMETHASONE SODIUM PHOSPHATE 4 MG/ML
INJECTION, SOLUTION INTRA-ARTICULAR; INTRALESIONAL; INTRAMUSCULAR; INTRAVENOUS; SOFT TISSUE
Status: DISCONTINUED | OUTPATIENT
Start: 2023-07-13 | End: 2023-07-13

## 2023-07-13 RX ORDER — ONDANSETRON HYDROCHLORIDE 2 MG/ML
INJECTION, SOLUTION INTRAMUSCULAR; INTRAVENOUS
Status: DISCONTINUED | OUTPATIENT
Start: 2023-07-13 | End: 2023-07-13

## 2023-07-13 RX ORDER — CELECOXIB 200 MG/1
400 CAPSULE ORAL
Status: COMPLETED | OUTPATIENT
Start: 2023-07-13 | End: 2023-07-13

## 2023-07-13 RX ORDER — DIPHENHYDRAMINE HYDROCHLORIDE 50 MG/ML
25 INJECTION INTRAMUSCULAR; INTRAVENOUS EVERY 4 HOURS PRN
Status: DISCONTINUED | OUTPATIENT
Start: 2023-07-13 | End: 2023-07-13 | Stop reason: HOSPADM

## 2023-07-13 RX ORDER — CEFAZOLIN SODIUM 1 G/3ML
INJECTION, POWDER, FOR SOLUTION INTRAMUSCULAR; INTRAVENOUS
Status: DISCONTINUED | OUTPATIENT
Start: 2023-07-13 | End: 2023-07-13

## 2023-07-13 RX ORDER — ACETAMINOPHEN 325 MG/1
650 TABLET ORAL EVERY 4 HOURS PRN
Status: DISCONTINUED | OUTPATIENT
Start: 2023-07-13 | End: 2023-07-13 | Stop reason: HOSPADM

## 2023-07-13 RX ORDER — PHENYLEPHRINE HYDROCHLORIDE 10 MG/ML
INJECTION INTRAVENOUS
Status: DISCONTINUED | OUTPATIENT
Start: 2023-07-13 | End: 2023-07-13

## 2023-07-13 RX ORDER — POLYETHYLENE GLYCOL 3350 17 G/17G
17 POWDER, FOR SOLUTION ORAL DAILY
Status: DISCONTINUED | OUTPATIENT
Start: 2023-07-13 | End: 2023-07-13 | Stop reason: HOSPADM

## 2023-07-13 RX ORDER — KETOROLAC TROMETHAMINE 30 MG/ML
30 INJECTION, SOLUTION INTRAMUSCULAR; INTRAVENOUS
Status: DISCONTINUED | OUTPATIENT
Start: 2023-07-13 | End: 2023-07-13 | Stop reason: HOSPADM

## 2023-07-13 RX ORDER — ROCURONIUM BROMIDE 10 MG/ML
INJECTION, SOLUTION INTRAVENOUS
Status: DISCONTINUED | OUTPATIENT
Start: 2023-07-13 | End: 2023-07-13

## 2023-07-13 RX ORDER — FENTANYL CITRATE 50 UG/ML
INJECTION, SOLUTION INTRAMUSCULAR; INTRAVENOUS
Status: DISCONTINUED | OUTPATIENT
Start: 2023-07-13 | End: 2023-07-13

## 2023-07-13 RX ORDER — HYDROMORPHONE HYDROCHLORIDE 1 MG/ML
INJECTION, SOLUTION INTRAMUSCULAR; INTRAVENOUS; SUBCUTANEOUS
Status: DISCONTINUED | OUTPATIENT
Start: 2023-07-13 | End: 2023-07-13

## 2023-07-13 RX ORDER — KETOROLAC TROMETHAMINE 30 MG/ML
INJECTION, SOLUTION INTRAMUSCULAR; INTRAVENOUS
Status: DISCONTINUED | OUTPATIENT
Start: 2023-07-13 | End: 2023-07-13

## 2023-07-13 RX ORDER — DIPHENHYDRAMINE HCL 25 MG
25 CAPSULE ORAL EVERY 4 HOURS PRN
Status: DISCONTINUED | OUTPATIENT
Start: 2023-07-13 | End: 2023-07-13 | Stop reason: HOSPADM

## 2023-07-13 RX ORDER — FAMOTIDINE 20 MG/1
20 TABLET, FILM COATED ORAL
Status: COMPLETED | OUTPATIENT
Start: 2023-07-13 | End: 2023-07-13

## 2023-07-13 RX ADMIN — PREGABALIN 50 MG: 50 CAPSULE ORAL at 06:07

## 2023-07-13 RX ADMIN — LIDOCAINE HYDROCHLORIDE 100 MG: 20 INJECTION, SOLUTION EPIDURAL; INFILTRATION; INTRACAUDAL; PERINEURAL at 07:07

## 2023-07-13 RX ADMIN — PROPOFOL 100 MG: 10 INJECTION, EMULSION INTRAVENOUS at 07:07

## 2023-07-13 RX ADMIN — HYDROMORPHONE HYDROCHLORIDE 1.2 MG: 1 INJECTION, SOLUTION INTRAMUSCULAR; INTRAVENOUS; SUBCUTANEOUS at 08:07

## 2023-07-13 RX ADMIN — CEFAZOLIN 2 G: 330 INJECTION, POWDER, FOR SOLUTION INTRAMUSCULAR; INTRAVENOUS at 07:07

## 2023-07-13 RX ADMIN — PHENYLEPHRINE HYDROCHLORIDE 100 MCG: 10 INJECTION INTRAVENOUS at 08:07

## 2023-07-13 RX ADMIN — POLYETHYLENE GLYCOL 3350 17 G: 17 POWDER, FOR SOLUTION ORAL at 10:07

## 2023-07-13 RX ADMIN — SENNOSIDES AND DOCUSATE SODIUM 1 TABLET: 50; 8.6 TABLET ORAL at 10:07

## 2023-07-13 RX ADMIN — Medication 50 MG: at 07:07

## 2023-07-13 RX ADMIN — FENTANYL CITRATE 100 MCG: 0.05 INJECTION, SOLUTION INTRAMUSCULAR; INTRAVENOUS at 07:07

## 2023-07-13 RX ADMIN — ACETAMINOPHEN 1000 MG: 500 TABLET ORAL at 06:07

## 2023-07-13 RX ADMIN — HYDROMORPHONE HYDROCHLORIDE 0.4 MG: 1 INJECTION, SOLUTION INTRAMUSCULAR; INTRAVENOUS; SUBCUTANEOUS at 08:07

## 2023-07-13 RX ADMIN — MIDAZOLAM HYDROCHLORIDE 2 MG: 1 INJECTION, SOLUTION INTRAMUSCULAR; INTRAVENOUS at 07:07

## 2023-07-13 RX ADMIN — FAMOTIDINE 20 MG: 20 TABLET ORAL at 06:07

## 2023-07-13 RX ADMIN — PHENYLEPHRINE HYDROCHLORIDE 200 MCG: 10 INJECTION INTRAVENOUS at 07:07

## 2023-07-13 RX ADMIN — SUGAMMADEX 200 MG: 100 INJECTION, SOLUTION INTRAVENOUS at 08:07

## 2023-07-13 RX ADMIN — CELECOXIB 400 MG: 200 CAPSULE ORAL at 06:07

## 2023-07-13 RX ADMIN — ONDANSETRON 4 MG: 2 INJECTION INTRAMUSCULAR; INTRAVENOUS at 07:07

## 2023-07-13 RX ADMIN — SUCCINYLCHOLINE CHLORIDE 120 MG: 20 INJECTION, SOLUTION INTRAMUSCULAR; INTRAVENOUS at 07:07

## 2023-07-13 RX ADMIN — DEXAMETHASONE SODIUM PHOSPHATE 4 MG: 4 INJECTION, SOLUTION INTRAMUSCULAR; INTRAVENOUS at 07:07

## 2023-07-13 RX ADMIN — SODIUM CHLORIDE, SODIUM LACTATE, POTASSIUM CHLORIDE, AND CALCIUM CHLORIDE: .6; .31; .03; .02 INJECTION, SOLUTION INTRAVENOUS at 07:07

## 2023-07-13 RX ADMIN — ROCURONIUM BROMIDE 5 MG: 10 INJECTION, SOLUTION INTRAVENOUS at 07:07

## 2023-07-13 RX ADMIN — KETOROLAC TROMETHAMINE 30 MG: 30 INJECTION, SOLUTION INTRAMUSCULAR at 08:07

## 2023-07-13 RX ADMIN — HYDROCODONE BITARTRATE AND ACETAMINOPHEN 1 TABLET: 10; 325 TABLET ORAL at 10:07

## 2023-07-13 NOTE — ANESTHESIA PREPROCEDURE EVALUATION
07/13/2023  Cira Keys is a 42 y.o., female.      Pre-op Assessment    I have reviewed the Patient Summary Reports.     I have reviewed the Nursing Notes. I have reviewed the NPO Status.   I have reviewed the Medications.     Review of Systems  Anesthesia Hx:  No problems with previous Anesthesia    Social:  Non-Smoker, No Alcohol Use    Hematology/Oncology:  Hematology Normal   Oncology Normal     EENT/Dental:EENT/Dental Normal   Cardiovascular:   Exercise tolerance: good Hypertension    Pulmonary:  Pulmonary Normal    Renal/:  Renal/ Normal     Hepatic/GI:   GERD Liver Disease, Hepatitis    Musculoskeletal:  Musculoskeletal Normal    Neurological:  Neurology Normal    Endocrine:   Diabetes, type 1    Dermatological:  Skin Normal    Psych:   Psychiatric History          Physical Exam  General: Well nourished    Airway:  Mallampati: II   Mouth Opening: Normal  TM Distance: Normal  Tongue: Normal  Neck ROM: Normal ROM    Chest/Lungs:  Clear to auscultation    Heart:  Rate: Normal  Rhythm: Regular Rhythm  Sounds: Normal        Anesthesia Plan  Type of Anesthesia, risks & benefits discussed:    Anesthesia Type: Gen ETT  Intra-op Monitoring Plan: Standard ASA Monitors  Post Op Pain Control Plan: multimodal analgesia  Induction:  IV  Airway Plan: Direct  Informed Consent: Informed consent signed with the Patient and all parties understand the risks and agree with anesthesia plan.  All questions answered.   ASA Score: 3  Day of Surgery Review of History & Physical: H&P Update referred to the surgeon/provider.I have interviewed and examined the patient. I have reviewed the patient's H&P dated: 7/13/23. There are no significant changes.     Ready For Surgery From Anesthesia Perspective.     .

## 2023-07-13 NOTE — OP NOTE
preop diagnosis  menorrhagia    Postop diagnosis  Same    Procedure   TLH and bilateral salpingectomy    Surgeon  Clem Sidhu    Asst.  Kellin Rodrigues    Specimen uterus with attached cervix and bilateral fallopian tubes    Estimated blood loss  less than 100 cc    Anesthesia Gen.    Findings  Normal-appearing uterus and tubes and ovaries bilaterally.    Procedure in detail    After the appropriate informed consents were obtained and laboratory within normal limits the patient was taken to the operating room where she was placed in a general anesthesia she was prepped and draped in the usual sterile fashion. She was placed in the universal stirrups.  A small 10 mm midline incision was made in the umbilicus.  Through this was passed a varies needle.  Initial free flow of new fluid was noted initial low reading pressure CO2 gas was noted.  CO2 gas was allowed to fill the belly until a pressure of 15.  The varies needle was replaced. A10 mm trocar with the attached laparoscope  were placed through the same incision.  Entrance into the abdomen was assured.  Right and left lateral ports were placed under direct visualization . The fallopian tube was grasped with a 5 mm grasper.  First beginning on the patient's left.  The harmonic scalpel was used to take down the fallopian tube from the ovary.  This was continued all the way to the round ligament which was transected with Harmonic scalpel.  Bladder flap was created anteriorly.  Peritoneum was excised posterior.  The uterine arteries were skeletonized.  The gyrus was used to coagulate across the uterine arteries.  They were transected with the Harmonic scalpel.  Same procedure was repeated on the patient's right.  Following this a sponge stick was placed in the vagina for exposure anteriorly.  The bladder was cleared and Harmonic scalpel was used to make an incision in the anterior vagina.  Uterus was elevated and sponge stick was placed in the posterior cuff.   The incision was again made with the harmonic scalpel posteriorly.  Following this the patient was first turned the patient's left side where the broad ligament was dissected away.  The uterus and cervix were mostly  on that side attention was into the patient's right where again ligament tissue was allowed to be pulled away using harmonic scalpel and the cervix and uterus were completely dissected away. Once it was freed from the left side also it was delivered through the vagina.  Hemoperitoneum was reestablished.  The cuff was closed using interrupted sutures of 2-0 Vicryl.  Following this the pelvis was irrigated with good hemostasis noted.  Both ureters were noted to peristalse.  Interceed was placed in the cervical spelled.  The CO2 gas was allowed to escape from the abdomen.  Patient was taken out of the universal stirrups the incisions were repaired with a subcutaneous stitch of 3-0 undyed Vicryl and dressed.  The patient was awakened from anesthesia and sent to recovery room in stable condition.   Harmonic scalpel= voyant    Discharge Note      SUMMARY     Admit Date: 7/13/2023    Attending Physician: Clem Sidhu MD     Discharge Physician: Clem Sidhu MD    Discharge Date: 7/13/2023     Reason for Admission:  Laparoscopic hysterectomy    Final Diagnoses:   Principal Problem: Menorrhagia with irregular cycle   Secondary Diagnoses:   Active Hospital Problems    Diagnosis  POA    *Menorrhagia with irregular cycle [N92.1]  Yes      Resolved Hospital Problems   No resolved problems to display.       Disposition: Home or Self Care    Procedures Performed: Procedure(s) (LRB):  HYSTERECTOMY, TOTAL, LAPAROSCOPIC (N/A)  SALPINGECTOMY, LAPAROSCOPIC (Bilateral)    Hospital Course:  Patient was admitted underwent laparoscopic hysterectomy.  Procedure was uncomplicated.  Once patient is fully awake and urinating her own and tolerating liquids she be allowed to be discharged home.    Consults:  none    Discharged Condition: good    Patient Instructions:   Current Discharge Medication List        START taking these medications    Details   oxyCODONE-acetaminophen (PERCOCET) 5-325 mg per tablet Take 1 tablet by mouth every 4 (four) hours as needed for Pain.  Qty: 20 tablet, Refills: 0           CONTINUE these medications which have NOT CHANGED    Details   busPIRone (BUSPAR) 5 MG Tab Take 1 tablet by mouth twice daily as needed  Qty: 120 tablet, Refills: 0      citalopram (CELEXA) 10 MG tablet Take 1 tablet (10 mg total) by mouth once daily.  Qty: 30 tablet, Refills: 5      esomeprazole (NEXIUM) 20 MG capsule Take 20 mg by mouth once daily.      gabapentin (NEURONTIN) 300 MG capsule Take 1 capsule (300 mg total) by mouth once daily.  Qty: 30 capsule, Refills: 11    Associated Diagnoses: Tendinopathy of rotator cuff, unspecified laterality      insulin aspart U-100 (NOVOLOG U-100 INSULIN ASPART) 100 unit/mL injection To use with insulin pump. Max daily dose 100 units.  Qty: 30 mL, Refills: 11    Associated Diagnoses: Type 1 diabetes mellitus with hyperglycemia      insulin pump cart,automated,BT (OMNIPOD 5 G6 PODS, GEN 5,) Crtg Inject 1 each into the skin every 72 hours.  Qty: 10 each, Refills: 11    Associated Diagnoses: Type 1 diabetes mellitus with hyperglycemia      lisinopriL (PRINIVIL,ZESTRIL) 5 MG tablet Take 1 tablet by mouth once daily  Qty: 90 tablet, Refills: 1      loratadine (CLARITIN) 10 mg tablet Take 10 mg by mouth daily as needed for Allergies.       sucralfate (CARAFATE) 1 gram tablet Take 1 tablet (1 g total) by mouth 4 (four) times daily before meals and nightly.  Qty: 120 tablet, Refills: 0      aluminum & magnesium hydroxide-simethicone (MAALOX MAXIMUM STRENGTH) 400-400-40 mg/5 mL suspension Take 15 mLs by mouth every 6 (six) hours as needed for Indigestion.  Qty: 335 mL, Refills: 0      atorvastatin (LIPITOR) 20 MG tablet Take 1 tablet (20 mg total) by mouth once daily.  Qty: 90 tablet,  "Refills: 3    Associated Diagnoses: Type 1 diabetes mellitus with hyperglycemia      insulin glargine (LANTUS U-100 INSULIN) 100 unit/mL injection Inject 6 Units into the skin 2 (two) times a day.  Qty: 3.6 mL, Refills: 2    Associated Diagnoses: Type 1 diabetes mellitus with hyperglycemia      insulin needles, disposable, 32 x 1/4 " Ndle Use to inject insulin 5 times a day  Qty: 100 each, Refills: 1      OMNIPOD 5 G6 INTRO KIT, GEN 5, Crtg              Medications:  Reconciled Home Medications:      Medication List        START taking these medications      oxyCODONE-acetaminophen 5-325 mg per tablet  Commonly known as: PERCOCET  Take 1 tablet by mouth every 4 (four) hours as needed for Pain.            CONTINUE taking these medications      aluminum & magnesium hydroxide-simethicone 400-400-40 mg/5 mL suspension  Commonly known as: MAALOX MAXIMUM STRENGTH  Take 15 mLs by mouth every 6 (six) hours as needed for Indigestion.     atorvastatin 20 MG tablet  Commonly known as: LIPITOR  Take 1 tablet (20 mg total) by mouth once daily.     busPIRone 5 MG Tab  Commonly known as: BUSPAR  Take 1 tablet by mouth twice daily as needed     citalopram 10 MG tablet  Commonly known as: CeleXA  Take 1 tablet (10 mg total) by mouth once daily.     esomeprazole 20 MG capsule  Commonly known as: NEXIUM  Take 20 mg by mouth once daily.     gabapentin 300 MG capsule  Commonly known as: NEURONTIN  Take 1 capsule (300 mg total) by mouth once daily.     insulin aspart U-100 100 unit/mL injection  Commonly known as: NovoLOG U-100 Insulin aspart  To use with insulin pump. Max daily dose 100 units.     insulin glargine 100 unit/mL injection  Commonly known as: LANTUS U-100 INSULIN  Inject 6 Units into the skin 2 (two) times a day.     lisinopriL 5 MG tablet  Commonly known as: PRINIVIL,ZESTRIL  Take 1 tablet by mouth once daily     loratadine 10 mg tablet  Commonly known as: CLARITIN  Take 10 mg by mouth daily as needed for Allergies.   " "  OMNIPOD 5 G6 INTRO KIT (GEN 5) Crtg  Generic drug: insulin pump cart,auto,BT-cntr     OMNIPOD 5 G6 PODS (GEN 5) Crtg  Generic drug: insulin pump cart,automated,BT  Inject 1 each into the skin every 72 hours.     pen needle, diabetic 32 gauge x 1/4" Ndle  Use to inject insulin 5 times a day     sucralfate 1 gram tablet  Commonly known as: CARAFATE  Take 1 tablet (1 g total) by mouth 4 (four) times daily before meals and nightly.              Discharge Procedure Orders (must include Diet, Follow-up, Activity)  No discharge procedures on file.      Follow-up Information       Clem Sidhu MD Follow up in 2 week(s).    Specialties: Obstetrics, Obstetrics and Gynecology  Why: post op follow up  Contact information:  [place] [id number] [phone]                              "

## 2023-07-13 NOTE — ANESTHESIA PROCEDURE NOTES
Intubation    Date/Time: 7/13/2023 7:33 AM  Performed by: Mustapha Barker CRNA  Authorized by: Mustapha Barker CRNA     Intubation:     Induction:  Intravenous    Intubated:  Postinduction    Mask Ventilation:  Easy mask    Attempts:  1    Attempted By:  Student    Method of Intubation:  Video laryngoscopy    Blade:  Pineda 3    Laryngeal View Grade: Grade I - full view of cords      Difficult Airway Encountered?: No      Complications:  None    Airway Device:  Oral endotracheal tube    Airway Device Size:  7.5    Style/Cuff Inflation:  Cuffed (inflated to minimal occlusive pressure)    Inflation Amount (mL):  6    Tube secured:  21    Secured at:  The lips    Placement Verified By:  Capnometry    Complicating Factors:  None    Findings Post-Intubation:  BS equal bilateral

## 2023-07-13 NOTE — ANESTHESIA POSTPROCEDURE EVALUATION
Anesthesia Post Evaluation    Patient: Cira Keys    Procedure(s) Performed: Procedure(s) (LRB):  HYSTERECTOMY, TOTAL, LAPAROSCOPIC (N/A)  SALPINGECTOMY, LAPAROSCOPIC (Bilateral)    Final Anesthesia Type: general      Patient location during evaluation: PACU  Patient participation: Yes- Able to Participate  Level of consciousness: awake and alert, oriented and awake  Post-procedure vital signs: reviewed and stable  Pain management: adequate  Airway patency: patent    PONV status at discharge: No PONV  Anesthetic complications: no      Cardiovascular status: blood pressure returned to baseline, hemodynamically stable and stable  Respiratory status: unassisted, spontaneous ventilation and room air  Hydration status: euvolemic  Follow-up not needed.          Vitals Value Taken Time   /58 07/13/23 0915   Temp 36.7 °C (98.1 °F) 07/13/23 0852   Pulse 77 07/13/23 0915   Resp 16 07/13/23 0915   SpO2 98 % 07/13/23 0915         Event Time   Out of Recovery 09:26:45         Pain/Lena Score: Pain Rating Prior to Med Admin: 0 (7/13/2023  6:59 AM)  Lena Score: 10 (7/13/2023  8:58 AM)

## 2023-07-13 NOTE — TRANSFER OF CARE
Anesthesia Transfer of Care Note    Patient: Cira Keys    Procedure(s) Performed: Procedure(s) (LRB):  HYSTERECTOMY, TOTAL, LAPAROSCOPIC (N/A)  SALPINGECTOMY, LAPAROSCOPIC (Bilateral)    Patient location: PACU    Anesthesia Type: general    Transport from OR: Transported from OR on 6-10 L/min O2 by face mask with adequate spontaneous ventilation    Post pain: adequate analgesia    Post assessment: no apparent anesthetic complications and tolerated procedure well    Post vital signs: stable    Level of consciousness: sedated    Nausea/Vomiting: no nausea/vomiting    Complications: none    Transfer of care protocol was followed      Last vitals:   Visit Vitals  /62 (BP Location: Right arm, Patient Position: Lying)   Pulse 70   Temp 36.5 °C (97.7 °F) (Oral)   Resp 20   SpO2 96%   Breastfeeding No

## 2023-07-17 LAB
FINAL PATHOLOGIC DIAGNOSIS: NORMAL
GROSS: NORMAL
Lab: NORMAL

## 2023-07-24 ENCOUNTER — PATIENT OUTREACH (OUTPATIENT)
Dept: ADMINISTRATIVE | Facility: HOSPITAL | Age: 43
End: 2023-07-24
Payer: MEDICAID

## 2023-07-25 ENCOUNTER — PATIENT MESSAGE (OUTPATIENT)
Dept: ENDOCRINOLOGY | Facility: CLINIC | Age: 43
End: 2023-07-25
Payer: MEDICAID

## 2023-07-25 NOTE — TELEPHONE ENCOUNTER
Called pt, advised pt insurance probably sent that when dexcom got rejected, reached out to [employer], they said pt was good on their in and supplies ship out tmrw, pt also said pharmacy only gives her 10 pods each month, and she is going a week without omnipods, called pharmacy, they said it was a mess up on their end that they will fill 10 pods today for her to  Patient notified of [employer] supplies and supplies for omnipod, verbalized understanding.

## 2023-08-03 ENCOUNTER — OFFICE VISIT (OUTPATIENT)
Dept: OBSTETRICS AND GYNECOLOGY | Facility: CLINIC | Age: 43
End: 2023-08-03
Payer: MEDICAID

## 2023-08-03 VITALS
WEIGHT: 165.31 LBS | HEIGHT: 67 IN | BODY MASS INDEX: 25.94 KG/M2 | SYSTOLIC BLOOD PRESSURE: 116 MMHG | DIASTOLIC BLOOD PRESSURE: 78 MMHG | HEART RATE: 88 BPM

## 2023-08-03 DIAGNOSIS — Z09 POSTOPERATIVE FOLLOW-UP: Primary | ICD-10-CM

## 2023-08-03 PROCEDURE — 1160F PR REVIEW ALL MEDS BY PRESCRIBER/CLIN PHARMACIST DOCUMENTED: ICD-10-PCS | Mod: CPTII,,, | Performed by: OBSTETRICS & GYNECOLOGY

## 2023-08-03 PROCEDURE — 3051F PR MOST RECENT HEMOGLOBIN A1C LEVEL 7.0 - < 8.0%: ICD-10-PCS | Mod: CPTII,,, | Performed by: OBSTETRICS & GYNECOLOGY

## 2023-08-03 PROCEDURE — 4010F PR ACE/ARB THEARPY RXD/TAKEN: ICD-10-PCS | Mod: CPTII,,, | Performed by: OBSTETRICS & GYNECOLOGY

## 2023-08-03 PROCEDURE — 3008F PR BODY MASS INDEX (BMI) DOCUMENTED: ICD-10-PCS | Mod: CPTII,,, | Performed by: OBSTETRICS & GYNECOLOGY

## 2023-08-03 PROCEDURE — 99999 PR PBB SHADOW E&M-EST. PATIENT-LVL IV: ICD-10-PCS | Mod: PBBFAC,,, | Performed by: OBSTETRICS & GYNECOLOGY

## 2023-08-03 PROCEDURE — 99024 POSTOP FOLLOW-UP VISIT: CPT | Mod: ,,, | Performed by: OBSTETRICS & GYNECOLOGY

## 2023-08-03 PROCEDURE — 3074F PR MOST RECENT SYSTOLIC BLOOD PRESSURE < 130 MM HG: ICD-10-PCS | Mod: CPTII,,, | Performed by: OBSTETRICS & GYNECOLOGY

## 2023-08-03 PROCEDURE — 3078F DIAST BP <80 MM HG: CPT | Mod: CPTII,,, | Performed by: OBSTETRICS & GYNECOLOGY

## 2023-08-03 PROCEDURE — 3078F PR MOST RECENT DIASTOLIC BLOOD PRESSURE < 80 MM HG: ICD-10-PCS | Mod: CPTII,,, | Performed by: OBSTETRICS & GYNECOLOGY

## 2023-08-03 PROCEDURE — 1159F MED LIST DOCD IN RCRD: CPT | Mod: CPTII,,, | Performed by: OBSTETRICS & GYNECOLOGY

## 2023-08-03 PROCEDURE — 99214 OFFICE O/P EST MOD 30 MIN: CPT | Mod: PBBFAC | Performed by: OBSTETRICS & GYNECOLOGY

## 2023-08-03 PROCEDURE — 99024 PR POST-OP FOLLOW-UP VISIT: ICD-10-PCS | Mod: ,,, | Performed by: OBSTETRICS & GYNECOLOGY

## 2023-08-03 PROCEDURE — 99999 PR PBB SHADOW E&M-EST. PATIENT-LVL IV: CPT | Mod: PBBFAC,,, | Performed by: OBSTETRICS & GYNECOLOGY

## 2023-08-03 PROCEDURE — 4010F ACE/ARB THERAPY RXD/TAKEN: CPT | Mod: CPTII,,, | Performed by: OBSTETRICS & GYNECOLOGY

## 2023-08-03 PROCEDURE — 1159F PR MEDICATION LIST DOCUMENTED IN MEDICAL RECORD: ICD-10-PCS | Mod: CPTII,,, | Performed by: OBSTETRICS & GYNECOLOGY

## 2023-08-03 PROCEDURE — 3051F HG A1C>EQUAL 7.0%<8.0%: CPT | Mod: CPTII,,, | Performed by: OBSTETRICS & GYNECOLOGY

## 2023-08-03 PROCEDURE — 3074F SYST BP LT 130 MM HG: CPT | Mod: CPTII,,, | Performed by: OBSTETRICS & GYNECOLOGY

## 2023-08-03 PROCEDURE — 1160F RVW MEDS BY RX/DR IN RCRD: CPT | Mod: CPTII,,, | Performed by: OBSTETRICS & GYNECOLOGY

## 2023-08-03 PROCEDURE — 3008F BODY MASS INDEX DOCD: CPT | Mod: CPTII,,, | Performed by: OBSTETRICS & GYNECOLOGY

## 2023-08-03 NOTE — PROGRESS NOTES
Subjective:       Patient ID: Cira Keys is a 42 y.o. female.    Chief Complaint:  Post-op Evaluation (Pt here 3 week PO hysterectomy, she is c/o sciatic pain)      History of Present Illness  Patient presents for 2 week postoperative follow-up from laparoscopic hysterectomy.  Patient denies any pain or bleeding.  She is voiding well and has good appetite.  Patient's pathology report was benign.  She is otherwise without gyn.    Menstrual History:  OB History          2    Para   2    Term   2            AB        Living   1         SAB        IAB        Ectopic        Multiple        Live Births   2                Menarche age:  Patient's last menstrual period was 2023.         Review of Systems  Review of Systems   Constitutional:  Negative for activity change, appetite change, chills, diaphoresis, fatigue, fever and unexpected weight change.   HENT:  Negative for congestion, dental problem, drooling, ear discharge, ear pain, facial swelling, hearing loss, mouth sores, nosebleeds, postnasal drip, rhinorrhea, sinus pressure, sneezing, sore throat, tinnitus, trouble swallowing and voice change.    Eyes:  Negative for photophobia, pain, discharge, redness, itching and visual disturbance.   Respiratory:  Negative for apnea, cough, choking, chest tightness, shortness of breath, wheezing and stridor.    Cardiovascular:  Negative for chest pain, palpitations and leg swelling.   Gastrointestinal:  Negative for abdominal distention, abdominal pain, anal bleeding, blood in stool, constipation, diarrhea, nausea, rectal pain and vomiting.   Endocrine: Negative for cold intolerance, heat intolerance, polydipsia, polyphagia and polyuria.   Genitourinary:  Negative for decreased urine volume, difficulty urinating, dyspareunia, dysuria, enuresis, flank pain, frequency, genital sores, hematuria, menstrual problem, pelvic pain, urgency, vaginal bleeding, vaginal discharge and vaginal pain.    Musculoskeletal:  Negative for arthralgias, back pain, gait problem, joint swelling, myalgias, neck pain and neck stiffness.   Skin:  Negative for color change, pallor, rash and wound.   Allergic/Immunologic: Negative for environmental allergies, food allergies and immunocompromised state.   Neurological:  Negative for dizziness, tremors, seizures, syncope, facial asymmetry, speech difficulty, weakness, light-headedness, numbness and headaches.   Hematological:  Negative for adenopathy. Does not bruise/bleed easily.   Psychiatric/Behavioral:  Negative for agitation, behavioral problems, confusion, decreased concentration, dysphoric mood, hallucinations, self-injury, sleep disturbance and suicidal ideas. The patient is not nervous/anxious and is not hyperactive.            Objective:      Physical Exam  Vitals and nursing note reviewed.     Incisions clean dry and intact.  Abdomen soft nontender        Assessment:        1. Postoperative follow-up                Plan:         Cira was seen today for post-op evaluation.    Diagnoses and all orders for this visit:    Postoperative follow-up

## 2023-08-31 ENCOUNTER — OFFICE VISIT (OUTPATIENT)
Dept: OBSTETRICS AND GYNECOLOGY | Facility: CLINIC | Age: 43
End: 2023-08-31
Payer: MEDICAID

## 2023-08-31 VITALS
HEART RATE: 66 BPM | WEIGHT: 167.69 LBS | SYSTOLIC BLOOD PRESSURE: 128 MMHG | HEIGHT: 67 IN | DIASTOLIC BLOOD PRESSURE: 72 MMHG | BODY MASS INDEX: 26.32 KG/M2

## 2023-08-31 DIAGNOSIS — Z09 POSTOPERATIVE FOLLOW-UP: Primary | ICD-10-CM

## 2023-08-31 PROCEDURE — 3078F PR MOST RECENT DIASTOLIC BLOOD PRESSURE < 80 MM HG: ICD-10-PCS | Mod: CPTII,,, | Performed by: OBSTETRICS & GYNECOLOGY

## 2023-08-31 PROCEDURE — 99999 PR PBB SHADOW E&M-EST. PATIENT-LVL III: ICD-10-PCS | Mod: PBBFAC,,, | Performed by: OBSTETRICS & GYNECOLOGY

## 2023-08-31 PROCEDURE — 99024 POSTOP FOLLOW-UP VISIT: CPT | Mod: ,,, | Performed by: OBSTETRICS & GYNECOLOGY

## 2023-08-31 PROCEDURE — 3078F DIAST BP <80 MM HG: CPT | Mod: CPTII,,, | Performed by: OBSTETRICS & GYNECOLOGY

## 2023-08-31 PROCEDURE — 4010F ACE/ARB THERAPY RXD/TAKEN: CPT | Mod: CPTII,,, | Performed by: OBSTETRICS & GYNECOLOGY

## 2023-08-31 PROCEDURE — 99999 PR PBB SHADOW E&M-EST. PATIENT-LVL III: CPT | Mod: PBBFAC,,, | Performed by: OBSTETRICS & GYNECOLOGY

## 2023-08-31 PROCEDURE — 1160F RVW MEDS BY RX/DR IN RCRD: CPT | Mod: CPTII,,, | Performed by: OBSTETRICS & GYNECOLOGY

## 2023-08-31 PROCEDURE — 1159F MED LIST DOCD IN RCRD: CPT | Mod: CPTII,,, | Performed by: OBSTETRICS & GYNECOLOGY

## 2023-08-31 PROCEDURE — 4010F PR ACE/ARB THEARPY RXD/TAKEN: ICD-10-PCS | Mod: CPTII,,, | Performed by: OBSTETRICS & GYNECOLOGY

## 2023-08-31 PROCEDURE — 3051F PR MOST RECENT HEMOGLOBIN A1C LEVEL 7.0 - < 8.0%: ICD-10-PCS | Mod: CPTII,,, | Performed by: OBSTETRICS & GYNECOLOGY

## 2023-08-31 PROCEDURE — 3008F PR BODY MASS INDEX (BMI) DOCUMENTED: ICD-10-PCS | Mod: CPTII,,, | Performed by: OBSTETRICS & GYNECOLOGY

## 2023-08-31 PROCEDURE — 3008F BODY MASS INDEX DOCD: CPT | Mod: CPTII,,, | Performed by: OBSTETRICS & GYNECOLOGY

## 2023-08-31 PROCEDURE — 99213 OFFICE O/P EST LOW 20 MIN: CPT | Mod: PBBFAC | Performed by: OBSTETRICS & GYNECOLOGY

## 2023-08-31 PROCEDURE — 1160F PR REVIEW ALL MEDS BY PRESCRIBER/CLIN PHARMACIST DOCUMENTED: ICD-10-PCS | Mod: CPTII,,, | Performed by: OBSTETRICS & GYNECOLOGY

## 2023-08-31 PROCEDURE — 1159F PR MEDICATION LIST DOCUMENTED IN MEDICAL RECORD: ICD-10-PCS | Mod: CPTII,,, | Performed by: OBSTETRICS & GYNECOLOGY

## 2023-08-31 PROCEDURE — 3074F PR MOST RECENT SYSTOLIC BLOOD PRESSURE < 130 MM HG: ICD-10-PCS | Mod: CPTII,,, | Performed by: OBSTETRICS & GYNECOLOGY

## 2023-08-31 PROCEDURE — 99024 PR POST-OP FOLLOW-UP VISIT: ICD-10-PCS | Mod: ,,, | Performed by: OBSTETRICS & GYNECOLOGY

## 2023-08-31 PROCEDURE — 3051F HG A1C>EQUAL 7.0%<8.0%: CPT | Mod: CPTII,,, | Performed by: OBSTETRICS & GYNECOLOGY

## 2023-08-31 PROCEDURE — 3074F SYST BP LT 130 MM HG: CPT | Mod: CPTII,,, | Performed by: OBSTETRICS & GYNECOLOGY

## 2023-08-31 NOTE — PROGRESS NOTES
Subjective:       Patient ID: Cira Keys is a 43 y.o. female.    Chief Complaint:  Post-op Evaluation (Pt here 6 week PO hysterectomy with no complaints)      History of Present Illness  Patient presents for 4 week follow-up from laparoscopic hysterectomy.  Patient denies any pain or bleeding.  She is voiding well and has good appetite.  Patient reports she is feeling back to normal.  Patient will be cleared to resume activity except intercourse.  Patient counseled to wait until at least 6 weeks postop    Menstrual History:  OB History          2    Para   2    Term   2            AB        Living   1         SAB        IAB        Ectopic        Multiple        Live Births   2                Menarche age:  Patient's last menstrual period was 2023.         Review of Systems  Review of Systems   Constitutional:  Negative for activity change, appetite change, chills, diaphoresis, fatigue, fever and unexpected weight change.   HENT:  Negative for congestion, dental problem, drooling, ear discharge, ear pain, facial swelling, hearing loss, mouth sores, nosebleeds, postnasal drip, rhinorrhea, sinus pressure, sneezing, sore throat, tinnitus, trouble swallowing and voice change.    Eyes:  Negative for photophobia, pain, discharge, redness, itching and visual disturbance.   Respiratory:  Negative for apnea, cough, choking, chest tightness, shortness of breath, wheezing and stridor.    Cardiovascular:  Negative for chest pain, palpitations and leg swelling.   Gastrointestinal:  Negative for abdominal distention, abdominal pain, anal bleeding, blood in stool, constipation, diarrhea, nausea, rectal pain and vomiting.   Endocrine: Negative for cold intolerance, heat intolerance, polydipsia, polyphagia and polyuria.   Genitourinary:  Negative for decreased urine volume, difficulty urinating, dyspareunia, dysuria, enuresis, flank pain, frequency, genital sores, hematuria, menstrual problem, pelvic  pain, urgency, vaginal bleeding, vaginal discharge and vaginal pain.   Musculoskeletal:  Negative for arthralgias, back pain, gait problem, joint swelling, myalgias, neck pain and neck stiffness.   Skin:  Negative for color change, pallor, rash and wound.   Allergic/Immunologic: Negative for environmental allergies, food allergies and immunocompromised state.   Neurological:  Negative for dizziness, tremors, seizures, syncope, facial asymmetry, speech difficulty, weakness, light-headedness, numbness and headaches.   Hematological:  Negative for adenopathy. Does not bruise/bleed easily.   Psychiatric/Behavioral:  Negative for agitation, behavioral problems, confusion, decreased concentration, dysphoric mood, hallucinations, self-injury, sleep disturbance and suicidal ideas. The patient is not nervous/anxious and is not hyperactive.            Objective:      Physical Exam  Vitals and nursing note reviewed.           Assessment:        1. Postoperative follow-up                Plan:         Cira was seen today for post-op evaluation.    Diagnoses and all orders for this visit:    Postoperative follow-up

## 2023-09-05 RX ORDER — BUSPIRONE HYDROCHLORIDE 5 MG/1
TABLET ORAL
Qty: 120 TABLET | Refills: 1 | Status: SHIPPED | OUTPATIENT
Start: 2023-09-05

## 2023-09-25 ENCOUNTER — OFFICE VISIT (OUTPATIENT)
Dept: ENDOCRINOLOGY | Facility: CLINIC | Age: 43
End: 2023-09-25
Payer: MEDICAID

## 2023-09-25 VITALS
DIASTOLIC BLOOD PRESSURE: 80 MMHG | WEIGHT: 165 LBS | HEART RATE: 90 BPM | BODY MASS INDEX: 25.9 KG/M2 | RESPIRATION RATE: 18 BRPM | SYSTOLIC BLOOD PRESSURE: 119 MMHG | HEIGHT: 67 IN

## 2023-09-25 DIAGNOSIS — E10.65 TYPE 1 DIABETES MELLITUS WITH HYPERGLYCEMIA: ICD-10-CM

## 2023-09-25 DIAGNOSIS — E78.2 MIXED HYPERLIPIDEMIA: ICD-10-CM

## 2023-09-25 DIAGNOSIS — E66.3 OVERWEIGHT (BMI 25.0-29.9): ICD-10-CM

## 2023-09-25 DIAGNOSIS — Z96.41 INSULIN PUMP IN PLACE: ICD-10-CM

## 2023-09-25 DIAGNOSIS — I10 ESSENTIAL HYPERTENSION: ICD-10-CM

## 2023-09-25 PROCEDURE — 3051F HG A1C>EQUAL 7.0%<8.0%: CPT | Mod: CPTII,,, | Performed by: STUDENT IN AN ORGANIZED HEALTH CARE EDUCATION/TRAINING PROGRAM

## 2023-09-25 PROCEDURE — 99999 PR PBB SHADOW E&M-EST. PATIENT-LVL IV: CPT | Mod: PBBFAC,,, | Performed by: STUDENT IN AN ORGANIZED HEALTH CARE EDUCATION/TRAINING PROGRAM

## 2023-09-25 PROCEDURE — 99214 OFFICE O/P EST MOD 30 MIN: CPT | Mod: 25,S$PBB,, | Performed by: STUDENT IN AN ORGANIZED HEALTH CARE EDUCATION/TRAINING PROGRAM

## 2023-09-25 PROCEDURE — 3008F PR BODY MASS INDEX (BMI) DOCUMENTED: ICD-10-PCS | Mod: CPTII,,, | Performed by: STUDENT IN AN ORGANIZED HEALTH CARE EDUCATION/TRAINING PROGRAM

## 2023-09-25 PROCEDURE — 3051F PR MOST RECENT HEMOGLOBIN A1C LEVEL 7.0 - < 8.0%: ICD-10-PCS | Mod: CPTII,,, | Performed by: STUDENT IN AN ORGANIZED HEALTH CARE EDUCATION/TRAINING PROGRAM

## 2023-09-25 PROCEDURE — 95251 CONT GLUC MNTR ANALYSIS I&R: CPT | Mod: ,,, | Performed by: STUDENT IN AN ORGANIZED HEALTH CARE EDUCATION/TRAINING PROGRAM

## 2023-09-25 PROCEDURE — 3074F SYST BP LT 130 MM HG: CPT | Mod: CPTII,,, | Performed by: STUDENT IN AN ORGANIZED HEALTH CARE EDUCATION/TRAINING PROGRAM

## 2023-09-25 PROCEDURE — 1160F PR REVIEW ALL MEDS BY PRESCRIBER/CLIN PHARMACIST DOCUMENTED: ICD-10-PCS | Mod: CPTII,,, | Performed by: STUDENT IN AN ORGANIZED HEALTH CARE EDUCATION/TRAINING PROGRAM

## 2023-09-25 PROCEDURE — 3074F PR MOST RECENT SYSTOLIC BLOOD PRESSURE < 130 MM HG: ICD-10-PCS | Mod: CPTII,,, | Performed by: STUDENT IN AN ORGANIZED HEALTH CARE EDUCATION/TRAINING PROGRAM

## 2023-09-25 PROCEDURE — 99999 PR PBB SHADOW E&M-EST. PATIENT-LVL IV: ICD-10-PCS | Mod: PBBFAC,,, | Performed by: STUDENT IN AN ORGANIZED HEALTH CARE EDUCATION/TRAINING PROGRAM

## 2023-09-25 PROCEDURE — 4010F ACE/ARB THERAPY RXD/TAKEN: CPT | Mod: CPTII,,, | Performed by: STUDENT IN AN ORGANIZED HEALTH CARE EDUCATION/TRAINING PROGRAM

## 2023-09-25 PROCEDURE — 1160F RVW MEDS BY RX/DR IN RCRD: CPT | Mod: CPTII,,, | Performed by: STUDENT IN AN ORGANIZED HEALTH CARE EDUCATION/TRAINING PROGRAM

## 2023-09-25 PROCEDURE — 3008F BODY MASS INDEX DOCD: CPT | Mod: CPTII,,, | Performed by: STUDENT IN AN ORGANIZED HEALTH CARE EDUCATION/TRAINING PROGRAM

## 2023-09-25 PROCEDURE — 99214 PR OFFICE/OUTPT VISIT, EST, LEVL IV, 30-39 MIN: ICD-10-PCS | Mod: 25,S$PBB,, | Performed by: STUDENT IN AN ORGANIZED HEALTH CARE EDUCATION/TRAINING PROGRAM

## 2023-09-25 PROCEDURE — 1159F PR MEDICATION LIST DOCUMENTED IN MEDICAL RECORD: ICD-10-PCS | Mod: CPTII,,, | Performed by: STUDENT IN AN ORGANIZED HEALTH CARE EDUCATION/TRAINING PROGRAM

## 2023-09-25 PROCEDURE — 99214 OFFICE O/P EST MOD 30 MIN: CPT | Mod: PBBFAC | Performed by: STUDENT IN AN ORGANIZED HEALTH CARE EDUCATION/TRAINING PROGRAM

## 2023-09-25 PROCEDURE — 95251 PR GLUCOSE MONITOR, 72 HOUR, PHYS INTERP: ICD-10-PCS | Mod: ,,, | Performed by: STUDENT IN AN ORGANIZED HEALTH CARE EDUCATION/TRAINING PROGRAM

## 2023-09-25 PROCEDURE — 3079F PR MOST RECENT DIASTOLIC BLOOD PRESSURE 80-89 MM HG: ICD-10-PCS | Mod: CPTII,,, | Performed by: STUDENT IN AN ORGANIZED HEALTH CARE EDUCATION/TRAINING PROGRAM

## 2023-09-25 PROCEDURE — 1159F MED LIST DOCD IN RCRD: CPT | Mod: CPTII,,, | Performed by: STUDENT IN AN ORGANIZED HEALTH CARE EDUCATION/TRAINING PROGRAM

## 2023-09-25 PROCEDURE — 4010F PR ACE/ARB THEARPY RXD/TAKEN: ICD-10-PCS | Mod: CPTII,,, | Performed by: STUDENT IN AN ORGANIZED HEALTH CARE EDUCATION/TRAINING PROGRAM

## 2023-09-25 PROCEDURE — 3079F DIAST BP 80-89 MM HG: CPT | Mod: CPTII,,, | Performed by: STUDENT IN AN ORGANIZED HEALTH CARE EDUCATION/TRAINING PROGRAM

## 2023-09-25 NOTE — ASSESSMENT & PLAN NOTE
Control has improved with Omnipod 5. Her glucose has improved from an average to 280 to now 170. Her TIR is 65% which is close to goal. I will make her target a little bit more strict to see if we can get TIR > 70%.    Plan  - Continue insulin Novolog via Omnipod 5 with above settings    Basal rate  12A: 0.8 U/h    ICR  12A: 12    ISF  12A: 45    Target: 130 ->120  IAT: 5h    - Continue Dexcom G6 CGM    F/u 4 months    Labs now

## 2023-09-25 NOTE — PROGRESS NOTES
"  Subjective:      Patient ID: Cira Keys is a 43 y.o. female.    Chief Complaint:  Type 1 diabetes mellitus      History of Present Illness  This is a 43 y.o. female. with a past medical history of type 1 diabetes mellitus here for follow up.      Type 1 diabetes mellitus  Diagnosed around age 9     11/03/15 04:28   C-Peptide <0.1 (L)   Glutamic Acid Decarb Ab 0.12 (H)       Current diabetes medications:  - Insulin Novolog via Omnipod 5 insulin pump  Basal rate  12A: 0.8 U/h    ICR  12A: 12    ISF  12A: 45    Target: 130  IAT: 5h        Past diabetes medications:  - Lantus  - Levemir  - Toujeo  - Novolog  - Insulin pump    Lab Results   Component Value Date    CREATININE 0.8 07/11/2023    EGFRNORACEVR >60 07/11/2023       Known diabetic complications:  hypoglycemia    Weight trend:  Wt Readings from Last 8 Encounters:   09/25/23 74.8 kg (165 lb)   08/31/23 76.1 kg (167 lb 10.6 oz)   08/03/23 75 kg (165 lb 4.8 oz)   07/07/23 74.1 kg (163 lb 6.4 oz)   06/14/23 72.1 kg (159 lb)   04/02/23 75.1 kg (165 lb 8 oz)   04/01/23 77.4 kg (170 lb 10.2 oz)   03/28/23 77.4 kg (170 lb 10.2 oz)       Family history of diabetes:  No    Prior visit with diabetes education: yes    Current diet: 3 meals per day      Blood glucose monitoring at home:               Diabetes Management Status  Statin: Taking  ACE/ARB: Taking    Screening or Prevention Patient's value   HgA1C Testing and Control   Lab Results   Component Value Date    HGBA1C 7.3 (H) 02/28/2023        LDL control Lab Results   Component Value Date    LDLCALC 128.4 11/22/2022      Nephropathy screening Lab Results   Component Value Date    MICALBCREAT 48.5 (H) 11/22/2022            Review of Systems  As above    Social and family history reviewed  Current medications and allergies reviewed    Objective:   /80 (BP Location: Right arm, Patient Position: Sitting, BP Method: Medium (Manual))   Pulse 90   Resp 18   Ht 5' 7" (1.702 m)   Wt 74.8 kg (165 lb)   " LMP 07/01/2023   BMI 25.84 kg/m²   Physical Exam  Alert, oriented    BP Readings from Last 1 Encounters:   09/25/23 119/80      Wt Readings from Last 1 Encounters:   09/25/23 1329 74.8 kg (165 lb)     Body mass index is 25.84 kg/m².    Lab Review:   Lab Results   Component Value Date    HGBA1C 7.3 (H) 02/28/2023     Lab Results   Component Value Date    CHOL 235 (H) 11/22/2022    HDL 94 (H) 11/22/2022    LDLCALC 128.4 11/22/2022    TRIG 63 11/22/2022    CHOLHDL 40.0 11/22/2022     Lab Results   Component Value Date     07/11/2023    K 4.5 07/11/2023     07/11/2023    CO2 28 07/11/2023     (H) 07/11/2023    BUN 8 07/11/2023    CREATININE 0.8 07/11/2023    CALCIUM 9.1 07/11/2023    PROT 7.2 07/11/2023    ALBUMIN 3.4 (L) 07/11/2023    BILITOT 0.4 07/11/2023    ALKPHOS 72 07/11/2023    AST 27 07/11/2023    ALT 19 07/11/2023    ANIONGAP 9 07/11/2023    ESTGFRAFRICA >60 03/20/2022    EGFRNONAA >60 03/20/2022    TSH 1.154 09/11/2020       All pertinent labs reviewed    Assessment and Plan     Type 1 diabetes mellitus with hyperglycemia  Control has improved with Omnipod 5. Her glucose has improved from an average to 280 to now 170. Her TIR is 65% which is close to goal. I will make her target a little bit more strict to see if we can get TIR > 70%.    Plan  - Continue insulin Novolog via Omnipod 5 with above settings    Basal rate  12A: 0.8 U/h    ICR  12A: 12    ISF  12A: 45    Target: 130 ->120  IAT: 5h    - Continue Dexcom G6 CGM    F/u 4 months    Labs now    Insulin pump in place  Continue insulin Novolog via Omnipod 5    Essential hypertension  Continue antihypertensive regimen including ARB/ACEi    Overweight (BMI 25.0-29.9)  Lifestyle changes    Mixed hyperlipidemia  Continue statin        Jhonny Mao MD  Endocrinology

## 2023-09-28 ENCOUNTER — PATIENT OUTREACH (OUTPATIENT)
Dept: ADMINISTRATIVE | Facility: HOSPITAL | Age: 43
End: 2023-09-28
Payer: MEDICAID

## 2023-09-28 NOTE — PROGRESS NOTES
Chart reviewed, immunization record updated.  No new results noted on [facility] or [facility] web site.  Care Everywhere updated.   Patient care coordination note updated.   LOV with PCP 03/28/2023.  Contacted patient to discuss Diabetic Eye exam.  Patient states she will schedule eye exam soon.

## 2023-10-02 ENCOUNTER — LAB VISIT (OUTPATIENT)
Dept: LAB | Facility: HOSPITAL | Age: 43
End: 2023-10-02
Attending: STUDENT IN AN ORGANIZED HEALTH CARE EDUCATION/TRAINING PROGRAM
Payer: MEDICAID

## 2023-10-02 DIAGNOSIS — E10.65 TYPE 1 DIABETES MELLITUS WITH HYPERGLYCEMIA: ICD-10-CM

## 2023-10-02 LAB
ALBUMIN SERPL BCP-MCNC: 4.1 G/DL (ref 3.5–5.2)
ALBUMIN/CREAT UR: 4.3 UG/MG (ref 0–30)
ALP SERPL-CCNC: 81 U/L (ref 55–135)
ALT SERPL W/O P-5'-P-CCNC: 35 U/L (ref 10–44)
ANION GAP SERPL CALC-SCNC: 12 MMOL/L (ref 8–16)
AST SERPL-CCNC: 62 U/L (ref 10–40)
BASOPHILS # BLD AUTO: 0.08 K/UL (ref 0–0.2)
BASOPHILS NFR BLD: 1.8 % (ref 0–1.9)
BILIRUB SERPL-MCNC: 0.5 MG/DL (ref 0.1–1)
BUN SERPL-MCNC: 10 MG/DL (ref 6–20)
CALCIUM SERPL-MCNC: 9.2 MG/DL (ref 8.7–10.5)
CHLORIDE SERPL-SCNC: 106 MMOL/L (ref 95–110)
CHOLEST SERPL-MCNC: 181 MG/DL (ref 120–199)
CHOLEST/HDLC SERPL: 2 {RATIO} (ref 2–5)
CO2 SERPL-SCNC: 25 MMOL/L (ref 23–29)
CREAT SERPL-MCNC: 0.8 MG/DL (ref 0.5–1.4)
CREAT UR-MCNC: 92.2 MG/DL (ref 15–325)
DIFFERENTIAL METHOD: ABNORMAL
EOSINOPHIL # BLD AUTO: 0.2 K/UL (ref 0–0.5)
EOSINOPHIL NFR BLD: 4.1 % (ref 0–8)
ERYTHROCYTE [DISTWIDTH] IN BLOOD BY AUTOMATED COUNT: 17.5 % (ref 11.5–14.5)
EST. GFR  (NO RACE VARIABLE): >60 ML/MIN/1.73 M^2
ESTIMATED AVG GLUCOSE: 137 MG/DL (ref 68–131)
GLUCOSE SERPL-MCNC: 134 MG/DL (ref 70–110)
HBA1C MFR BLD: 6.4 % (ref 4–5.6)
HCT VFR BLD AUTO: 36.7 % (ref 37–48.5)
HDLC SERPL-MCNC: 92 MG/DL (ref 40–75)
HDLC SERPL: 50.8 % (ref 20–50)
HGB BLD-MCNC: 11.5 G/DL (ref 12–16)
IMM GRANULOCYTES # BLD AUTO: 0.01 K/UL (ref 0–0.04)
IMM GRANULOCYTES NFR BLD AUTO: 0.2 % (ref 0–0.5)
LDLC SERPL CALC-MCNC: 75.8 MG/DL (ref 63–159)
LYMPHOCYTES # BLD AUTO: 1.8 K/UL (ref 1–4.8)
LYMPHOCYTES NFR BLD: 41.6 % (ref 18–48)
MCH RBC QN AUTO: 26.6 PG (ref 27–31)
MCHC RBC AUTO-ENTMCNC: 31.3 G/DL (ref 32–36)
MCV RBC AUTO: 85 FL (ref 82–98)
MICROALBUMIN UR DL<=1MG/L-MCNC: 4 UG/ML
MONOCYTES # BLD AUTO: 0.4 K/UL (ref 0.3–1)
MONOCYTES NFR BLD: 8.4 % (ref 4–15)
NEUTROPHILS # BLD AUTO: 1.9 K/UL (ref 1.8–7.7)
NEUTROPHILS NFR BLD: 43.9 % (ref 38–73)
NONHDLC SERPL-MCNC: 89 MG/DL
NRBC BLD-RTO: 0 /100 WBC
PLATELET # BLD AUTO: 238 K/UL (ref 150–450)
PMV BLD AUTO: 9.1 FL (ref 9.2–12.9)
POTASSIUM SERPL-SCNC: 4.1 MMOL/L (ref 3.5–5.1)
PROT SERPL-MCNC: 7.9 G/DL (ref 6–8.4)
RBC # BLD AUTO: 4.33 M/UL (ref 4–5.4)
SODIUM SERPL-SCNC: 143 MMOL/L (ref 136–145)
T4 FREE SERPL-MCNC: 0.76 NG/DL (ref 0.71–1.51)
TRIGL SERPL-MCNC: 66 MG/DL (ref 30–150)
TSH SERPL DL<=0.005 MIU/L-ACNC: 4.1 UIU/ML (ref 0.4–4)
WBC # BLD AUTO: 4.42 K/UL (ref 3.9–12.7)

## 2023-10-02 PROCEDURE — 82043 UR ALBUMIN QUANTITATIVE: CPT | Performed by: STUDENT IN AN ORGANIZED HEALTH CARE EDUCATION/TRAINING PROGRAM

## 2023-10-02 PROCEDURE — 83036 HEMOGLOBIN GLYCOSYLATED A1C: CPT | Performed by: STUDENT IN AN ORGANIZED HEALTH CARE EDUCATION/TRAINING PROGRAM

## 2023-10-02 PROCEDURE — 85025 COMPLETE CBC W/AUTO DIFF WBC: CPT | Performed by: STUDENT IN AN ORGANIZED HEALTH CARE EDUCATION/TRAINING PROGRAM

## 2023-10-02 PROCEDURE — 36415 COLL VENOUS BLD VENIPUNCTURE: CPT | Performed by: STUDENT IN AN ORGANIZED HEALTH CARE EDUCATION/TRAINING PROGRAM

## 2023-10-02 PROCEDURE — 80053 COMPREHEN METABOLIC PANEL: CPT | Performed by: STUDENT IN AN ORGANIZED HEALTH CARE EDUCATION/TRAINING PROGRAM

## 2023-10-02 PROCEDURE — 80061 LIPID PANEL: CPT | Performed by: STUDENT IN AN ORGANIZED HEALTH CARE EDUCATION/TRAINING PROGRAM

## 2023-10-02 PROCEDURE — 84443 ASSAY THYROID STIM HORMONE: CPT | Performed by: STUDENT IN AN ORGANIZED HEALTH CARE EDUCATION/TRAINING PROGRAM

## 2023-10-02 PROCEDURE — 84439 ASSAY OF FREE THYROXINE: CPT | Performed by: STUDENT IN AN ORGANIZED HEALTH CARE EDUCATION/TRAINING PROGRAM

## 2023-10-03 ENCOUNTER — PATIENT MESSAGE (OUTPATIENT)
Dept: ENDOCRINOLOGY | Facility: CLINIC | Age: 43
End: 2023-10-03
Payer: MEDICAID

## 2023-10-13 PROBLEM — E11.10 DIABETIC KETOACIDOSIS WITHOUT COMA: Status: ACTIVE | Noted: 2023-10-13

## 2023-10-14 PROBLEM — E11.10 DIABETIC KETOACIDOSIS WITHOUT COMA: Status: RESOLVED | Noted: 2023-10-13 | Resolved: 2023-10-14

## 2023-10-25 ENCOUNTER — PATIENT MESSAGE (OUTPATIENT)
Dept: ENDOCRINOLOGY | Facility: CLINIC | Age: 43
End: 2023-10-25
Payer: MEDICAID

## 2023-12-05 ENCOUNTER — PATIENT MESSAGE (OUTPATIENT)
Dept: INTERNAL MEDICINE | Facility: CLINIC | Age: 43
End: 2023-12-05

## 2023-12-06 ENCOUNTER — OFFICE VISIT (OUTPATIENT)
Dept: INTERNAL MEDICINE | Facility: CLINIC | Age: 43
End: 2023-12-06

## 2023-12-06 VITALS
BODY MASS INDEX: 26.85 KG/M2 | WEIGHT: 171.06 LBS | DIASTOLIC BLOOD PRESSURE: 68 MMHG | OXYGEN SATURATION: 99 % | HEART RATE: 78 BPM | HEIGHT: 67 IN | SYSTOLIC BLOOD PRESSURE: 126 MMHG | RESPIRATION RATE: 16 BRPM

## 2023-12-06 DIAGNOSIS — E10.65 TYPE 1 DIABETES MELLITUS WITH HYPERGLYCEMIA: ICD-10-CM

## 2023-12-06 DIAGNOSIS — E78.2 MIXED HYPERLIPIDEMIA: ICD-10-CM

## 2023-12-06 DIAGNOSIS — E66.3 OVERWEIGHT (BMI 25.0-29.9): ICD-10-CM

## 2023-12-06 DIAGNOSIS — F41.1 GAD (GENERALIZED ANXIETY DISORDER): ICD-10-CM

## 2023-12-06 DIAGNOSIS — Z23 NEED FOR VACCINATION: ICD-10-CM

## 2023-12-06 DIAGNOSIS — I10 ESSENTIAL HYPERTENSION: Primary | ICD-10-CM

## 2023-12-06 PROCEDURE — 99214 OFFICE O/P EST MOD 30 MIN: CPT | Mod: S$PBB,,, | Performed by: INTERNAL MEDICINE

## 2023-12-06 PROCEDURE — 99214 PR OFFICE/OUTPT VISIT, EST, LEVL IV, 30-39 MIN: ICD-10-PCS | Mod: S$PBB,,, | Performed by: INTERNAL MEDICINE

## 2023-12-06 PROCEDURE — 99214 OFFICE O/P EST MOD 30 MIN: CPT | Mod: PBBFAC | Performed by: INTERNAL MEDICINE

## 2023-12-06 PROCEDURE — 90471 IMMUNIZATION ADMIN: CPT | Mod: PBBFAC

## 2023-12-06 PROCEDURE — 99999 PR PBB SHADOW E&M-EST. PATIENT-LVL IV: CPT | Mod: PBBFAC,,, | Performed by: INTERNAL MEDICINE

## 2023-12-06 PROCEDURE — 99999 PR PBB SHADOW E&M-EST. PATIENT-LVL IV: ICD-10-PCS | Mod: PBBFAC,,, | Performed by: INTERNAL MEDICINE

## 2023-12-06 PROCEDURE — 99999PBSHW FLU VACCINE (QUAD) GREATER THAN OR EQUAL TO 3YO PRESERVATIVE FREE IM: ICD-10-PCS | Mod: PBBFAC,,,

## 2023-12-06 PROCEDURE — 99999PBSHW FLU VACCINE (QUAD) GREATER THAN OR EQUAL TO 3YO PRESERVATIVE FREE IM: Mod: PBBFAC,,,

## 2023-12-06 RX ORDER — CITALOPRAM 10 MG/1
10 TABLET ORAL DAILY
Qty: 30 TABLET | Refills: 5 | Status: SHIPPED | OUTPATIENT
Start: 2023-12-06 | End: 2024-12-05

## 2023-12-06 NOTE — PROGRESS NOTES
Subjective:       Patient ID: Cira Keys is a 43 y.o. female.    Chief Complaint: Follow-up      HPI:    Cira Keys is a 41 y.o. female with known GERD, DM type 1 and multiple admissions for DKA who presents for follow up chronic medical issues.   Labs from 10/2/23 Dr. Mao personally reviewed, interpreted and discussed  A1C 6.4%, at goal  LDL 75, at goal  GFR > 60, at goal     She was admitted to [facility] 10/2023 for DKA again, ran out of insulin prior to admit. Discharged on aspart 5 units TID and Levemir 7 units BID. She reports she is back on her pump and using DEXCOM again. A1C 6.4%  Following with endo at [facility]     Her significant other was diagnosed with cancer. She was started on Celexa 10mg and  buspar 5mg BID around that time and she reports this helps a lot and she completely stopped EtOH and cigerettes. She ran out of Celexa and sx are worsening; asking for refill today.      She was restarted on lisinopril for renal protection. BP is stable.   On statin for prevention as well. LDl at goal     She continues with b/l shoulder pain. No improvement with PT> MRI showing tendinopathy. She tried 's old script of gabapentin 300mg in AM which is helping a lot. Wants her own prescription.     Past Medical History:   Diagnosis Date    E coli bacteremia 2011    GERD (gastroesophageal reflux disease)     Hyperlipidemia     Iron deficiency anemia due to chronic blood loss 10/31/2015    Type 1 diabetes mellitus without complication 10/31/2015       Family History   Problem Relation Age of Onset    Hyperlipidemia Mother     Hypertension Father     No Known Problems Sister     No Known Problems Brother     Breast cancer Neg Hx     Colon cancer Neg Hx     Ovarian cancer Neg Hx        Social History     Socioeconomic History    Marital status:    Tobacco Use    Smoking status: Former     Current packs/day: 0.00     Types: Cigarettes     Quit date: 12/10/2021     Years since  "quittin.9     Passive exposure: Current    Smokeless tobacco: Never   Substance and Sexual Activity    Alcohol use: Yes     Alcohol/week: 5.0 standard drinks of alcohol     Types: 5 Drinks containing 0.5 oz of alcohol per week     Comment: occ    Drug use: No     Comment: "20-30 years ago drug use"    Sexual activity: Yes     Partners: Male     Birth control/protection: See Surgical Hx     Comment:      Social Determinants of Health     Financial Resource Strain: Medium Risk (2023)    Overall Financial Resource Strain (CARDIA)     Difficulty of Paying Living Expenses: Somewhat hard   Food Insecurity: Unknown (2023)    Hunger Vital Sign     Ran Out of Food in the Last Year: Never true   Transportation Needs: No Transportation Needs (2023)    PRAPARE - Transportation     Lack of Transportation (Medical): No     Lack of Transportation (Non-Medical): No   Physical Activity: Unknown (2023)    Exercise Vital Sign     Days of Exercise per Week: 4 days   Housing Stability: Unknown (2023)    Housing Stability Vital Sign     Unable to Pay for Housing in the Last Year: Patient refused     Unstable Housing in the Last Year: Patient refused       Review of Systems   Constitutional:  Negative for activity change, fatigue, fever and unexpected weight change.   HENT:  Negative for congestion, ear pain, hearing loss, rhinorrhea and sore throat.    Eyes:  Negative for redness and visual disturbance.   Respiratory:  Negative for cough, shortness of breath and wheezing.    Cardiovascular:  Negative for chest pain, palpitations and leg swelling.   Gastrointestinal:  Negative for abdominal pain, constipation, diarrhea, nausea and vomiting.   Genitourinary:  Negative for dysuria, frequency and urgency.   Musculoskeletal:  Negative for back pain, joint swelling and neck pain.   Skin:  Negative for color change, rash and wound.   Neurological:  Negative for dizziness, tremors, weakness, light-headedness " and headaches.   Psychiatric/Behavioral:  The patient is nervous/anxious.          Objective:      Physical Exam  Vitals reviewed.   Constitutional:       General: She is not in acute distress.     Appearance: She is well-developed.   HENT:      Head: Normocephalic and atraumatic.      Right Ear: External ear normal.      Left Ear: External ear normal.      Nose: Nose normal.   Eyes:      General:         Right eye: No discharge.         Left eye: No discharge.      Conjunctiva/sclera: Conjunctivae normal.   Neck:      Thyroid: No thyromegaly.   Cardiovascular:      Rate and Rhythm: Normal rate and regular rhythm.      Heart sounds: No murmur heard.  Pulmonary:      Effort: Pulmonary effort is normal. No respiratory distress.      Breath sounds: Normal breath sounds.   Abdominal:      General: Bowel sounds are normal. There is no distension.      Palpations: Abdomen is soft.      Tenderness: There is no abdominal tenderness.   Skin:     General: Skin is warm and dry.   Neurological:      Mental Status: She is alert and oriented to person, place, and time.   Psychiatric:         Behavior: Behavior normal.         Thought Content: Thought content normal.         Assessment:       1. Essential hypertension    2. Mixed hyperlipidemia    3. Type 1 diabetes mellitus with hyperglycemia    4. Overweight (BMI 25.0-29.9)    5. WALTER (generalized anxiety disorder)    6. Need for vaccination        Plan:       1. Essential hypertension  Chronic controlled  Continue medications at same dose  Low Na diet  Exercise, weight loss  Check BP and keep log for next visit    -     CBC Auto Differential; Future; Expected date: 06/03/2024  -     Comprehensive Metabolic Panel; Future; Expected date: 06/03/2024  -     TSH; Future; Expected date: 06/03/2024  -     Lipid Panel; Future; Expected date: 06/03/2024  -     Hemoglobin A1C; Future; Expected date: 06/03/2024  -     Microalbumin/Creatinine Ratio, Urine; Future; Expected date:  06/03/2024    2. Mixed hyperlipidemia  Chronic controlled  Cont statin same dose  -     CBC Auto Differential; Future; Expected date: 06/03/2024  -     Comprehensive Metabolic Panel; Future; Expected date: 06/03/2024  -     TSH; Future; Expected date: 06/03/2024  -     Lipid Panel; Future; Expected date: 06/03/2024  -     Hemoglobin A1C; Future; Expected date: 06/03/2024  -     Microalbumin/Creatinine Ratio, Urine; Future; Expected date: 06/03/2024    3. Type 1 diabetes mellitus with hyperglycemia  Chroinc controlled  A1C at goal  Cont pump per endo recommendations  Let us know ASAP if needs supplies/meds to prevent hospitilization  -     CBC Auto Differential; Future; Expected date: 06/03/2024  -     Comprehensive Metabolic Panel; Future; Expected date: 06/03/2024  -     TSH; Future; Expected date: 06/03/2024  -     Lipid Panel; Future; Expected date: 06/03/2024  -     Hemoglobin A1C; Future; Expected date: 06/03/2024  -     Microalbumin/Creatinine Ratio, Urine; Future; Expected date: 06/03/2024    4. Overweight (BMI 25.0-29.9)  Chronic stable  Diet, exercise,   -     CBC Auto Differential; Future; Expected date: 06/03/2024  -     Comprehensive Metabolic Panel; Future; Expected date: 06/03/2024  -     TSH; Future; Expected date: 06/03/2024  -     Lipid Panel; Future; Expected date: 06/03/2024  -     Hemoglobin A1C; Future; Expected date: 06/03/2024  -     Microalbumin/Creatinine Ratio, Urine; Future; Expected date: 06/03/2024    5. WALTER (generalized anxiety disorder)  Chronic uncontrolled  Continue buspar  RESUME celexa same dose as prior  Side effects of SSRI discussed  If not effective come back for appointment  -     citalopram (CELEXA) 10 MG tablet; Take 1 tablet (10 mg total) by mouth once daily.  Dispense: 30 tablet; Refill: 5  -     CBC Auto Differential; Future; Expected date: 06/03/2024  -     Comprehensive Metabolic Panel; Future; Expected date: 06/03/2024  -     TSH; Future; Expected date: 06/03/2024  -      Lipid Panel; Future; Expected date: 06/03/2024  -     Hemoglobin A1C; Future; Expected date: 06/03/2024  -     Microalbumin/Creatinine Ratio, Urine; Future; Expected date: 06/03/2024    6. Need for vaccination  Done today  -     Influenza - Quadrivalent (PF)       RTC 6 months with labs for routine and PRN. See me sooner if WALTER sx do not improve with resuming prior meds

## 2023-12-26 ENCOUNTER — PATIENT MESSAGE (OUTPATIENT)
Dept: ENDOCRINOLOGY | Facility: CLINIC | Age: 43
End: 2023-12-26

## 2023-12-31 NOTE — TELEPHONE ENCOUNTER
No care due was identified.  [facility] Embedded Care Due Messages. Reference number: [id number].   12/31/2023 8:01:02 AM CST

## 2024-01-02 RX ORDER — ATORVASTATIN CALCIUM 20 MG/1
20 TABLET, FILM COATED ORAL
Qty: 30 TABLET | Refills: 0 | Status: SHIPPED | OUTPATIENT
Start: 2024-01-02 | End: 2024-01-29

## 2024-01-03 ENCOUNTER — PATIENT MESSAGE (OUTPATIENT)
Dept: ENDOCRINOLOGY | Facility: CLINIC | Age: 44
End: 2024-01-03
Payer: COMMERCIAL

## 2024-01-05 DIAGNOSIS — E10.65 TYPE 1 DIABETES MELLITUS WITH HYPERGLYCEMIA: ICD-10-CM

## 2024-01-05 RX ORDER — INSULIN ASPART 100 [IU]/ML
INJECTION, SOLUTION INTRAVENOUS; SUBCUTANEOUS
Qty: 30 ML | Refills: 11 | Status: SHIPPED | OUTPATIENT
Start: 2024-01-05 | End: 2024-01-16

## 2024-01-11 ENCOUNTER — TELEPHONE (OUTPATIENT)
Dept: ENDOCRINOLOGY | Facility: CLINIC | Age: 44
End: 2024-01-11
Payer: COMMERCIAL

## 2024-01-16 ENCOUNTER — HOSPITAL ENCOUNTER (INPATIENT)
Facility: HOSPITAL | Age: 44
LOS: 6 days | Discharge: HOME | DRG: 637 | End: 2024-01-22
Attending: EMERGENCY MEDICINE | Admitting: STUDENT IN AN ORGANIZED HEALTH CARE EDUCATION/TRAINING PROGRAM
Payer: COMMERCIAL

## 2024-01-16 DIAGNOSIS — R00.0 TACHYCARDIA: ICD-10-CM

## 2024-01-16 DIAGNOSIS — R00.0 SINUS TACHYCARDIA: ICD-10-CM

## 2024-01-16 DIAGNOSIS — R40.20 LOC (LOSS OF CONSCIOUSNESS): ICD-10-CM

## 2024-01-16 DIAGNOSIS — A41.9 SEVERE SEPSIS: ICD-10-CM

## 2024-01-16 DIAGNOSIS — F41.1 GAD (GENERALIZED ANXIETY DISORDER): ICD-10-CM

## 2024-01-16 DIAGNOSIS — E10.65 TYPE 1 DIABETES MELLITUS WITH HYPERGLYCEMIA: ICD-10-CM

## 2024-01-16 DIAGNOSIS — R94.31 LONG QT INTERVAL: ICD-10-CM

## 2024-01-16 DIAGNOSIS — E10.10 DIABETIC KETOACIDOSIS WITHOUT COMA ASSOCIATED WITH TYPE 1 DIABETES MELLITUS: Primary | ICD-10-CM

## 2024-01-16 DIAGNOSIS — R65.20 SEVERE SEPSIS: ICD-10-CM

## 2024-01-16 DIAGNOSIS — R94.31 PROLONGED QT INTERVAL: ICD-10-CM

## 2024-01-16 DIAGNOSIS — E78.2 MIXED HYPERLIPIDEMIA: ICD-10-CM

## 2024-01-16 DIAGNOSIS — F10.939 ALCOHOL WITHDRAWAL SYNDROME WITH COMPLICATION: ICD-10-CM

## 2024-01-16 DIAGNOSIS — F10.90 ALCOHOL USE DISORDER: ICD-10-CM

## 2024-01-16 DIAGNOSIS — R07.9 CHEST PAIN: ICD-10-CM

## 2024-01-16 DIAGNOSIS — R50.9 FEVER, UNSPECIFIED FEVER CAUSE: ICD-10-CM

## 2024-01-16 DIAGNOSIS — I47.10 SVT (SUPRAVENTRICULAR TACHYCARDIA): ICD-10-CM

## 2024-01-16 DIAGNOSIS — R57.9 SHOCK: ICD-10-CM

## 2024-01-16 PROBLEM — R55 SYNCOPE: Status: ACTIVE | Noted: 2024-01-16

## 2024-01-16 PROBLEM — G93.41 ENCEPHALOPATHY, METABOLIC: Status: ACTIVE | Noted: 2024-01-16

## 2024-01-16 LAB
ALBUMIN SERPL BCP-MCNC: 3.1 G/DL (ref 3.5–5.2)
ALBUMIN SERPL BCP-MCNC: 4.1 G/DL (ref 3.5–5.2)
ALLENS TEST: ABNORMAL
ALP SERPL-CCNC: 51 U/L (ref 55–135)
ALP SERPL-CCNC: 66 U/L (ref 55–135)
ALT SERPL W/O P-5'-P-CCNC: 10 U/L (ref 10–44)
ALT SERPL W/O P-5'-P-CCNC: 14 U/L (ref 10–44)
AMPHET+METHAMPHET UR QL: NEGATIVE
ANION GAP SERPL CALC-SCNC: 15 MMOL/L (ref 8–16)
ANION GAP SERPL CALC-SCNC: 16 MMOL/L (ref 8–16)
ANION GAP SERPL CALC-SCNC: 18 MMOL/L (ref 8–16)
ANION GAP SERPL CALC-SCNC: 19 MMOL/L (ref 8–16)
AST SERPL-CCNC: 13 U/L (ref 10–40)
AST SERPL-CCNC: 19 U/L (ref 10–40)
B-HCG UR QL: NEGATIVE
B-OH-BUTYR BLD STRIP-SCNC: 4.9 MMOL/L (ref 0–0.5)
B-OH-BUTYR BLD STRIP-SCNC: 5.9 MMOL/L (ref 0–0.5)
BACTERIA #/AREA URNS AUTO: ABNORMAL /HPF
BARBITURATES UR QL SCN>200 NG/ML: NEGATIVE
BASOPHILS # BLD AUTO: 0.03 K/UL (ref 0–0.2)
BASOPHILS # BLD AUTO: 0.06 K/UL (ref 0–0.2)
BASOPHILS NFR BLD: 0.3 % (ref 0–1.9)
BASOPHILS NFR BLD: 0.6 % (ref 0–1.9)
BENZODIAZ UR QL SCN>200 NG/ML: NEGATIVE
BILIRUB SERPL-MCNC: 0.7 MG/DL (ref 0.1–1)
BILIRUB SERPL-MCNC: 1 MG/DL (ref 0.1–1)
BILIRUB UR QL STRIP: NEGATIVE
BNP SERPL-MCNC: 29 PG/ML (ref 0–99)
BUN SERPL-MCNC: 12 MG/DL (ref 6–20)
BZE UR QL SCN: NEGATIVE
CALCIUM SERPL-MCNC: 8.6 MG/DL (ref 8.7–10.5)
CALCIUM SERPL-MCNC: 9 MG/DL (ref 8.7–10.5)
CALCIUM SERPL-MCNC: 9.3 MG/DL (ref 8.7–10.5)
CALCIUM SERPL-MCNC: 9.8 MG/DL (ref 8.7–10.5)
CANNABINOIDS UR QL SCN: ABNORMAL
CHLORIDE SERPL-SCNC: 103 MMOL/L (ref 95–110)
CHLORIDE SERPL-SCNC: 104 MMOL/L (ref 95–110)
CHLORIDE SERPL-SCNC: 106 MMOL/L (ref 95–110)
CHLORIDE SERPL-SCNC: 107 MMOL/L (ref 95–110)
CLARITY UR REFRACT.AUTO: CLEAR
CO2 SERPL-SCNC: 12 MMOL/L (ref 23–29)
CO2 SERPL-SCNC: 18 MMOL/L (ref 23–29)
CO2 SERPL-SCNC: 19 MMOL/L (ref 23–29)
CO2 SERPL-SCNC: 19 MMOL/L (ref 23–29)
COLOR UR AUTO: YELLOW
CREAT SERPL-MCNC: 0.7 MG/DL (ref 0.5–1.4)
CREAT SERPL-MCNC: 0.8 MG/DL (ref 0.5–1.4)
CREAT UR-MCNC: 75 MG/DL (ref 15–325)
CTP QC/QA: YES
DIFFERENTIAL METHOD BLD: ABNORMAL
DIFFERENTIAL METHOD BLD: ABNORMAL
EOSINOPHIL # BLD AUTO: 0 K/UL (ref 0–0.5)
EOSINOPHIL # BLD AUTO: 0 K/UL (ref 0–0.5)
EOSINOPHIL NFR BLD: 0 % (ref 0–8)
EOSINOPHIL NFR BLD: 0.1 % (ref 0–8)
ERYTHROCYTE [DISTWIDTH] IN BLOOD BY AUTOMATED COUNT: 15.2 % (ref 11.5–14.5)
ERYTHROCYTE [DISTWIDTH] IN BLOOD BY AUTOMATED COUNT: 15.8 % (ref 11.5–14.5)
EST. GFR  (NO RACE VARIABLE): >60 ML/MIN/1.73 M^2
ESTIMATED AVG GLUCOSE: 140 MG/DL (ref 68–131)
ETHANOL SERPL-MCNC: <10 MG/DL
ETHANOL UR-MCNC: <10 MG/DL
GLUCOSE SERPL-MCNC: 175 MG/DL (ref 70–110)
GLUCOSE SERPL-MCNC: 183 MG/DL (ref 70–110)
GLUCOSE SERPL-MCNC: 293 MG/DL (ref 70–110)
GLUCOSE SERPL-MCNC: 359 MG/DL (ref 70–110)
GLUCOSE UR QL STRIP: ABNORMAL
HBA1C MFR BLD: 6.5 % (ref 4–5.6)
HCO3 UR-SCNC: 16.2 MMOL/L (ref 24–28)
HCT VFR BLD AUTO: 29.4 % (ref 37–48.5)
HCT VFR BLD AUTO: 35.2 % (ref 37–48.5)
HCV AB SERPL QL IA: NORMAL
HGB BLD-MCNC: 10.9 G/DL (ref 12–16)
HGB BLD-MCNC: 8.9 G/DL (ref 12–16)
HGB UR QL STRIP: NEGATIVE
HIV 1+2 AB+HIV1 P24 AG SERPL QL IA: NORMAL
HYALINE CASTS UR QL AUTO: 1 /LPF
IMM GRANULOCYTES # BLD AUTO: 0.04 K/UL (ref 0–0.04)
IMM GRANULOCYTES # BLD AUTO: 0.05 K/UL (ref 0–0.04)
IMM GRANULOCYTES NFR BLD AUTO: 0.4 % (ref 0–0.5)
IMM GRANULOCYTES NFR BLD AUTO: 0.5 % (ref 0–0.5)
INFLUENZA A, MOLECULAR: NEGATIVE
INFLUENZA B, MOLECULAR: NEGATIVE
KETONES UR QL STRIP: ABNORMAL
LACTATE SERPL-SCNC: 3 MMOL/L (ref 0.5–2.2)
LEUKOCYTE ESTERASE UR QL STRIP: NEGATIVE
LYMPHOCYTES # BLD AUTO: 1 K/UL (ref 1–4.8)
LYMPHOCYTES # BLD AUTO: 1.7 K/UL (ref 1–4.8)
LYMPHOCYTES NFR BLD: 15.7 % (ref 18–48)
LYMPHOCYTES NFR BLD: 8.8 % (ref 18–48)
MAGNESIUM SERPL-MCNC: 1.2 MG/DL (ref 1.6–2.6)
MAGNESIUM SERPL-MCNC: 1.8 MG/DL (ref 1.6–2.6)
MCH RBC QN AUTO: 26.9 PG (ref 27–31)
MCH RBC QN AUTO: 27.1 PG (ref 27–31)
MCHC RBC AUTO-ENTMCNC: 30.3 G/DL (ref 32–36)
MCHC RBC AUTO-ENTMCNC: 31 G/DL (ref 32–36)
MCV RBC AUTO: 87 FL (ref 82–98)
MCV RBC AUTO: 90 FL (ref 82–98)
METHADONE UR QL SCN>300 NG/ML: NEGATIVE
MICROSCOPIC COMMENT: ABNORMAL
MONOCYTES # BLD AUTO: 0.5 K/UL (ref 0.3–1)
MONOCYTES # BLD AUTO: 0.8 K/UL (ref 0.3–1)
MONOCYTES NFR BLD: 4.5 % (ref 4–15)
MONOCYTES NFR BLD: 7.6 % (ref 4–15)
NEUTROPHILS # BLD AUTO: 8.4 K/UL (ref 1.8–7.7)
NEUTROPHILS # BLD AUTO: 9.2 K/UL (ref 1.8–7.7)
NEUTROPHILS NFR BLD: 76 % (ref 38–73)
NEUTROPHILS NFR BLD: 85.5 % (ref 38–73)
NITRITE UR QL STRIP: NEGATIVE
NRBC BLD-RTO: 0 /100 WBC
NRBC BLD-RTO: 0 /100 WBC
OPIATES UR QL SCN: NEGATIVE
PCO2 BLDA: 29.8 MMHG (ref 35–45)
PCP UR QL SCN>25 NG/ML: NEGATIVE
PH SMN: 7.34 [PH] (ref 7.35–7.45)
PH UR STRIP: 6 [PH] (ref 5–8)
PHOSPHATE SERPL-MCNC: 2.3 MG/DL (ref 2.7–4.5)
PLATELET # BLD AUTO: 270 K/UL (ref 150–450)
PLATELET # BLD AUTO: 292 K/UL (ref 150–450)
PMV BLD AUTO: 10 FL (ref 9.2–12.9)
PMV BLD AUTO: 10.6 FL (ref 9.2–12.9)
PO2 BLDA: 32 MMHG (ref 40–60)
POC BE: -10 MMOL/L
POC SATURATED O2: 60 % (ref 95–100)
POC TCO2: 17 MMOL/L (ref 24–29)
POCT GLUCOSE: 117 MG/DL (ref 70–110)
POCT GLUCOSE: 133 MG/DL (ref 70–110)
POCT GLUCOSE: 144 MG/DL (ref 70–110)
POCT GLUCOSE: 152 MG/DL (ref 70–110)
POCT GLUCOSE: 169 MG/DL (ref 70–110)
POCT GLUCOSE: 200 MG/DL (ref 70–110)
POCT GLUCOSE: 269 MG/DL (ref 70–110)
POCT GLUCOSE: 270 MG/DL (ref 70–110)
POCT GLUCOSE: 334 MG/DL (ref 70–110)
POCT GLUCOSE: 345 MG/DL (ref 70–110)
POCT GLUCOSE: 473 MG/DL (ref 70–110)
POCT GLUCOSE: 98 MG/DL (ref 70–110)
POTASSIUM SERPL-SCNC: 3.7 MMOL/L (ref 3.5–5.1)
POTASSIUM SERPL-SCNC: 3.9 MMOL/L (ref 3.5–5.1)
POTASSIUM SERPL-SCNC: 4.4 MMOL/L (ref 3.5–5.1)
POTASSIUM SERPL-SCNC: 4.5 MMOL/L (ref 3.5–5.1)
PROCALCITONIN SERPL IA-MCNC: 0.28 NG/ML
PROT SERPL-MCNC: 5.9 G/DL (ref 6–8.4)
PROT SERPL-MCNC: 7.9 G/DL (ref 6–8.4)
PROT UR QL STRIP: NEGATIVE
RBC # BLD AUTO: 3.28 M/UL (ref 4–5.4)
RBC # BLD AUTO: 4.05 M/UL (ref 4–5.4)
RBC #/AREA URNS AUTO: 0 /HPF (ref 0–4)
SAMPLE: ABNORMAL
SARS-COV-2 RDRP RESP QL NAA+PROBE: NEGATIVE
SITE: ABNORMAL
SODIUM SERPL-SCNC: 135 MMOL/L (ref 136–145)
SODIUM SERPL-SCNC: 140 MMOL/L (ref 136–145)
SODIUM SERPL-SCNC: 140 MMOL/L (ref 136–145)
SODIUM SERPL-SCNC: 141 MMOL/L (ref 136–145)
SP GR UR STRIP: 1.02 (ref 1–1.03)
SPECIMEN SOURCE: NORMAL
SQUAMOUS #/AREA URNS AUTO: 9 /HPF
TOXICOLOGY INFORMATION: ABNORMAL
TROPONIN I SERPL DL<=0.01 NG/ML-MCNC: 0.01 NG/ML (ref 0–0.03)
TROPONIN I SERPL DL<=0.01 NG/ML-MCNC: 0.02 NG/ML (ref 0–0.03)
TSH SERPL DL<=0.005 MIU/L-ACNC: 3.67 UIU/ML (ref 0.4–4)
URN SPEC COLLECT METH UR: ABNORMAL
WBC # BLD AUTO: 10.76 K/UL (ref 3.9–12.7)
WBC # BLD AUTO: 11.01 K/UL (ref 3.9–12.7)
WBC #/AREA URNS AUTO: 2 /HPF (ref 0–5)
YEAST UR QL AUTO: ABNORMAL

## 2024-01-16 PROCEDURE — 82010 KETONE BODYS QUAN: CPT | Mod: 91

## 2024-01-16 PROCEDURE — 85025 COMPLETE CBC W/AUTO DIFF WBC: CPT | Mod: 91

## 2024-01-16 PROCEDURE — 96361 HYDRATE IV INFUSION ADD-ON: CPT | Mod: 59

## 2024-01-16 PROCEDURE — 63600175 PHARM REV CODE 636 W HCPCS

## 2024-01-16 PROCEDURE — 84100 ASSAY OF PHOSPHORUS: CPT

## 2024-01-16 PROCEDURE — 83036 HEMOGLOBIN GLYCOSYLATED A1C: CPT

## 2024-01-16 PROCEDURE — 87040 BLOOD CULTURE FOR BACTERIA: CPT | Mod: 59

## 2024-01-16 PROCEDURE — 84484 ASSAY OF TROPONIN QUANT: CPT

## 2024-01-16 PROCEDURE — S5010 5% DEXTROSE AND 0.45% SALINE: HCPCS

## 2024-01-16 PROCEDURE — 84484 ASSAY OF TROPONIN QUANT: CPT | Mod: 91

## 2024-01-16 PROCEDURE — 96365 THER/PROPH/DIAG IV INF INIT: CPT

## 2024-01-16 PROCEDURE — 87389 HIV-1 AG W/HIV-1&-2 AB AG IA: CPT | Performed by: PHYSICIAN ASSISTANT

## 2024-01-16 PROCEDURE — 99291 CRITICAL CARE FIRST HOUR: CPT

## 2024-01-16 PROCEDURE — 25000003 PHARM REV CODE 250

## 2024-01-16 PROCEDURE — 80053 COMPREHEN METABOLIC PANEL: CPT | Mod: 91

## 2024-01-16 PROCEDURE — 20000000 HC ICU ROOM

## 2024-01-16 PROCEDURE — U0002 COVID-19 LAB TEST NON-CDC: HCPCS

## 2024-01-16 PROCEDURE — 86803 HEPATITIS C AB TEST: CPT | Performed by: PHYSICIAN ASSISTANT

## 2024-01-16 PROCEDURE — 80307 DRUG TEST PRSMV CHEM ANLYZR: CPT

## 2024-01-16 PROCEDURE — 99900035 HC TECH TIME PER 15 MIN (STAT)

## 2024-01-16 PROCEDURE — 82010 KETONE BODYS QUAN: CPT

## 2024-01-16 PROCEDURE — 84443 ASSAY THYROID STIM HORMONE: CPT

## 2024-01-16 PROCEDURE — 87502 INFLUENZA DNA AMP PROBE: CPT

## 2024-01-16 PROCEDURE — 82962 GLUCOSE BLOOD TEST: CPT

## 2024-01-16 PROCEDURE — 83735 ASSAY OF MAGNESIUM: CPT

## 2024-01-16 PROCEDURE — 80321 ALCOHOLS BIOMARKERS 1OR 2: CPT

## 2024-01-16 PROCEDURE — 96367 TX/PROPH/DG ADDL SEQ IV INF: CPT

## 2024-01-16 PROCEDURE — 82077 ASSAY SPEC XCP UR&BREATH IA: CPT

## 2024-01-16 PROCEDURE — 96375 TX/PRO/DX INJ NEW DRUG ADDON: CPT | Mod: 59

## 2024-01-16 PROCEDURE — 96366 THER/PROPH/DIAG IV INF ADDON: CPT

## 2024-01-16 PROCEDURE — 93010 ELECTROCARDIOGRAM REPORT: CPT | Mod: ,,, | Performed by: INTERNAL MEDICINE

## 2024-01-16 PROCEDURE — 82803 BLOOD GASES ANY COMBINATION: CPT

## 2024-01-16 PROCEDURE — 83880 ASSAY OF NATRIURETIC PEPTIDE: CPT

## 2024-01-16 PROCEDURE — 96374 THER/PROPH/DIAG INJ IV PUSH: CPT | Mod: 59

## 2024-01-16 PROCEDURE — 12000002 HC ACUTE/MED SURGE SEMI-PRIVATE ROOM

## 2024-01-16 PROCEDURE — 80048 BASIC METABOLIC PNL TOTAL CA: CPT | Mod: 91,XB

## 2024-01-16 PROCEDURE — 81001 URINALYSIS AUTO W/SCOPE: CPT | Mod: XB

## 2024-01-16 PROCEDURE — 80048 BASIC METABOLIC PNL TOTAL CA: CPT | Mod: XB

## 2024-01-16 PROCEDURE — 83605 ASSAY OF LACTIC ACID: CPT

## 2024-01-16 PROCEDURE — 93005 ELECTROCARDIOGRAM TRACING: CPT

## 2024-01-16 PROCEDURE — 85025 COMPLETE CBC W/AUTO DIFF WBC: CPT

## 2024-01-16 PROCEDURE — 84145 PROCALCITONIN (PCT): CPT

## 2024-01-16 PROCEDURE — 80053 COMPREHEN METABOLIC PANEL: CPT

## 2024-01-16 PROCEDURE — 81025 URINE PREGNANCY TEST: CPT

## 2024-01-16 PROCEDURE — 83735 ASSAY OF MAGNESIUM: CPT | Mod: 91

## 2024-01-16 RX ORDER — GLUCAGON 1 MG
1 KIT INJECTION
Status: DISCONTINUED | OUTPATIENT
Start: 2024-01-16 | End: 2024-01-20

## 2024-01-16 RX ORDER — INSULIN ASPART 100 [IU]/ML
100 INJECTION, SOLUTION INTRAVENOUS; SUBCUTANEOUS
Status: ON HOLD | COMMUNITY
End: 2024-01-22 | Stop reason: HOSPADM

## 2024-01-16 RX ORDER — IBUPROFEN 200 MG
16 TABLET ORAL
Status: DISCONTINUED | OUTPATIENT
Start: 2024-01-16 | End: 2024-01-20

## 2024-01-16 RX ORDER — DEXTROSE MONOHYDRATE AND SODIUM CHLORIDE 5; .45 G/100ML; G/100ML
INJECTION, SOLUTION INTRAVENOUS CONTINUOUS PRN
Status: DISCONTINUED | OUTPATIENT
Start: 2024-01-16 | End: 2024-01-16

## 2024-01-16 RX ORDER — SODIUM CHLORIDE 0.9 % (FLUSH) 0.9 %
10 SYRINGE (ML) INJECTION EVERY 12 HOURS PRN
Status: DISCONTINUED | OUTPATIENT
Start: 2024-01-16 | End: 2024-01-22 | Stop reason: HOSPADM

## 2024-01-16 RX ORDER — DEXTROSE MONOHYDRATE 100 MG/ML
INJECTION, SOLUTION INTRAVENOUS
Status: DISCONTINUED | OUTPATIENT
Start: 2024-01-16 | End: 2024-01-16

## 2024-01-16 RX ORDER — NIFEDIPINE 30 MG/1
60 TABLET, EXTENDED RELEASE ORAL DAILY
Status: DISCONTINUED | OUTPATIENT
Start: 2024-01-16 | End: 2024-01-16

## 2024-01-16 RX ORDER — LANOLIN ALCOHOL/MO/W.PET/CERES
800 CREAM (GRAM) TOPICAL
Status: DISCONTINUED | OUTPATIENT
Start: 2024-01-16 | End: 2024-01-16

## 2024-01-16 RX ORDER — CITALOPRAM 10 MG/1
10 TABLET ORAL DAILY
Status: DISCONTINUED | OUTPATIENT
Start: 2024-01-17 | End: 2024-01-17

## 2024-01-16 RX ORDER — SODIUM CHLORIDE 0.9 % (FLUSH) 0.9 %
10 SYRINGE (ML) INJECTION
Status: DISCONTINUED | OUTPATIENT
Start: 2024-01-16 | End: 2024-01-16

## 2024-01-16 RX ORDER — SODIUM CHLORIDE 9 MG/ML
1000 INJECTION, SOLUTION INTRAVENOUS CONTINUOUS
Status: ACTIVE | OUTPATIENT
Start: 2024-01-16 | End: 2024-01-16

## 2024-01-16 RX ORDER — DROPERIDOL 2.5 MG/ML
1.25 INJECTION, SOLUTION INTRAMUSCULAR; INTRAVENOUS
Status: COMPLETED | OUTPATIENT
Start: 2024-01-16 | End: 2024-01-16

## 2024-01-16 RX ORDER — SODIUM CHLORIDE 0.9 % (FLUSH) 0.9 %
10 SYRINGE (ML) INJECTION
Status: DISCONTINUED | OUTPATIENT
Start: 2024-01-16 | End: 2024-01-20

## 2024-01-16 RX ORDER — DEXTROSE MONOHYDRATE AND SODIUM CHLORIDE 5; .45 G/100ML; G/100ML
INJECTION, SOLUTION INTRAVENOUS CONTINUOUS PRN
Status: DISCONTINUED | OUTPATIENT
Start: 2024-01-16 | End: 2024-01-17

## 2024-01-16 RX ORDER — MAGNESIUM SULFATE HEPTAHYDRATE 40 MG/ML
2 INJECTION, SOLUTION INTRAVENOUS
Status: COMPLETED | OUTPATIENT
Start: 2024-01-16 | End: 2024-01-16

## 2024-01-16 RX ORDER — SODIUM,POTASSIUM PHOSPHATES 280-250MG
2 POWDER IN PACKET (EA) ORAL ONCE
Status: COMPLETED | OUTPATIENT
Start: 2024-01-16 | End: 2024-01-17

## 2024-01-16 RX ORDER — GABAPENTIN 300 MG/1
300 CAPSULE ORAL DAILY
Status: DISCONTINUED | OUTPATIENT
Start: 2024-01-16 | End: 2024-01-16

## 2024-01-16 RX ORDER — TALC
6 POWDER (GRAM) TOPICAL NIGHTLY PRN
Status: DISCONTINUED | OUTPATIENT
Start: 2024-01-16 | End: 2024-01-22 | Stop reason: HOSPADM

## 2024-01-16 RX ORDER — PANTOPRAZOLE SODIUM 40 MG/10ML
40 INJECTION, POWDER, LYOPHILIZED, FOR SOLUTION INTRAVENOUS DAILY
Status: DISCONTINUED | OUTPATIENT
Start: 2024-01-17 | End: 2024-01-16

## 2024-01-16 RX ORDER — NALOXONE HCL 0.4 MG/ML
0.02 VIAL (ML) INJECTION
Status: DISCONTINUED | OUTPATIENT
Start: 2024-01-16 | End: 2024-01-22 | Stop reason: HOSPADM

## 2024-01-16 RX ORDER — SODIUM CHLORIDE 9 MG/ML
1000 INJECTION, SOLUTION INTRAVENOUS CONTINUOUS
Status: DISCONTINUED | OUTPATIENT
Start: 2024-01-16 | End: 2024-01-16

## 2024-01-16 RX ORDER — INSULIN ASPART 100 [IU]/ML
0-5 INJECTION, SOLUTION INTRAVENOUS; SUBCUTANEOUS
Status: DISCONTINUED | OUTPATIENT
Start: 2024-01-16 | End: 2024-01-16

## 2024-01-16 RX ORDER — IBUPROFEN 200 MG
24 TABLET ORAL
Status: DISCONTINUED | OUTPATIENT
Start: 2024-01-16 | End: 2024-01-20

## 2024-01-16 RX ORDER — ATORVASTATIN CALCIUM 20 MG/1
20 TABLET, FILM COATED ORAL DAILY
Status: DISCONTINUED | OUTPATIENT
Start: 2024-01-16 | End: 2024-01-22 | Stop reason: HOSPADM

## 2024-01-16 RX ORDER — DEXTROSE MONOHYDRATE 100 MG/ML
INJECTION, SOLUTION INTRAVENOUS
Status: DISCONTINUED | OUTPATIENT
Start: 2024-01-16 | End: 2024-01-20

## 2024-01-16 RX ORDER — NOREPINEPHRINE BITARTRATE/D5W 4MG/250ML
0-.2 PLASTIC BAG, INJECTION (ML) INTRAVENOUS CONTINUOUS
Status: DISCONTINUED | OUTPATIENT
Start: 2024-01-16 | End: 2024-01-17

## 2024-01-16 RX ORDER — ONDANSETRON HYDROCHLORIDE 2 MG/ML
4 INJECTION, SOLUTION INTRAVENOUS
Status: COMPLETED | OUTPATIENT
Start: 2024-01-16 | End: 2024-01-16

## 2024-01-16 RX ORDER — DIAZEPAM 5 MG/1
5 TABLET ORAL EVERY 8 HOURS PRN
Status: DISCONTINUED | OUTPATIENT
Start: 2024-01-16 | End: 2024-01-16

## 2024-01-16 RX ORDER — CITALOPRAM 10 MG/1
10 TABLET ORAL DAILY
Status: DISCONTINUED | OUTPATIENT
Start: 2024-01-16 | End: 2024-01-16

## 2024-01-16 RX ADMIN — SODIUM CHLORIDE, POTASSIUM CHLORIDE, SODIUM LACTATE AND CALCIUM CHLORIDE 1000 ML: 600; 310; 30; 20 INJECTION, SOLUTION INTRAVENOUS at 09:01

## 2024-01-16 RX ADMIN — PIPERACILLIN SODIUM AND TAZOBACTAM SODIUM 4.5 G: 4; .5 INJECTION, POWDER, FOR SOLUTION INTRAVENOUS at 08:01

## 2024-01-16 RX ADMIN — SODIUM CHLORIDE, POTASSIUM CHLORIDE, SODIUM LACTATE AND CALCIUM CHLORIDE 1000 ML: 600; 310; 30; 20 INJECTION, SOLUTION INTRAVENOUS at 04:01

## 2024-01-16 RX ADMIN — GABAPENTIN 300 MG: 300 CAPSULE ORAL at 12:01

## 2024-01-16 RX ADMIN — MAGNESIUM SULFATE 2 G: 2 INJECTION INTRAVENOUS at 11:01

## 2024-01-16 RX ADMIN — INSULIN DETEMIR 12 UNITS: 100 INJECTION, SOLUTION SUBCUTANEOUS at 03:01

## 2024-01-16 RX ADMIN — CITALOPRAM HYDROBROMIDE 10 MG: 10 TABLET ORAL at 12:01

## 2024-01-16 RX ADMIN — DEXTROSE AND SODIUM CHLORIDE: 5; 450 INJECTION, SOLUTION INTRAVENOUS at 01:01

## 2024-01-16 RX ADMIN — ATORVASTATIN CALCIUM 20 MG: 20 TABLET, FILM COATED ORAL at 12:01

## 2024-01-16 RX ADMIN — NIFEDIPINE 60 MG: 30 TABLET, FILM COATED, EXTENDED RELEASE ORAL at 12:01

## 2024-01-16 RX ADMIN — ONDANSETRON 4 MG: 2 INJECTION INTRAMUSCULAR; INTRAVENOUS at 10:01

## 2024-01-16 RX ADMIN — INSULIN HUMAN 0.1 UNITS/KG/HR: 1 INJECTION, SOLUTION INTRAVENOUS at 09:01

## 2024-01-16 RX ADMIN — DROPERIDOL 1.25 MG: 2.5 INJECTION, SOLUTION INTRAMUSCULAR; INTRAVENOUS at 11:01

## 2024-01-16 RX ADMIN — SODIUM CHLORIDE 500 ML: 9 INJECTION, SOLUTION INTRAVENOUS at 10:01

## 2024-01-16 RX ADMIN — INSULIN HUMAN 0.05 UNITS/KG/HR: 1 INJECTION, SOLUTION INTRAVENOUS at 03:01

## 2024-01-16 RX ADMIN — DIAZEPAM 5 MG: 5 TABLET ORAL at 05:01

## 2024-01-16 RX ADMIN — INSULIN HUMAN 0.1 UNITS/KG/HR: 1 INJECTION, SOLUTION INTRAVENOUS at 11:01

## 2024-01-16 RX ADMIN — SODIUM CHLORIDE 1000 ML: 9 INJECTION, SOLUTION INTRAVENOUS at 11:01

## 2024-01-16 RX ADMIN — DEXTROSE AND SODIUM CHLORIDE: 5; 450 INJECTION, SOLUTION INTRAVENOUS at 03:01

## 2024-01-16 RX ADMIN — SODIUM CHLORIDE, POTASSIUM CHLORIDE, SODIUM LACTATE AND CALCIUM CHLORIDE 1000 ML: 600; 310; 30; 20 INJECTION, SOLUTION INTRAVENOUS at 06:01

## 2024-01-16 NOTE — ASSESSMENT & PLAN NOTE
Chronic, uncontrolled. Latest blood pressure and vitals reviewed-     Temp:  [97.8 °F (36.6 °C)-97.9 °F (36.6 °C)]   Pulse:  []   Resp:  [17-20]   BP: (113-189)/(54-83)   SpO2:  [95 %-100 %] .   Home meds for hypertension were reviewed and noted below.   Hypertension Medications               lisinopriL (PRINIVIL,ZESTRIL) 5 MG tablet Take 1 tablet by mouth once daily            While in the hospital, will manage blood pressure as follows; Adjust home antihypertensive regimen as follows- Added Nifedipine 60 mg daily    Will utilize p.r.n. blood pressure medication only if patient's blood pressure greater than 180/110 and she develops symptoms such as worsening chest pain or shortness of breath.

## 2024-01-16 NOTE — ED PROVIDER NOTES
Encounter Date: 2024       History     Chief Complaint   Patient presents with    Loss of Consciousness     Arrived with rapid response team from [facility] with c/o syncope, denies injury or fall, diabetic, pt c/o nausea     43-year-old female with a past medical history of type 1 diabetes, GERD, HLD, E. Coli bacteremia presents to the ED via rapid response team from [facility] status post syncope.  No injury or fall noted per patient and rapid response team. Currently the patient is complaining of nausea and nonbloody vomiting.  She denies chest pain, shortness of breath, abdominal pain, headache, fever, dysuria, hematuria.  No other complaints at this time.    The history is provided by the patient and medical records.     Review of patient's allergies indicates:  No Known Allergies  Past Medical History:   Diagnosis Date    E coli bacteremia     GERD (gastroesophageal reflux disease)     Hyperlipidemia     Iron deficiency anemia due to chronic blood loss 10/31/2015    Type 1 diabetes mellitus without complication 10/31/2015     Past Surgical History:   Procedure Laterality Date     SECTION      LAPAROSCOPIC SALPINGECTOMY Bilateral 2023    Procedure: SALPINGECTOMY, LAPAROSCOPIC;  Surgeon: Clem Sidhu MD;  Location: [facility];  Service: OB/GYN;  Laterality: Bilateral;    LAPAROSCOPIC TOTAL HYSTERECTOMY N/A 2023    Procedure: HYSTERECTOMY, TOTAL, LAPAROSCOPIC;  Surgeon: Clem Sidhu MD;  [facility]: [facility];  Service: OB/GYN;  Laterality: N/A;    TUBAL LIGATION       Family History   Problem Relation Age of Onset    Hyperlipidemia Mother     Hypertension Father     No Known Problems Sister     No Known Problems Brother     Breast cancer Neg Hx     Colon cancer Neg Hx     Ovarian cancer Neg Hx      Social History     Tobacco Use    Smoking status: Former     Current packs/day: 0.00     Types: Cigarettes     Quit date: 12/10/2021     Years since quittin.1     " Passive exposure: Current    Smokeless tobacco: Never   Substance Use Topics    Alcohol use: Yes     Alcohol/week: 5.0 standard drinks of alcohol     Types: 5 Drinks containing 0.5 oz of alcohol per week     Comment: occ    Drug use: No     Comment: "20-30 years ago drug use"     Review of Systems  See HPI  Physical Exam     Initial Vitals [01/16/24 0923]   BP Pulse Resp Temp SpO2   (!) 189/81 99 20 97.9 °F (36.6 °C) 99 %      MAP       --         Physical Exam    Nursing note and vitals reviewed.  Constitutional: Vital signs are normal. She appears well-developed and well-nourished. She is not diaphoretic. She appears distressed.   HENT:   Head: Normocephalic and atraumatic.   Right Ear: External ear normal.   Left Ear: External ear normal.   No obvious hematomas noted to the head.   Eyes: EOM are normal. Right eye exhibits no discharge. Left eye exhibits no discharge.   Neck: Trachea normal. Neck supple. No thyroid mass present.   No midline C-spine tenderness to palpation.   Normal range of motion.  Cardiovascular:  Normal rate, regular rhythm, normal heart sounds and intact distal pulses.     Exam reveals no gallop and no friction rub.       No murmur heard.  Pulmonary/Chest: Breath sounds normal. No respiratory distress. She has no wheezes. She has no rhonchi. She has no rales. She exhibits no tenderness.   Abdominal: Abdomen is soft. Bowel sounds are normal. She exhibits no distension. There is no abdominal tenderness. There is no rebound and no guarding.   Musculoskeletal:         General: No tenderness or edema. Normal range of motion.      Cervical back: Normal range of motion and neck supple.      Comments: No midline T-spine or L-spine tenderness to palpation.  All 4 extremities have good range of motion without any tenderness.     Neurological: She is alert and oriented to person, place, and time. She has normal strength. No cranial nerve deficit or sensory deficit. GCS score is 15. GCS eye subscore is " 4. GCS verbal subscore is 5. GCS motor subscore is 6.   Skin: Skin is warm and dry. Capillary refill takes less than 2 seconds. No rash noted.   Psychiatric: She has a normal mood and affect.         ED Course   Procedures  Labs Reviewed   CBC W/ AUTO DIFFERENTIAL - Abnormal; Notable for the following components:       Result Value    Hemoglobin 10.9 (*)     Hematocrit 35.2 (*)     MCH 26.9 (*)     MCHC 31.0 (*)     RDW 15.2 (*)     Gran # (ANC) 9.2 (*)     Immature Grans (Abs) 0.05 (*)     Gran % 85.5 (*)     Lymph % 8.8 (*)     All other components within normal limits   COMPREHENSIVE METABOLIC PANEL - Abnormal; Notable for the following components:    CO2 19 (*)     Glucose 293 (*)     Anion Gap 18 (*)     All other components within normal limits   MAGNESIUM - Abnormal; Notable for the following components:    Magnesium 1.2 (*)     All other components within normal limits   URINALYSIS, REFLEX TO URINE CULTURE - Abnormal; Notable for the following components:    Glucose, UA 4+ (*)     Ketones, UA 3+ (*)     All other components within normal limits    Narrative:     Specimen Source->Urine   BETA - HYDROXYBUTYRATE, SERUM - Abnormal; Notable for the following components:    Beta-Hydroxybutyrate 4.9 (*)     All other components within normal limits   HEMOGLOBIN A1C - Abnormal; Notable for the following components:    Hemoglobin A1C 6.5 (*)     Estimated Avg Glucose 140 (*)     All other components within normal limits    Narrative:     Add-on [facility] to [phone] per Lenin Márquez MD on  01/16/2024    11:31    URINALYSIS MICROSCOPIC - Abnormal; Notable for the following components:    Bacteria Few (*)     All other components within normal limits    Narrative:     Specimen Source->Urine   POCT GLUCOSE - Abnormal; Notable for the following components:    POCT Glucose 270 (*)     All other components within normal limits   POCT GLUCOSE - Abnormal; Notable for the following components:    POCT Glucose 345 (*)     All  other components within normal limits   INFLUENZA A & B BY MOLECULAR   HIV 1 / 2 ANTIBODY    Narrative:     Release to patient->Immediate   HEPATITIS C ANTIBODY    Narrative:     Release to patient->Immediate   B-TYPE NATRIURETIC PEPTIDE   SARS-COV-2 RNA AMPLIFICATION, QUAL   TROPONIN I   TSH   HEMOGLOBIN A1C   MAGNESIUM   TOXICOLOGY SCREEN, URINE, RANDOM (COMPLIANCE)   PHOSPHORUS   PHOSPHORUS   BASIC METABOLIC PANEL   BASIC METABOLIC PANEL   MAGNESIUM   ALCOHOL,MEDICAL (ETHANOL)   PHOSPHATIDYLETHANOL (PETH)   POCT URINE PREGNANCY   POCT GLUCOSE MONITORING CONTINUOUS     EKG Readings: (Independently Interpreted)   79 beats per minute.  Normal sinus rhythm.  Normal axis.  No STEMI.       Imaging Results              X-Ray Chest PA And Lateral (Final result)  Result time 01/16/24 10:25:12      Final result by Terrance Caruso MD (01/16/24 10:25:12)                   Impression:      No acute cardiopulmonary disease      Electronically signed by: Terrance Caruso MD  Date:    01/16/2024  Time:    10:25               Narrative:    EXAMINATION:  XR CHEST PA AND LATERAL    CLINICAL HISTORY:  Unspecified coma    TECHNIQUE:  PA and lateral views of the chest were performed.    COMPARISON:  12/27/2023    FINDINGS:  The heart size and pulmonary vessels are normal.  Lungs are expanded and clear.  No acute lung consolidation or pleural fluid or pneumothorax is detected.  The skeletal structures are intact.    Monitor wires overlie the chest.                                       Medications   sodium chloride 0.9% flush 10 mL (has no administration in time range)   0.9%  NaCl infusion (1,000 mLs Intravenous New Bag 1/16/24 1112)   dextrose 5 % and 0.45 % NaCl infusion (has no administration in time range)   dextrose 10% bolus 250 mL 250 mL (has no administration in time range)   dextrose 10% bolus 125 mL 125 mL (has no administration in time range)   insulin regular in 0.9 % NaCl 100 unit/100 mL (1 unit/mL) infusion (0.05  Units/kg/hr × 77.1 kg Intravenous Rate/Dose Change 1/16/24 1208)   magnesium sulfate 2g in water 50mL IVPB (premix) (2 g Intravenous New Bag 1/16/24 1110)   sodium chloride 0.9% flush 10 mL (has no administration in time range)   naloxone 0.4 mg/mL injection 0.02 mg (has no administration in time range)   glucose chewable tablet 16 g (has no administration in time range)   glucose chewable tablet 24 g (has no administration in time range)   glucagon (human recombinant) injection 1 mg (has no administration in time range)   melatonin tablet 6 mg (has no administration in time range)   NIFEdipine 24 hr tablet 60 mg (60 mg Oral Given 1/16/24 1217)   atorvastatin tablet 20 mg (20 mg Oral Given 1/16/24 1217)   citalopram tablet 10 mg (10 mg Oral Given 1/16/24 1217)   gabapentin capsule 300 mg (300 mg Oral Given 1/16/24 1217)   busPIRone tablet 5 mg (has no administration in time range)   ondansetron injection 4 mg (4 mg Intravenous Given 1/16/24 1000)   sodium chloride 0.9% bolus 500 mL 500 mL (0 mLs Intravenous Stopped 1/16/24 1059)   droPERidol injection 1.25 mg (1.25 mg Intravenous Given 1/16/24 1108)     Medical Decision Making  43-year-old female who appears to be in distress due to symptoms presents to the ED for syncope.  ABC's intact.  Initial triage vital signs reveal hypertension and otherwise unremarkable.    Differential diagnosis includes but is not limited to cardiac arrhythmia, ACS, pneumonia, UTI, electrolyte abnormality, anemia, DKA    Amount and/or Complexity of Data Reviewed  Labs: ordered. Decision-making details documented in ED Course.  Radiology: ordered. Decision-making details documented in ED Course.    Risk  Prescription drug management.  Decision regarding hospitalization.              Attending Attestation:   Physician Attestation Statement for Resident:  As the supervising MD   Physician Attestation Statement: I have personally seen and examined this patient.   I agree with the above  history.  -:   As the supervising MD I agree with the above PE.     As the supervising MD I agree with the above treatment, course, plan, and disposition.            Attending Critical Care:   Critical Care Times:   Direct Patient Care (initial evaluation, reassessments, and time considering the case)................................................................8 minutes.   Additional History from reviewing old medical records or taking additional history from the family, EMS, PCP, etc.......................9 minutes.   Ordering, Reviewing, and Interpreting Diagnostic Studies...............................................................................................................7 minutes.   Documentation..................................................................................................................................................................................6 minutes.   Consultation with other Physicians. .................................................................................................................................................5 minutes.   ==============================================================  Total Critical Care Time - exclusive of procedural time: 35 minutes.  ==============================================================  Critical Care Comments: This is an emergent evaluation of a critically ill patient.  The patient appeared to be in distress upon arrival.  Differential diagnosis was quite broad.  It did include diabetic ketoacidosis.  This was confirmed with laboratory studies.  The patient has been provided with copious IV fluids and an insulin drip has been started.  The patient will be admitted for further management.           ED Course as of 01/16/24 1232   Tue Jan 16, 2024   0958 POCT Glucose(!): 270  Within normal limits. [BG]   1014 Beta-Hydroxybutyrate(!): 4.9  Elevated. [BG]   1014 Hemoglobin(!): 10.9  Stable anemia. [BG]   1014 WBC: 10.76  No  leukocytosis. [BG]   1039 Comprehensive metabolic panel(!)  Elevated anion gap in the setting of mild hyperglycemia and elevated beta hydroxybutyrate could be related to a mild DKA.  Initiated fluid resuscitation. [BG]   1040 BNP: 29  Correlates with physical exam of no signs of fluid overload. [BG]   1040 X-Ray Chest PA And Lateral  No acute cardiopulmonary disease [BG]   1041 Troponin I: 0.007  Decreases likelihood of ACS in the setting of no active chest pain and unremarkable EKG. [BG]   1041 Magnesium (!): 1.2  Will replete with oral magnesium. [BG]   1041 Preg Test, Ur: Negative [BG]   1047 I discussed the exam findings with the patient who understands and agrees with the plan of admission for mild DKA. [BG]   1049 RN notified me that the patient continues to actively vomit.  I will give the patient 12.5 mg of Phenergan. [BG]   1052 I am giving the patient 1.25 mg of droperidol as there is a national shortage of Phenergan.  Magnesium is low.  I have given 2 rounds of QT prolonging medications.  I will give the patient IV magnesium instead of oral. [BG]   1055 Patient will be admitted to Hospital Medicine under inpatient designation for further workup and evaluation of DKA. [BG]      ED Course User Index  [BG] Ramírez Wesley MD                           Clinical Impression:  Final diagnoses:  [R40.20] LOC (loss of consciousness)  [E10.10] Diabetic ketoacidosis without coma associated with type 1 diabetes mellitus (Primary)          ED Disposition Condition    Admit Serious                Ramírez Wesley MD  Resident  01/16/24 1109       Lenin Márquez MD  01/16/24 1232

## 2024-01-16 NOTE — CODE/ RAPID DOCUMENTATION
"Medical Emergency Team Consult Note  Critical Care Medicine    CC: <principal problem not specified>  Date: 01/16/2024  Admit Date: 1/16/2024  Hospital Length of Stay: 0  MRN: [id number]  The patient location is: Bed ED 05/05  Dx: <principal problem not specified>  Code Status: Prior   Chart Reviewed: 01/16/2024, 9:47 AM      SUBJECTIVE:     HPI:  Cira Keys has a past medical history of E coli bacteremia, GERD (gastroesophageal reflux disease), Hyperlipidemia, Iron deficiency anemia due to chronic blood loss, and Type 1 diabetes mellitus without complication.    Significant Events: Code blue called overhead to [facility] on 2nd floor room 27. Upon arrival, patient laying on exam table moaning. Clinic RN reports episode of dizziness, lightheadedness and weakness. Patient's  states her glucose was in the 400s this morning and patient took 5 units of her short acting insulin. He stated she vomited multiple times on the way over the clinic. RN attempted to check glucose using patient's home glucose monitoring device but  states its no longer working. Patient awake and oriented to person. Had an episode of dry heaving.       OBJECTIVE:     Physical Exam  Vitals reviewed.   Constitutional:       Appearance: She is ill-appearing.      Comments: Tremulous   HENT:      Head: Normocephalic and atraumatic.   Eyes:      Pupils: Pupils are equal, round, and reactive to light.   Cardiovascular:      Rate and Rhythm: Normal rate.   Pulmonary:      Effort: Pulmonary effort is normal.      Breath sounds: Normal breath sounds.   Abdominal:      Palpations: Abdomen is soft.   Skin:     General: Skin is warm and dry.   Neurological:      Mental Status: She is alert and oriented to person, place, and time.   Psychiatric:         Attention and Perception: Attention normal.         Behavior: Behavior normal.         Last VS: BP (!) 179/83   Pulse 76   Temp 97.9 °F (36.6 °C) (Oral)   Resp 17   Ht 5' 7" " "(1.702 m)   Wt 77.1 kg (170 lb)   LMP 07/01/2023   SpO2 100%   BMI 26.63 kg/m²     24H Vital Sign Range:    Temp:  [97.9 °F (36.6 °C)]   Pulse:  [76-99]   Resp:  [17-20]   BP: (179-189)/(81-83)   SpO2:  [99 %-100 %]     Level of Consciousness (AVPU): alert    No intake or output data in the 24 hours ending 01/16/24 0947    No results for input(s): "CBC", "WBC", "HGB", "HCT", "PLT" in the last 72 hours.    No results for input(s): "NA", "K", "CL", "CO2", "BUN", "CREATININE", "GLU", "PHOS", "MG" in the last 72 hours.    Invalid input(s): "CMP", "TBIL"     No results for input(s): "PH", "PCO2", "PO2", "HCO3", "POCSATURATED", "BE" in the last 72 hours.     Lab Results   Component Value Date    LACTATE 1.2 10/13/2023    LACTATE 0.9 03/18/2022    LACTATE 1.8 03/18/2022         ASSESSMENT AND PLAN :     Patient HDS prior to transfer to ED from [facility] tower in wheelchair.     1) Suspect DKA vs uncontrolled glucoses causing presenting symptoms  --transferred to ED for further work up  --labs pending      Juju Remy PA-C  Critical Care Medicine  1/16/2024   9:54 AM          "

## 2024-01-16 NOTE — MEDICAL/APP STUDENT
Hospital Medicine Student   History and Physical  [facility] HOSP MED 5  01/16/2024  11:48 AM    SUBJECTIVE:     Chief Complaint:  Loss of consciousness    History of Present Illness:  Cira Keys is a 43 y.o. female with a relevant medical history of type 1 diabetes mellitus and hyperlipidemia who was brought in by a rapid response team from the [facility] after loss of consciousness. Patient states she was accompanying her , who is receiving chemotherapy. States she was feeling nauseated this morning but does not remember any details around the time of the event. Per the ED, there was no injury or fall noted by the patient or the rapid response team.     Patient states she is compliant with her insulin regimen and received her last dose of Levemir this morning. States she is on a sliding-scale insulin regimen and monitors glucose via finger prick approximately 5x per day. States her insulin levels have been consistently in the low 200s, sometimes in the 100s, but have never reached the 300s. Patient was taken off her insulin pump in December 2023 due to insurance. Reports other home medications include atorvastatin and a pink tablet for depression.    Patient currently endorses nausea but denies fever, chills, headache, vomiting, chest pain, shortness of breath, abdominal pain, diarrhea, dysuria, increased urinary frequency, or new rashes. Patient endorses regular alcohol use and states she drinks approximately 1-2 vodka jigar-aids per day.    Review of Systems   Constitutional:  Negative for chills and fever.   Respiratory:  Negative for shortness of breath.    Cardiovascular:  Negative for chest pain and leg swelling.   Gastrointestinal:  Positive for nausea. Negative for abdominal pain, diarrhea and vomiting.   Genitourinary:  Negative for dysuria, frequency and urgency.   Skin:  Negative for rash.   Neurological:  Negative for headaches.       HISTORY:     Past Medical History:   Diagnosis  "Date    E coli bacteremia     GERD (gastroesophageal reflux disease)     Hyperlipidemia     Iron deficiency anemia due to chronic blood loss 10/31/2015    Type 1 diabetes mellitus without complication 10/31/2015       Past Surgical History:   Procedure Laterality Date     SECTION      LAPAROSCOPIC SALPINGECTOMY Bilateral 2023    Procedure: SALPINGECTOMY, LAPAROSCOPIC;  Surgeon: Clem Sidhu MD;  Location: [facility];  Service: OB/GYN;  Laterality: Bilateral;    LAPAROSCOPIC TOTAL HYSTERECTOMY N/A 2023    Procedure: HYSTERECTOMY, TOTAL, LAPAROSCOPIC;  Surgeon: Clem Sidhu MD;  Location: [facility] OR;  Service: OB/GYN;  Laterality: N/A;    TUBAL LIGATION         Family History   Problem Relation Age of Onset    Hyperlipidemia Mother     Hypertension Father     No Known Problems Sister     No Known Problems Brother     Breast cancer Neg Hx     Colon cancer Neg Hx     Ovarian cancer Neg Hx        Social History     Socioeconomic History    Marital status:    Tobacco Use    Smoking status: Former     Current packs/day: 0.00     Types: Cigarettes     Quit date: 12/10/2021     Years since quittin.1     Passive exposure: Current    Smokeless tobacco: Never   Substance and Sexual Activity    Alcohol use: Yes     Alcohol/week: 5.0 standard drinks of alcohol     Types: 5 Drinks containing 0.5 oz of alcohol per week     Comment: occ    Drug use: No     Comment: "20-30 years ago drug use"    Sexual activity: Yes     Partners: Male     Birth control/protection: See Surgical Hx     Comment:      Social Determinants of Health     Financial Resource Strain: Medium Risk (2023)    Overall Financial Resource Strain (CARDIA)     Difficulty of Paying Living Expenses: Somewhat hard   Food Insecurity: Unknown (2023)    Hunger Vital Sign     Ran Out of Food in the Last Year: Never true   Transportation Needs: No Transportation Needs (2023)    PRAPARE - Transportation     Lack of " Transportation (Medical): No     Lack of Transportation (Non-Medical): No   Physical Activity: Unknown (2/28/2023)    Exercise Vital Sign     Days of Exercise per Week: 4 days   Housing Stability: Unknown (2/28/2023)    Housing Stability Vital Sign     Unable to Pay for Housing in the Last Year: Patient refused     Unstable Housing in the Last Year: Patient refused       MEDICATIONS & ALLERGIES:     No current facility-administered medications on file prior to encounter.     Current Outpatient Medications on File Prior to Encounter   Medication Sig Dispense Refill    atorvastatin (LIPITOR) 20 MG tablet Take 1 tablet by mouth once daily 30 tablet 0    busPIRone (BUSPAR) 5 MG Tab Take 1 tablet by mouth twice daily as needed (Patient taking differently: Take 5 mg by mouth 2 (two) times daily.) 120 tablet 1    citalopram (CELEXA) 10 MG tablet Take 1 tablet (10 mg total) by mouth once daily. 30 tablet 5    esomeprazole (NEXIUM) 20 MG capsule Take 20 mg by mouth once daily.      gabapentin (NEURONTIN) 300 MG capsule Take 1 capsule (300 mg total) by mouth once daily. (Patient not taking: Reported on 12/27/2023) 30 capsule 11    insulin aspart U-100 (NOVOLOG U-100 INSULIN ASPART) 100 unit/mL injection To use with insulin pump. Max daily dose 100 units. 30 mL 11    insulin detemir U-100, Levemir, (LEVEMIR FLEXPEN) 100 unit/mL (3 mL) InPn pen Inject 12 Units into the skin 2 (two) times daily.      lisinopriL (PRINIVIL,ZESTRIL) 5 MG tablet Take 1 tablet by mouth once daily 90 tablet 1     Review of patient's allergies indicates:  No Known Allergies    OBJECTIVE:     Vital Signs Recent:  Temp: 97.9 °F (36.6 °C) (01/16/24 0923)  Pulse: 76 (01/16/24 0942)  Resp: 17 (01/16/24 0942)  BP: (!) 179/83 (01/16/24 0934)  SpO2: 100 % (01/16/24 0942)  Oxygen Documentation:                Device (Oxygen Therapy): room air         Vital Signs Range (Last 24H):  Temp:  [97.9 °F (36.6 °C)]   Pulse:  [76-99]   Resp:  [17-20]   BP:  (179-189)/(81-83)   SpO2:  [99 %-100 %]        Weight:  Body mass index is 26.63 kg/m².  Wt Readings from Last 3 Encounters:   01/16/24 77.1 kg (170 lb)   12/28/23 78.8 kg (173 lb 11.6 oz)   12/06/23 77.6 kg (171 lb 1.2 oz)        Physical Exam   General appearance: in no acute distress  HEENT: normocephalic, atraumatic  Cardiovascular: normal hear rate and rhythm, no added heart sounds  Pulmonary: lungs clear on auscultation  Abdominal: nontender and nondistended  Skin: warm, dry, capillary refill < 2 seconds  Neurological: notable for tremor, no asterixis seen    Sodium (mmol/L)   Date Value   01/16/2024 140   12/28/2023 137   12/27/2023 132 (L)     Potassium (mmol/L)   Date Value   01/16/2024 4.4   12/28/2023 3.0 (L)   12/27/2023 3.3 (L)     Chloride (mmol/L)   Date Value   01/16/2024 103   12/28/2023 102   12/27/2023 98     CO2 (mmol/L)   Date Value   01/16/2024 19 (L)   12/28/2023 23   12/27/2023 14 (L)     BUN (mg/dL)   Date Value   01/16/2024 12   12/28/2023 13   12/27/2023 19 (H)     Creatinine (mg/dL)   Date Value   01/16/2024 0.8   12/28/2023 0.61   12/27/2023 0.77     Glucose (mg/dL)   Date Value   01/16/2024 293 (H)   12/28/2023 98   12/27/2023 395 (H)     Calcium (mg/dL)   Date Value   01/16/2024 9.8   12/28/2023 8.7   12/27/2023 9.8     Magnesium (mg/dL)   Date Value   01/16/2024 1.2 (L)   12/28/2023 2.2   12/27/2023 1.5 (L)     Phosphorus (mg/dL)   Date Value   10/14/2023 2.2 (L)   10/13/2023 5.2 (H)   03/02/2023 1.7 (L)     Alkaline Phosphatase (U/L)   Date Value   01/16/2024 66   12/28/2023 60   12/27/2023 84     ALT (U/L)   Date Value   01/16/2024 14   12/28/2023 18   12/27/2023 23     AST (U/L)   Date Value   01/16/2024 19   12/28/2023 27   12/27/2023 38     Albumin (g/dL)   Date Value   01/16/2024 4.1   12/28/2023 4.0   12/27/2023 5.1     Total Protein (g/dL)   Date Value   01/16/2024 7.9   12/28/2023 7.4   12/27/2023 9.1 (H)     Total Bilirubin (mg/dL)   Date Value   01/16/2024 1.0   12/28/2023  1.0   12/27/2023 1.4 (H)     INR (no units)   Date Value   09/28/2020 1.0   09/15/2020 1.0   09/14/2020 1.0       WBC (K/uL)   Date Value   01/16/2024 10.76   12/28/2023 8.73   12/27/2023 10.94     Hemoglobin (g/dL)   Date Value   01/16/2024 10.9 (L)   12/28/2023 10.8 (L)   12/27/2023 12.4     Platelets (K/uL)   Date Value   01/16/2024 292   12/28/2023 296   12/27/2023 315       ASSESSMENT -- PLAN:   Cira Keys is a 43 y.o. female with a relevant medical history of type 1 diabetes mellitus and hyperlipidemia who is being treated for diabetic ketoacidosis.      1. Diabetic ketoacidosis  - Continue IV insulin. Transition to MDI when anion gap < 18, 2 consecutive POCT glucose < 200, and acidosis resolves   - IV D5NS  (POCT glucose is < 200)  - IV Potassium ( K+ is < 5.3)  - Repeat POCT Q4 hours    2. Type 1 Diabetes Mellitus   - See DKA    3. Alcohol use disorder  - Administer Thiamine  - Check Folate/Vitamin B12 levels  - PT/INR for hepatic coagulopathy (significant if INR > 1.4)  - CIWA to monitor for alcohol withdrawal     4. Anemia  - Chronic  - Consider iron studies and FOBT if Hb < 8  - Outpatient follow up    5. Hyperlipidemia  - Continue atorvastatin    6. DVT Prophylaxis with Enoxaparin    7. Full Code Status    Discharge planning:   Discharge home pending resolution of DKA.

## 2024-01-16 NOTE — CODE/ RAPID DOCUMENTATION
RAPID RESPONSE NURSE NOTE        Admit Date: 2024  LOS: 0  Code Status: Prior   Date of Consult: 2024  : 1980  Age: 43 y.o.  Weight:   Wt Readings from Last 1 Encounters:   24 77.1 kg (170 lb)     Sex: female  Race: White   Bed: ED :   MRN: [id number]  Time Rapid Response Team page Received: 912  Time Rapid Response Team at Bedside: 913  Time Rapid Response Team left Bedside: 925  Was the patient discharged from an ICU this admission? No  Was the patient discharged from a PACU within last 24 hours? No   Did the patient receive conscious sedation/general anesthesia in last 24 hours? No  Was the patient in the ED within the past 24 hours? No  Was the patient on NIPPV within the past 24 hours? No   Did this progress into an ARC or CPA: No  Attending Physician: JENS  Primary Service: NA    SITUATION    Notified by overhead page.  Reason for alert: dizziness  Called to evaluate the patient for Neuro    BACKGROUND     Why is the patient in the hospital?: visitor to patient in [facility]     Patient has a past medical history of E coli bacteremia, GERD (gastroesophageal reflux disease), Hyperlipidemia, Iron deficiency anemia due to chronic blood loss, and Type 1 diabetes mellitus without complication.    ASSESSMENT/INTERVENTIONS    What did you find: Patient was visitor in [facility], known type 1 diabetic who became dizzy and nauseated. She endorses taking insulin this morning. Denies dexcom or continuous insulin pump. Patient transferred to ED for evaluation.     RECOMMENDATIONS    We recommend: ED transfer and evaluation.    PROVIDER ESCALATION    Orders received and case discussed with  LAURI López.    FOLLOW UP    Call the Rapid Response Nurse, Fatoumata Jaeger RN at [phone] for additional questions or concerns.

## 2024-01-16 NOTE — SUBJECTIVE & OBJECTIVE
Past Medical History:   Diagnosis Date    E coli bacteremia     GERD (gastroesophageal reflux disease)     Hyperlipidemia     Iron deficiency anemia due to chronic blood loss 10/31/2015    Type 1 diabetes mellitus without complication 10/31/2015       Past Surgical History:   Procedure Laterality Date     SECTION      LAPAROSCOPIC SALPINGECTOMY Bilateral 2023    Procedure: SALPINGECTOMY, LAPAROSCOPIC;  Surgeon: Clem Sidhu MD;  Location: [facility];  Service: OB/GYN;  Laterality: Bilateral;    LAPAROSCOPIC TOTAL HYSTERECTOMY N/A 2023    Procedure: HYSTERECTOMY, TOTAL, LAPAROSCOPIC;  Surgeon: Clem Sidhu MD;  Location: [facility] OR;  Service: OB/GYN;  Laterality: N/A;    TUBAL LIGATION         Review of patient's allergies indicates:  No Known Allergies    No current facility-administered medications on file prior to encounter.     Current Outpatient Medications on File Prior to Encounter   Medication Sig    atorvastatin (LIPITOR) 20 MG tablet Take 1 tablet by mouth once daily    busPIRone (BUSPAR) 5 MG Tab Take 1 tablet by mouth twice daily as needed (Patient taking differently: Take 5 mg by mouth 2 (two) times daily.)    citalopram (CELEXA) 10 MG tablet Take 1 tablet (10 mg total) by mouth once daily.    esomeprazole (NEXIUM) 20 MG capsule Take 20 mg by mouth once daily.    insulin detemir U-100, Levemir, (LEVEMIR FLEXPEN) 100 unit/mL (3 mL) InPn pen Inject 12 Units into the skin 2 (two) times daily.    lisinopriL (PRINIVIL,ZESTRIL) 5 MG tablet Take 1 tablet by mouth once daily    gabapentin (NEURONTIN) 300 MG capsule Take 1 capsule (300 mg total) by mouth once daily. (Patient not taking: Reported on 2023)    insulin aspart U-100 (NOVOLOG) 100 unit/mL injection Inject 100 Units into the skin 3 (three) times daily before meals. To use with insulin pump. Max daily dose 100 units. normal    [DISCONTINUED] insulin aspart U-100 (NOVOLOG U-100 INSULIN ASPART) 100 unit/mL injection To use  "with insulin pump. Max daily dose 100 units.     Family History       Problem Relation (Age of Onset)    Hyperlipidemia Mother    Hypertension Father    No Known Problems Sister, Brother          Tobacco Use    Smoking status: Former     Current packs/day: 0.00     Types: Cigarettes     Quit date: 12/10/2021     Years since quittin.1     Passive exposure: Current    Smokeless tobacco: Never   Substance and Sexual Activity    Alcohol use: Yes     Alcohol/week: 5.0 standard drinks of alcohol     Types: 5 Drinks containing 0.5 oz of alcohol per week     Comment: occ    Drug use: No     Comment: "20-30 years ago drug use"    Sexual activity: Yes     Partners: Male     Birth control/protection: See Surgical Hx     Comment:      Review of Systems   Constitutional:  Negative for chills, diaphoresis, fever and unexpected weight change.   Eyes:  Negative for visual disturbance.   Respiratory:  Negative for cough, choking, chest tightness and shortness of breath.    Cardiovascular:  Negative for chest pain, palpitations and leg swelling.   Gastrointestinal:  Positive for nausea. Negative for abdominal distention, abdominal pain, blood in stool, constipation, diarrhea and vomiting.   Endocrine: Positive for polyuria.   Genitourinary:  Positive for frequency. Negative for dysuria and urgency.   Skin:  Negative for color change and rash.   Neurological:  Positive for tremors. Negative for dizziness, speech difficulty, weakness, light-headedness, numbness and headaches.   Psychiatric/Behavioral:  The patient is nervous/anxious.      Objective:     Vital Signs (Most Recent):  Temp: 97.8 °F (36.6 °C) (24 1440)  Pulse: (!) 141 (24 1554)  Resp: 20 (24 1401)  BP: 139/63 (24 1602)  SpO2: 98 % (24 1554) Vital Signs (24h Range):  Temp:  [97.8 °F (36.6 °C)-97.9 °F (36.6 °C)] 97.8 °F (36.6 °C)  Pulse:  [] 141  Resp:  [17-20] 20  SpO2:  [95 %-100 %] 98 %  BP: (113-189)/(54-83) 139/63 "     Weight: 77.1 kg (170 lb)  Body mass index is 26.63 kg/m².     Physical Exam  Constitutional:       Appearance: She is ill-appearing.      Comments: Alert and anxious; tremulousness exhibited   HENT:      Head: Normocephalic and atraumatic.      Mouth/Throat:      Mouth: Mucous membranes are moist.      Pharynx: Oropharynx is clear.   Eyes:      Extraocular Movements: Extraocular movements intact.      Conjunctiva/sclera: Conjunctivae normal.      Pupils: Pupils are equal, round, and reactive to light.   Cardiovascular:      Rate and Rhythm: Regular rhythm. Tachycardia present.      Pulses: Normal pulses.      Heart sounds: Normal heart sounds.   Pulmonary:      Effort: Pulmonary effort is normal. No respiratory distress.      Breath sounds: Normal breath sounds. No wheezing.   Abdominal:      General: Abdomen is flat. There is no distension.      Palpations: Abdomen is soft.      Tenderness: There is no abdominal tenderness. There is no guarding or rebound.   Musculoskeletal:         General: No tenderness.      Right lower leg: No edema.      Left lower leg: No edema.   Skin:     General: Skin is warm and dry.   Neurological:      General: No focal deficit present.      Mental Status: She is oriented to person, place, and time.      Cranial Nerves: No cranial nerve deficit.      Motor: No weakness.   Psychiatric:         Behavior: Behavior normal.              CRANIAL NERVES     CN III, IV, VI   Pupils are equal, round, and reactive to light.       Significant Labs: All pertinent labs within the past 24 hours have been reviewed.    Significant Imaging: I have reviewed all pertinent imaging results/findings within the past 24 hours.

## 2024-01-16 NOTE — ASSESSMENT & PLAN NOTE
Patient has Abnormal Magnesium: hypomagnesemia. Will continue to monitor electrolytes closely. Will replace the affected electrolytes and repeat labs to be done after interventions completed. The patient's magnesium results have been reviewed and are listed below.  Recent Labs   Lab 01/16/24  1414   MG 1.8

## 2024-01-16 NOTE — HPI
Cira Keys is a 43 y.o. female with a PMH of type 1 diabetes mellitus, hyperlipidemia, and GERD who was brought in by a rapid response team from the [facility] after loss of consciousness. Patient states she was accompanying her , who is receiving chemotherapy. States she was feeling nauseated this morning but does not remember any details around the time of the event. Per the ED, there was no injury or fall noted by the patient or the rapid response team.      Patient states she is compliant with her insulin regimen and received her last dose of Levemir this morning. States she is on a sliding-scale insulin regimen and monitors glucose via finger prick approximately 5x per day. States her insulin levels have been consistently in the low 200s, sometimes in the 100s, but have never reached the 300s. Patient was taken off her insulin pump in December 2023 due to insurance. Patient currently endorses nausea but denies fever, chills, headache, vomiting, chest pain, shortness of breath, abdominal pain, diarrhea, dysuria, increased urinary frequency, or new rashes. Patient endorses regular alcohol use and states she drinks approximately 1-2 vodka jigar-aids per day.    In the ED, patient was hypertensive to 189/91, Afebrile, and otherwise vitally stable. Her blood glucose was 345, and she was found to be in DKA with an anion gap of 18, BHB 4.9, and a Bicarb of 19. Other significant labs include: Hgb A1C 6.5, Mg 1.2, UA with 4+ glucose and 3+ ketones, UDS only positive for marijuana, and Phos 2.3. BNP, troponin, COVID/Flu, CBC, ethanol, Hep C, HIV, and TSH were all negative/unremarkable.   Patient was started on DKA protocol with an insulin drip and normal saline infusion. Following resolution of DKA and hyperglycemia improvement, she was transitioned to D5W and 1/2 NS with 12 units of Levemir given alongside Insulin infusion, and the infusion to be stopped after 2 hours of giving levemir.  Patient was admitted to hospital medicine for management of DKA and hyperglycemia.

## 2024-01-16 NOTE — Clinical Note
Diagnosis: Shock [555054]   Future Attending Provider: LAURA VENEGAS [[id number]]   Reason for IP Medical Treatment  (Clinical interventions that can only be accomplished in the IP setting? ) :: Shock   I certify that Inpatient services for greater than or equal to 2 midnights are medically necessary:: No   Plans for Post-Acute care--if anticipated (pick the single best option):: A. No post acute care anticipated at this time

## 2024-01-16 NOTE — ASSESSMENT & PLAN NOTE
Patient is a 44 yo female with PMH of T1DM (previous use of insulin pump/dexcom) who presented following a syncopal episode. Doesn't recall moments leading up to the syncope, but does report ongoing nausea. Reports taking Levemir 12 units BID and insulin aspart as needed with meals when blood sugar > 200. Hgb A1C 6.5%. In ED was found to be hyperglycemic to 345 with elevated anion gap, metabolic acidosis, and UA with glucosuria/ketonuria. Patient was started on insulin drip and normal saline infusion with eventual transition to D5W and 1/2 NS.    Plan:  - Transition to SQ insulin when BG < 200  - Added prandial insulin 4 units scheduled  - Daily CBC, CMP, Mg, and Phos    Patient's FSGs are controlled on current medication regimen.  Last A1c reviewed-   Lab Results   Component Value Date    HGBA1C 6.5 (H) 01/16/2024     Most recent fingerstick glucose reviewed-   Recent Labs   Lab 01/16/24  1302 01/16/24  1400 01/16/24  1439 01/16/24  1536   POCTGLUCOSE 117* 98 144* 169*     Current correctional scale  Low  Maintain anti-hyperglycemic dose as follows-   Antihyperglycemics (From admission, onward)      Start     Stop Route Frequency Ordered    01/16/24 2100  insulin detemir U-100 (Levemir) pen 12 Units         -- SubQ Once 01/16/24 1545    01/16/24 1600  insulin detemir U-100 (Levemir) pen 12 Units         -- SubQ 2 times daily 01/16/24 1545    01/16/24 1530  insulin regular in 0.9 % NaCl 100 unit/100 mL (1 unit/mL) infusion        Question Answer Comment   Insulin Rate Adjustment (DO NOT MODIFY ANSWER) [email or web address]\Images\Pharmacy\InsulinInfusions\INSULIN ADJUSTMENT DKA version RO038A.pdf    Initial dose (DO NOT CHANGE): 0.1 units/kg/hr        01/16/24 1800 IV Continuous 01/16/24 1523          Hold Oral hypoglycemics while patient is in the hospital.

## 2024-01-16 NOTE — PHARMACY MED REC
"  Admission Medication History     The home medication history was taken by Geneva Pryor.    You may go to "Admission" then "Reconcile Home Medications" tabs to review and/or act upon these items.     The home medication list has been updated by the Pharmacy department.   Please read ALL comments highlighted in yellow.   Please address this information as you see fit.    Feel free to contact us if you have any questions or require assistance.      The medications listed below were removed from the home medication list. Please reorder if appropriate:  Patient reports no longer taking the following medication(s):  Gabapentin 300 mg      Medications listed below were obtained from: Patient/family    Current Outpatient Medications on File Prior to Encounter   Medication Sig    atorvastatin (LIPITOR) 20 MG tablet Take 1 tablet by mouth once daily    busPIRone (BUSPAR) 5 MG Tab Take 5 mg by mouth 2 (two) times daily.    citalopram (CELEXA) 10 MG tablet Take 1 tablet (10 mg total) by mouth once daily.    esomeprazole (NEXIUM) 20 MG capsule Take 20 mg by mouth once daily.    insulin detemir U-100, Levemir, (LEVEMIR FLEXPEN) 100 unit/mL (3 mL) InPn pen Inject 12 Units into the skin 2 (two) times daily.    lisinopriL (PRINIVIL,ZESTRIL) 5 MG tablet Take 1 tablet by mouth once daily    insulin aspart U-100 (NOVOLOG) 100 unit/mL injection Inject 100 Units into the skin 3 (three) times daily before meals. To use with insulin pump. Max daily dose 100 units. normal          Potential issues to be addressed PRIOR TO DISCHARGE  Patient reported not taking the following medications: (insulin NOVOLOG). These medications remain on the home medication list. Please address accordingly.     Geneva Pryor  EXT [id number]               .          "

## 2024-01-16 NOTE — H&P
[facility] - Emergency Dept  [facility] Medicine  History & Physical    Patient Name: Cira Keys  MRN: [id number]  Patient Class: IP- Inpatient  Admission Date: 1/16/2024  Attending Physician: Hayden Duffy MD   Primary Care Provider: Krysta Mercado MD         Patient information was obtained from patient, past medical records, and ER records.     Subjective:     Principal Problem:DKA (diabetic ketoacidosis)    Chief Complaint:   Chief Complaint   Patient presents with    Loss of Consciousness     Arrived with rapid response team from [facility] with c/o syncope, denies injury or fall, diabetic, pt c/o nausea        HPI: Cira Keys is a 43 y.o. female with a PMH of type 1 diabetes mellitus, hyperlipidemia, and GERD who was brought in by a rapid response team from the [facility] after loss of consciousness. Patient states she was accompanying her , who is receiving chemotherapy. States she was feeling nauseated this morning but does not remember any details around the time of the event. Per the ED, there was no injury or fall noted by the patient or the rapid response team.      Patient states she is compliant with her insulin regimen and received her last dose of Levemir this morning. States she is on a sliding-scale insulin regimen and monitors glucose via finger prick approximately 5x per day. States her insulin levels have been consistently in the low 200s, sometimes in the 100s, but have never reached the 300s. Patient was taken off her insulin pump in December 2023 due to insurance. Patient currently endorses nausea but denies fever, chills, headache, vomiting, chest pain, shortness of breath, abdominal pain, diarrhea, dysuria, increased urinary frequency, or new rashes. Patient endorses regular alcohol use and states she drinks approximately 1-2 vodka jigar-aids per day.    In the ED, patient was hypertensive to 189/91, Afebrile, and otherwise vitally stable. Her  blood glucose was 345, and she was found to be in DKA with an anion gap of 18, BHB 4.9, and a Bicarb of 19. Other significant labs include: Hgb A1C 6.5, Mg 1.2, UA with 4+ glucose and 3+ ketones, UDS only positive for marijuana, and Phos 2.3. BNP, troponin, COVID/Flu, CBC, ethanol, Hep C, HIV, and TSH were all negative/unremarkable.   Patient was started on DKA protocol with an insulin drip and normal saline infusion. Following resolution of DKA and hyperglycemia improvement, she was transitioned to D5W and 1/2 NS with 12 units of Levemir given alongside Insulin infusion, and the infusion to be stopped after 2 hours of giving levemir. Patient was admitted to hospital medicine for management of DKA and hyperglycemia.    Past Medical History:   Diagnosis Date    E coli bacteremia     GERD (gastroesophageal reflux disease)     Hyperlipidemia     Iron deficiency anemia due to chronic blood loss 10/31/2015    Type 1 diabetes mellitus without complication 10/31/2015       Past Surgical History:   Procedure Laterality Date     SECTION      LAPAROSCOPIC SALPINGECTOMY Bilateral 2023    Procedure: SALPINGECTOMY, LAPAROSCOPIC;  Surgeon: Clem Sidhu MD;  Location: [facility];  Service: OB/GYN;  Laterality: Bilateral;    LAPAROSCOPIC TOTAL HYSTERECTOMY N/A 2023    Procedure: HYSTERECTOMY, TOTAL, LAPAROSCOPIC;  Surgeon: Clem Sidhu MD;  [facility]  Service: [facility]N;  Laterality: N/A;    TUBAL LIGATION         Review of patient's allergies indicates:  No Known Allergies    No current facility-administered medications on file prior to encounter.     Current Outpatient Medications on File Prior to Encounter   Medication Sig    atorvastatin (LIPITOR) 20 MG tablet Take 1 tablet by mouth once daily    busPIRone (BUSPAR) 5 MG Tab Take 1 tablet by mouth twice daily as needed (Patient taking differently: Take 5 mg by mouth 2 (two) times daily.)    citalopram (CELEXA) 10 MG tablet Take 1 tablet (10  "mg total) by mouth once daily.    esomeprazole (NEXIUM) 20 MG capsule Take 20 mg by mouth once daily.    insulin detemir U-100, Levemir, (LEVEMIR FLEXPEN) 100 unit/mL (3 mL) InPn pen Inject 12 Units into the skin 2 (two) times daily.    lisinopriL (PRINIVIL,ZESTRIL) 5 MG tablet Take 1 tablet by mouth once daily    gabapentin (NEURONTIN) 300 MG capsule Take 1 capsule (300 mg total) by mouth once daily. (Patient not taking: Reported on 2023)    insulin aspart U-100 (NOVOLOG) 100 unit/mL injection Inject 100 Units into the skin 3 (three) times daily before meals. To use with insulin pump. Max daily dose 100 units. normal    [DISCONTINUED] insulin aspart U-100 (NOVOLOG U-100 INSULIN ASPART) 100 unit/mL injection To use with insulin pump. Max daily dose 100 units.     Family History       Problem Relation (Age of Onset)    Hyperlipidemia Mother    Hypertension Father    No Known Problems Sister, Brother          Tobacco Use    Smoking status: Former     Current packs/day: 0.00     Types: Cigarettes     Quit date: 12/10/2021     Years since quittin.1     Passive exposure: Current    Smokeless tobacco: Never   Substance and Sexual Activity    Alcohol use: Yes     Alcohol/week: 5.0 standard drinks of alcohol     Types: 5 Drinks containing 0.5 oz of alcohol per week     Comment: occ    Drug use: No     Comment: "20-30 years ago drug use"    Sexual activity: Yes     Partners: Male     Birth control/protection: See Surgical Hx     Comment:      Review of Systems   Constitutional:  Negative for chills, diaphoresis, fever and unexpected weight change.   Eyes:  Negative for visual disturbance.   Respiratory:  Negative for cough, choking, chest tightness and shortness of breath.    Cardiovascular:  Negative for chest pain, palpitations and leg swelling.   Gastrointestinal:  Positive for nausea. Negative for abdominal distention, abdominal pain, blood in stool, constipation, diarrhea and vomiting.   Endocrine: " Positive for polyuria.   Genitourinary:  Positive for frequency. Negative for dysuria and urgency.   Skin:  Negative for color change and rash.   Neurological:  Positive for tremors. Negative for dizziness, speech difficulty, weakness, light-headedness, numbness and headaches.   Psychiatric/Behavioral:  The patient is nervous/anxious.      Objective:     Vital Signs (Most Recent):  Temp: 97.8 °F (36.6 °C) (01/16/24 1440)  Pulse: (!) 141 (01/16/24 1554)  Resp: 20 (01/16/24 1401)  BP: 139/63 (01/16/24 1602)  SpO2: 98 % (01/16/24 1554) Vital Signs (24h Range):  Temp:  [97.8 °F (36.6 °C)-97.9 °F (36.6 °C)] 97.8 °F (36.6 °C)  Pulse:  [] 141  Resp:  [17-20] 20  SpO2:  [95 %-100 %] 98 %  BP: (113-189)/(54-83) 139/63     Weight: 77.1 kg (170 lb)  Body mass index is 26.63 kg/m².     Physical Exam  Constitutional:       Appearance: She is ill-appearing.      Comments: Alert and anxious; tremulousness exhibited   HENT:      Head: Normocephalic and atraumatic.      Mouth/Throat:      Mouth: Mucous membranes are moist.      Pharynx: Oropharynx is clear.   Eyes:      Extraocular Movements: Extraocular movements intact.      Conjunctiva/sclera: Conjunctivae normal.      Pupils: Pupils are equal, round, and reactive to light.   Cardiovascular:      Rate and Rhythm: Regular rhythm. Tachycardia present.      Pulses: Normal pulses.      Heart sounds: Normal heart sounds.   Pulmonary:      Effort: Pulmonary effort is normal. No respiratory distress.      Breath sounds: Normal breath sounds. No wheezing.   Abdominal:      General: Abdomen is flat. There is no distension.      Palpations: Abdomen is soft.      Tenderness: There is no abdominal tenderness. There is no guarding or rebound.   Musculoskeletal:         General: No tenderness.      Right lower leg: No edema.      Left lower leg: No edema.   Skin:     General: Skin is warm and dry.   Neurological:      General: No focal deficit present.      Mental Status: She is oriented  to person, place, and time.      Cranial Nerves: No cranial nerve deficit.      Motor: No weakness.   Psychiatric:         Behavior: Behavior normal.              CRANIAL NERVES     CN III, IV, VI   Pupils are equal, round, and reactive to light.       Significant Labs: All pertinent labs within the past 24 hours have been reviewed.    Significant Imaging: I have reviewed all pertinent imaging results/findings within the past 24 hours.  Assessment/Plan:     * DKA (diabetic ketoacidoses)  Patient is a 44 yo female with PMH of T1DM (previous use of insulin pump/dexcom) who presented following a syncopal episode. Doesn't recall moments leading up to the syncope, but does report ongoing nausea. Reports taking Levemir 12 units BID and insulin aspart as needed with meals when blood sugar > 200. Hgb A1C 6.5%. In ED was found to be hyperglycemic to 345 with elevated anion gap, metabolic acidosis, and UA with glucosuria/ketonuria. Patient was started on insulin drip and normal saline infusion with eventual transition to D5W and 1/2 NS.    Plan:  - Transition to SQ insulin when BG < 200  - Add prandial insulin 4 units scheduled  - Daily CBC, CMP, Mg, and Phos    Patient's FSGs are controlled on current medication regimen.  Last A1c reviewed-   Lab Results   Component Value Date    HGBA1C 6.5 (H) 01/16/2024     Most recent fingerstick glucose reviewed-   Recent Labs   Lab 01/16/24  1302 01/16/24  1400 01/16/24  1439 01/16/24  1536   POCTGLUCOSE 117* 98 144* 169*     Current correctional scale  Low  Maintain anti-hyperglycemic dose as follows-   Antihyperglycemics (From admission, onward)      Start     Stop Route Frequency Ordered    01/16/24 2100  insulin detemir U-100 (Levemir) pen 12 Units         -- SubQ Once 01/16/24 1545    01/16/24 1600  insulin detemir U-100 (Levemir) pen 12 Units         -- SubQ 2 times daily 01/16/24 1545    01/16/24 1530  insulin regular in 0.9 % NaCl 100 unit/100 mL (1 unit/mL) infusion         Question Answer Comment   Insulin Rate Adjustment (DO NOT MODIFY ANSWER) [email or web address]\Images\Pharmacy\InsulinInfusions\INSULIN ADJUSTMENT DKA version CK047A.pdf    Initial dose (DO NOT CHANGE): 0.1 units/kg/hr        01/16/24 1800 IV Continuous 01/16/24 1523          Hold Oral hypoglycemics while patient is in the hospital.    Mixed hyperlipidemia  - Resume home statin      WALTER (generalized anxiety disorder)  - Resume home citalopram    Essential hypertension  Chronic, uncontrolled. Latest blood pressure and vitals reviewed-     Temp:  [97.8 °F (36.6 °C)-97.9 °F (36.6 °C)]   Pulse:  []   Resp:  [17-20]   BP: (113-189)/(54-83)   SpO2:  [95 %-100 %] .   Home meds for hypertension were reviewed and noted below.   Hypertension Medications               lisinopriL (PRINIVIL,ZESTRIL) 5 MG tablet Take 1 tablet by mouth once daily            While in the hospital, will manage blood pressure as follows; Adjust home antihypertensive regimen as follows- Added Nifedipine 60 mg daily    Will utilize p.r.n. blood pressure medication only if patient's blood pressure greater than 180/110 and she develops symptoms such as worsening chest pain or shortness of breath.    Hypomagnesemia  Patient has Abnormal Magnesium: hypomagnesemia. Will continue to monitor electrolytes closely. Will replace the affected electrolytes and repeat labs to be done after interventions completed. The patient's magnesium results have been reviewed and are listed below.  Recent Labs   Lab 01/16/24  1414   MG 1.8          VTE Risk Mitigation (From admission, onward)           Ordered     IP VTE LOW RISK PATIENT  Once         01/16/24 1139     Place sequential compression device  Until discontinued         01/16/24 1139                                    Shawn Caldera DO  Department of Hospital Medicine  [facility] - Emergency Dept

## 2024-01-16 NOTE — RESPIRATORY THERAPY
"RAPID RESPONSE RESPIRATORY THERAPY NOTE             Code Status: Prior   : 1980  Bed: ED :   MRN: [id number]  Time page Received: 912  Time Rapid Response RT at Bedside: 915  Time Rapid Response RT left Bedside: 925    SITUATION    Evaluated patient for: AMS/neuro    BACKGROUND    Why is the patient in the hospital?: <principal problem not specified>    Patient has a past medical history of E coli bacteremia, GERD (gastroesophageal reflux disease), Hyperlipidemia, Iron deficiency anemia due to chronic blood loss, and Type 1 diabetes mellitus without complication.    24 Hours Vitals Range:  Temp:  [97.9 °F (36.6 °C)]   Pulse:  [99]   Resp:  [20]   BP: (189)/(81)   SpO2:  [99 %]     Labs:    No results for input(s): "NA", "K", "CL", "CO2", "BUN", "CREATININE", "GLU", "PHOS", "MG" in the last 72 hours.    Invalid input(s): "CMP", "TBIL"     No results for input(s): "PH", "PCO2", "PO2", "HCO3", "POCSATURATED", "BE" in the last 72 hours.    ASSESSMENT/INTERVENTIONS  Pt in chair conscience.    Last Vitals: Temp: 97.9 °F (36.6 °C) (923)  Pulse: 99 (923)  Resp: 20 (923)  BP: 189/81 (923)  SpO2: 99 % (923)  Level of Consciousness: Level of Consciousness (AVPU): alert  Respiratory Effort:    Expansion/Accessory Muscle Usage:    All Lung Field Breath Sounds:    O2 Device/Concentration: R.A.  NIPPV: No Surgical airway: No Vent: No  ETCO2 monitored:    Ambu at bedside:      Active Orders   There are no active orders of the following types: Respiratory Care.       RECOMMENDATIONS  ?  We recommend: RRT Recs: pt to be taken to E.D.    ESCALATION        FOLLOW-UP    Disposition: Tx to ER bed 05.    Please call back the Rapid Response RT, Jamil Watson RRT at x [phone] for any questions or concerns.              "

## 2024-01-16 NOTE — ED TRIAGE NOTES
Pt. Is a 43 y.o. female with a hx. Of DM. Pt. Was sitting in lobby with another family member when she began to feel diaphoretic and had a witnessed syncopal episode. Pt. Reports this has happened to her before. +nausea.

## 2024-01-17 PROBLEM — F10.90 ALCOHOL USE DISORDER: Status: ACTIVE | Noted: 2024-01-17

## 2024-01-17 LAB
ALBUMIN SERPL BCP-MCNC: 3 G/DL (ref 3.5–5.2)
ALBUMIN SERPL BCP-MCNC: 3.1 G/DL (ref 3.5–5.2)
ALBUMIN SERPL BCP-MCNC: 3.2 G/DL (ref 3.5–5.2)
ALLENS TEST: ABNORMAL
ALP SERPL-CCNC: 43 U/L (ref 55–135)
ALP SERPL-CCNC: 51 U/L (ref 55–135)
ALP SERPL-CCNC: 67 U/L (ref 55–135)
ALT SERPL W/O P-5'-P-CCNC: 11 U/L (ref 10–44)
ALT SERPL W/O P-5'-P-CCNC: 8 U/L (ref 10–44)
ALT SERPL W/O P-5'-P-CCNC: 9 U/L (ref 10–44)
ANION GAP SERPL CALC-SCNC: 11 MMOL/L (ref 8–16)
ANION GAP SERPL CALC-SCNC: 11 MMOL/L (ref 8–16)
ANION GAP SERPL CALC-SCNC: 20 MMOL/L (ref 8–16)
ANION GAP SERPL CALC-SCNC: 6 MMOL/L (ref 8–16)
ANION GAP SERPL CALC-SCNC: 7 MMOL/L (ref 8–16)
ANION GAP SERPL CALC-SCNC: 9 MMOL/L (ref 8–16)
ANION GAP SERPL CALC-SCNC: 9 MMOL/L (ref 8–16)
APAP SERPL-MCNC: <3 UG/ML (ref 10–20)
AST SERPL-CCNC: 13 U/L (ref 10–40)
AST SERPL-CCNC: 16 U/L (ref 10–40)
AST SERPL-CCNC: 22 U/L (ref 10–40)
BASOPHILS # BLD AUTO: 0.02 K/UL (ref 0–0.2)
BASOPHILS # BLD AUTO: 0.05 K/UL (ref 0–0.2)
BASOPHILS NFR BLD: 0.1 % (ref 0–1.9)
BASOPHILS NFR BLD: 0.4 % (ref 0–1.9)
BILIRUB DIRECT SERPL-MCNC: 0.5 MG/DL (ref 0.1–0.3)
BILIRUB SERPL-MCNC: 0.9 MG/DL (ref 0.1–1)
BILIRUB SERPL-MCNC: 1.3 MG/DL (ref 0.1–1)
BILIRUB SERPL-MCNC: 1.3 MG/DL (ref 0.1–1)
BUN SERPL-MCNC: 11 MG/DL (ref 6–20)
BUN SERPL-MCNC: 12 MG/DL (ref 6–20)
BUN SERPL-MCNC: 4 MG/DL (ref 6–20)
BUN SERPL-MCNC: 7 MG/DL (ref 6–20)
BUN SERPL-MCNC: 8 MG/DL (ref 6–20)
CALCIUM SERPL-MCNC: 8 MG/DL (ref 8.7–10.5)
CALCIUM SERPL-MCNC: 8.2 MG/DL (ref 8.7–10.5)
CALCIUM SERPL-MCNC: 8.2 MG/DL (ref 8.7–10.5)
CALCIUM SERPL-MCNC: 8.3 MG/DL (ref 8.7–10.5)
CALCIUM SERPL-MCNC: 8.5 MG/DL (ref 8.7–10.5)
CALCIUM SERPL-MCNC: 8.6 MG/DL (ref 8.7–10.5)
CALCIUM SERPL-MCNC: 9.2 MG/DL (ref 8.7–10.5)
CHLORIDE SERPL-SCNC: 101 MMOL/L (ref 95–110)
CHLORIDE SERPL-SCNC: 103 MMOL/L (ref 95–110)
CHLORIDE SERPL-SCNC: 107 MMOL/L (ref 95–110)
CHLORIDE SERPL-SCNC: 107 MMOL/L (ref 95–110)
CHLORIDE SERPL-SCNC: 109 MMOL/L (ref 95–110)
CHLORIDE SERPL-SCNC: 109 MMOL/L (ref 95–110)
CHLORIDE SERPL-SCNC: 110 MMOL/L (ref 95–110)
CO2 SERPL-SCNC: 17 MMOL/L (ref 23–29)
CO2 SERPL-SCNC: 20 MMOL/L (ref 23–29)
CO2 SERPL-SCNC: 21 MMOL/L (ref 23–29)
CO2 SERPL-SCNC: 21 MMOL/L (ref 23–29)
CO2 SERPL-SCNC: 22 MMOL/L (ref 23–29)
CO2 SERPL-SCNC: 22 MMOL/L (ref 23–29)
CO2 SERPL-SCNC: 8 MMOL/L (ref 23–29)
CREAT SERPL-MCNC: 0.6 MG/DL (ref 0.5–1.4)
CREAT SERPL-MCNC: 0.7 MG/DL (ref 0.5–1.4)
CREAT SERPL-MCNC: 0.8 MG/DL (ref 0.5–1.4)
CREAT SERPL-MCNC: 0.8 MG/DL (ref 0.5–1.4)
CREAT SERPL-MCNC: 0.9 MG/DL (ref 0.5–1.4)
DIFFERENTIAL METHOD BLD: ABNORMAL
DIFFERENTIAL METHOD BLD: ABNORMAL
EOSINOPHIL # BLD AUTO: 0 K/UL (ref 0–0.5)
EOSINOPHIL # BLD AUTO: 0 K/UL (ref 0–0.5)
EOSINOPHIL NFR BLD: 0 % (ref 0–8)
EOSINOPHIL NFR BLD: 0.1 % (ref 0–8)
ERYTHROCYTE [DISTWIDTH] IN BLOOD BY AUTOMATED COUNT: 15.8 % (ref 11.5–14.5)
ERYTHROCYTE [DISTWIDTH] IN BLOOD BY AUTOMATED COUNT: 16.1 % (ref 11.5–14.5)
EST. GFR  (NO RACE VARIABLE): >60 ML/MIN/1.73 M^2
GLUCOSE SERPL-MCNC: 102 MG/DL (ref 70–110)
GLUCOSE SERPL-MCNC: 104 MG/DL (ref 70–110)
GLUCOSE SERPL-MCNC: 107 MG/DL (ref 70–110)
GLUCOSE SERPL-MCNC: 152 MG/DL (ref 70–110)
GLUCOSE SERPL-MCNC: 166 MG/DL (ref 70–110)
GLUCOSE SERPL-MCNC: 221 MG/DL (ref 70–110)
GLUCOSE SERPL-MCNC: 99 MG/DL (ref 70–110)
HCO3 UR-SCNC: 16.3 MMOL/L (ref 24–28)
HCT VFR BLD AUTO: 27.6 % (ref 37–48.5)
HCT VFR BLD AUTO: 27.7 % (ref 37–48.5)
HGB BLD-MCNC: 8.6 G/DL (ref 12–16)
HGB BLD-MCNC: 8.8 G/DL (ref 12–16)
IMM GRANULOCYTES # BLD AUTO: 0.07 K/UL (ref 0–0.04)
IMM GRANULOCYTES # BLD AUTO: 0.1 K/UL (ref 0–0.04)
IMM GRANULOCYTES NFR BLD AUTO: 0.5 % (ref 0–0.5)
IMM GRANULOCYTES NFR BLD AUTO: 0.6 % (ref 0–0.5)
LACTATE SERPL-SCNC: 1.2 MMOL/L (ref 0.5–2.2)
LACTATE SERPL-SCNC: 2.6 MMOL/L (ref 0.5–2.2)
LACTATE SERPL-SCNC: 5.1 MMOL/L (ref 0.5–2.2)
LYMPHOCYTES # BLD AUTO: 0.5 K/UL (ref 1–4.8)
LYMPHOCYTES # BLD AUTO: 1.5 K/UL (ref 1–4.8)
LYMPHOCYTES NFR BLD: 10.3 % (ref 18–48)
LYMPHOCYTES NFR BLD: 3 % (ref 18–48)
MAGNESIUM SERPL-MCNC: 1.3 MG/DL (ref 1.6–2.6)
MAGNESIUM SERPL-MCNC: 1.7 MG/DL (ref 1.6–2.6)
MAGNESIUM SERPL-MCNC: 1.7 MG/DL (ref 1.6–2.6)
MCH RBC QN AUTO: 27.3 PG (ref 27–31)
MCH RBC QN AUTO: 27.8 PG (ref 27–31)
MCHC RBC AUTO-ENTMCNC: 31.2 G/DL (ref 32–36)
MCHC RBC AUTO-ENTMCNC: 31.8 G/DL (ref 32–36)
MCV RBC AUTO: 87 FL (ref 82–98)
MCV RBC AUTO: 88 FL (ref 82–98)
MONOCYTES # BLD AUTO: 0.9 K/UL (ref 0.3–1)
MONOCYTES # BLD AUTO: 1.1 K/UL (ref 0.3–1)
MONOCYTES NFR BLD: 6 % (ref 4–15)
MONOCYTES NFR BLD: 6.5 % (ref 4–15)
NEUTROPHILS # BLD AUTO: 11.6 K/UL (ref 1.8–7.7)
NEUTROPHILS # BLD AUTO: 15.9 K/UL (ref 1.8–7.7)
NEUTROPHILS NFR BLD: 82.2 % (ref 38–73)
NEUTROPHILS NFR BLD: 90.3 % (ref 38–73)
NRBC BLD-RTO: 0 /100 WBC
NRBC BLD-RTO: 0 /100 WBC
PCO2 BLDA: 29.2 MMHG (ref 35–45)
PH SMN: 7.35 [PH] (ref 7.35–7.45)
PHOSPHATE SERPL-MCNC: 2 MG/DL (ref 2.7–4.5)
PHOSPHATE SERPL-MCNC: 2.4 MG/DL (ref 2.7–4.5)
PHOSPHATE SERPL-MCNC: <1 MG/DL (ref 2.7–4.5)
PLATELET # BLD AUTO: 234 K/UL (ref 150–450)
PLATELET # BLD AUTO: 244 K/UL (ref 150–450)
PMV BLD AUTO: 10.2 FL (ref 9.2–12.9)
PMV BLD AUTO: 9.9 FL (ref 9.2–12.9)
PO2 BLDA: 38 MMHG (ref 40–60)
POC BE: -9 MMOL/L
POC SATURATED O2: 71 % (ref 95–100)
POC TCO2: 17 MMOL/L (ref 24–29)
POCT GLUCOSE: 101 MG/DL (ref 70–110)
POCT GLUCOSE: 103 MG/DL (ref 70–110)
POCT GLUCOSE: 104 MG/DL (ref 70–110)
POCT GLUCOSE: 111 MG/DL (ref 70–110)
POCT GLUCOSE: 112 MG/DL (ref 70–110)
POCT GLUCOSE: 126 MG/DL (ref 70–110)
POCT GLUCOSE: 126 MG/DL (ref 70–110)
POCT GLUCOSE: 127 MG/DL (ref 70–110)
POCT GLUCOSE: 155 MG/DL (ref 70–110)
POCT GLUCOSE: 160 MG/DL (ref 70–110)
POCT GLUCOSE: 174 MG/DL (ref 70–110)
POCT GLUCOSE: 179 MG/DL (ref 70–110)
POCT GLUCOSE: 181 MG/DL (ref 70–110)
POCT GLUCOSE: 203 MG/DL (ref 70–110)
POCT GLUCOSE: 207 MG/DL (ref 70–110)
POCT GLUCOSE: 230 MG/DL (ref 70–110)
POCT GLUCOSE: 63 MG/DL (ref 70–110)
POCT GLUCOSE: 89 MG/DL (ref 70–110)
POTASSIUM SERPL-SCNC: 3.5 MMOL/L (ref 3.5–5.1)
POTASSIUM SERPL-SCNC: 3.5 MMOL/L (ref 3.5–5.1)
POTASSIUM SERPL-SCNC: 3.6 MMOL/L (ref 3.5–5.1)
POTASSIUM SERPL-SCNC: 3.8 MMOL/L (ref 3.5–5.1)
POTASSIUM SERPL-SCNC: 3.8 MMOL/L (ref 3.5–5.1)
POTASSIUM SERPL-SCNC: 4.2 MMOL/L (ref 3.5–5.1)
POTASSIUM SERPL-SCNC: 4.6 MMOL/L (ref 3.5–5.1)
PROT SERPL-MCNC: 5.7 G/DL (ref 6–8.4)
PROT SERPL-MCNC: 6 G/DL (ref 6–8.4)
PROT SERPL-MCNC: 6.5 G/DL (ref 6–8.4)
RBC # BLD AUTO: 3.15 M/UL (ref 4–5.4)
RBC # BLD AUTO: 3.17 M/UL (ref 4–5.4)
SALICYLATES SERPL-MCNC: <5 MG/DL (ref 15–30)
SAMPLE: ABNORMAL
SITE: ABNORMAL
SODIUM SERPL-SCNC: 132 MMOL/L (ref 136–145)
SODIUM SERPL-SCNC: 134 MMOL/L (ref 136–145)
SODIUM SERPL-SCNC: 136 MMOL/L (ref 136–145)
SODIUM SERPL-SCNC: 136 MMOL/L (ref 136–145)
SODIUM SERPL-SCNC: 137 MMOL/L (ref 136–145)
SODIUM SERPL-SCNC: 137 MMOL/L (ref 136–145)
SODIUM SERPL-SCNC: 138 MMOL/L (ref 136–145)
WBC # BLD AUTO: 14.1 K/UL (ref 3.9–12.7)
WBC # BLD AUTO: 17.59 K/UL (ref 3.9–12.7)

## 2024-01-17 PROCEDURE — 63600175 PHARM REV CODE 636 W HCPCS: Performed by: INTERNAL MEDICINE

## 2024-01-17 PROCEDURE — 99222 1ST HOSP IP/OBS MODERATE 55: CPT | Mod: ,,, | Performed by: PSYCHIATRY & NEUROLOGY

## 2024-01-17 PROCEDURE — 25000003 PHARM REV CODE 250

## 2024-01-17 PROCEDURE — 80179 DRUG ASSAY SALICYLATE: CPT

## 2024-01-17 PROCEDURE — 63600175 PHARM REV CODE 636 W HCPCS

## 2024-01-17 PROCEDURE — S5010 5% DEXTROSE AND 0.45% SALINE: HCPCS

## 2024-01-17 PROCEDURE — 80048 BASIC METABOLIC PNL TOTAL CA: CPT | Mod: 91,XB

## 2024-01-17 PROCEDURE — 25000003 PHARM REV CODE 250: Performed by: INTERNAL MEDICINE

## 2024-01-17 PROCEDURE — 83735 ASSAY OF MAGNESIUM: CPT | Mod: 91 | Performed by: INTERNAL MEDICINE

## 2024-01-17 PROCEDURE — 94761 N-INVAS EAR/PLS OXIMETRY MLT: CPT | Mod: XB

## 2024-01-17 PROCEDURE — 83605 ASSAY OF LACTIC ACID: CPT | Mod: 91

## 2024-01-17 PROCEDURE — 84100 ASSAY OF PHOSPHORUS: CPT

## 2024-01-17 PROCEDURE — 99900035 HC TECH TIME PER 15 MIN (STAT)

## 2024-01-17 PROCEDURE — 80053 COMPREHEN METABOLIC PANEL: CPT

## 2024-01-17 PROCEDURE — 83735 ASSAY OF MAGNESIUM: CPT | Mod: 91

## 2024-01-17 PROCEDURE — 36415 COLL VENOUS BLD VENIPUNCTURE: CPT

## 2024-01-17 PROCEDURE — 80053 COMPREHEN METABOLIC PANEL: CPT | Mod: 91 | Performed by: INTERNAL MEDICINE

## 2024-01-17 PROCEDURE — 80143 DRUG ASSAY ACETAMINOPHEN: CPT

## 2024-01-17 PROCEDURE — 82803 BLOOD GASES ANY COMBINATION: CPT

## 2024-01-17 PROCEDURE — 80076 HEPATIC FUNCTION PANEL: CPT

## 2024-01-17 PROCEDURE — 93010 ELECTROCARDIOGRAM REPORT: CPT | Mod: ,,, | Performed by: INTERNAL MEDICINE

## 2024-01-17 PROCEDURE — 84100 ASSAY OF PHOSPHORUS: CPT | Mod: 91

## 2024-01-17 PROCEDURE — 20600001 HC STEP DOWN PRIVATE ROOM

## 2024-01-17 PROCEDURE — 85025 COMPLETE CBC W/AUTO DIFF WBC: CPT | Mod: 91

## 2024-01-17 PROCEDURE — 80320 DRUG SCREEN QUANTALCOHOLS: CPT

## 2024-01-17 PROCEDURE — 83735 ASSAY OF MAGNESIUM: CPT

## 2024-01-17 PROCEDURE — 85025 COMPLETE CBC W/AUTO DIFF WBC: CPT

## 2024-01-17 PROCEDURE — 93005 ELECTROCARDIOGRAM TRACING: CPT

## 2024-01-17 PROCEDURE — 99291 CRITICAL CARE FIRST HOUR: CPT | Mod: ,,, | Performed by: INTERNAL MEDICINE

## 2024-01-17 PROCEDURE — 84100 ASSAY OF PHOSPHORUS: CPT | Mod: 91 | Performed by: INTERNAL MEDICINE

## 2024-01-17 PROCEDURE — 83605 ASSAY OF LACTIC ACID: CPT

## 2024-01-17 RX ORDER — ENOXAPARIN SODIUM 100 MG/ML
40 INJECTION SUBCUTANEOUS EVERY 24 HOURS
Status: DISCONTINUED | OUTPATIENT
Start: 2024-01-17 | End: 2024-01-22 | Stop reason: HOSPADM

## 2024-01-17 RX ORDER — DIAZEPAM 10 MG/2ML
5 INJECTION INTRAMUSCULAR EVERY 4 HOURS PRN
Status: DISCONTINUED | OUTPATIENT
Start: 2024-01-17 | End: 2024-01-17

## 2024-01-17 RX ORDER — MAGNESIUM SULFATE HEPTAHYDRATE 40 MG/ML
2 INJECTION, SOLUTION INTRAVENOUS ONCE
Status: COMPLETED | OUTPATIENT
Start: 2024-01-17 | End: 2024-01-17

## 2024-01-17 RX ORDER — THIAMINE HCL 100 MG
100 TABLET ORAL 3 TIMES DAILY
Status: DISCONTINUED | OUTPATIENT
Start: 2024-01-20 | End: 2024-01-22 | Stop reason: HOSPADM

## 2024-01-17 RX ORDER — DEXTROSE MONOHYDRATE AND SODIUM CHLORIDE 5; .45 G/100ML; G/100ML
INJECTION, SOLUTION INTRAVENOUS CONTINUOUS
Status: DISCONTINUED | OUTPATIENT
Start: 2024-01-17 | End: 2024-01-17

## 2024-01-17 RX ORDER — LORAZEPAM 2 MG/ML
1 INJECTION INTRAMUSCULAR EVERY 4 HOURS PRN
Status: DISCONTINUED | OUTPATIENT
Start: 2024-01-17 | End: 2024-01-22 | Stop reason: HOSPADM

## 2024-01-17 RX ORDER — INSULIN ASPART 100 [IU]/ML
12 INJECTION, SOLUTION INTRAVENOUS; SUBCUTANEOUS
Status: DISCONTINUED | OUTPATIENT
Start: 2024-01-17 | End: 2024-01-19

## 2024-01-17 RX ORDER — MUPIROCIN 20 MG/G
OINTMENT TOPICAL 2 TIMES DAILY
Status: COMPLETED | OUTPATIENT
Start: 2024-01-17 | End: 2024-01-21

## 2024-01-17 RX ORDER — FOLIC ACID 1 MG/1
1 TABLET ORAL DAILY
Status: DISCONTINUED | OUTPATIENT
Start: 2024-01-17 | End: 2024-01-22 | Stop reason: HOSPADM

## 2024-01-17 RX ORDER — INSULIN ASPART 100 [IU]/ML
0-5 INJECTION, SOLUTION INTRAVENOUS; SUBCUTANEOUS
Status: DISCONTINUED | OUTPATIENT
Start: 2024-01-17 | End: 2024-01-20

## 2024-01-17 RX ORDER — HEPARIN SODIUM 5000 [USP'U]/ML
5000 INJECTION, SOLUTION INTRAVENOUS; SUBCUTANEOUS EVERY 8 HOURS
Status: DISCONTINUED | OUTPATIENT
Start: 2024-01-17 | End: 2024-01-17

## 2024-01-17 RX ADMIN — POTASSIUM BICARBONATE 40 MEQ: 391 TABLET, EFFERVESCENT ORAL at 04:01

## 2024-01-17 RX ADMIN — ENOXAPARIN SODIUM 40 MG: 40 INJECTION SUBCUTANEOUS at 06:01

## 2024-01-17 RX ADMIN — DEXTROSE AND SODIUM CHLORIDE: 5; 450 INJECTION, SOLUTION INTRAVENOUS at 02:01

## 2024-01-17 RX ADMIN — MUPIROCIN: 20 OINTMENT TOPICAL at 08:01

## 2024-01-17 RX ADMIN — MAGNESIUM SULFATE 2 G: 2 INJECTION INTRAVENOUS at 08:01

## 2024-01-17 RX ADMIN — PIPERACILLIN SODIUM AND TAZOBACTAM SODIUM 4.5 G: 4; .5 INJECTION, POWDER, FOR SOLUTION INTRAVENOUS at 03:01

## 2024-01-17 RX ADMIN — VANCOMYCIN HYDROCHLORIDE 1500 MG: 1.5 INJECTION, POWDER, LYOPHILIZED, FOR SOLUTION INTRAVENOUS at 01:01

## 2024-01-17 RX ADMIN — INSULIN DETEMIR 18 UNITS: 100 INJECTION, SOLUTION SUBCUTANEOUS at 09:01

## 2024-01-17 RX ADMIN — POTASSIUM & SODIUM PHOSPHATES POWDER PACK 280-160-250 MG 2 PACKET: 280-160-250 PACK at 02:01

## 2024-01-17 RX ADMIN — MAGNESIUM SULFATE 2 G: 2 INJECTION INTRAVENOUS at 04:01

## 2024-01-17 RX ADMIN — INSULIN HUMAN 0.05 UNITS/KG/HR: 1 INJECTION, SOLUTION INTRAVENOUS at 11:01

## 2024-01-17 RX ADMIN — LORAZEPAM 1 MG: 2 INJECTION INTRAMUSCULAR; INTRAVENOUS at 11:01

## 2024-01-17 RX ADMIN — DEXTROSE MONOHYDRATE 125 ML: 100 INJECTION, SOLUTION INTRAVENOUS at 04:01

## 2024-01-17 RX ADMIN — HEPARIN SODIUM 5000 UNITS: 5000 INJECTION INTRAVENOUS; SUBCUTANEOUS at 06:01

## 2024-01-17 RX ADMIN — SODIUM CHLORIDE, POTASSIUM CHLORIDE, SODIUM LACTATE AND CALCIUM CHLORIDE 1000 ML: 600; 310; 30; 20 INJECTION, SOLUTION INTRAVENOUS at 12:01

## 2024-01-17 RX ADMIN — FOLIC ACID 1 MG: 1 TABLET ORAL at 01:01

## 2024-01-17 RX ADMIN — DIBASIC SODIUM PHOSPHATE, MONOBASIC POTASSIUM PHOSPHATE AND MONOBASIC SODIUM PHOSPHATE 2 TABLET: 852; 155; 130 TABLET ORAL at 06:01

## 2024-01-17 RX ADMIN — THIAMINE HYDROCHLORIDE 500 MG: 100 INJECTION, SOLUTION INTRAMUSCULAR; INTRAVENOUS at 11:01

## 2024-01-17 RX ADMIN — POTASSIUM PHOSPHATE, MONOBASIC POTASSIUM PHOSPHATE, DIBASIC 30 MMOL: 224; 236 INJECTION, SOLUTION, CONCENTRATE INTRAVENOUS at 05:01

## 2024-01-17 RX ADMIN — ATORVASTATIN CALCIUM 20 MG: 20 TABLET, FILM COATED ORAL at 08:01

## 2024-01-17 RX ADMIN — INSULIN DETEMIR 18 UNITS: 100 INJECTION, SOLUTION SUBCUTANEOUS at 11:01

## 2024-01-17 RX ADMIN — DIBASIC SODIUM PHOSPHATE, MONOBASIC POTASSIUM PHOSPHATE AND MONOBASIC SODIUM PHOSPHATE 2 TABLET: 852; 155; 130 TABLET ORAL at 01:01

## 2024-01-17 NOTE — HPI
Cira Keys is a 43 y.o. female with a PMH of type 1 diabetes mellitus, hyperlipidemia, and GERD who presented after loss of consciousness episode this morning at the [facility]. Patient states she was accompanying her , who is receiving chemotherapy. She states she was feeling nauseated this morning but does not remember any details around the time of the event. Per the ED, there was no injury or fall noted by the patient or the rapid response team. In the ED, pt's glucose noted to be 345 and BHB elevated at 4.9. Patient states she is compliant with her insulin regimen and received her last dose of Levemir this morning. States she is on a sliding-scale insulin regimen and monitors glucose via finger prick approximately 5x per day. States her insulin levels have been consistently in the low 200s, sometimes in the 100s, but have never reached the 300s. Patient was taken off her insulin pump in December 2023 due to insurance. Patient currently endorses nausea but denies fever, chills, headache, vomiting, chest pain, shortness of breath, abdominal pain, diarrhea, dysuria, increased urinary frequency, or new rashes. Patient endorses regular alcohol use and states she drinks approximately 1-2 vodka jigar-aids per day.    In the ED, patient was hypertensive to 189/91, Afebrile, and otherwise vitally stable. Her blood glucose was 345, and she was found to be in DKA with an anion gap of 18, BHB 4.9, and a Bicarb of 19. Other significant labs include: Hgb A1C 6.5, Mg 1.2, UA with 4+ glucose and 3+ ketones, UDS only positive for marijuana, and Phos 2.3. BNP, troponin, COVID/Flu, CBC, ethanol, Hep C, HIV, and TSH were all negative/unremarkable. Patient was started on DKA protocol with an insulin drip and normal saline infusion. Following resolution of DKA and hyperglycemia improvement, she was transitioned to D5W and 1/2 NS with 12 units of Levemir given alongside Insulin infusion, and the infusion to be  stopped after 2 hours of giving levemir. Patient was admitted to hospital medicine for management of DKA and hyperglycemia.     Pt requested to leave AMA midday on day of presentation; patient's insulin gtt was stopped and transitioned to basal/bolus dosing in anticipation for discharge. She was given nifedipine 60mg for hypertension earlier in the day (not her home medication) as well as gabapentin and droperidol around 11am. Later in the afternoon, patient noted to be hypotensive, down to 95/45 MAP 65, as well as exhibiting AMS and somnolence. Pt received total of 2.5L of LR with marginal improvement in MAPs, and also received Valium 5mg at 5pm due to concern for possible alcohol withdrawal. MICU was consulted for persistent hypotension despite IVF and concern for ongoing DKA. At time of evaluation, pt with MAP of 62. Pt will be admitted to MICU for further management of hypotension and DKA.

## 2024-01-17 NOTE — PLAN OF CARE
[facility] - Card[facility] Medical [facility]  Initial Discharge Assessment       Primary Care Provider: Krysta Mercado MD    Admission Diagnosis: Shock [R57.9]  LOC (loss of consciousness) [R40.20]  Chest pain [R07.9]  Diabetic ketoacidosis without coma associated with type 1 diabetes mellitus [E10.10]    Admission Date: 1/16/2024  Expected Discharge Date:     Transition of Care Barriers: Underinsured    Payor: [facility] CONNECTIONS / Plan: [facility] MARKETPLACE / Product Type: Commercial /     Extended Emergency Contact Information  Primary Emergency Contact: Karla Aguirre   [facility]  Home Phone: [phone]  Relation: Mother  Secondary Emergency Contact: Randolph Keys  Mobile Phone: [phone]  Relation: Spouse    Discharge Plan A: Home with family  Discharge Plan B: [facility]      [id number] - [place], [place] - [address]  [address]  [place] [address]  Phone: [phone] Fax: [phone]      Initial Assessment (most recent)       Adult Discharge Assessment - 01/17/24 1614          Discharge Assessment    Assessment Type Discharge Planning Assessment     Confirmed/corrected address, phone number and insurance Yes     Confirmed Demographics Correct on Facesheet     Source of Information patient     When was your last doctors appointment? 01/05/24     Does patient/caregiver understand observation status Yes     Communicated JUVENTINO with patient/caregiver Date not available/Unable to determine     Reason For Admission DKI/Shock     People in Home spouse;child(nargis), dependent;grandchild(nargis)     Facility Arrived From: [facility]     Do you expect to return to your current living situation? Yes     Do you have help at home or someone to help you manage your care at home? Yes     Who are your caregiver(s) and their phone number(s)? Randolph Keys, spouse/cp# [phone] and Karla Aguirre mtr./cp# [phone]     Prior to hospitilization cognitive status: Unable to  Assess     Current cognitive status: Alert/Oriented     Walking or Climbing Stairs Difficulty no     Dressing/Bathing Difficulty no     Home Layout Able to live on 1st floor     Equipment Currently Used at Home nebulizer     Readmission within 30 days? Yes     Patient currently being followed by outpatient case management? No     Do you currently have service(s) that help you manage your care at home? No     Do you take prescription medications? --   n/a    Do you have prescription coverage? Yes     Coverage [facility]     Do you have any problems affording any of your prescribed medications? No     Who is going to help you get home at discharge? Spouse     How do you get to doctors appointments? family or friend will provide     Are you on dialysis? No     Do you take coumadin? No     Discharge Plan A Home with family     Discharge Plan B Community Services     DME Needed Upon Discharge  none     Discharge Plan discussed with: Patient     Transition of Care Barriers Underinsured        Physical Activity    On average, how many days per week do you engage in moderate to strenuous exercise (like a brisk walk)? Patient declined     On average, how many minutes do you engage in exercise at this level? Patient declined        Financial Resource Strain    How hard is it for you to pay for the very basics like food, housing, medical care, and heating? Not very hard        Housing Stability    In the last 12 months, was there a time when you were not able to pay the mortgage or rent on time? No     In the last 12 months, how many places have you lived? 1     In the last 12 months, was there a time when you did not have a steady place to sleep or slept in a shelter (including now)? No        Transportation Needs    In the past 12 months, has lack of transportation kept you from medical appointments or from getting medications? No     In the past 12 months, has lack  "of transportation kept you from meetings, work, or from getting things needed for daily living? No        Food Insecurity    Within the past 12 months, you worried that your food would run out before you got the money to buy more. Patient declined     Within the past 12 months, the food you bought just didn't last and you didn't have money to get more. Patient declined        Stress    Do you feel stress - tense, restless, nervous, or anxious, or unable to sleep at night because your mind is troubled all the time - these days? To some extent        Social Connections    In a typical week, how many times do you talk on the phone with family, friends, or neighbors? More than three times a week     How often do you get together with friends or relatives? --   " maybe monthly"    How often do you attend Spiritism or Hindu services? Never     Do you belong to any clubs or organizations such as Spiritism groups, unions, fraternal or athletic groups, or school groups? No     How often do you attend meetings of the clubs or organizations you belong to? Never     Are you , , , , never , or living with a partner?         Alcohol Use    Q1: How often do you have a drink containing alcohol? 4 or more times a week     Q2: How many drinks containing alcohol do you have on a typical day when you are drinking? 3 or 4     Q3: How often do you have six or more drinks on one occasion? Monthly        OTHER    Name(s) of People in Home Karla Aguirre, mother/cp# [phone] and Randolph Keys, spouse/cp# [phone]                     Readmission Assessment (most recent)       Readmission Assessment - 01/17/24 1609          Readmission    Why were you hospitalized in the last 30 days? Type 1 DM with hyperglycemia     Why were you readmitted? Alarmed about signs/symptoms     When you left the hospital how did you feel? "better"     When you left the hospital where did you go? Home with Family     " Did patient/caregiver refused recommended DC plan? No     Tell me about what happened between when you left the hospital and the day you returned. started feeling light headed     When did you start not feeling well? yesterday     Did you try to manage your symptoms your self? No     Did you call anyone? --   n/a    Did you try to see or did see a doctor or nurse before you came? No     Why? Just went to the on 12/6/2023     Did you have  a follow-up appointment on discharge? Yes     Did you go? Yes     Was this a planned readmission? No                    Josette Perez LMSW  [facility]  X [address]

## 2024-01-17 NOTE — H&P
[facility] - Cardiac Medical ICU  Critical Care Medicine  History & Physical    Patient Name: Cira Keys  MRN: [id number]  Admission Date: 1/16/2024  Hospital Length of Stay: 1 days  Code Status: Full Code  Attending Physician: Jessie Haas MD   Primary Care Provider: Krysta Mercado MD   Principal Problem: Shock    Subjective:     HPI:  Cira Keys is a 43 y.o. female with a PMH of type 1 diabetes mellitus, hyperlipidemia, and GERD who presented after loss of consciousness episode this morning at the [facility]. Patient states she was accompanying her , who is receiving chemotherapy. She states she was feeling nauseated this morning but does not remember any details around the time of the event. Per the ED, there was no injury or fall noted by the patient or the rapid response team. In the ED, pt's glucose noted to be 345 and BHB elevated at 4.9. Patient states she is compliant with her insulin regimen and received her last dose of Levemir this morning. States she is on a sliding-scale insulin regimen and monitors glucose via finger prick approximately 5x per day. States her insulin levels have been consistently in the low 200s, sometimes in the 100s, but have never reached the 300s. Patient was taken off her insulin pump in December 2023 due to insurance. Patient currently endorses nausea but denies fever, chills, headache, vomiting, chest pain, shortness of breath, abdominal pain, diarrhea, dysuria, increased urinary frequency, or new rashes. Patient endorses regular alcohol use and states she drinks approximately 1-2 vodka jigar-aids per day.    In the ED, patient was hypertensive to 189/91, Afebrile, and otherwise vitally stable. Her blood glucose was 345, and she was found to be in DKA with an anion gap of 18, BHB 4.9, and a Bicarb of 19. Other significant labs include: Hgb A1C 6.5, Mg 1.2, UA with 4+ glucose and 3+ ketones, UDS only positive for marijuana, and Phos 2.3. BNP,  troponin, COVID/Flu, CBC, ethanol, Hep C, HIV, and TSH were all negative/unremarkable. Patient was started on DKA protocol with an insulin drip and normal saline infusion. Following resolution of DKA and hyperglycemia improvement, she was transitioned to D5W and 1/2 NS with 12 units of Levemir given alongside Insulin infusion, and the infusion to be stopped after 2 hours of giving levemir. Patient was admitted to hospital medicine for management of DKA and hyperglycemia.     Pt requested to leave AMA midday on day of presentation; patient's insulin gtt was stopped and transitioned to basal/bolus dosing in anticipation for discharge. She was given nifedipine 60mg for hypertension earlier in the day (not her home medication) as well as gabapentin and droperidol around 11am. Later in the afternoon, patient noted to be hypotensive, down to 95/45 MAP 65, as well as exhibiting AMS and somnolence. Pt received total of 2.5L of LR with marginal improvement in MAPs, and also received Valium 5mg at 5pm due to concern for possible alcohol withdrawal. MICU was consulted for persistent hypotension despite IVF and concern for ongoing DKA. At time of evaluation, pt with MAP of 62. Pt will be admitted to MICU for further management of hypotension and DKA.    Hospital/ICU Course:  No notes on file     Past Medical History:   Diagnosis Date    E coli bacteremia     GERD (gastroesophageal reflux disease)     Hyperlipidemia     Iron deficiency anemia due to chronic blood loss 10/31/2015    Type 1 diabetes mellitus without complication 10/31/2015       Past Surgical History:   Procedure Laterality Date     SECTION      LAPAROSCOPIC SALPINGECTOMY Bilateral 2023    Procedure: SALPINGECTOMY, LAPAROSCOPIC;  Surgeon: Clem Sidhu MD;  [facility];  Service: OB/GYN;  Laterality: Bilateral;    LAPAROSCOPIC TOTAL HYSTERECTOMY N/A 2023    Procedure: HYSTERECTOMY, TOTAL, LAPAROSCOPIC;  Surgeon: Clem Sidhu,  "MD;  Location: [facility];  Service: OB/GYN;  Laterality: N/A;    TUBAL LIGATION         Review of patient's allergies indicates:  No Known Allergies    Family History       Problem Relation (Age of Onset)    Hyperlipidemia Mother    Hypertension Father    No Known Problems Sister, Brother          Tobacco Use    Smoking status: Former     Current packs/day: 0.00     Types: Cigarettes     Quit date: 12/10/2021     Years since quittin.1     Passive exposure: Current    Smokeless tobacco: Never   Substance and Sexual Activity    Alcohol use: Yes     Alcohol/week: 5.0 standard drinks of alcohol     Types: 5 Drinks containing 0.5 oz of alcohol per week     Comment: occ    Drug use: No     Comment: "20-30 years ago drug use"    Sexual activity: Yes     Partners: Male     Birth control/protection: See Surgical Hx     Comment:       Review of Systems   Unable to perform ROS: Mental status change     Objective:     Vital Signs (Most Recent):  Temp: 98.6 °F (37 °C) (24)  Pulse: (!) 115 (24)  Resp: 20 (24)  BP: (!) 94/43 (24)  SpO2: 97 % (24) Vital Signs (24h Range):  Temp:  [97.8 °F (36.6 °C)-98.6 °F (37 °C)] 98.6 °F (37 °C)  Pulse:  [] 115  Resp:  [17-26] 20  SpO2:  [95 %-100 %] 97 %  BP: ()/(39-83) 94/43   Weight: 77.1 kg (170 lb)  Body mass index is 26.63 kg/m².      Intake/Output Summary (Last 24 hours) at 2024 2100  Last data filed at 2024 1802  Gross per 24 hour   Intake 2290.03 ml   Output --   Net 2290.03 ml          Physical Exam  Vitals reviewed.   Constitutional:       General: She is not in acute distress.     Appearance: Normal appearance. She is ill-appearing.   HENT:      Head: Normocephalic and atraumatic.      Nose: No congestion or rhinorrhea.      Mouth/Throat:      Mouth: Mucous membranes are moist.      Pharynx: Oropharynx is clear.   Eyes:      Extraocular Movements: Extraocular movements intact.      Pupils: Pupils are " equal, round, and reactive to light.   Cardiovascular:      Rate and Rhythm: Regular rhythm. Tachycardia present.      Pulses: Normal pulses.      Heart sounds: Normal heart sounds.   Pulmonary:      Effort: Pulmonary effort is normal. No respiratory distress.      Breath sounds: Normal breath sounds.   Abdominal:      General: Bowel sounds are normal.      Palpations: Abdomen is soft.      Tenderness: There is no abdominal tenderness.   Musculoskeletal:         General: Tenderness (TTP to anterior chest) present. No swelling.   Skin:     Findings: No bruising or rash.   Neurological:      General: No focal deficit present.      Mental Status: She is disoriented.   Psychiatric:      Comments: Somnolent, but responds mostly appropriately            Vents:     Lines/Drains/Airways       Peripheral Intravenous Line  Duration                  Peripheral IV - Single Lumen 01/16/24 0936 18 G Right Antecubital <1 day         Peripheral IV - Single Lumen 01/16/24 1108 20 G Left Antecubital <1 day                  Significant Labs:    CBC/Anemia Profile:  Recent Labs   Lab 01/16/24  1005 01/16/24 2015   WBC 10.76 11.01   HGB 10.9* 8.9*   HCT 35.2* 29.4*    270   MCV 87 90   RDW 15.2* 15.8*        Chemistries:  Recent Labs   Lab 01/16/24  1005 01/16/24  1219 01/16/24  1414 01/16/24  1550    141  --  140   K 4.4 3.7  --  3.9    107  --  106   CO2 19* 19*  --  18*   BUN 12 12  --  12   CREATININE 0.8 0.8  --  0.7   CALCIUM 9.8 9.3  --  9.0   ALBUMIN 4.1  --   --   --    PROT 7.9  --   --   --    BILITOT 1.0  --   --   --    ALKPHOS 66  --   --   --    ALT 14  --   --   --    AST 19  --   --   --    MG 1.2*  --  1.8  --    PHOS  --  2.3*  --   --        BMP:   Recent Labs   Lab 01/16/24  1414 01/16/24  1550   GLU  --  183*   NA  --  140   K  --  3.9   CL  --  106   CO2  --  18*   BUN  --  12   CREATININE  --  0.7   CALCIUM  --  9.0   MG 1.8  --      CMP:   Recent Labs   Lab 01/16/24  1005 01/16/24  1217  "01/16/24  1550    141 140   K 4.4 3.7 3.9    107 106   CO2 19* 19* 18*   * 175* 183*   BUN 12 12 12   CREATININE 0.8 0.8 0.7   CALCIUM 9.8 9.3 9.0   PROT 7.9  --   --    ALBUMIN 4.1  --   --    BILITOT 1.0  --   --    ALKPHOS 66  --   --    AST 19  --   --    ALT 14  --   --    ANIONGAP 18* 15 16       Significant Imaging: I have reviewed all pertinent imaging results/findings within the past 24 hours.  Assessment/Plan:     Neuro  Encephalopathy, metabolic  Patient exhibiting altered mentation associated with hypotension. Suspect metabolic secondary to ongoing DKA in setting of having received several potentially sedating medications. Continue to monitor.    - Obtain CT Head if encephalopathy not improved despite IVF and DKA resolution.    Psychiatric  WALTER (generalized anxiety disorder)  - Buspirone PRN and celexa home meds  - Holding celexa as Qtc prolonged at 560 on last EKG    Cardiac/Vascular  * Shock  44yo F stepped up to MICU on 1/16/24 for shock of unclear etiology - suspect hypovolemic vs distributive shock from a combination of unresolved DKA, use of sedating agents (valium, droperidol, gabapentin) and nifedipine (not her home medication), and potentially sepsis. Patient started on broad spectrum antibiotics while in the ED due to concern for sepsis, but unclear source of infection. There was concern for PE given tachycardia and subjective SOB (though on room air and satting well). Bedside echo performed 1/16 showed hyperdynamic EF and normal RV without septal bowing. On exam, patient with warm extremities and palpable pulses. CXR unremarkable. CBC without leukocytosis. Other labs notable for elevated BHB and hyperglycemia. Suspect primary cause of hypotension at this point is dehydration and total body water deficit related to DKA, in setting of sedating medications.    Plan  - DKA management, see "DKA" for further details  - Continue broad spectrum abx with vanc and zosyn.  - Follow-up " blood cultures and tailor antibiotic therapy accordingly  - 1L IVF bolus now in addition to 30cc/kg that was given earlier, total of 3.5L IVF, continue with mIVF  - Vasopressor support as needed to keep MAP >65    Mixed hyperlipidemia  - Continue home statin    Essential hypertension  - Holding all agents due to shock, resume when clinically indicated    Renal/  Hypomagnesemia  - Replenish    Endocrine  DKA (diabetic ketoacidoses)  Type 1 diabetes mellitus with hyperglycemia     Type 1 diabetic previously on an insulin pump but now off it due to insurance, presenting with vasovagal syncope and evidence of DKA with BHB >4. Due to patient wanting to leave AMA earlier on 1/16 she was transitioned off the insulin drip to basal/bolus dosing however in anticipation of discharge. However, pt developing worsening mentation and BHB elevated with blood gas showing mild acidemia, so Hosp Med team resumed insulin gtt.    Plan:   - Continue IVF, NS at 125 mL/hr. Once BGL <200, will change IVF to D5 1/2 NS at 50 mL/hr  - Monitor for volume overload and pulmonary edema while on IVF  - Hosp Med resumed insulin gtt due to repeat BHB more elevated at 5.9  - q1h glucose accuchecks while on insulin gtt  - Monitor electrolytes with BMP q4h while on insulin gtt and place on telemetry, replete K as needed  - Transition insulin gtt to subQ insulin when BGL is < 200mg/dL on consecutive measures, and two of the following three are met: AG < 12, bicarb ? 18, and pH > 7.30  - Diet NPO or clear liquids if pt tolerating and no n/v; advance as tolerated    Type 1 diabetes mellitus with hyperglycemia  - See DKA    GI  Alcoholic liver disease  Patient reports drinking 3 mixed drinks per day, last use 1 day prior to admission, ETOH level negative on testing.    - Will keep withdrawal in mind, but avoid benzos for now due to decompensation after receiving valium in the ER    Other  Syncope  Syncope prior to admission while having N/V. Suspect  hypovolemic and vasovagal etiology.    - Telemetry monitoring  - Treat underlying causes        Critical Care Daily Checklist:    A: Awake: RASS Goal/Actual Goal:    Actual:     B: Spontaneous Breathing Trial Performed?     C: SAT & SBT Coordinated?  NA                      D: Delirium: CAM-ICU     E: Early Mobility Performed? No   F: Feeding Goal:    Status:     Current Diet Order   Procedures    Diet NPO      AS: Analgesia/Sedation None   T: Thromboembolic Prophylaxis Yes   H: HOB > 300 Yes   U: Stress Ulcer Prophylaxis (if needed) PPI daily   G: Glucose Control Insulin gtt   B: Bowel Function     I: Indwelling Catheter (Lines & Recinos) Necessity PIV   D: De-escalation of Antimicrobials/Pharmacotherapies Vanc zosyn today    Plan for the day/ETD Admit to ICU    Code Status:  Family/Goals of Care: Full Code         Critical secondary to Patient has a condition that poses threat to life and bodily function: DKA    Critical care was time spent personally by me on the following activities: development of treatment plan with patient or surrogate and bedside caregivers, discussions with consultants, evaluation of patient's response to treatment, examination of patient, ordering and performing treatments and interventions, ordering and review of laboratory studies, ordering and review of radiographic studies, pulse oximetry, re-evaluation of patient's condition. This critical care time did not overlap with that of any other provider or involve time for any procedures.     Sruthi Berumen MD  Critical Care Medicine  [facility] - Cardiac Medical ICU

## 2024-01-17 NOTE — ASSESSMENT & PLAN NOTE
Type 1 diabetes mellitus with hyperglycemia     Type 1 diabetic previously on an insulin pump but now off it due to insurance, presenting with vasovagal syncope and evidence of DKA with BHB >4. Due to patient wanting to leave AMA earlier on 1/16 she was transitioned off the insulin drip to basal/bolus dosing however in anticipation of discharge. However, pt developing worsening mentation and BHB elevated with blood gas showing mild acidemia, so Hosp Med team resumed insulin gtt.    Plan:   - Continue IVF, NS at 125 mL/hr. Once BGL <200, will change IVF to D5 1/2 NS at 50 mL/hr  - Monitor for volume overload and pulmonary edema while on IVF  - Hosp Med resumed insulin gtt due to repeat BHB more elevated at 5.9  - q1h glucose accuchecks while on insulin gtt  - Monitor electrolytes with BMP q4h while on insulin gtt and place on telemetry, replete K as needed  - Transition insulin gtt to subQ insulin when BGL is < 200mg/dL on consecutive measures, and two of the following three are met: AG < 12, bicarb ? 18, and pH > 7.30  - Diet NPO or clear liquids if pt tolerating and no n/v; advance as tolerated

## 2024-01-17 NOTE — PLAN OF CARE
Patient is a 43 y.o F with T1DM, HTN and GERD who was admitted earlier in the day for DKA and after syncopal event. Patient's DKA was improving, however had not yet fully resolved when she asked to leave against medical advice. She was quickly transitioned to MDI prior to leaving however later team was able to convince patient to stay. A few hours later patient was tachycardic to 150s, EKG with sinus tachycardia. She was tremulous but otherwise asymptomatic. She was given IVF boluses and later given 5mg Valium as there was concern she may be withdrawing from alcohol.     At ~ 7:15pm, MD was notified by nursing that patient was complaining of dizziness, HA, blurry vision, and SOB in setting of MAPs 50s-60s. Patient promptly seen at bedside. Patient tachycardic, tremulous and toxic appearing on physical exam. MAPs low 60s in trendelenburg position. Repeat CBC, CMP, BHB, Trop, Blood Cx x2 and BSABX ordered. Additional LR bolus ordered. ICU consulted. Critical care residents Ata and Hansa promptly assessed patient. After discussion, decision was made to transfer patient to ICU for closer monitoring and potential need for vasopressors.       Marzena Rojas MD  PGY III

## 2024-01-17 NOTE — ASSESSMENT & PLAN NOTE
Syncope prior to admission while having N/V. Suspect hypovolemic and vasovagal etiology.    - Telemetry monitoring  - Treat underlying causes

## 2024-01-17 NOTE — ASSESSMENT & PLAN NOTE
Patient drinks 2-3 drinks per day. She expresses interest and desire to quit. Motivated by wanting to be around for her grandson. Last drink evening of Monday 1/15.    - IV Valium 5 mg PRN for CIWA > 8  - Fall/Seizure/Aspiration precautions  - Consider Addiction Psych consult  - Thiamine 500 IV x 3 days followed by 100 po TID

## 2024-01-17 NOTE — ASSESSMENT & PLAN NOTE
Patient exhibiting altered mentation associated with hypotension. Suspect metabolic secondary to ongoing DKA in setting of having received several potentially sedating medications. Continue to monitor.    - Obtain CT Head if encephalopathy not improved despite IVF and DKA resolution.

## 2024-01-17 NOTE — ED NOTES
Cira eKys, a 43 y.o. female presents to the ED w/ complaint of Loss of consciousness. Pt states she is feeling better at this current time. Pt denies being SOB and headache. +Dizziness    Adult Physical Assessment  LOC: Cira Keys, 43 y.o. female verified via two identifiers.  The patient is awake, alert, oriented x4 and speaking appropriately at this time.  APPEARANCE: Patient resting comfortably and appears to be in no acute distress at this time. Patient is clean and well groomed, patient's clothing is properly fastened.  SKIN:The skin is warm and dry, color consistent with ethnicity, patient has normal skin turgor and moist mucus membranes, skin intact, no breakdown or brusing noted.  MUSCULOSKELETAL: Patient moving all extremities well, no obvious swelling or deformities noted. Generalized weakness.   RESPIRATORY: Airway is open and patent, respirations are spontaneous, patient has a normal effort and rate, no accessory muscle use noted.  CARDIAC: Patient has a normal rate and rhythm, no periphreal edema noted in any extremity, capillary refill < 3 seconds in all extremities. Pt is tachycardiac.  ABDOMEN: Soft and non tender to palpation, no abdominal distention noted.  NEUROLOGIC: Eyes open spontaneously, behavior appropriate to situation, follows commands, facial expression symmetrical, bilateral hand grasp equal and even, purposeful motor response noted, normal sensation in all extremities when touched with a finger.      Triage note:  Chief Complaint   Patient presents with    Loss of Consciousness     Arrived with rapid response team from [facility] with c/o syncope, denies injury or fall, diabetic, pt c/o nausea     Review of patient's allergies indicates:  No Known Allergies  Past Medical History:   Diagnosis Date    E coli bacteremia 2011    GERD (gastroesophageal reflux disease)     Hyperlipidemia     Iron deficiency anemia due to chronic blood loss 10/31/2015    Type 1  diabetes mellitus without complication 10/31/2015

## 2024-01-17 NOTE — CONSULTS
274}        INITIAL VISIT: ADDICTION PSYCHIATRY CONSULTATION SERVICE      ASSESSMENT AND PLAN:     DIAGNOSES & PROBLEMS:  Alcohol use disorder    In Summary:  42 YO F with hx of Alcoholic liver injury, Type 1 Diabetes, Depression and Anxiety who presented after fall/LOC while with her  at the [facility] ( undergoing chemotherapy for stage 4 cancer in his colon and bone). Psychiatry consulted out of concern for daily drinking which may have contributed to DKA and patient stating that she wants help stopping drinking.    Plan:  We will continue to follow Ms. Keys   Schedule diazepam 10 mg q8 hrs.  Increase PRN lorazepam from 1 mg to 2 mg q8 hrs PRN for CIWA > 8  Continue citalopram at home dose of 10 mg daily  Continue buspar at her home dose.     With reasonable medical certainty, based on history, chart review, available collateral information, and a present-state examination:  - the patient does not currently meet the criteria for psychiatric hospitalization  - the patient is deemed to be currently best managed on a medical unit, owing to the need for medical stabilization  - the need for psychiatric hospitalization is currently NOT anticipated upon stabilization of acute medical condition  - PEC is not indicated  - adjustments to psychotropic regimen as noted herein, otherwise continue as prescribed  - defer non-psychiatric medication(s) and management to the current provider(s) of record  - facility staff (e.g., case management, social work) to arrange for appropriate follow up care  - upon discharge, it is recommended to follow up with an outpatient provider within 1-2 weeks of discharge, but ideally as soon as possible  - patient should contact their current outpatient provider expeditiously after discharge to apprise them of the situation and receive further instructions  - continue alcohol (or sedative-hypnotic) withdrawal protocol, including supportive measures,frequent monitoring of  "vitals and CIWA-Ar, and use of PRN +/- standing benzodiazepines (see herein for dosing guidance and/or recommendations)  -- RECOMMENDATIONS STATUS POST DETOX: establish and maintain sobriety and a recovery lifestyle, complete a licensed accredited rehab program, and engage in 12 step (or equivalent) meetings and activities  - counseled on full abstinence from alcohol and substances of abuse (illicit and prescription)  - full engagement in 12 step (or equivalent) recovery program(s), including meeting attendance and acquisition/maintenance of sponsor  - relapse prevention and motivational interviewing provided  - provided resources for various addiction rehabilitation options as part of aftercare planning  - augmentation with additional psychotherapy via a licensed counselor,  or psychologist is recommended: the patient is contemplative  - follow with primary care provider for routine health maintenance and management of medical co-morbidities  - defer non-psychiatric medication(s) and management to the current primary and/or specialist provider(s) of record       PRESENTATION:     Cira Keys presents with the following chief complaint: alcohol and/or drug addiction    Per Chart:  43 YOF with hx of DM1 who presented after rapid response to [facility] where her  receives infusions for his metastatic cancer. Ms. Keys was altered and found to be in DKA with an anion gap of 18 and glucose of 345. She previously was managed with an insulin pump but due to insurance changes, her pump was no longer supported in December of 2023 and she has been on 5x daily sliding scale and long acting with levemir. Her stay was complicated by altered mental status and agitation treated with droperidol and valium. She endorsed daily mixed drinks and a history of withdrawal symptoms and liver injury in 2022, and voiced a desire for assistance with alcohol cessation.    Per Patient:  "I tried to stop " "on my own, but I couldn't"  Ms. Keys states that she knows she has a problem with drinking. She has a history with IVDU (cocaine) 30 yrs ago, but stopped that "when I couldn't get access, so I switched to alcohol."  She also endorses intermittent THC use. She has a history of 1x DUI in [place] in 1999, and in 2022 was told that her drinking was hurting her liver.  She stopped drinking for ~6 months at that time, but after her liver tests normalized, she slowly started back again.  Currently, she finishes ~1.75 liters of vodka every 3-4 days.  Her last drink was the night of 1/15/24. She is interested in help stopping drinking, including medications and 12-step programs, stating that she knows her drinking could kill her.    Collateral:   None      REVIEW OF SYSTEMS:  I[]I Patient denies any pertinent ROS, and none is known.  I[]I Patient unable or unwilling to provide any ROS.    [x] Y  [] N  sleep disturbance: ** decreased quality of sleep  [x] Y  [] N  appetite/weight change: **  Positive for: decreased appetite  [x] Y  [] N  fatigue/anergia: **   [x] Y  [] N  impairment in focus/concentration: **   [] Y  [x] N  depression: ** has a history of depression but states her current mood is "okay"  [x] Y  [] N  anxiety/worry: **   [x] Y  [] N  dysregulated mood/behavior: **  Positive for: irritability   [] Y  [x] N  manic symptomatology: **   [x] Y  [] N  psychosis: **  Positive for: auditory hallucinations, visual hallucinations     A pertinent medical review of systems was performed with the following notable findings: tremor    CURRENT PSYCHOTROPIC REGIMEN:  I[x]I Y  I[]I N  I[]I U    Citalopram 10 mg and buspirone 5 mg BID      ADDICTION:     I[]I Y  I[x]I N  I[]I U  I[]I Current  I[]I Former  Nicotine Use:   I[x]I Y  I[]I N  I[]I U  I[x]I Current  I[]I Former  Alcohol Use:   I[x]I Y  I[]I N  I[]I U  I[x]I Current  I[]I Former  Alcohol Misuse/Abuse:   I[]I Y  I[]I N  I[]I U  I[]I " "Current  I[x]I Former  Illicit Drug Use/Misuse/Abuse:   I[]I Y  I[x]I N  I[]I U  I[]I Current  I[]I Former  Misuse/Abuse of Rx Medications:   I[x]I Cannabis  I[x]I Cocaine  I[]I Heroin  I[]I Meth  I[]I Opioids  I[]I Stimulants  I[]I Benzos  I[]I Other:     I[]I N/A  I[]I U  Substance(s) of Choice: alcohol  I[]I N/A  I[]I U  Substance(s) Used Currently/Recently: alcohol, THC  I[]I N/A  I[]I U  Alcohol Consumption: daily or near daily 1.75 liters of vodka every 3-4 days  I[]I N/A  I[]I U  Last Drink: 1/15/24  I[x]I N/A  I[]I U  Last Drug Use:   I[x]I N/A  I[]I U  Duration of Sobriety/Abstinence: longest previous abstinence: 6 months    I[]I Y  I[x]I N  I[]I U  Hx of Detox:   I[]I Y  I[x]I N  I[]I U  Hx of Rehab:   I[x]I Y  I[]I N  I[]I U  Hx of IVDU:   I[]I Y  I[x]I N  I[]I U  Hx of Accidental Overdose:   I[x]I Y  I[]I N  I[]I U  Hx of DUI:   I[]I Y  I[x]I N  I[]I U  Hx of Complicated Withdrawal (i.e. Seizures and/or Delirium Tremens):   I[]I Y  I[x]I N  I[]I U  Hx of Known/Suspected Substance-Induced Psychiatric Disorder:   I[]I Y  I[x]I N  I[]I U  Hx of Medication Assisted Treatment:   I[]I Y  I[x]I N  I[]I U  Hx of Twelve Step Program (or Equivalent) Involvement:   I[]I Y  I[x]I N  I[]I U  Currently Exhibits Signs of Intoxication:   I[x]I Y  I[]I N  I[]I U  Currently Exhibits Signs of Withdrawal:   I[]I Y  I[x]I N  I[]I U  Currently Active in Recovery:   I[x]I Y  I[]I N  I[]I U  Social Support:   I[]I Y  I[x]I N  I[]I U  Spouse/Partner Consumption:     I[]I N/A  I[x]I Y  I[]I N  I[]I U  Acknowledges/Accepts Addiction:   I[]I N/A  I[x]I Y  I[]I N  I[]I U  Advised to Quit/Cut Back:   I[]I N/A  I[x]I Y  I[]I N  I[]I U  Alcohol/Drug Cessation ("Wants to Quit"): Interested in Quitting, Advised to Quit , Encouragement Provided, Motivational Interviewing Provided, Resources Provided  I[]I N/A  I[x]I Y  I[]I N  I[]I U  Motivation to Pursue Treatment: High  I[x]I N/A  I[]I Y  I[]I N  I[]I U  Tobacco Cessation ("Wants to " "Quit"):     DSM-5-TR SUBSTANCE USE DISORDER CRITERIA:     -- Impaired Control:  I[x]I Y  I[]I N  I[]I U  I[]I A  I[]I D  Often take in larger amounts or over a longer period of time than was intended:   I[x]I Y  I[]I N  I[]I U  I[]I A  I[]I D  Persistent desire or unsuccessful efforts to cut down or control use:   I[x]I Y  I[]I N  I[]I U  I[]I A  I[]I D  Great deal of time spent in activities necessary to obtain substance, use, or recover from effects:   I[]I Y  I[]I N  I[x]I U  I[]I A  I[]I D  Craving/strong desire for substance or urge to use:   -- Social Impairment:  I[]I Y  I[]I N  I[x]I U  I[]I A  I[]I D  Use resulting in failure to fulfill major role obligations at home, work or school:   I[x]I Y  I[]I N  I[]I U  I[]I A  I[]I D  Social, occupational, recreational activities decreased because of use:   I[x]I Y  I[]I N  I[]I U  I[]I A  I[]I D  Continued use despite having persistent or recurrent social or interpersonal problems caused or exacerbated by the substance:   -- Risky Use:  I[]I Y  I[]I N  I[x]I U  I[]I A  I[]I D  Recurrent use in situations in which it is physically hazardous:   I[x]I Y  I[]I N  I[]I U  I[]I A  I[]I D  Use despite physical or psychological problems that are likely to have been caused or exacerbated by the substance:   -- Neuroadaptation:  I[x]I Y  I[]I N  I[]I U  I[]I A  I[]I D  Tolerance, as defined by either of the following: (1) a need for markedly increased amounts of substance to achieve intoxication or desired effect.  -OR- (2) a markedly diminished effect with continued use of the same amount of substance:   I[x]I Y  I[]I N  I[]I U  I[]I A  I[]I D  Withdrawal, as manifested by either of the following: (1) the characteristic withdrawal syndrome for substance.  -OR- (2) substance is taken to relieve or avoid withdrawal symptoms:   -- Mild (1-3), Moderate (4-5), Severe (?6)    I[]I N/A  I[x]I Y  I[]I N  I[]I U  I[]I A  I[]I D  Active Substance Use Disorder:       HISTORY:     I[]I " Patient denies any history, and none is known.  I[]I Patient unable or unwilling to provide any history.    I[x]I Y  I[]I N  I[]I U  Psychiatric Diagnoses: depression and anxiety with history of suicide attempt  I[]I Y  I[x]I N  I[]I U  Current Psychiatric Provider (if Applicable): Rx from PCP, Dr. Mercado  I[]I Y  I[x]I N  I[]I U  Hx of Psychiatric Hospitalization:   I[]I Y  I[x]I N  I[]I U  Hx of Outpatient Psychiatric Treatment (psychiatry/psychotherapy):   I[x]I Y  I[]I N  I[]I U  Psychotropic Trials: celexa 10 mg daily with buspar 5 mg BID  I[x]I Y  I[]I N  I[]I U  Prior Suicide Attempts:   I[x]I Y  I[]I N  I[]I U  Hx of Suicidal Ideation:   I[]I Y  I[x]I N  I[]I U  Hx of Homicidal Ideation:   I[]I Y  I[x]I N  I[]I U  Hx of Self-Injurious Behavior (Non-Suicidal):   I[]I Y  I[x]I N  I[]I U  Hx of Violence:   I[]I Y  I[x]I N  I[]I U  Documented Hx of Malingering:     FAMILY HISTORY:  I[x]I Y  I[]I N  I[]I U    Father - EtOH and suicide (successful)  Brother - Suicide (successful)    I[]I Y  I[x]I N  I[]I U  Hx of Trauma/Neglect:   I[]I Y  I[x]I N  I[]I U  Hx of Physical Abuse:   I[]I Y  I[]I N  I[x]I U  Hx of Sexual Abuse:   I[x]I Y  I[]I N  I[]I U  Grew Up Locally?:   I[x]I Y  I[]I N  I[]I U  Happy Childhood?:   I[]I Y  I[x]I N  I[]I U  Significant Developmental Delay/Disability?:   I[]I Y  I[x]I N  I[]I U  GED/High School Dipoloma?:   I[]I Y  I[x]I N  I[]I U  Post High School Education?:   I[]I Y  I[x]I N  I[]I U  Currently Employed?:   I[]I Y  I[]I N  I[x]I U  On or Applying for Disability?:   I[x]I Y  I[]I N  I[]I U  Functions Independently?:   I[]I Y  I[x]I N  I[]I U  Financially Stable?:   I[x]I Y  I[]I N  I[]I U  Domiciled?:   I[]I Y  I[x]I N  I[]I U  Lives Alone?:   I[x]I Y  I[]I N  I[]I U  Heterosexual/Cisgender?:   I[x]I Y  I[]I N  I[]I U  Currently in a Romantic Relationship?:   I[x]I Y  I[]I N  I[]I U  Ever ?:   I[x]I Y  I[]I N  I[]I U  Children/Dependents?:   I[]I Y  I[x]I N  I[]I U   Sikhism/Spiritual?:   I[]I Y  I[x]I N  I[]I U   History?:   I[]I Y  I[x]I N  I[]I U  Current Legal Issues:   I[x]I Y  I[]I N  I[]I U  Past Charges/Convictions:   I[]I Y  I[x]I N  I[]I U  Hx of Incarceration:   I[x]I Y  I[]I N  I[]I U  Engaged in Hobbies/Recreational Activities?:   I[]I Y  I[x]I N  I[]I U  Access to a Gun?: Does have crossbow for hunting  I[]I Y  I[x]I N  I[]I U  Hx of Seizure:   I[]I Y  I[x]I N  I[]I U  Hx of Significant Head Trauma (e.g., Loss of Consciousness, Concussion, Coma):    I[x]I Y  I[]I N  I[]I U  Medical History & Diagnoses:  Type 1 diabetes, alcoholic liver disease     The patient's past medical history has been reviewed and updated as appropriate within the electronic medical record system.    Shock    Type 1 diabetes mellitus with hyperglycemia    Hypomagnesemia    Essential hypertension    DKA (diabetic ketoacidoses)    Alcohol withdrawal syndrome with complication    Alcoholic liver disease    WALTER (generalized anxiety disorder)    Mixed hyperlipidemia    Encephalopathy, metabolic    Syncope    Alcohol use disorder     Scheduled and PRN Medications: The electronic chart was reviewed and updated as appropriate.  See Medcard for details.    Current Facility-Administered Medications:     atorvastatin tablet 20 mg, 20 mg, Oral, Daily, Shawn Caldera DO, 20 mg at 01/17/24 0838    busPIRone tablet 5 mg, 5 mg, Oral, BID PRN, Shawn Caldera DO    dextrose 10 % infusion, , Intravenous, PRNBob Mary, MD    dextrose 10 % infusion, , Intravenous, PRNBob Mary, MD    dextrose 10% bolus 125 mL 125 mL, 12.5 g, Intravenous, PRNBob Mary, MD    dextrose 10% bolus 250 mL 250 mL, 25 g, Intravenous, PRNBob Mary, MD    enoxaparin injection 40 mg, 40 mg, Subcutaneous, Q24H (prophylaxis, 1700), Sruthi Berumen MD    folic acid tablet 1 mg, 1 mg, Oral, Daily, Cathryn Pierce MD, 1 mg at 01/17/24 1355    glucagon (human recombinant) injection 1 mg, 1 mg, Intramuscular, PRN,  Aj Calderain, DO    glucose chewable tablet 16 g, 16 g, Oral, PRN, Aj Calderain, DO    glucose chewable tablet 24 g, 24 g, Oral, PRN, Aj Calderain, DO    insulin aspart U-100 pen 0-5 Units, 0-5 Units, Subcutaneous, QID (AC + HS) PRN, John Mendoza MD    insulin aspart U-100 pen 12 Units, 12 Units, Subcutaneous, TIDWM, John Mendoza MD    insulin detemir U-100 (Levemir) pen 18 Units, 18 Units, Subcutaneous, BID, John Mendoza MD, 18 Units at 01/17/24 1142    k phos di & mono-sod phos mono 250 mg tablet 2 tablet, 2 tablet, Oral, Q4H, Cathryn Pierce MD, 2 tablet at 01/17/24 1355    LORazepam injection 1 mg, 1 mg, Intravenous, Q4H PRN, John Mendoza MD, 1 mg at 01/17/24 1148    melatonin tablet 6 mg, 6 mg, Oral, Nightly PRN, Shawn Caldera, DO    mupirocin 2 % ointment, , Nasal, BID, Jessie Haas MD, Given at 01/17/24 0838    naloxone 0.4 mg/mL injection 0.02 mg, 0.02 mg, Intravenous, PRN, Shawn Caldera, DO    sodium chloride 0.9% flush 10 mL, 10 mL, Intravenous, Q12H PRN, Aj Calderain, DO    sodium chloride 0.9% flush 10 mL, 10 mL, Intravenous, PRN, Marzena Rojas MD    thiamine (B-1) 500 mg in sodium chloride 0.9% 100 mL IVPB, 500 mg, Intravenous, Daily, Stopped at 01/17/24 1252 **FOLLOWED BY** [START ON 1/20/2024] thiamine tablet 100 mg, 100 mg, Oral, TID, Cathryn Pierce MD    Allergies:  Patient has no known allergies.    PSYCHOSOCIAL FACTORS:  Stressors (Biopsychosocial, Cultural and Environmental): finances, marriage ( has cancer)  Functioning Relationships: Daughter,     STRENGTHS AND LIABILITIES:   Strength: Patient accepts guidance/feedback.  Strength: Patient is expressive/articulate.  Strength: Patient is motivated for change.  Strength: Patient has positive support network.  Strength: Patient is stable.  Strength: Patient is accepting of treatment.  Strength: Patient is seeking help.  Strength: Patient embraces recovery.  Strength: Patient is kind.  Liability: Patient has poor  "health.  Liability: Patient is in active addiction.    Additional Relevant History, As Applicable:       EXAMINATION:     BP (!) 113/54 (BP Location: Left arm, Patient Position: Lying)   Pulse 104   Temp 98.6 °F (37 °C) (Oral)   Resp (!) 27   Ht 5' 7" (1.702 m)   Wt 77.1 kg (170 lb)   LMP 07/01/2023   SpO2 95%   BMI 26.63 kg/m²     MENTAL STATUS EXAMINATION:  General Appearance: **  adequately groomed, appropriately dressed, in no apparent distress  Behavior: **  normal; cooperative; reasonably friendly, pleasant, and polite; appropriate eye-contact; under good behavioral control  Involuntary Movements and Motor Activity: **  no abnormal involuntary movements noted, no psychomotor agitation or retardation, +tremors  Gait and Station: **  unable to assess - patient lying down or seated  Speech and Language: **  conversational, spontaneous, speaks and understands English proficiently  Mood: "okay"  Affect: **  normal, euthymic, reactive, full-range, mood-congruent, appropriate to situation and context  Thought Process and Associations: **  linear and goal-directed, with no loosening of associations  Thought Content and Perceptions: **  no auditory or visual hallucinations, no paranoid ideation, no ideas of reference, no evidence of delusions or psychosis  Sensorium: **  alert and oriented, with clear sensorium  Recent and Remote Memory: **  grossly intact, no significant impairments noted  Attention and Concentration: **  attentive, not readily distractible  Fund of Knowledge: **  grossly intact, used appropriate vocabulary, no significant deficits noted  Insight: **  intact, demonstrates awareness of illness  Judgment: **  intact, behavior is adequate/appropriate given the circumstances      RISK MANAGEMENT:     I[]I Y  I[x]I N  I[]I U  I[]I A  Suicidal Ideation/Behavior: **   I[]I Y  I[x]I N  I[]I U  I[]I A  Homicidal Ideation/Behavior: **  I[]I Y  I[x]I N  I[]I U  I[]I A  Violence: **  I[]I Y  I[x]I N  I[]I " U  I[]I A  Self-Injurious Behavior: **    The patient is deemed to be a reliable and factually accurate historian.    I[]I Y  I[x]I N  I[]I U  I[]I A  I[]I N/A  Minimization of Risk Parameters Suspected/Evident: **  I[]I Y  I[x]I N  I[]I U  I[]I A  I[]I N/A  Exaggeration of Risk Parameters Suspected/Evident: **      [] Y  [x] N  Danger to Self:   [] Y  [x] N  Danger to Others:   [] Y  [x] N  Grave Disability:       In cases of emergency, daily coverage provided by Acute/ER Psych MD, NP, PA, or SW, with contact numbers located in [facility] On Call Schedule.    Jluius Meehan  Department of Psychiatry  [facility]        KEY:     I[]I Y = Yes / Present / Endorses  I[]I N = No / Absent / Denies  I[]I U = Unknown / Unable to Assess / Unwilling to Participate  I[]I A = Ambiguity Exists / Accuracy Uncertain  I[]I D = Denial or Minimization is Suspected/Evident  I[]I N/A = Non-Applicable    CHART REVIEW:     Available documentation has been reviewed, and pertinent elements of the chart have been incorporated into this evaluation where appropriate.    The patient's last Epic encounter in the psychiatry department was on: Visit date not found  The patient's first Epic encounter in the psychiatry department was on:      LA/MS  [facility]  Site reviewed - No recent discrepancies or irregularities are noted.      ADVICE AND COUNSELING:     [x] In cases of emergencies (e.g. SI/HI resulting in danger to self or others, functioning deteriorates to the level of grave disability), call 911 or [phone], or present to the emergency department for immediate assistance.  [x] Patient should not operate a motor vehicle or heavy machinery if effects of medications or underlying symptoms/condition impair the ability to safely do so.    Alcohol, Tobacco, and Drug Counseling, as well as resources, has been provided, as warranted.     Shared medical decision making and informed consent are the hallmark and bedrock of good  clinical care, and as such have been employed and obtained, respectively, to the degree possible.      Risk Mitigation Strategies, Harm Reduction Techniques, and Safety Netting are important interventions that can reduce acute and chronic risk, and as such have been employed to the degree possible.    Prescription Drug Management entails the review, recommendation, or consideration without recommendation of medications, and as such was employed during the encounter.    Additional Psychoeducation has been provided, as warranted.    Discussed, to the extent possible, diagnosis, risks and benefits of proposed treatment vs alternative treatments vs no treatment, potential side effects of these treatments and the inherent unpredictability of treatment. The patient expresses understanding of the above and displays the capacity to agree with this treatment given said understanding. Patient also agrees that, currently, the benefits outweigh the risks and consents to treatment at this time.     Written material has been provided to supplement, augment, and reinforce any discussions and interventions, via the AVS or other pre-printed handouts, as warranted.      DIAGNOSTIC TESTING:     The chart was reviewed for recent diagnostic procedures and investigations, and pertinent results are noted below.    Na 134 (L)  1/17/2024   K 4.2  1/17/2024   Ca 8.0 (L)  1/17/2024  Phos 2.4 (L)  1/17/2024   Mg 1.7  1/17/2024     Glu 166 (H)  1/17/2024   HgA1c 6.5 (H)  1/16/2024    BUN 8  1/17/2024   Cr 0.7  1/17/2024   GFR >60.0  1/17/2024   Specific Gravity 1.020  1/16/2024   Protein (Urine) Negative  1/16/2024   Microalbumin *   *     T Prot 5.7 (L)  1/17/2024   Alb 3.0 (L)  1/17/2024   T Bili 0.9  1/17/2024   Alk Phos 43 (L)  1/17/2024   AST 16  1/17/2024   ALT 8 (L)  1/17/2024   GGT *   *   NH3 *   *   Amylase *   *   Lipase *   *    TSH 3.674  1/16/2024   Free T4 0.76  10/2/2023  PTH *   *  Prolactin *   *    CPK *   *   Troponin I 0.017  1/16/2024   PT *   *   INR *   *    WBC 14.10 (H)  1/17/2024   RBC 3.17 (L)  1/17/2024   Hgb 8.8 (L)  1/17/2024   HCT 27.7 (L)  1/17/2024   MCV 87  1/17/2024    1/17/2024   ANC 11.6; 82.2 (H);  (H)  1/17/2024    Cholesterol 181  10/2/2023   Triglycerides 66  10/2/2023   LDL 75.8  10/2/2023   HDL 92 (H)  10/2/2023     B12 *   *   Folate *   *   Thiamine *   *   Vit D *   *     HIV 1/2 Ag/Ab Non-reactive  1/16/2024   Hep B Surface *   *   Hep B Core *   *   Hep A *   *   Hep C Non-reactive  1/16/2024   RPR *   *     Lithium *   *   VPA *   *   Clozapine *   *     Alcohol <10  1/16/2024   Benzodiazepines Negative  1/16/2024   Barbiturates Negative  1/16/2024   Cannabis Presumptive Positive (A)  1/16/2024   Cocaine Negative  1/16/2024   Amphetamines Negative  1/16/2024   PCP Negative  1/16/2024   Opiates Negative  1/16/2024   Methadone Negative  1/16/2024   Buprenorphine *   *   Fentanyl *   *   Oxycodone *   *   Tramadol *   *     Ethanol <10  1/16/2024  PETH *   *   EtG *   *   EtS *   *   Buprenorphine *   *   Norbuprenorphine *   *     Results for orders placed or performed during the hospital encounter of 01/16/24   EKG 12-lead    Collection Time: 01/16/24  7:40 PM    Narrative    Test Reason :     Vent. Rate : 119 BPM     Atrial Rate : 119 BPM     P-R Int : 154 ms          QRS Dur : 088 ms      QT Int : 322 ms       P-R-T Axes : 069 230 049 degrees     QTc Int : 452 ms    Sinus tachycardia  Right superior axis deviation  Right ventricular conduction delay  Low QRS voltage  Abnormal ECG  When compared with ECG of 16-JAN-2024 19:36,  No significant change was found  Confirmed by SUSHMA MORENO MD ([id number]) on 1/17/2024 1:54:30 PM    Referred By: [email or web address]   SELF           Confirmed By:SUSHMA MORENO MD       No results found for this or any previous visit.    CONSULTATION:     A diagnostic psychiatric evaluation was performed and  responsiveness to treatment was assessed.  The patient demonstrates adequate ability/capacity to respond to treatment.    Inpatient consult to Psychiatry  Consult performed by: Julius Meehan MD  Consult ordered by: Cathryn Pierce MD          - Facility staff were informed of the encounter documentation.

## 2024-01-17 NOTE — PLAN OF CARE
SW met with patient to complete Discharge planning assessment and SW inquired about alcoholism.  Patient conveyed that she drinks alcohol daily.  Patient admits to drinking a pint of alcohol q3 days.  SW offered to provide patient with resources, she was agreeable.  SW provided patient with alcohol and drug addiction resources.      Josette Perez LMSW  [facility]  [phone]

## 2024-01-17 NOTE — PROGRESS NOTES
Therapy with vancomycin was discontinued by the provider.  Pharmacy will sign off, please re-consult as needed.    Thank you for the consult,   Marylu Munroe, PharmD, [facility]  [id number]

## 2024-01-17 NOTE — SUBJECTIVE & OBJECTIVE
"Past Medical History:   Diagnosis Date    E coli bacteremia     GERD (gastroesophageal reflux disease)     Hyperlipidemia     Iron deficiency anemia due to chronic blood loss 10/31/2015    Type 1 diabetes mellitus without complication 10/31/2015       Past Surgical History:   Procedure Laterality Date     SECTION      LAPAROSCOPIC SALPINGECTOMY Bilateral 2023    Procedure: SALPINGECTOMY, LAPAROSCOPIC;  Surgeon: Clem Sidhu MD;  Location: [facility] OR;  Service: OB/GYN;  Laterality: Bilateral;    LAPAROSCOPIC TOTAL HYSTERECTOMY N/A 2023    Procedure: HYSTERECTOMY, TOTAL, LAPAROSCOPIC;  Surgeon: Clem Sidhu MD;  Location: [facility] OR;  Service: OB/GYN;  Laterality: N/A;    TUBAL LIGATION         Review of patient's allergies indicates:  No Known Allergies    Family History       Problem Relation (Age of Onset)    Hyperlipidemia Mother    Hypertension Father    No Known Problems Sister, Brother          Tobacco Use    Smoking status: Former     Current packs/day: 0.00     Types: Cigarettes     Quit date: 12/10/2021     Years since quittin.1     Passive exposure: Current    Smokeless tobacco: Never   Substance and Sexual Activity    Alcohol use: Yes     Alcohol/week: 5.0 standard drinks of alcohol     Types: 5 Drinks containing 0.5 oz of alcohol per week     Comment: occ    Drug use: No     Comment: "20-30 years ago drug use"    Sexual activity: Yes     Partners: Male     Birth control/protection: See Surgical Hx     Comment:       Review of Systems   Unable to perform ROS: Mental status change     Objective:     Vital Signs (Most Recent):  Temp: 98.6 °F (37 °C) (24)  Pulse: (!) 115 (24)  Resp: 20 (24)  BP: (!) 94/43 (24)  SpO2: 97 % (24) Vital Signs (24h Range):  Temp:  [97.8 °F (36.6 °C)-98.6 °F (37 °C)] 98.6 °F (37 °C)  Pulse:  [] 115  Resp:  [17-26] 20  SpO2:  [95 %-100 %] 97 %  BP: ()/(39-83) 94/43   Weight: " 77.1 kg (170 lb)  Body mass index is 26.63 kg/m².      Intake/Output Summary (Last 24 hours) at 1/16/2024 2100  Last data filed at 1/16/2024 1802  Gross per 24 hour   Intake 2290.03 ml   Output --   Net 2290.03 ml          Physical Exam  Vitals reviewed.   Constitutional:       General: She is not in acute distress.     Appearance: Normal appearance. She is ill-appearing.   HENT:      Head: Normocephalic and atraumatic.      Nose: No congestion or rhinorrhea.      Mouth/Throat:      Mouth: Mucous membranes are moist.      Pharynx: Oropharynx is clear.   Eyes:      Extraocular Movements: Extraocular movements intact.      Pupils: Pupils are equal, round, and reactive to light.   Cardiovascular:      Rate and Rhythm: Regular rhythm. Tachycardia present.      Pulses: Normal pulses.      Heart sounds: Normal heart sounds.   Pulmonary:      Effort: Pulmonary effort is normal. No respiratory distress.      Breath sounds: Normal breath sounds.   Abdominal:      General: Bowel sounds are normal.      Palpations: Abdomen is soft.      Tenderness: There is no abdominal tenderness.   Musculoskeletal:         General: Tenderness (TTP to anterior chest) present. No swelling.   Skin:     Findings: No bruising or rash.   Neurological:      General: No focal deficit present.      Mental Status: She is disoriented.   Psychiatric:      Comments: Somnolent, but responds mostly appropriately            Vents:     Lines/Drains/Airways       Peripheral Intravenous Line  Duration                  Peripheral IV - Single Lumen 01/16/24 0936 18 G Right Antecubital <1 day         Peripheral IV - Single Lumen 01/16/24 1108 20 G Left Antecubital <1 day                  Significant Labs:    CBC/Anemia Profile:  Recent Labs   Lab 01/16/24  1005 01/16/24 2015   WBC 10.76 11.01   HGB 10.9* 8.9*   HCT 35.2* 29.4*    270   MCV 87 90   RDW 15.2* 15.8*        Chemistries:  Recent Labs   Lab 01/16/24  1005 01/16/24  1219 01/16/24  1414  01/16/24  1550    141  --  140   K 4.4 3.7  --  3.9    107  --  106   CO2 19* 19*  --  18*   BUN 12 12  --  12   CREATININE 0.8 0.8  --  0.7   CALCIUM 9.8 9.3  --  9.0   ALBUMIN 4.1  --   --   --    PROT 7.9  --   --   --    BILITOT 1.0  --   --   --    ALKPHOS 66  --   --   --    ALT 14  --   --   --    AST 19  --   --   --    MG 1.2*  --  1.8  --    PHOS  --  2.3*  --   --        BMP:   Recent Labs   Lab 01/16/24  1414 01/16/24  1550   GLU  --  183*   NA  --  140   K  --  3.9   CL  --  106   CO2  --  18*   BUN  --  12   CREATININE  --  0.7   CALCIUM  --  9.0   MG 1.8  --      CMP:   Recent Labs   Lab 01/16/24  1005 01/16/24  1219 01/16/24  1550    141 140   K 4.4 3.7 3.9    107 106   CO2 19* 19* 18*   * 175* 183*   BUN 12 12 12   CREATININE 0.8 0.8 0.7   CALCIUM 9.8 9.3 9.0   PROT 7.9  --   --    ALBUMIN 4.1  --   --    BILITOT 1.0  --   --    ALKPHOS 66  --   --    AST 19  --   --    ALT 14  --   --    ANIONGAP 18* 15 16       Significant Imaging: I have reviewed all pertinent imaging results/findings within the past 24 hours.

## 2024-01-17 NOTE — DISCHARGE INSTRUCTIONS
REFERRAL RECOMMENDATIONS FOR SUBSTANCE ABUSE & MENTAL HEALTH      IN CASE OF SUICIDAL THINKING, call the National Suicide Hotline Number: 988    988 Suicide & Crisis Lifeline: 982 , 0-391-455-WFDS (0592)  https://988[employer].INTTRA       SUBSTANCE ABUSE:     [facility] (formerly known as the ABU)  [x] [phone], Option 2  [x] [address] [facility] 4[facility], [place] [address]  [x] [email or web address]://[email or web address].[email or web address]/services/[email or web address]-recovery-program  [x] [facility] delivers comprehensive and collaborative treatment for alcohol and substance use disorders.  Excellent program for working professionals or anyone else seeking recovery.  [x] Requires insurance approval prior to starting program, call number above for more information.  [x] Intensive Outpatient Rehabilitation Program - M-F 9am-3pm - daily groups with psychologists and social workers, sessions with MDs 3x per week   [x] Ambulatory detox and dual diagnosis available      SUBOXONE:     NOTE: some Suboxone clinics require their clients to participate in a structured program (such as an IOP) in order to be prescribed Suboxone.  Some clinics have a long waiting list.  Most of these clinics do not accept walk-in clients, so call first to to learn what must be done to get started on Suboxone.    [facility] - [phone] (can do Sublocade)  [address] [facility], [place] [address]    [address]  [phone]    [facility] - [phone] (can do Sublocade)  [address] [place] [address]    [facility]  [address] [place]  [phone]     [facility] (very short waiting list, may accept some walk-in's but call first if possible)  [address], [place] [address]  [phone]; [phone]    [facility]   [address] [place]    [phone]    [facility] (can usually be seen within a week but is cash only  for appointment)  [address] [place], [place]    [facility] ([facility])  [address] [facility], [address] [place] [address] [phone]    [facility] ([facility])  [address], [place]  [phone]    [facility] ([facility])  [address] [facility] [address]  [phone]    [place], [place]:    [facility] - [address] [place] [address] - Tel: [phone]    Lopez Peterson - [address] [place] [address] - Tel: [phone]    Ta Guajardo - [address] [place] [place] [address] - Tel: [phone]    Pascual Garza - [address] [place] [address] - Tel: [phone]    Austin Redmond - [address] [place] [address] - Tel: [phone]    [place], [place]:     Dr. Modesta Fonseca and Dr. Jm Carpenter - [address] [place], [place] - Tel: [phone]    Dr. Darcie Prado - [address] [place], [place] - Tel: [phone]    Dr. Hitesh Lynch - Tel: [phone]    Dr. Lang Grant - [facility] - [phone]      METHADONE:     [facility] (the only methadone clinic in the [facility], has two locations)  [x] [place] - [facility] [place] [address], [phone]  [x] [facility] [place] [address], [phone]    12 STEP PROGRAMS (and similar):     Alcoholics Anonymous (local)  [x] [phone]  [x] www.[place].org for schedules for in-person and online meetings  [x] There are AA meetings throughout the day all over town  [x] AA costs nothing to attend; they pass a basket for donations but this is not required    Narcotics Anonymous  [x] [phone]  [x] www.noana.org  [x] There are NA meetings throughout the day all over town  [x] NA costs nothing to attend; they pass a basket for donations but this is not required    Alcoholics Anonymous Online Intergroup (national)  [x] www.aa-intergroup.org  [x] Good resource for large, nation-wide meetings  [x] Can also attend smaller, local meetings in other cities  [x] Countless meetings  all day and all night  [x] AA costs nothing to attend; they pass a basket for donations but this is not required    Flying Sober - 24/7 zoom meetings for women and coed - sign on anytime, anywhere!  [email or web address]-hzshxvss/    Online Intergroup of AA - 121 Open AA [place] Meeting - 24/7 zoom meetings  https://aa-intergroup.org/meetings/    LOOKING FOR AN ALTERNATIVE TO 12 STEP PROGRAMS - check out:  SMART Recovery: https://www.smartrecovery.org/about-us  [facility]avan Recovery: https://recoverydharma.org      DETOX UNITS (USUALLY 5-7 DAYS):     [facility]: [address], [place]  [phone], call first to ensure bed availability    [facility]: [address] [place]  [phone], Option 1, call first to ensure bed availability    [facility]: [facility] [address], [facility]  [phone] (intake by appointment only)    [facility]: [address], [place]  [phone]      INTENSIVE OUTPATIENT PROGRAMS:     [facility] (formerly known as the ABU)  [x] [phone], Option 2  [x] [address] [facility] 4th [facility], [facility] [address]  [x] [email or web address]://www.[email or web address]/services/[email or web address]-recovery-program  [x] [facility] delivers comprehensive and collaborative treatment for alcohol and substance use disorders.  Excellent program for working professionals or anyone else seeking recovery.  [x] Requires insurance approval prior to starting program, call number above for more information.  [x] Intensive Outpatient Rehabilitation Program - M-F 9am-3pm - daily groups with psychologists and social workers, sessions with MDs 3x per week   [x] Ambulatory detox and dual diagnosis available    [facility] Intensive Outpatient Program  [x] [phone]  [x] [address] [facility] (the clinic not [facility])  [x] Call number above for more info and to check insurance requirements    [address] [place] [address]  [phone]    [facility]:  [address] [facility], [address]?, [place], [place] [address]?, [phone]  [address] [facility]?, [place], [place] [address]?, [phone]    RESIDENTIAL REHABS (USUALLY 28 DAYS):     [facility]: [address], [phone]    [place] [facility]: [address] [place] [address]  [phone] (intake by appointment only)    [facility] (men only) [address], [phone]    [facility] (Female only) [address], [phone]    [facility]: [address], [place], men's program [phone], women's program [phone]    [facility]: [address] [place] [address], [phone]    [facility]: [address], [place], [place], [phone]    [facility]: Men only, [phone], [address] [facility] Genaro Espinal [facility]: [address], [place], [place], [phone]    [facility]: [address] [place] [address],  [phone]  New Location: [address] [place] [address] [address], [place] [address], [phone]    [facility]:   ?[address]?[place], [place] [address]?[phone]    [place]: [address], [place] [address], [phone]    [place]: [place], [phone]     [facility]: [place], [place], [phone]    [facility]: [place], [phone]    [facility]: [place], [place], [phone]    [place]: VAHE [place], [phone]    [facility]: [address], [place] [address], [phone]    COMMUNITY ADDICTION CLINICS:     [facility]: [address] [facility], [address] [place] -[phone] -or- [address] [place] [address]    [facility]: [address] [facility], [place]  [address]     [facility]: Clinics [phone]; Crisis [phone]    [facility]: [address] [place] [address]    [facility]: [address] [place] [address]    [facility]: [address] [place] [address],    [facility] Center: 2nd Floor [address] [facility] [place] [address]    [facility]: [address], [place] [address]    [facility], [address] [place] [address]    [facility]: [address] [facility], [place] [phone]  (serves youth 16-23 years old)    [facility]: [facility] [place]/[facility] [phone]    [facility]: [address] [facility], [place] [phone]    [facility]: [address] [facility] [phone]    [facility] Behavioral [facility]: [address] [facility], [address], [phone]     [facility]: [address] [facility], [phone], [phone]    RESOURCES IN OTHER [facility]:     [facility]: [address] [facility], [phone], [phone]    [facility]: [address] [facility], [address], [phone]    [facility], [address] [place] [address], [phone]    [facility] Behavioral Health: [address] [facility], [phone]    [facility], [address] [facility], [phone] ([facility])    [facility], [address] [facility], [phone] ([facility])    [facility], [address], [place], [phone] ([facility], [facility])    [facility], [address] [place], [phone] ([facility])    [facility] Hotline [phone], [phone]    [facility], [address] [facility], [address] [place] [address]  "Blvd[address], [facility], [address] [facility] [address], [facility], [address] [facility] [address] [place] [facility], [address], [place]    [facility] Human Services, [address], [place] [phone]    [facility], [address] [facility] [phone]    [facility] Recovery, [address] [facility] [phone]    [facility], [address] [facility], [phone]      Bhutanese SPEAKING (en español):     Información de la reunión de Alcohólicos Anónimos  [facility]to [facility], 10:00 am  Habla [facility]  Esta reunión está abierta y cualquiera puede asistir.    Uruguayan speaking Alcoholics anonymous meetings:  El "Lamberto [place] AA Skype" es un lamberto on line de Alcohólicos Anónimos en [facility]. El lamberto es saima, gratuito y virtual a través de Skype Audio. El lamberto funciona mediante vel llamada grupal de voz, por lo que no se utiliza la videollamada, ni se pueden toñito las imágenes o rostros de los participantes. Hace hanna años y medio abrimos el primer Lamberto de AA por Skype en [facility], bob actualmente asisten personas desde [place], [place], [place], [place], [place], [place], [place], [place], [place], [place], [place] y [place], entre otros.    El lamberto es muy útil para los alcohólicos que residen en lugares donde no se celebran reuniones de AA, o residen en lugares donde las reuniones de AA son un número limitado de días a la semana, o para aquellos compañeros que se hayan de viaje o que, por cualquier motivo, se hayan convalecientes y no pueden desplazarse. Todos los días nos reuniones a las 21:00 (hora española)    Podéis obtener más información sobre el lamberto y zackery sesiones en la página web [email or web address]      MENTAL HEALTH:     [facility]  Department of Psychiatry - " Outpatient Clinic  [phone]    [facility] of Psychiatry - General Psychiatry Intensive Outpatient Program  [facility] (formerly known as the BMU)  [phone], option 3    [facility] Psychiat[facility] - Dual Diagnosis Intensive Outpatient Program  [facility] (formerly known as the ABU)  [phone], option 2      [facility] MENTAL HEALTH CENTERS:     [facility]  (aka [facility], [facility])  Serves [facility].  Serves uninsured patients & those with Medicaid.  Main location: [address] [place] [address] [phone]  Walk-in's available during regular business hours.  24/7 Crisis Line: [phone]    [facility]  (aka [facility], [facility])  Serves [facility].  Serves uninsured patients, those with Medicaid and some private plans.  Walk-in's available during regular business hours.  Primary care services available as well.  [facility]: [address] [facility] [place] [address];  [phone]  [place]: [address] [place] [address];  [phone]  24/7 Crisis Line: [phone]    [facility]  Serves uninsured patients & those with Medicaid, call for more info.  Primary care, pediatrics, HIV treatment, and dentistry services available as well.  Three locations.  [phone]    [facility] [place]Corporate Office  Serves patients with Medicaid, Medicare, and private insurance  [address]  [place],?[place] [address]  [phone]    [facility]  Serves uninsured on a sliding scale, as well as Medicaid, Medicare, and private plans.  Eight locations around the [facility] area.  [phone]    [facility]  Serves uninsured patients & those with Medicaid, private insurances.  Primary care  available as well.  [phone]  [address] [place] [address]    [facility] Outpatient Psychiatry  Serves veterans who were honorably discharged.  [address] [place] [address]  [phone]  24/7 Veterans Crisis Line: [phone] (Press 1)    If you have private insurance and need to find a specialist, please contact your insurance network to request a list of providers covered by your benefits.      MENTAL HEALTH/ADDICTIVE DISORDERS:     AA ([phone]), NA ([phone])   National Suicide Prevention Lifeline- Call 1-123.974.1842 Available 24 hours everyday  [facility] [phone]; Crisis Line 230-9707 - Call for options A-F:  Intensive Outpatient Treatment/ Day programs   [facility], please contact   [facility], please contact [phone] or [phone] to speak with an admissions counselor.  [facility] (Methadone Maintenance)   [address] [place] [address], [phone]  [address] [place] [address] [phone]  [facility], [address] [place] [address], [phone]  [facility] [phone]  [place] [facility] [place] please contact [phone]  [facility], [address] [facility], 4th floor [place] [address] Phone: [phone]   Kamryn Richard Office: [address] [place] [address], [phone]  [place] Office: [address] [facility], [address], [place] [address], [phone]  [place] Office: [address] [place] [address] [phone]    Outpatient Substance Abuse Treatment   [facility] (Methadone Maintenance)   [address] [place] [address], [phone]  [address] [place] [address] [phone]  [facility], [address] [place] [address], [phone]  Acer  [place] Office: [address], [place] [address], [phone]  [place]  Office: [address] [facility], [address], [place] [address], [phone]  [place] Office: [address] [facility], [place] [address] [phone]  [facility]ict[facility], [address] [place] [address] [phone]   [facility] Recovery, [address] [facility], [address], [phone]  [facility]over, [address] [place], [place] [phone]    Residential Substance Abuse Treatment   [facility] [address] [facility], [phone]  [facility], [address] [facility], [phone]  [facility] (men only) [address] [place] [address], [phone]  [facility] (women only) [address] [place] [address] [phone]  [facility], [address] [place] [address] [phone]  [facility] (women only), intakes at [address] [facility], [phone]  [facility] (7-day program, $100, [address], [phone], [phone], [phone])  [place] [facility] (Men only, [phone]), [address], [place] (Vets*/Non-Vets)  [facility] (Men only, $400/month program fee) [address] [facility], [phone]  [place] (Women over age 39 only), [address] [facility], [phone]    Out of Area:    [place], [address] [facility] [address], [place], [place] [phone])  [facility] Recovery Program (men only), [address] [facility] [place] [address], [phone]  [facility], [address] [place], [place] [phone])  [place], [address] [place] [address] Dr. Jama, [place] ([phone])  [facility], [address] [place] [address], [phone]  [facility] (Women only, has to have mental illness, can be homeless or substance abuser), [phone]        DOMESTIC VIOLENCE RESOURCES:     Advocacy  [facility] ([facility])  [address] [address]    [facility] ? [phone]  Services  provided: emergency shelter, individual advocacy, information and referrals, group support, children's program, medical advocacy, forensic medical exams, primary care, legal assistance, counseling, safety planning, and caregiver support    [facility] HEALING AND EMPOWERMENT [place]  Confidential location  [facility] ? [phone]  Services provided: short term emergency shelter, all services provided are free of charge    [facility] FOR COMMUNITY ADVOCACY  Multiple locations in [facility], [facility], [place], and [facility] ([place], [place], and [place])    [facility] ? [phone]  Services provided: emergency shelter, individual advocacy, information and referrals, group support, children's program, medical advocacy, legal assistance in obtaining restraining orders, counseling, safety planning, and caregiver support    [facility]   Emergency Shelter   [phone]  Emergency Services ,Legal and Financial Assistance Services ,Housing Services ,Support Services     [place] [facility]   [phone]  Emergency Services ,Counseling Services , Housing Services ,Support Services ,Children's Services     [facility]OMEN WITH A VISION  [address] [place] [address]  WWAV ? [phone]  Services provided: advocacy, health education and supportive services, specializing in free healing services for marginalized groups, including LGBTQ individuals and sex workers    SEXUAL TRAUMA AWARENESS AND RESPONSE (STAR)  [address] [place] [address]    STAR ? [phone]  Services provided: individual advocacy, information and referrals, group support, medical advocacy, legal assistance, counseling, and safety planning for survivors of sexual assault    [facility])  [address] [place] [address]  [facility] Forensic Program ? [phone]  Services provided: free forensic medical exams for sexual assault and  domestic violence, which can be performed up to 5 days after an incident. It is not necessary to make a police report to receive a forensic medical exam    Legal  [facility]  [address], [place] [address]  Project SAVE ? [phone]  Services provided: free emergency legal representation for survivors of doemstic violence residing in [facility]. Legal services may include temporary restraining orders, temporary child support, custody, and use of property    [facility] LEGAL SERVICES (SLLS)  [address], [place] [address]  SLLS ? [phone]  Services provided: free legal representation for survivors of domestic violence residing in [facility]. Legal services may include temporary child support, custody, and divorce      HOTLINES:     [facility] DOMESTIC VIOLENCE HOTLINE  [phone]    Services provided: free and confidential hotline for victims and survivors of domestic violence. All calls will be routed to a domestic violence service provided in the victim or survivor's area    NATIONAL HUMAN TRAFFICKING HOTLINE  (723) 120-7851    Services provided: national anti-trafficking hotline serving victims and survivors of human trafficking. Provides information about local resources, and access to safe space to report tips, seek services, and ask for help    VIA LINK  211 or [phone]    Service provided: counselors can provide crisis counseling. Counselors can also provide information and referrals to programs which can help with needs such as food, shelter, medical care, financial assistance, mental health services, substance abuse treatment, senior services, childcare, and more      HOMELESS SHELTERS:      Homeless shelters  [facility]  Emergency shelter for individuals and families  [address]  [phone]  [facility]  Emergency shelter for men only  Meals daily 6am, 2pm, & 6pm  Clothing, case management M-F by appointment  (ID/job/housing/legal assistance), mail  [address] [facility]  [phone]  [place]  Emergency shelter for men  [address] [facility]  [phone]  Emergency shelter for women  [facility] [facility]  [phone]  Breakfast & lunch daily, dinner M-F  Case management, job counseling services   [facility]  Emergency shelter for teens and young adults up to 22yo  [address]  [phone]  [place] [facility]  Emergency shelter for women over 17yo and their kids  [address] [place] [address]  [phone]  [facility]  Day program, meals M-F 1PM (arrive early)  Showers, laundry, hygiene kits, showers, phones, , notary services, case management, ID assistance  [address] [facility]  [phone] M-F 8am-2:30pm  [employer]  Day program  [facility] 7:30am-3:30pm,  8:30am-3:30pm  Crisis intervention, employment assistance, food/clothing, hygiene kits, bus tokens, mail  [address] [facility] [address]  [phone]  [facility]  Mobile outreach for homeless persons in [facility]  [phone]  [facility]  Primary healthcare, case management, dental services, TB placement  Call ahead  [address] [facility] 2nd [address]  [phone]  [facility]  Connects homeless people with their loved ones in other cities by providing transportation costs   [phone]      [place] RESOURCES:     [place] Mobile Mental Health Crisis Response Team:    Region 12 ([place], [place], [place], and [place]) ([facility])  [phone]      Outpatient Mental Health & Addiction Clinic Resources for both [facility] and [facility]:    [facility] Mental Healthcare Resources  Website: www.pbmhr.org  Main Number: 415-533-6526    [facility] ([facility])  [address] ([address] [facility]) [place] [address]  [phone]    [facility] ([facility])  [address] ([address] [facility]) [place] [address]  [phone]    [facility]  [address] ([address]) [place] [address]  [phone]    [facility]  [address] ([address] [facility]) [place] [address]  [phone]      Addiction Treatment Resources for both [facility] and [place]:    [facility] (Detox, Residential, PHP, IOP, and Aftercare Programs)  [address] [place] [address]  [phone]    [facility] (Residential, IOP, Transitional Living, and Aftercare Programs)  [address] [place] [address]  [phone]    [facility] (Inpatient, Residential, Detox, IOP, Outpatient, and Aftercare Programs)  [address] [place] [address]  [phone] or toll free at [phone]      Outpatient Mental Health Psychotherapy Resources for both [facility] and [facility] [place]:    Tari Flynn, [facility]  [address]  [place] [address]  [phone]  Specialties: Depression, Anxiety, and Life Transitions    Gilma Stahl, PhD  [address]  [place] [address]  [phone]  Specialties: Testing and Evaluation, Education and Learning Disabilities, and ADHD    Cathleen Dockery, [facility] [address] [place] [place] [address]  [phone]  Specialties: Obsessive-Compulsive (OCD), Depression, and Relationship Issues    Rhina Adams LPC [address] [place] [address]  [phone]  Specialties: Trauma & PTSD, Mood Disorders, and Anxiety    Rhina Good, PhD, [facility] [address] [place] [address]  [phone]  Specialties: Family Conflict, Child, and Relationship Issues    Shani Gonzalez LPC [facility] [place] [address] [phone]  Specialties: Anxiety, Depression, and  Anger Management        IN CASE OF SUICIDAL THINKING, call the National Suicide Hotline Number: 988    988 Suicide & Crisis Lifeline: 988 , 8-0669-740-041-TALK (5305)  Provides 24/7, free and confidential support for people in distress, prevention and crisis resources for you or your loved ones, and best practices for professionals.    Call, text or chat.  https://[employer].org

## 2024-01-17 NOTE — RESIDENT HANDOFF
Handoff     Primary Team: Networked reference to record [facility]  Room Number: 6077/6077 A     Patient Name: Cira Keys MRN: [id number]     Date of Birth: [id number] Allergies: Patient has no known allergies.     Age: 43 y.o. Admit Date: 1/16/2024     Sex: female  BMI: Body mass index is 26.63 kg/m².     Code Status: Full Code        Illness Level (current clinical status): Watcher - No    Reason for Admission: Shock    Brief HPI (pertinent PMH and diagnosis or differential diagnosis): Patient is a 42 yo F with T1DM, HLD, GERD, and alcohol use disorder who was admitted to Hospital Medicine for DKA after a syncopal episode yesterday. She was taken off the DKA pathway early due to desire to leave the hospital AMA. Later in the evening, she experienced low MAPs and altered mentation, prompting her to be stepped up to the ICU. Of note, she had received droperidol (not home med), gabapentin (home med), Nifedipine (not home med), and Valium (not home med) during the course of the day prior to symptom onset.     Procedure Date: N/A    Hospital Course (updated, brief assessment by system or problem, significant events): She was admitted to the ICU for further management. She was placed back on DKA pathway and transitioned to SubQ insulin the afternoon of 1/17 after her gap closed. She endorses being dehydrated and not eating the day of her syncopal episode. In addition, she expresses desire and motivation to quit drinking alcohol, Addiction Psychiatry was consulted for assistance. At this time, she is medically ready to step back down to  for further management.     Tasks (specific, using if-then statements):   - F/u orthostatics  - F/u EKG for improvement of QTc; consider resuming Celexa if normalized  - F/u Addiction Psych recs - patient expresses interest in quitting but has failed to do so in the past  - Aggressive electrolyte repletion - patient had phosphorus < 1.0 this morning  - Resume lisinopril 5 mg po daily -  she is not taking it for HTN, but for renal protection   - Patient has new insurance ([employer]), will need to work with Pharmacy to find which outpatient basal and prandial insulins are covered  - Adjust insulin regimen as needed (current regimen based on the 0.05 U/kg/hr she was receiving towards the end of her DKA pathway)    Contingency Plan (special circumstances anticipated and plan):   - If patient starts having more severe withdrawal symptoms, consider increasing PRN Ativan dose, would be wary of scheduled Valium since it is has a longer half life     Estimated Discharge Date: 1/18/2024    Discharge Disposition: Home or Self Care    Mentored By: N/A

## 2024-01-17 NOTE — CONSULTS
Critical care consult note    Patient seen and examined at bedside. Will admit to MICU for shock. Full H&P to follow.     Sruthi Berumen MD  [facility] Resident PGY-3

## 2024-01-17 NOTE — PROGRESS NOTES
Pharmacokinetic Initial Assessment: IV Vancomycin    Assessment/Plan:    Initiate intravenous vancomycin with loading dose of 1500 mg once followed by a maintenance dose of vancomycin 1000 mg IV every 12 hours.  Desired empiric serum trough concentration is 15 to 20 mcg/mL  Draw vancomycin trough level 60 min prior to fourth dose on 0/18/2024 at approximately 1200.  Pharmacy will continue to follow and monitor vancomycin.      Please contact pharmacy at extension [phone] with any questions regarding this assessment.     Thank you for the consult,   Noblemaxine Aguirre       Patient brief summary:  Cira Keys is a 43 y.o. female initiated on antimicrobial therapy with IV Vancomycin for treatment of suspected sepsis    Drug Allergies:   Review of patient's allergies indicates:  No Known Allergies    Actual Body Weight:   77.1 kg    Renal Function:   Estimated Creatinine Clearance: 85.4 mL/min (based on SCr of 0.9 mg/dL).,     Dialysis Method (if applicable):  N/A    CBC (last 72 hours):  Recent Labs   Lab Result Units 01/16/24  1005 01/16/24 2015   WBC K/uL 10.76 11.01   Hemoglobin g/dL 10.9* 8.9*   Hemoglobin A1C % 6.5*  --    Hematocrit % 35.2* 29.4*   Platelets K/uL 292 270   Gran % % 85.5* 76.0*   Lymph % % 8.8* 15.7*   Mono % % 4.5 7.6   Eosinophil % % 0.1 0.0   Basophil % % 0.6 0.3   Differential Method  Automated Automated       Metabolic Panel (last 72 hours):  Recent Labs   Lab Result Units 01/16/24  1005 01/16/24  1105 01/16/24  1219 01/16/24  1414 01/16/24  1550 01/16/24 2015 01/17/24  0036   Sodium mmol/L 140  --  141  --  140 135* 137   Potassium mmol/L 4.4  --  3.7  --  3.9 4.5 4.6   Chloride mmol/L 103  --  107  --  106 104 109   CO2 mmol/L 19*  --  19*  --  18* 12* 8*   Glucose mg/dL 293*  --  175*  --  183* 359* 221*   Glucose, UA   --  4+*  --   --   --   --   --    BUN mg/dL 12  --  12  --  12 12 12   Creatinine mg/dL 0.8  --  0.8  --  0.7 0.8 0.9   Creatinine, Urine mg/dL  --  75.0  --   --   --  "  --   --    Albumin g/dL 4.1  --   --   --   --  3.1* 3.2*   Total Bilirubin mg/dL 1.0  --   --   --   --  0.7 1.3*   Alkaline Phosphatase U/L 66  --   --   --   --  51* 67   AST U/L 19  --   --   --   --  13 22   ALT U/L 14  --   --   --   --  10 11   Magnesium mg/dL 1.2*  --   --  1.8  --   --   --    Phosphorus mg/dL  --   --  2.3*  --   --   --   --        Drug levels (last 3 results):  No results for input(s): "VANCOMYCINRA", "VANCORANDOM", "VANCOMYCINPE", "VANCOPEAK", "VANCOMYCINTR", "VANCOTROUGH" in the last 72 hours.    Microbiologic Results:  Microbiology Results (last 7 days)       Procedure Component Value Units Date/Time    Blood culture [[id number]] Collected: 01/16/24 2035    Order Status: Sent Specimen: Blood from Peripheral, Hand, Left Updated: 01/16/24 2055    Blood culture [[id number]] Collected: 01/16/24 2035    Order Status: Sent Specimen: Blood from Peripheral, Forearm, Left Updated: 01/16/24 2055    Influenza A & B by Molecular [[id number]] Collected: 01/16/24 0951    Order Status: Completed Specimen: Nasopharyngeal Swab Updated: 01/16/24 1100     Influenza A, Molecular Negative     Influenza B, Molecular Negative     Flu A & B Source Nasal swab            "

## 2024-01-17 NOTE — ASSESSMENT & PLAN NOTE
Patient reports drinking 3 mixed drinks per day, last use 1 day prior to admission, ETOH level negative on testing.    - Will keep withdrawal in mind, but avoid benzos for now due to decompensation after receiving valium in the ER

## 2024-01-17 NOTE — ASSESSMENT & PLAN NOTE
"42yo F stepped up to MICU on 1/16/24 for shock of unclear etiology - suspect hypovolemic vs distributive shock from a combination of unresolved DKA, use of sedating agents (valium, droperidol, gabapentin) and nifedipine (not her home medication), and potentially sepsis. Patient started on broad spectrum antibiotics while in the ED due to concern for sepsis, but unclear source of infection. There was concern for PE given tachycardia and subjective SOB (though on room air and satting well). Bedside echo performed 1/16 showed hyperdynamic EF and normal RV without septal bowing. On exam, patient with warm extremities and palpable pulses. CXR unremarkable. CBC without leukocytosis. Other labs notable for elevated BHB and hyperglycemia. Suspect primary cause of hypotension at this point is dehydration and total body water deficit related to DKA, in setting of sedating medications.    Plan  - DKA management, see "DKA" for further details  - Continue broad spectrum abx with vanc and zosyn.  - Follow-up blood cultures and tailor antibiotic therapy accordingly  - 1L IVF bolus now in addition to 30cc/kg that was given earlier, total of 3.5L IVF, continue with mIVF  - Vasopressor support as needed to keep MAP >65  "

## 2024-01-17 NOTE — PLAN OF CARE
MICU DAILY GOALS     Family/Goals of care/Code Status   Code Status: Full Code    24H Vital Sign Range  Temp:  [97.8 °F (36.6 °C)-98.6 °F (37 °C)]   Pulse:  []   Resp:  [17-27]   BP: ()/(39-83)   SpO2:  [95 %-100 %]      Shift Events (include procedures and significant events)   Pt arrived to unit at 0001 for low BP and high blood sugar. Replaced mag, phos, and potassium. Insulin gtt @ .05.     AWAKE RASS: Goal -    Actual -      Restraint necessity: Not necessary   BREATHE SBT: Not intubated    Coordinate A & B, analgesics/sedatives Pain: managed   SAT: Not intubated   Delirium CAM-ICU: Overall CAM-ICU: Negative   Early(intubated/ Progressive (non-intubated) Mobility MOVE Screen (INTUBATED ONLY): Not intubated    Activity: Activity Management: Rolling - L1   Feeding/Nutrition Diet order: Diet/Nutrition Received: NPO, sips of water,     Thrombus DVT prophylaxis:     HOB Elevation Head of Bed (HOB) Positioning: HOB at 30-45 degrees   Ulcer Prophylaxis GI: yes   Glucose control managed     Skin Skin assessed during: Admit    Sacrum intact/not altered? Yes  Heels intact/not altered? Yes  Surgical wound? Yes    Check one (no altered skin or altered skin) and sub boxes:  [] No Altered Skin Integrity Present    []Prevention Measures Documented    [] Altered Skin Integrity Present or Discovered   [] LDA present in EPIC              [] LDA added in EPIC   [] Wound Image Taken (required on admit,                   transfer/discharge and every Tuesday)    Wound Care Consulted? No    Attending Nurse:     Second RN/Staff Member:    Bowel Function no issues    Indwelling Catheter Necessity         Purewick   De-escalation Antibiotics Yes       VS and assessment per flow sheet, patient progressing towards goals as tolerated, plan of care reviewed with  Cira Keys , all concerns addressed on my shift.

## 2024-01-18 PROBLEM — R00.0 SINUS TACHYCARDIA: Status: ACTIVE | Noted: 2024-01-18

## 2024-01-18 PROBLEM — I47.10 SVT (SUPRAVENTRICULAR TACHYCARDIA): Status: ACTIVE | Noted: 2024-01-18

## 2024-01-18 PROBLEM — J90 PLEURAL EFFUSION: Status: ACTIVE | Noted: 2024-01-18

## 2024-01-18 PROBLEM — I47.10 SVT (SUPRAVENTRICULAR TACHYCARDIA): Status: RESOLVED | Noted: 2024-01-18 | Resolved: 2024-01-18

## 2024-01-18 PROBLEM — I47.19 PAROXYSMAL SINUS TACHYCARDIA: Status: ACTIVE | Noted: 2024-01-18

## 2024-01-18 PROBLEM — R57.9 SHOCK: Status: RESOLVED | Noted: 2024-01-16 | Resolved: 2024-01-18

## 2024-01-18 PROBLEM — G93.41 ENCEPHALOPATHY, METABOLIC: Status: RESOLVED | Noted: 2024-01-16 | Resolved: 2024-01-18

## 2024-01-18 LAB
ALBUMIN SERPL BCP-MCNC: 3.5 G/DL (ref 3.5–5.2)
ALLENS TEST: ABNORMAL
ALP SERPL-CCNC: 60 U/L (ref 55–135)
ALT SERPL W/O P-5'-P-CCNC: 12 U/L (ref 10–44)
ANION GAP SERPL CALC-SCNC: 10 MMOL/L (ref 8–16)
ANION GAP SERPL CALC-SCNC: 7 MMOL/L (ref 8–16)
ANION GAP SERPL CALC-SCNC: 8 MMOL/L (ref 8–16)
ASCENDING AORTA: 3.14 CM
AST SERPL-CCNC: 23 U/L (ref 10–40)
AV INDEX (PROSTH): 0.76
AV MEAN GRADIENT: 3 MMHG
AV PEAK GRADIENT: 4 MMHG
AV VALVE AREA BY VELOCITY RATIO: 1.77 CM²
AV VALVE AREA: 2.4 CM²
AV VELOCITY RATIO: 0.56
B-OH-BUTYR BLD STRIP-SCNC: 0.4 MMOL/L (ref 0–0.5)
BASOPHILS # BLD AUTO: 0.07 K/UL (ref 0–0.2)
BASOPHILS NFR BLD: 0.5 % (ref 0–1.9)
BILIRUB DIRECT SERPL-MCNC: 0.4 MG/DL (ref 0.1–0.3)
BILIRUB SERPL-MCNC: 1 MG/DL (ref 0.1–1)
BILIRUB UR QL STRIP: NEGATIVE
BILIRUB UR QL STRIP: NEGATIVE
BSA FOR ECHO PROCEDURE: 1.96 M2
BUN SERPL-MCNC: 3 MG/DL (ref 6–20)
BUN SERPL-MCNC: 3 MG/DL (ref 6–20)
BUN SERPL-MCNC: <3 MG/DL (ref 6–20)
CALCIUM SERPL-MCNC: 8.2 MG/DL (ref 8.7–10.5)
CALCIUM SERPL-MCNC: 8.2 MG/DL (ref 8.7–10.5)
CALCIUM SERPL-MCNC: 8.9 MG/DL (ref 8.7–10.5)
CHLORIDE SERPL-SCNC: 100 MMOL/L (ref 95–110)
CHLORIDE SERPL-SCNC: 101 MMOL/L (ref 95–110)
CHLORIDE SERPL-SCNC: 102 MMOL/L (ref 95–110)
CLARITY UR REFRACT.AUTO: CLEAR
CLARITY UR REFRACT.AUTO: CLEAR
CLINICAL BIOCHEMIST REVIEW: NORMAL
CO2 SERPL-SCNC: 21 MMOL/L (ref 23–29)
CO2 SERPL-SCNC: 22 MMOL/L (ref 23–29)
CO2 SERPL-SCNC: 22 MMOL/L (ref 23–29)
COLOR UR AUTO: COLORLESS
COLOR UR AUTO: COLORLESS
CREAT SERPL-MCNC: 0.7 MG/DL (ref 0.5–1.4)
CV ECHO LV RWT: 0.26 CM
DELSYS: ABNORMAL
DIFFERENTIAL METHOD BLD: ABNORMAL
DOP CALC AO PEAK VEL: 1.03 M/S
DOP CALC AO VTI: 15.14 CM
DOP CALC LVOT AREA: 3.1 CM2
DOP CALC LVOT DIAMETER: 2 CM
DOP CALC LVOT PEAK VEL: 0.58 M/S
DOP CALC LVOT STROKE VOLUME: 36.3 CM3
DOP CALCLVOT PEAK VEL VTI: 11.56 CM
ECHO LV POSTERIOR WALL: 0.6 CM (ref 0.6–1.1)
EOSINOPHIL # BLD AUTO: 0 K/UL (ref 0–0.5)
EOSINOPHIL NFR BLD: 0.1 % (ref 0–8)
ERYTHROCYTE [DISTWIDTH] IN BLOOD BY AUTOMATED COUNT: 16 % (ref 11.5–14.5)
EST. GFR  (NO RACE VARIABLE): >60 ML/MIN/1.73 M^2
FRACTIONAL SHORTENING: 45 % (ref 28–44)
GLUCOSE SERPL-MCNC: 200 MG/DL (ref 70–110)
GLUCOSE SERPL-MCNC: 227 MG/DL (ref 70–110)
GLUCOSE SERPL-MCNC: 243 MG/DL (ref 70–110)
GLUCOSE UR QL STRIP: ABNORMAL
GLUCOSE UR QL STRIP: ABNORMAL
HCO3 UR-SCNC: 24.1 MMOL/L (ref 24–28)
HCT VFR BLD AUTO: 32.6 % (ref 37–48.5)
HCT VFR BLD CALC: 35 %PCV (ref 36–54)
HGB BLD-MCNC: 10.5 G/DL (ref 12–16)
HGB UR QL STRIP: NEGATIVE
HGB UR QL STRIP: NEGATIVE
IMM GRANULOCYTES # BLD AUTO: 0.06 K/UL (ref 0–0.04)
IMM GRANULOCYTES NFR BLD AUTO: 0.4 % (ref 0–0.5)
INTERVENTRICULAR SEPTUM: 0.6 CM (ref 0.6–1.1)
KETONES UR QL STRIP: ABNORMAL
KETONES UR QL STRIP: ABNORMAL
LA MAJOR: 4.95 CM
LA MINOR: 4.56 CM
LA WIDTH: 3.17 CM
LACTATE SERPL-SCNC: 1.4 MMOL/L (ref 0.5–2.2)
LEFT ATRIUM SIZE: 2.88 CM
LEFT ATRIUM VOLUME INDEX: 19.1 ML/M2
LEFT ATRIUM VOLUME: 36.84 CM3
LEFT INTERNAL DIMENSION IN SYSTOLE: 2.54 CM (ref 2.1–4)
LEFT VENTRICLE DIASTOLIC VOLUME INDEX: 39.97 ML/M2
LEFT VENTRICLE DIASTOLIC VOLUME: 77.15 ML
LEFT VENTRICLE MASS INDEX: 42 G/M2
LEFT VENTRICLE SYSTOLIC VOLUME INDEX: 12 ML/M2
LEFT VENTRICLE SYSTOLIC VOLUME: 23.16 ML
LEFT VENTRICULAR INTERNAL DIMENSION IN DIASTOLE: 4.6 CM (ref 3.5–6)
LEFT VENTRICULAR MASS: 81.95 G
LEUKOCYTE ESTERASE UR QL STRIP: NEGATIVE
LEUKOCYTE ESTERASE UR QL STRIP: NEGATIVE
LYMPHOCYTES # BLD AUTO: 1.2 K/UL (ref 1–4.8)
LYMPHOCYTES NFR BLD: 8.3 % (ref 18–48)
MAGNESIUM SERPL-MCNC: 1.4 MG/DL (ref 1.6–2.6)
MCH RBC QN AUTO: 27.4 PG (ref 27–31)
MCHC RBC AUTO-ENTMCNC: 32.2 G/DL (ref 32–36)
MCV RBC AUTO: 85 FL (ref 82–98)
MODE: ABNORMAL
MONOCYTES # BLD AUTO: 0.9 K/UL (ref 0.3–1)
MONOCYTES NFR BLD: 6.1 % (ref 4–15)
NEUTROPHILS # BLD AUTO: 12.6 K/UL (ref 1.8–7.7)
NEUTROPHILS NFR BLD: 84.6 % (ref 38–73)
NITRITE UR QL STRIP: NEGATIVE
NITRITE UR QL STRIP: NEGATIVE
NRBC BLD-RTO: 0 /100 WBC
PCO2 BLDA: 31.3 MMHG (ref 35–45)
PH SMN: 7.49 [PH] (ref 7.35–7.45)
PH UR STRIP: 7 [PH] (ref 5–8)
PH UR STRIP: 7 [PH] (ref 5–8)
PHOSPHATE SERPL-MCNC: 2.7 MG/DL (ref 2.7–4.5)
PLATELET # BLD AUTO: 278 K/UL (ref 150–450)
PLPETH BLD-MCNC: 842 NG/ML
PMV BLD AUTO: 10.1 FL (ref 9.2–12.9)
PO2 BLDA: 43 MMHG (ref 40–60)
POC BE: 1 MMOL/L
POC IONIZED CALCIUM: 1.16 MMOL/L (ref 1.06–1.42)
POC SATURATED O2: 83 % (ref 95–100)
POC TCO2: 25 MMOL/L (ref 24–29)
POCT GLUCOSE: 102 MG/DL (ref 70–110)
POCT GLUCOSE: 151 MG/DL (ref 70–110)
POCT GLUCOSE: 155 MG/DL (ref 70–110)
POCT GLUCOSE: 160 MG/DL (ref 70–110)
POCT GLUCOSE: 203 MG/DL (ref 70–110)
POCT GLUCOSE: 203 MG/DL (ref 70–110)
POCT GLUCOSE: 257 MG/DL (ref 70–110)
POCT GLUCOSE: 37 MG/DL (ref 70–110)
POCT GLUCOSE: 47 MG/DL (ref 70–110)
POCT GLUCOSE: 55 MG/DL (ref 70–110)
POCT GLUCOSE: 69 MG/DL (ref 70–110)
POPETH BLD-MCNC: 869 NG/ML
POTASSIUM BLD-SCNC: 3.9 MMOL/L (ref 3.5–5.1)
POTASSIUM SERPL-SCNC: 3.7 MMOL/L (ref 3.5–5.1)
POTASSIUM SERPL-SCNC: 3.9 MMOL/L (ref 3.5–5.1)
POTASSIUM SERPL-SCNC: 4.1 MMOL/L (ref 3.5–5.1)
PROT SERPL-MCNC: 7 G/DL (ref 6–8.4)
PROT UR QL STRIP: NEGATIVE
PROT UR QL STRIP: NEGATIVE
RA MAJOR: 4.49 CM
RA PRESSURE ESTIMATED: 3 MMHG
RA WIDTH: 2.94 CM
RBC # BLD AUTO: 3.83 M/UL (ref 4–5.4)
RIGHT VENTRICULAR END-DIASTOLIC DIMENSION: 3.24 CM
SAMPLE: ABNORMAL
SINUS: 2.93 CM
SITE: ABNORMAL
SODIUM BLD-SCNC: 132 MMOL/L (ref 136–145)
SODIUM SERPL-SCNC: 130 MMOL/L (ref 136–145)
SODIUM SERPL-SCNC: 131 MMOL/L (ref 136–145)
SODIUM SERPL-SCNC: 132 MMOL/L (ref 136–145)
SP GR UR STRIP: 1.01 (ref 1–1.03)
SP GR UR STRIP: 1.01 (ref 1–1.03)
SP02: 93
STJ: 2.91 CM
TDI LATERAL: 0.11 M/S
TDI SEPTAL: 0.14 M/S
TDI: 0.13 M/S
TRICUSPID ANNULAR PLANE SYSTOLIC EXCURSION: 1.72 CM
URN SPEC COLLECT METH UR: ABNORMAL
URN SPEC COLLECT METH UR: ABNORMAL
WBC # BLD AUTO: 14.86 K/UL (ref 3.9–12.7)
Z-SCORE OF LEFT VENTRICULAR DIMENSION IN END DIASTOLE: -1.72
Z-SCORE OF LEFT VENTRICULAR DIMENSION IN END SYSTOLE: -2.22

## 2024-01-18 PROCEDURE — 25000003 PHARM REV CODE 250

## 2024-01-18 PROCEDURE — 80048 BASIC METABOLIC PNL TOTAL CA: CPT

## 2024-01-18 PROCEDURE — 94761 N-INVAS EAR/PLS OXIMETRY MLT: CPT | Mod: XB

## 2024-01-18 PROCEDURE — 84295 ASSAY OF SERUM SODIUM: CPT

## 2024-01-18 PROCEDURE — 82803 BLOOD GASES ANY COMBINATION: CPT

## 2024-01-18 PROCEDURE — 81003 URINALYSIS AUTO W/O SCOPE: CPT

## 2024-01-18 PROCEDURE — 20600001 HC STEP DOWN PRIVATE ROOM

## 2024-01-18 PROCEDURE — 93010 ELECTROCARDIOGRAM REPORT: CPT | Mod: 76,,, | Performed by: INTERNAL MEDICINE

## 2024-01-18 PROCEDURE — 82010 KETONE BODYS QUAN: CPT

## 2024-01-18 PROCEDURE — 85014 HEMATOCRIT: CPT

## 2024-01-18 PROCEDURE — 83605 ASSAY OF LACTIC ACID: CPT

## 2024-01-18 PROCEDURE — 87040 BLOOD CULTURE FOR BACTERIA: CPT

## 2024-01-18 PROCEDURE — 80076 HEPATIC FUNCTION PANEL: CPT

## 2024-01-18 PROCEDURE — 93005 ELECTROCARDIOGRAM TRACING: CPT

## 2024-01-18 PROCEDURE — 25500020 PHARM REV CODE 255: Performed by: STUDENT IN AN ORGANIZED HEALTH CARE EDUCATION/TRAINING PROGRAM

## 2024-01-18 PROCEDURE — 84100 ASSAY OF PHOSPHORUS: CPT

## 2024-01-18 PROCEDURE — 36415 COLL VENOUS BLD VENIPUNCTURE: CPT

## 2024-01-18 PROCEDURE — 81003 URINALYSIS AUTO W/O SCOPE: CPT | Mod: 91

## 2024-01-18 PROCEDURE — 63600175 PHARM REV CODE 636 W HCPCS

## 2024-01-18 PROCEDURE — 36415 COLL VENOUS BLD VENIPUNCTURE: CPT | Mod: XB

## 2024-01-18 PROCEDURE — 25500020 PHARM REV CODE 255: Performed by: PHARMACIST

## 2024-01-18 PROCEDURE — 84132 ASSAY OF SERUM POTASSIUM: CPT

## 2024-01-18 PROCEDURE — 82330 ASSAY OF CALCIUM: CPT

## 2024-01-18 PROCEDURE — 63600175 PHARM REV CODE 636 W HCPCS: Performed by: INTERNAL MEDICINE

## 2024-01-18 PROCEDURE — 83735 ASSAY OF MAGNESIUM: CPT

## 2024-01-18 PROCEDURE — 85025 COMPLETE CBC W/AUTO DIFF WBC: CPT

## 2024-01-18 PROCEDURE — 93010 ELECTROCARDIOGRAM REPORT: CPT | Mod: ,,, | Performed by: INTERNAL MEDICINE

## 2024-01-18 PROCEDURE — 63600175 PHARM REV CODE 636 W HCPCS: Performed by: STUDENT IN AN ORGANIZED HEALTH CARE EDUCATION/TRAINING PROGRAM

## 2024-01-18 RX ORDER — MAGNESIUM SULFATE HEPTAHYDRATE 40 MG/ML
2 INJECTION, SOLUTION INTRAVENOUS ONCE
Status: COMPLETED | OUTPATIENT
Start: 2024-01-18 | End: 2024-01-18

## 2024-01-18 RX ORDER — DIAZEPAM 5 MG/1
10 TABLET ORAL EVERY 8 HOURS
Status: DISCONTINUED | OUTPATIENT
Start: 2024-01-18 | End: 2024-01-19

## 2024-01-18 RX ORDER — ACETAMINOPHEN 325 MG/1
650 TABLET ORAL EVERY 6 HOURS PRN
Status: DISCONTINUED | OUTPATIENT
Start: 2024-01-18 | End: 2024-01-18

## 2024-01-18 RX ORDER — DIAZEPAM 10 MG/2ML
5 INJECTION INTRAMUSCULAR ONCE
Status: COMPLETED | OUTPATIENT
Start: 2024-01-18 | End: 2024-01-18

## 2024-01-18 RX ORDER — IBUPROFEN 200 MG
400 TABLET ORAL ONCE
Status: COMPLETED | OUTPATIENT
Start: 2024-01-18 | End: 2024-01-18

## 2024-01-18 RX ORDER — ACETAMINOPHEN 325 MG/1
650 TABLET ORAL EVERY 6 HOURS PRN
Status: DISCONTINUED | OUTPATIENT
Start: 2024-01-18 | End: 2024-01-22 | Stop reason: HOSPADM

## 2024-01-18 RX ADMIN — DIAZEPAM 5 MG: 10 INJECTION, SOLUTION INTRAMUSCULAR; INTRAVENOUS at 05:01

## 2024-01-18 RX ADMIN — INSULIN ASPART 12 UNITS: 100 INJECTION, SOLUTION INTRAVENOUS; SUBCUTANEOUS at 01:01

## 2024-01-18 RX ADMIN — ENOXAPARIN SODIUM 40 MG: 40 INJECTION SUBCUTANEOUS at 05:01

## 2024-01-18 RX ADMIN — VANCOMYCIN HYDROCHLORIDE 1250 MG: 1.25 INJECTION, POWDER, LYOPHILIZED, FOR SOLUTION INTRAVENOUS at 05:01

## 2024-01-18 RX ADMIN — SODIUM CHLORIDE 500 ML: 9 INJECTION, SOLUTION INTRAVENOUS at 02:01

## 2024-01-18 RX ADMIN — PIPERACILLIN SODIUM AND TAZOBACTAM SODIUM 4.5 G: 4; .5 INJECTION, POWDER, FOR SOLUTION INTRAVENOUS at 09:01

## 2024-01-18 RX ADMIN — IBUPROFEN 400 MG: 200 TABLET, FILM COATED ORAL at 06:01

## 2024-01-18 RX ADMIN — DIAZEPAM 10 MG: 5 TABLET ORAL at 09:01

## 2024-01-18 RX ADMIN — ACETAMINOPHEN 650 MG: 325 TABLET ORAL at 03:01

## 2024-01-18 RX ADMIN — FOLIC ACID 1 MG: 1 TABLET ORAL at 09:01

## 2024-01-18 RX ADMIN — PIPERACILLIN SODIUM AND TAZOBACTAM SODIUM 4.5 G: 4; .5 INJECTION, POWDER, FOR SOLUTION INTRAVENOUS at 05:01

## 2024-01-18 RX ADMIN — IOHEXOL 100 ML: 350 INJECTION, SOLUTION INTRAVENOUS at 11:01

## 2024-01-18 RX ADMIN — THIAMINE HYDROCHLORIDE 500 MG: 100 INJECTION, SOLUTION INTRAMUSCULAR; INTRAVENOUS at 11:01

## 2024-01-18 RX ADMIN — SODIUM CHLORIDE 1000 ML: 9 INJECTION, SOLUTION INTRAVENOUS at 03:01

## 2024-01-18 RX ADMIN — VANCOMYCIN HYDROCHLORIDE 1250 MG: 1.25 INJECTION, POWDER, LYOPHILIZED, FOR SOLUTION INTRAVENOUS at 03:01

## 2024-01-18 RX ADMIN — Medication 24 G: at 08:01

## 2024-01-18 RX ADMIN — MUPIROCIN: 20 OINTMENT TOPICAL at 09:01

## 2024-01-18 RX ADMIN — DIAZEPAM 10 MG: 5 TABLET ORAL at 01:01

## 2024-01-18 RX ADMIN — POTASSIUM BICARBONATE 50 MEQ: 978 TABLET, EFFERVESCENT ORAL at 02:01

## 2024-01-18 RX ADMIN — INSULIN ASPART 12 UNITS: 100 INJECTION, SOLUTION INTRAVENOUS; SUBCUTANEOUS at 05:01

## 2024-01-18 RX ADMIN — MAGNESIUM SULFATE HEPTAHYDRATE 2 G: 40 INJECTION, SOLUTION INTRAVENOUS at 01:01

## 2024-01-18 RX ADMIN — LORAZEPAM 1 MG: 2 INJECTION INTRAMUSCULAR; INTRAVENOUS at 01:01

## 2024-01-18 RX ADMIN — INSULIN DETEMIR 18 UNITS: 100 INJECTION, SOLUTION SUBCUTANEOUS at 09:01

## 2024-01-18 RX ADMIN — MAGNESIUM SULFATE HEPTAHYDRATE 2 G: 40 INJECTION, SOLUTION INTRAVENOUS at 05:01

## 2024-01-18 RX ADMIN — INSULIN ASPART 3 UNITS: 100 INJECTION, SOLUTION INTRAVENOUS; SUBCUTANEOUS at 01:01

## 2024-01-18 RX ADMIN — HUMAN ALBUMIN MICROSPHERES AND PERFLUTREN 0.66 MG: 10; .22 INJECTION, SOLUTION INTRAVENOUS at 09:01

## 2024-01-18 RX ADMIN — PIPERACILLIN SODIUM AND TAZOBACTAM SODIUM 4.5 G: 4; .5 INJECTION, POWDER, FOR SOLUTION INTRAVENOUS at 01:01

## 2024-01-18 RX ADMIN — ATORVASTATIN CALCIUM 20 MG: 20 TABLET, FILM COATED ORAL at 09:01

## 2024-01-18 RX ADMIN — INSULIN DETEMIR 18 UNITS: 100 INJECTION, SOLUTION SUBCUTANEOUS at 10:01

## 2024-01-18 NOTE — PROGRESS NOTES
274}        FOLLOW UP VISIT: ADDICTION PSYCHIATRY CONSULTATION SERVICE      ASSESSMENT AND PLAN:     DIAGNOSES & PROBLEMS:  Alcohol Use Disorder    In Summary:  Ms. Keys is a 42 YO F with hx of Alcoholic liver injury, Type 1 Diabetes, Depression and Anxiety who presented after fall/LOC while with her  at the [facility] ( undergoing chemotherapy for stage 4 cancer in his colon and bone). Psychiatry consulted out of concern for daily drinking which may have contributed to DKA and patient stating that she wants help stopping drinking.     Plan:  - s/sx minor alcohol withdrawal noted but they appear well-managed at this time.  -Continue scheduled diazepam 10 mg q8 hrs. and taper per primary.  -Continue lorazepam 1 mg q8 hrs PRN for CIWA > 8  -Continue citalopram at home dose of 10 mg daily  -Continue buspar at her home dose.  -We will sign off on Ms. Keys     With reasonable medical certainty, based on history, chart review, available collateral information, and a present-state examination:  - the patient does not currently meet the criteria for psychiatric hospitalization  - the patient is deemed to be currently best managed on a medical unit, owing to the need for medical stabilization  - the need for psychiatric hospitalization is currently NOT anticipated upon stabilization of acute medical condition  - PEC is not indicated  - adjustments to psychotropic regimen as noted herein, otherwise continue as prescribed  - defer non-psychiatric medication(s) and management to the current provider(s) of record  - facility staff (e.g., case management, social work) to arrange for appropriate follow up care  - upon discharge, it is recommended to follow up with an outpatient provider within 1-2 weeks of discharge, but ideally as soon as possible  - patient should contact their current outpatient provider expeditiously after discharge to apprise them of the situation and receive further instructions  -  continue alcohol (or sedative-hypnotic) withdrawal protocol, including supportive measures,frequent monitoring of vitals and CIWA-Ar, and use of PRN +/- standing benzodiazepines (see herein for dosing guidance and/or recommendations)  -- RECOMMENDATIONS STATUS POST DETOX: establish and maintain sobriety and a recovery lifestyle, complete a licensed accredited rehab program, and engage in 12 step (or equivalent) meetings and activities  - counseled on full abstinence from alcohol and substances of abuse (illicit and prescription)  - full engagement in 12 step (or equivalent) recovery program(s), including meeting attendance and acquisition/maintenance of sponsor  - relapse prevention and motivational interviewing provided  - provided resources for various addiction rehabilitation options as part of aftercare planning  - augmentation with additional psychotherapy via a licensed counselor,  or psychologist is recommended: the patient is contemplative  - follow with primary care provider for routine health maintenance and management of medical co-morbidities  - defer non-psychiatric medication(s) and management to the current primary and/or specialist provider(s) of record      INTERVAL HISTORY AND ROS:     Since yesterday, Ms. Keys developed significant tahycardia with HR between 140 and 180, which did not resolve after multiple PRN IV lorazepam injections or 10 mg IV diazepam, suggesting that alcohol withdrawal was not the causative factor.  She states she slept well and is firm in taking steps to stop drinking.  Her mentation and memory remains appropriate and factually accurate to the best of our understanding during our interview. She identifies resources she has received regarding cessation programs and states she intends to establish care/support with them.    CURRENT PSYCHOTROPIC REGIMEN:  Home: Citalopram 10 mg daily and Buspirone 5 mg BID      PERTINENT PAST HISTORY AND CHART REVIEW:     43 YOF  "with hx of DM1 who presented after rapid response to [facility] where her  receives infusions for his metastatic cancer. Ms. Keys was altered and found to be in DKA with an anion gap of 18 and glucose of 345. She previously was managed with an insulin pump but due to insurance changes, her pump was no longer supported in December of 2023 and she has been on 5x daily sliding scale and long acting with levemir. Her stay was complicated by altered mental status and agitation treated with droperidol and valium. She endorsed daily mixed drinks and a history of withdrawal symptoms and liver injury in 2022, and voiced a desire for assistance with alcohol cessation.   "I tried to stop on my own, but I couldn't"  Ms. Keys states that she knows she has a problem with drinking. She has a history with IVDU (cocaine) 30 yrs ago, but stopped that "when I couldn't get access, so I switched to alcohol."  She also endorses intermittent THC use. She has a history of 1x DUI in [place] in 1999, and in 2022 was told that her drinking was hurting her liver.  She stopped drinking for ~6 months at that time, but after her liver tests normalized, she slowly started back again.  Currently, she finishes ~1.75 liters of vodka every 3-4 days.  Her last drink was the night of 1/15/24. She is interested in help stopping drinking, including medications and 12-step programs, stating that she knows her drinking could kill her.  She endorses intermittent audio and visual hallucinations when withdrawing from alcohol.      EXAMINATION:     /63   Pulse 91   Temp 99.4 °F (37.4 °C) (Axillary)   Resp (!) 22   Ht 5' 7" (1.702 m)   Wt 80.9 kg (178 lb 5.6 oz)   LMP 07/01/2023   SpO2 99%   Breastfeeding No   BMI 27.93 kg/m²     MENTAL STATUS EXAMINATION:  General Appearance: **  adequately groomed, appropriately dressed, in no apparent distress  Behavior: **  normal; cooperative; reasonably friendly, pleasant, and polite; " "appropriate eye-contact; under good behavioral control  Involuntary Movements and Motor Activity: **  no abnormal involuntary movements noted, no psychomotor agitation or retardation, +tremors  Gait and Station: **  unable to assess - patient lying down or seated  Speech and Language: **  conversational, spontaneous, speaks and understands English proficiently  Mood: "okay"  Affect: **  normal, euthymic, reactive, full-range, mood-congruent, appropriate to situation and context  Thought Process and Associations: **  linear and goal-directed, with no loosening of associations  Thought Content and Perceptions: **  no auditory or visual hallucinations, no paranoid ideation, no ideas of reference, no evidence of delusions or psychosis  Sensorium: **  alert and oriented, with clear sensorium  Recent and Remote Memory: **  grossly intact, no significant impairments noted  Attention and Concentration: **  attentive, not readily distractible  Fund of Knowledge: **  grossly intact, used appropriate vocabulary, no significant deficits noted  Insight: **  intact, demonstrates awareness of illness  Judgment: **  intact, behavior is adequate/appropriate given the circumstances      RISK MANAGEMENT:     I[]I Y  I[x]I N  I[]I U  I[]I A  Suicidal Ideation/Behavior: **   I[]I Y  I[x]I N  I[]I U  I[]I A  Homicidal Ideation/Behavior: **  I[]I Y  I[x]I N  I[]I U  I[]I A  Violence: **  I[]I Y  I[x]I N  I[]I U  I[]I A  Self-Injurious Behavior: **    The patient is deemed to be a reliable and factually accurate historian.    I[]I Y  I[x]I N  I[]I U  I[]I A  I[]I N/A  Minimization of Risk Parameters Suspected/Evident: **  I[]I Y  I[x]I N  I[]I U  I[]I A  I[]I N/A  Exaggeration of Risk Parameters Suspected/Evident: **      [] Y  [x] N  Danger to Self:   [] Y  [x] N  Danger to Others:   [] Y  [x] N  Grave Disability:       In cases of emergency, daily coverage provided by Acute/ER Psych MD, NP, PA, or SW, with contact numbers " located in [facility] On Call Schedule.    Julius Meehan  Department of Psychiatry  [facility]        KEY:     I[]I Y = Yes / Present / Endorses  I[]I N = No / Absent / Denies  I[]I U = Unknown / Unable to Assess / Unwilling to Participate  I[]I A = Ambiguity Exists / Accuracy Uncertain  I[]I D = Denial or Minimization is Suspected/Evident  I[]I N/A = Non-Applicable    CHART REVIEW:     Available documentation has been reviewed, and pertinent elements of the chart - including previous psychiatric evaluations - have been incorporated into this evaluation where appropriate.    ADVICE AND COUNSELING:     [x] In cases of emergencies (e.g. SI/HI resulting in danger to self or others, functioning deteriorates to the level of grave disability), call 911 or [phone], or present to the emergency department for immediate assistance.  [x] Patient should not operate a motor vehicle or heavy machinery if effects of medications or underlying symptoms/condition impair the ability to safely do so.    Alcohol, Tobacco, and Drug Counseling, as well as resources, has been provided, as warranted.     Shared medical decision making and informed consent are the hallmark and bedrock of good clinical care, and as such have been employed and obtained, respectively, to the degree possible.      Risk Mitigation Strategies, Harm Reduction Techniques, and Safety Netting are important interventions that can reduce acute and chronic risk, and as such have been employed to the degree possible.    Prescription Drug Management entails the review, recommendation, or consideration without recommendation of medications, and as such was employed during the encounter.    Additional Psychoeducation has been provided, as warranted.    Discussed, to the extent possible, diagnosis, risks and benefits of proposed treatment vs alternative treatments vs no treatment, potential side effects of these treatments and the inherent unpredictability of  treatment. The patient expresses understanding of the above and displays the capacity to agree with this treatment given said understanding. Patient also agrees that, currently, the benefits outweigh the risks and consents to treatment at this time.     Written material has been provided to supplement, augment, and reinforce any discussions and interventions, via the AVS or other pre-printed handouts, as warranted.      DIAGNOSTIC TESTING:     The chart was reviewed for recent diagnostic procedures and investigations, and pertinent results are noted below.    Na 132 (L)  1/18/2024   K 3.7  1/18/2024   Ca 8.9  1/18/2024  Phos 2.7  1/18/2024   Mg 1.4 (L)  1/18/2024     Glu 200 (H)  1/18/2024   HgA1c 6.5 (H)  1/16/2024    BUN 3 (L)  1/18/2024   Cr 0.7  1/18/2024   GFR >60.0  1/18/2024   Specific Gravity 1.010  1/18/2024   Protein (Urine) Negative  1/18/2024   Microalbumin *   *     T Prot 7.0  1/18/2024   Alb 3.5  1/18/2024   T Bili 1.0  1/18/2024   Alk Phos 60  1/18/2024   AST 23  1/18/2024   ALT 12  1/18/2024   GGT *   *   NH3 *   *   Amylase *   *   Lipase *   *    TSH 3.674  1/16/2024   Free T4 0.76  10/2/2023  PTH *   *  Prolactin *   *   CPK *   *   Troponin I 0.017  1/16/2024   PT *   *   INR *   *    WBC 14.86 (H)  1/18/2024   RBC 3.83 (L)  1/18/2024   Hgb 10.5 (L)  1/18/2024   HCT 32.6 (L)  1/18/2024   MCV 85  1/18/2024    1/18/2024   ANC 12.6; 84.6 (H);  (H)  1/18/2024    Cholesterol 181  10/2/2023   Triglycerides 66  10/2/2023   LDL 75.8  10/2/2023   HDL 92 (H)  10/2/2023     B12 *   *   Folate *   *   Thiamine *   *   Vit D *   *     HIV 1/2 Ag/Ab Non-reactive  1/16/2024   Hep B Surface *   *   Hep B Core *   *   Hep A *   *   Hep C Non-reactive  1/16/2024   RPR *   *     Lithium *   *   VPA *   *   Clozapine *   *     Alcohol <10  1/16/2024   Benzodiazepines Negative  1/16/2024   Barbiturates Negative  1/16/2024   Cannabis Presumptive  Positive (A)  1/16/2024   Cocaine Negative  1/16/2024   Amphetamines Negative  1/16/2024   PCP Negative  1/16/2024   Opiates Negative  1/16/2024   Methadone Negative  1/16/2024   Buprenorphine *   *   Fentanyl *   *   Oxycodone *   *   Tramadol *   *     Ethanol <10  1/16/2024  PETH *   *   EtG *   *   EtS *   *   Buprenorphine *   *   Norbuprenorphine *   *

## 2024-01-18 NOTE — CARE UPDATE
"Medical Emergency Team Follow Up Note   Critical Care Medicine    CC: Shock  Date: 01/18/2024  Admit Date: 1/16/2024  Hospital Length of Stay: 2  MRN: [id number]  The patient location is: Bed 86201/54557 A  Dx: Shock  Code Status: Full Code   Chart Reviewed: 01/18/2024, 9:36 AM      Medical Emergency Team Follow Up Note      SUBJECTIVE:     HPI:  Cira Keys has a past medical history of E coli bacteremia, GERD (gastroesophageal reflux disease), Hyperlipidemia, Iron deficiency anemia due to chronic blood loss, and Type 1 diabetes mellitus without complication.    Significant Events: Follow up  for evaluation of  tachycardia         OBJECTIVE:     Physical Exam  Vitals and nursing note reviewed.   Constitutional:       General: She is awake.   HENT:      Head: Normocephalic.   Cardiovascular:      Rate and Rhythm: Normal rate and regular rhythm.   Pulmonary:      Effort: Pulmonary effort is normal. Tachypnea present.   Skin:     Comments: Flushed   Neurological:      Mental Status: She is lethargic.      GCS: GCS eye subscore is 4. GCS verbal subscore is 5. GCS motor subscore is 6.      Motor: Weakness present.         Last VS: /63   Pulse 91   Temp 99.4 °F (37.4 °C) (Axillary)   Resp (!) 22   Ht 5' 7" (1.702 m)   Wt 80.9 kg (178 lb 5.6 oz)   LMP 07/01/2023   SpO2 99%   Breastfeeding No   BMI 27.93 kg/m²     24H Vital Sign Range:    Temp:  [98.1 °F (36.7 °C)-101.5 °F (38.6 °C)]   Pulse:  []   Resp:  [20-36]   BP: ()/(53-68)   SpO2:  [90 %-99 %]     Level of Consciousness (AVPU): alert      Intake/Output Summary (Last 24 hours) at 1/18/2024 0936  Last data filed at 1/18/2024 0343  Gross per 24 hour   Intake 2037.65 ml   Output 1100 ml   Net 937.65 ml       Recent Labs     01/17/24  0312 01/17/24  1352 01/18/24  0214 01/18/24  0239   WBC 17.59* 14.10*  --  14.86*   HGB 8.6* 8.8*  --  10.5*   HCT 27.6* 27.7* 35* 32.6*    244  --  278       Recent Labs     01/17/24  1017 " 01/17/24  1352 01/17/24  1736 01/17/24  1958 01/17/24  2248 01/18/24  0236   * 137   < > 136 132* 132*   K 4.2 3.8   < > 3.5 3.5 3.7    109   < > 103 101 101   CO2 21* 21*   < > 22* 22* 21*   BUN 8 7   < > 4* 4* 3*   CREATININE 0.7 0.8   < > 0.6 0.6 0.7   * 99   < > 107 104 200*   PHOS 2.4* 2.0*  --   --   --  2.7   MG 1.7 1.7  --   --   --  1.4*    < > = values in this interval not displayed.        Recent Labs     01/16/24 2026 01/17/24 0214 01/18/24 0214   PH 7.343* 7.353 7.493*   PCO2 29.8* 29.2* 31.3*   PO2 32* 38* 43   HCO3 16.2* 16.3* 24.1   POCSATURATED 60 71 83   BE -10* -9* 1        Lab Results   Component Value Date    LACTATE 1.4 01/18/2024    LACTATE 1.2 01/17/2024    LACTATE 1.2 01/17/2024         ASSESSMENT AND PLAN :     Tachycardia  Tachycardia overnight with HR >150  - EKG with SVT, now SR  - Patient had a fever overnight but not taken at the same time as the   - Continue telemetry  - Continue abx and review cx. Consider repeating.     Patient reports no complaints at the time of my exam.  I spent a total of 30 minutes on the day of the visit.This includes face to face time and non-face to face time preparing to see the patient (eg, review of tests), obtaining and/or reviewing separately obtained history, documenting clinical information in the electronic or other health record, independently interpreting results and communicating results to the patient/family/caregiver, or care coordinator.   Fiona Winterbottom APRN, CNS  Critical Care Medicine  [phone]

## 2024-01-18 NOTE — CARE UPDATE
RAPID RESPONSE NURSE PROACTIVE ROUNDING NOTE       Time of Visit: 033    Admit Date: 2024  LOS: 2  Code Status: Full Code   Date of Visit: 2024  : 1980  Age: 43 y.o.  Sex: female  Race: White  Bed: 83826/18427 A:   MRN: [id number]  Was the patient discharged from an ICU this admission? Yes   Was the patient discharged from a PACU within last 24 hours? No   Did the patient receive conscious sedation/general anesthesia in last 24 hours? No  Was the patient in the ED within the past 24 hours? No  Was the patient on NIPPV within the past 24 hours? No   Attending Physician: Hayden Duffy MD  Primary Service: [facility] 5   Time spent at the bedside: 15 -30 min    SITUATION    Notified by charge RN during rounding.  Reason for alert: Sustained tachycardia.  Called to evaluate the patient for Dysrythmia    BACKGROUND     Why is the patient in the hospital?: Shock    Patient has a past medical history of E coli bacteremia, GERD (gastroesophageal reflux disease), Hyperlipidemia, Iron deficiency anemia due to chronic blood loss, and Type 1 diabetes mellitus without complication.    Last Vitals:  Temp: 101.5 °F (38.6 °C) (341)  Pulse: 180 (458)  Resp: 33 (451)  BP: 140/68 (333)  SpO2: 94 % (451)    24 Hours Vitals Range:  Temp:  [98.1 °F (36.7 °C)-101.5 °F (38.6 °C)]   Pulse:  []   Resp:  [20-36]   BP: ()/(53-71)   SpO2:  [90 %-97 %]     Labs:  Recent Labs     24  0312 24  1352 24  0214 24  0239   WBC 17.59* 14.10*  --  14.86*   HGB 8.6* 8.8*  --  10.5*   HCT 27.6* 27.7* 35* 32.6*    244  --  278       Recent Labs     24  1017 24  1352 24  1736 248 24  2248 24  0236   * 137   < > 136 132* 132*   K 4.2 3.8   < > 3.5 3.5 3.7    109   < > 103 101 101   CO2 21* 21*   < > 22* 22* 21*   BUN 8 7   < > 4* 4* 3*   CREATININE 0.7 0.8   < > 0.6 0.6 0.7   * 99   < > 107 104 200*    PHOS 2.4* 2.0*  --   --   --  2.7   MG 1.7 1.7  --   --   --  1.4*    < > = values in this interval not displayed.        Recent Labs     01/16/24 2026 01/17/24 0214 01/18/24  0214   PH 7.343* 7.353 7.493*   PCO2 29.8* 29.2* 31.3*   PO2 32* 38* 43   HCO3 16.2* 16.3* 24.1   POCSATURATED 60 71 83   BE -10* -9* 1        ASSESSMENT    Physical Exam  Vitals and nursing note reviewed.   Constitutional:       Appearance: She is ill-appearing.   HENT:      Mouth/Throat:      Mouth: Mucous membranes are moist.      Pharynx: Oropharynx is clear.   Eyes:      Extraocular Movements: Extraocular movements intact.      Pupils: Pupils are equal, round, and reactive to light.   Cardiovascular:      Rate and Rhythm: Regular rhythm. Tachycardia present.      Pulses: Normal pulses.   Pulmonary:      Comments: Tachypneic  Abdominal:      General: Abdomen is flat.      Palpations: Abdomen is soft.   Musculoskeletal:         General: Normal range of motion.   Skin:     General: Skin is dry.      Capillary Refill: Capillary refill takes less than 2 seconds.      Comments: Hot-to-palpation   Neurological:      General: No focal deficit present.      Mental Status: She is alert and oriented to person, place, and time. Mental status is at baseline.       INTERVENTIONS    Upon initial assessment, pt. Sleeping in hospital bed but easily awoken to verbal stimuli. While awake, pt. Oriented x4, GCS 15, calm, cooperative, pleasant. Denies cp, n/v/d, headache, dizziness, sob. Pt. Also denies anxiety, agitation. CIWA calculated to 2 during assessment. HR: 150-180bpm. EKG revealed sinus tachycardia.    Airway: patent, non obstructed, able to maintain secretions.  Breathing: non labored, speaking full sentences, equal chest rise and fall.  Circulation: without gross hemorrhage or bleeding noted externally, skin warm and dry to palpation.    Vital Signs:  HR: 150-180 ST w/o ectopy,  BP: 140/68 (98),  SpO2: 94% RA,  RR: 26,  Temperature: 101.5  axillary.    Primary team MD at bedside for assessment. Labs drawn and additional fluids administered (total of 1.5L tonight).     WBC: 14.8, H/H: 10.5/32.6, Na: 132, K: 3.7, AG: 10, creat: 0.7, Glucose: 200, Ca: 8.9, ma.4 (replaced), BH: 0.4, lactic acid: 1.4, VBG: (PH: 7.49, PCO2: 31.3, HCO3: 24.1, BE: 1).    Tylenol and Valium administered per MAR orders. Plan to monitor hemodynamic status closely on continuous telemetry monitor. Spoke with primary RNDoreen, and updated on POC. Please call RRN with questions/concerns/deterioration of patient. Will follow up post interventions.     PROVIDER ESCALATION    Yes/No  Yes    Orders received and case discussed with Dr. Mendoza .    Disposition: Remain in room 16377.    FOLLOW-UP    Bedside Doreen EMZA  updated on plan of care. Instructed to call the Rapid Response Nurse, Renea Alvarez RN at [phone] for additional questions or concerns.

## 2024-01-18 NOTE — CARE UPDATE
I was called to bedside around 1:30am for patient having sustained HR 160s-170s. /58. Afebrile. Oxygen 90-94% on room air. POCT glucose was obtained and was elevated at 203. Patient has history of brittle diabetes with DKA during this admission. EKG showing sinus tachycardia. Labs reviewed, last K 3.5, Na 132.     Concern for infection/sepsis vs dehydration vs alcohol withdrawal vs DKA vs electrolyte abnormalities.     Patient has history of alcohol use disorder and on physical examination has a mild hand tremor. Last drink reported evening of 1/15/24. She does not appear anxious but does say she can feel her heart beating quickly. CIWA 2. 1mg ativan PRN given. Patient had minimal response.     Ordered VBG, beta hydroxybutyrate, lactate,  500cc NS bolus followed by additional 1L (1.5L total) and 50mEq efferK. CBC/CMP/Mg/P pending collection. Restarted broad spectrum antibiotics. Order UA, CXR, and blood cultures for infectious work-up.    iStat lytes: Na 132, K 3.9, Hct 35.  VBG: pH 7.493, CO2 31.3    Patient was on vanc/Zosyn 1/16-1/17 and had blood cultures drawn 1/16 with NGTD. Negative Flu testing. New leukocytosis 14.1 on 1/17. She denies any shortness of breath, cough, muscle aches, headaches, dysuria, urinary frequency, nausea, vomiting, and diarrhea.     2:30 After first fluid bolus, patient reported feeling fine and wanted to go back to sleep. HR still elevated at 155. Labs still pending.     3:38am: I was notified that patient now has fever 101.5 F (axillary). Order for Tylenol placed.     3:42am: Lactate 1.4, WBC increased at 14.86.     4:25am: Patient still tachycardic with tachypnea/RR 30. Supplemental oxygen 2L NC ordered and Rendi reports improvement in RR to 24. Na still decreased at 132, anion gap 10, glucose 200, Mg 1.4. 2g Magnesium sulfate IV ordered. Beta hydroxybutyrate 0.4.    5:00am: Patient still tachycardic, now to 170s, RR 33. Ordered 5mg IV valium. Patient was unresponsive to 1mg  ativan but work-up is unrevealing so far. Concern for alcohol withdrawal vs Dts. Patient denies calf pain, positional chest pain or chest pain with deep inspiration and has been on DVT prophylaxis.     5:30am: Patient had no response to 5mg valium, additional 5mg ordered.     Jalen Mendoza MD

## 2024-01-18 NOTE — CARE UPDATE
RAPID RESPONSE NURSE PROACTIVE ROUNDING NOTE       Time of Visit: 0550    Admit Date: 2024  LOS: 2  Code Status: Full Code   Date of Visit: 2024  : 1980  Age: 43 y.o.  Sex: female  Race: White  Bed: 86490/15795 A:   MRN: [id number]  Was the patient discharged from an ICU this admission? Yes   Was the patient discharged from a PACU within last 24 hours? No   Did the patient receive conscious sedation/general anesthesia in last 24 hours? No  Was the patient in the ED within the past 24 hours? No  Was the patient on NIPPV within the past 24 hours? No   Attending Physician: Hayden Duffy MD  Primary Service: [facility] 5   Time spent at the bedside: 45-60 minutes    SITUATION    Notified by  Follow up .  Reason for alert: Sustained tachycardia  Called to evaluate the patient for Dysrythmia    BACKGROUND     Why is the patient in the hospital?: Shock    Patient has a past medical history of E coli bacteremia, GERD (gastroesophageal reflux disease), Hyperlipidemia, Iron deficiency anemia due to chronic blood loss, and Type 1 diabetes mellitus without complication.    Last Vitals:  Temp: 101.5 °F (38.6 °C) (517)  Pulse: 166 (517)  Resp: 28 (517)  BP: 131/66 (517)  SpO2: 91 % (517)    24 Hours Vitals Range:  Temp:  [98.1 °F (36.7 °C)-101.5 °F (38.6 °C)]   Pulse:  []   Resp:  [20-36]   BP: ()/(53-71)   SpO2:  [90 %-97 %]     Labs:  Recent Labs     24  0312 24  1352 24  0214 24  0239   WBC 17.59* 14.10*  --  14.86*   HGB 8.6* 8.8*  --  10.5*   HCT 27.6* 27.7* 35* 32.6*    244  --  278       Recent Labs     24  1017 24  1352 24  1736 248 24  2248 24  0236   * 137   < > 136 132* 132*   K 4.2 3.8   < > 3.5 3.5 3.7    109   < > 103 101 101   CO2 21* 21*   < > 22* 22* 21*   BUN 8 7   < > 4* 4* 3*   CREATININE 0.7 0.8   < > 0.6 0.6 0.7   * 99   < > 107 104 200*   PHOS 2.4* 2.0*   --   --   --  2.7   MG 1.7 1.7  --   --   --  1.4*    < > = values in this interval not displayed.        Recent Labs     01/16/24 2026 01/17/24  0214 01/18/24  0214   PH 7.343* 7.353 7.493*   PCO2 29.8* 29.2* 31.3*   PO2 32* 38* 43   HCO3 16.2* 16.3* 24.1   POCSATURATED 60 71 83   BE -10* -9* 1        ASSESSMENT    Physical Exam  Vitals and nursing note reviewed.   Constitutional:       Appearance: She is ill-appearing.   HENT:      Mouth/Throat:      Mouth: Mucous membranes are moist.      Pharynx: Oropharynx is clear.   Eyes:      Extraocular Movements: Extraocular movements intact.      Pupils: Pupils are equal, round, and reactive to light.   Cardiovascular:      Rate and Rhythm: Regular rhythm. Tachycardia present.      Pulses: Normal pulses.   Pulmonary:      Comments: Tachypneic  Abdominal:      General: Abdomen is flat.      Palpations: Abdomen is soft.   Musculoskeletal:         General: Normal range of motion.   Skin:     General: Skin is dry.      Capillary Refill: Capillary refill takes less than 2 seconds.      Comments: Hot-to-palpation   Neurological:      General: No focal deficit present.      Mental Status: She is alert and oriented to person, place, and time. Mental status is at baseline.   Psychiatric:         Mood and Affect: Mood normal.         Behavior: Behavior normal.         Thought Content: Thought content normal.         Judgment: Judgment normal.       INTERVENTIONS    Followed up from previous proactive rounding. Upon reassessment, pt. Sleeping in hospital bed, easily awoken to verbal stimuli. Remains A&Ox4, GCS 15, calm, cooperative, pleasant. Bedside cardiac monitor revealed a HR of 150-180bpm. Had recently received 2 doses of IV Diazepam per MAR orders.    Airway: patent, non obstructed, able to maintain secretions.  Breathing: non labored, speaking full sentences, equal chest rise and fall.  Circulation: without gross hemorrhage or bleeding noted externally, skin hot and dry to  palpation.    Vital Signs:  HR: 150-180 ST w/o ectopy,  BP: 131/66 (92),  SpO2: 94% 1L NC O2,  RR: 26,  Temperature: 100.6 axillary.    HM MD at bedside again for assessment. Repeat EKGs completed, revealed possible SVT vs. ST. Cardiology consulted per primary team. Attempted vagal maneuvers and diver's reflex with minimal change in heart rate: down to 140s but quickly back to 150-160. Recommend strict bedrest at this time until follow up s/t increase in HR with exertion. Plan to monitor hemodynamic status closely via continuous bedside cardiac monitor. Spoke with primary RN and updated on POC. Please call RRN with questions/concerns/deterioration of patient. Plan for dayshift rapid response team to reevaluate with HM and cardiology.    PROVIDER ESCALATION    Yes/No  Yes    Orders received and case discussed with Dr. Fletcher .    Disposition: Remain in room 08859.    FOLLOW-UP    Bedside Doreen MEZA  updated on plan of care. Instructed to call the Rapid Response Nurse, Renea Alvarez RN at [phone] for additional questions or concerns.

## 2024-01-18 NOTE — SUBJECTIVE & OBJECTIVE
Past Medical History:   Diagnosis Date    E coli bacteremia     GERD (gastroesophageal reflux disease)     Hyperlipidemia     Iron deficiency anemia due to chronic blood loss 10/31/2015    Type 1 diabetes mellitus without complication 10/31/2015       Past Surgical History:   Procedure Laterality Date     SECTION      LAPAROSCOPIC SALPINGECTOMY Bilateral 2023    Procedure: SALPINGECTOMY, LAPAROSCOPIC;  Surgeon: Clem Sidhu MD;  Location: [facility];  Service: OB/GYN;  Laterality: Bilateral;    LAPAROSCOPIC TOTAL HYSTERECTOMY N/A 2023    Procedure: HYSTERECTOMY, TOTAL, LAPAROSCOPIC;  Surgeon: Clem Sidhu MD;  Location: [facility] OR;  Service: OB/GYN;  Laterality: N/A;    TUBAL LIGATION         Review of patient's allergies indicates:  No Known Allergies    No current facility-administered medications on file prior to encounter.     Current Outpatient Medications on File Prior to Encounter   Medication Sig    atorvastatin (LIPITOR) 20 MG tablet Take 1 tablet by mouth once daily    busPIRone (BUSPAR) 5 MG Tab Take 1 tablet by mouth twice daily as needed (Patient taking differently: Take 5 mg by mouth 2 (two) times daily.)    citalopram (CELEXA) 10 MG tablet Take 1 tablet (10 mg total) by mouth once daily.    esomeprazole (NEXIUM) 20 MG capsule Take 20 mg by mouth once daily.    insulin detemir U-100, Levemir, (LEVEMIR FLEXPEN) 100 unit/mL (3 mL) InPn pen Inject 12 Units into the skin 2 (two) times daily.    lisinopriL (PRINIVIL,ZESTRIL) 5 MG tablet Take 1 tablet by mouth once daily    gabapentin (NEURONTIN) 300 MG capsule Take 1 capsule (300 mg total) by mouth once daily. (Patient not taking: Reported on 2023)    insulin aspart U-100 (NOVOLOG) 100 unit/mL injection Inject 100 Units into the skin 3 (three) times daily before meals. To use with insulin pump. Max daily dose 100 units. normal     Family History       Problem Relation (Age of Onset)    Hyperlipidemia Mother    Hypertension  "Father    No Known Problems Sister, Brother          Tobacco Use    Smoking status: Former     Current packs/day: 0.00     Types: Cigarettes     Quit date: 12/10/2021     Years since quittin.1     Passive exposure: Current    Smokeless tobacco: Never   Substance and Sexual Activity    Alcohol use: Yes     Alcohol/week: 5.0 standard drinks of alcohol     Types: 5 Drinks containing 0.5 oz of alcohol per week     Comment: occ    Drug use: No     Comment: "20-30 years ago drug use"    Sexual activity: Yes     Partners: Male     Birth control/protection: See Surgical Hx     Comment:      Review of Systems   Cardiovascular:  Negative for chest pain, dyspnea on exertion, irregular heartbeat, leg swelling, near-syncope, orthopnea, palpitations and syncope.   Respiratory:  Negative for shortness of breath.      Objective:     Vital Signs (Most Recent):  Temp: 99.4 °F (37.4 °C) (24 0745)  Pulse: 83 (24 1115)  Resp: (!) 25 (24 0827)  BP: 118/63 (24 1100)  SpO2: 99 % (24 1100) Vital Signs (24h Range):  Temp:  [98.1 °F (36.7 °C)-101.5 °F (38.6 °C)] 99.4 °F (37.4 °C)  Pulse:  [] 83  Resp:  [20-36] 25  SpO2:  [90 %-99 %] 99 %  BP: (110-140)/(55-68) 118/63     Weight: 80.9 kg (178 lb 5.6 oz)  Body mass index is 27.93 kg/m².    SpO2: 99 %         Intake/Output Summary (Last 24 hours) at 2024 1255  Last data filed at 2024 0343  Gross per 24 hour   Intake 2037.65 ml   Output 600 ml   Net 1437.65 ml       Lines/Drains/Airways       Peripheral Intravenous Line  Duration                  Peripheral IV - Single Lumen 24 2122 20 G Anterior;Right Wrist 1 day         Peripheral IV - Single Lumen 24 2202 22 G Anterior;Left Hand 1 day                     Physical Exam  Vitals and nursing note reviewed.   Constitutional:       General: She is not in acute distress.     Appearance: Normal appearance. She is not ill-appearing.   HENT:      Head: Normocephalic and atraumatic.     "  Mouth/Throat:      Mouth: Mucous membranes are moist.   Eyes:      General: No scleral icterus.        Right eye: No discharge.         Left eye: No discharge.   Cardiovascular:      Rate and Rhythm: Normal rate and regular rhythm.      Pulses: Normal pulses.      Heart sounds: Normal heart sounds. No murmur heard.  Pulmonary:      Effort: Pulmonary effort is normal. No respiratory distress.      Breath sounds: Normal breath sounds. No wheezing or rales.   Abdominal:      General: Abdomen is flat. There is no distension.      Palpations: Abdomen is soft.      Tenderness: There is no abdominal tenderness.   Musculoskeletal:      Cervical back: Normal range of motion.      Right lower leg: No edema.      Left lower leg: No edema.   Skin:     General: Skin is warm and dry.   Neurological:      Mental Status: She is alert and oriented to person, place, and time.          Significant Labs: CMP   Recent Labs   Lab 01/17/24  0209 01/17/24  0312 01/17/24  1017 01/17/24  1352 01/17/24  2248 01/18/24  0236 01/18/24  0947   NA  --    < > 134*   < > 132* 132* 130*   K  --    < > 4.2   < > 3.5 3.7 3.9   CL  --    < > 107   < > 101 101 100   CO2  --    < > 21*   < > 22* 21* 22*   GLU  --    < > 166*   < > 104 200* 227*   BUN  --    < > 8   < > 4* 3* <3*   CREATININE  --    < > 0.7   < > 0.6 0.7 0.7   CALCIUM  --    < > 8.0*   < > 9.2 8.9 8.2*   PROT 6.0  --  5.7*  --   --  7.0  --    ALBUMIN 3.1*  --  3.0*  --   --  3.5  --    BILITOT 1.3*  --  0.9  --   --  1.0  --    ALKPHOS 51*  --  43*  --   --  60  --    AST 13  --  16  --   --  23  --    ALT 9*  --  8*  --   --  12  --    ANIONGAP  --    < > 6*   < > 9 10 8    < > = values in this interval not displayed.   , CBC   Recent Labs   Lab 01/17/24  0312 01/17/24  1352 01/18/24  0214 01/18/24  0239   WBC 17.59* 14.10*  --  14.86*   HGB 8.6* 8.8*  --  10.5*   HCT 27.6* 27.7*   < > 32.6*    244  --  278    < > = values in this interval not displayed.   , and All pertinent lab  results from the last 24 hours have been reviewed.    Significant Imaging:   Echo    Addendum Date: 1/18/2024        Left Ventricle: The left ventricle is normal in size. Ventricular mass   is normal. Normal wall thickness. Normal wall motion. There is normal   systolic function with a visually estimated ejection fraction of 60 - 65%.   There is normal diastolic function.    Right Ventricle: Normal right ventricular cavity size. Wall thickness   is normal. Right ventricle wall motion  is normal. Systolic function is   normal.    Left Atrium: Normal left atrial size.    Right Atrium: Normal right atrial size.    Aorta: Aortic root is normal in size measuring 2.93 cm. Ascending aorta   is normal measuring 3.14 cm.    IVC/SVC: Normal venous pressure at 3 mmHg.

## 2024-01-18 NOTE — HPI
Cira Keys is a 43 y.o. F with a PMHx of T1DM, HLD, and GERD who initially presented for LOC episode while at [facility] with her , who is receiving chemo. Found to be in DKA, admitted initially to hospital medicine. Transferred to MICU for hypotension, AMS and somnolence. Stepped back down to hospital medicine following improvement in symptoms. She is also being treated for alcohol withdrawals. Cardiology was consulted for sinus tachycardia of unknown etiology. Morning of 1/18 had 4 EKGs showing sinus tachycardia. HR 160s-170s. Febrile, Lactate wnl, Leukocytosis, septic w/u initiated. TSH at admission wnl. She received IV ativan 1mg and IV Valium 5mg x2 doses. HR improved to 90s-100s later in the morning of 1/18. TTE 1/18 without any significant abnormality. No large central PE identified on CTA chest PE studies. SBP in past 24 hours 110-140s. Stable on RA.    On our initial eval she reports feeling okay, denies chest pain, SOB, SUGGS, palpitations. She reports that 2 nights ago she had an approx 2 hour episode of chest pain with associated SOB. The pain was central, non-radiating, crushing, 5/10 in intensity, worsening by leaning forward. She reports having audio/visual hallucinations at that time. During her episode of tachycardia the morning of 1/18 she reports her only symptom being palpitations. She denies any personal or family history of cardiac disease or arrhythmias. She denies any anginal symptoms prior to this admission. She drinks a gallon jug of vodka over the course of 3 days. Her last drink was Monday (1/15) evening. Addiction psychiatry has been consulted.

## 2024-01-18 NOTE — CARE UPDATE
I have reviewed the chart of Cira Keys and participated in the care of the patient who is hospitalized for the following:    Active Hospital Problems    Diagnosis    *Shock    Pleural effusion    Sinus tachycardia    Alcohol use disorder    Encephalopathy, metabolic    Syncope    Mixed hyperlipidemia    WALTER (generalized anxiety disorder)    Alcoholic liver disease    DKA (diabetic ketoacidoses)     2021 IMO Regulatory Import      Alcohol withdrawal syndrome with complication    Essential hypertension    Hypomagnesemia    Type 1 diabetes mellitus with hyperglycemia          I have reviewed the Cira Keys with the multidisciplinary team during rounds.      Darcie Benítez NP  Unit Based [facility]

## 2024-01-18 NOTE — PROGRESS NOTES
Pharmacokinetic Initial Assessment: IV Vancomycin    Assessment/Plan:    Initiate intravenous vancomycin with loading dose of 1500 mg once followed by a maintenance dose of vancomycin 1250mg IV every 12 hours  Desired empiric serum trough concentration is 15 to 20 mcg/mL  Draw vancomycin trough level  on 1/19 at approximately 1445  Pharmacy will continue to follow and monitor vancomycin.      Please contact pharmacy at extension [phone] with any questions regarding this assessment.     Thank you for the consult,   Claudette Fletcher       Patient brief summary:  Cira Keys is a 43 y.o. female initiated on antimicrobial therapy with IV Vancomycin for treatment of suspected sepsis    Drug Allergies:   Review of patient's allergies indicates:  No Known Allergies    Actual Body Weight:   80.9kg    Renal Function:   Estimated Creatinine Clearance: 130.9 mL/min (based on SCr of 0.6 mg/dL).,     Dialysis Method (if applicable):  N/A    CBC (last 72 hours):  Recent Labs   Lab Result Units 01/16/24  1005 01/16/24 2015 01/17/24  0312 01/17/24  1352   WBC K/uL 10.76 11.01 17.59* 14.10*   Hemoglobin g/dL 10.9* 8.9* 8.6* 8.8*   Hemoglobin A1C % 6.5*  --   --   --    Hematocrit % 35.2* 29.4* 27.6* 27.7*   Platelets K/uL 292 270 234 244   Gran % % 85.5* 76.0* 90.3* 82.2*   Lymph % % 8.8* 15.7* 3.0* 10.3*   Mono % % 4.5 7.6 6.0 6.5   Eosinophil % % 0.1 0.0 0.0 0.1   Basophil % % 0.6 0.3 0.1 0.4   Differential Method  Automated Automated Automated Automated       Metabolic Panel (last 72 hours):  Recent Labs   Lab Result Units 01/16/24  1005 01/16/24  1105 01/16/24  1219 01/16/24  1414 01/16/24  1550 01/16/24 2015 01/17/24  0036 01/17/24  0209 01/17/24  0312 01/17/24  1017 01/17/24  1352 01/17/24  1736 01/17/24  1958 01/17/24  2248   Sodium mmol/L 140  --  141  --  140 135* 137  --  138 134* 137 136 136 132*   Potassium mmol/L 4.4  --  3.7  --  3.9 4.5 4.6  --  3.6 4.2 3.8 3.8 3.5 3.5   Chloride mmol/L 103  --  107  --  106 104  "109  --  110 107 109 107 103 101   CO2 mmol/L 19*  --  19*  --  18* 12* 8*  --  17* 21* 21* 20* 22* 22*   Glucose mg/dL 293*  --  175*  --  183* 359* 221*  --  152* 166* 99 102 107 104   Glucose, UA   --  4+*  --   --   --   --   --   --   --   --   --   --   --   --    BUN mg/dL 12  --  12  --  12 12 12  --  11 8 7 4* 4* 4*   Creatinine mg/dL 0.8  --  0.8  --  0.7 0.8 0.9  --  0.8 0.7 0.8 0.6 0.6 0.6   Creatinine, Urine mg/dL  --  75.0  --   --   --   --   --   --   --   --   --   --   --   --    Albumin g/dL 4.1  --   --   --   --  3.1* 3.2* 3.1*  --  3.0*  --   --   --   --    Total Bilirubin mg/dL 1.0  --   --   --   --  0.7 1.3* 1.3*  --  0.9  --   --   --   --    Alkaline Phosphatase U/L 66  --   --   --   --  51* 67 51*  --  43*  --   --   --   --    AST U/L 19  --   --   --   --  13 22 13  --  16  --   --   --   --    ALT U/L 14  --   --   --   --  10 11 9*  --  8*  --   --   --   --    Magnesium mg/dL 1.2*  --   --  1.8  --   --   --   --  1.3* 1.7 1.7  --   --   --    Phosphorus mg/dL  --   --  2.3*  --   --   --   --   --  <1.0* 2.4* 2.0*  --   --   --        Drug levels (last 3 results):  No results for input(s): "VANCOMYCINRA", "VANCORANDOM", "VANCOMYCINPE", "VANCOPEAK", "VANCOMYCINTR", "VANCOTROUGH" in the last 72 hours.    Microbiologic Results:  Microbiology Results (last 7 days)       Procedure Component Value Units Date/Time    Blood culture [[id number]] Collected: 01/16/24 2035    Order Status: Completed Specimen: Blood from Peripheral, Hand, Left Updated: 01/17/24 2213     Blood Culture, Routine No Growth to date      No Growth to date    Blood culture [[id number]] Collected: 01/16/24 2035    Order Status: Completed Specimen: Blood from Peripheral, Forearm, Left Updated: 01/17/24 2213     Blood Culture, Routine No Growth to date      No Growth to date    Influenza A & B by Molecular [[id number]] Collected: 01/16/24 0951    Order Status: Completed Specimen: Nasopharyngeal Swab Updated: 01/16/24 " 1100     Influenza A, Molecular Negative     Influenza B, Molecular Negative     Flu A & B Source Nasal swab

## 2024-01-18 NOTE — NURSING
Nurses Note -- 4 Eyes      1/17/2024   6:42 PM      Skin assessed during: Transfer      [x] No Altered Skin Integrity Present    [x]Prevention Measures Documented      [] Yes- Altered Skin Integrity Present or Discovered   [] LDA Added if Not in Epic (Describe Wound)   [] New Altered Skin Integrity was Present on Admit and Documented in LDA   [] Wound Image Taken    Wound Care Consulted? No    Attending Nurse:  Himanshu Pérez RN/Staff Member:  DERRICK García

## 2024-01-18 NOTE — CARE UPDATE
"RAPID RESPONSE NURSE CHART REVIEW        Chart Reviewed: 01/18/2024, 10:26 AM    MRN: [id number]  Bed: 43678/99592 A    Dx: Mitch Keys has a past medical history of E coli bacteremia, GERD (gastroesophageal reflux disease), Hyperlipidemia, Iron deficiency anemia due to chronic blood loss, and Type 1 diabetes mellitus without complication.    Last VS: /63   Pulse 91   Temp 99.4 °F (37.4 °C) (Axillary)   Resp (!) 22   Ht 5' 7" (1.702 m)   Wt 80.9 kg (178 lb 5.6 oz)   LMP 07/01/2023   SpO2 99%   Breastfeeding No   BMI 27.93 kg/m²     24H Vital Sign Range:  Temp:  [98.1 °F (36.7 °C)-101.5 °F (38.6 °C)]   Pulse:  []   Resp:  [20-36]   BP: ()/(53-68)   SpO2:  [90 %-99 %]     Level of Consciousness (AVPU): alert    Recent Labs     01/17/24  0312 01/17/24  1352 01/18/24  0214 01/18/24  0239   WBC 17.59* 14.10*  --  14.86*   HGB 8.6* 8.8*  --  10.5*   HCT 27.6* 27.7* 35* 32.6*    244  --  278       Recent Labs     01/17/24  1017 01/17/24  1352 01/17/24  1736 01/17/24 1958 01/17/24  2248 01/18/24  0236   * 137   < > 136 132* 132*   K 4.2 3.8   < > 3.5 3.5 3.7    109   < > 103 101 101   CO2 21* 21*   < > 22* 22* 21*   BUN 8 7   < > 4* 4* 3*   CREATININE 0.7 0.8   < > 0.6 0.6 0.7   * 99   < > 107 104 200*   PHOS 2.4* 2.0*  --   --   --  2.7   MG 1.7 1.7  --   --   --  1.4*    < > = values in this interval not displayed.        Recent Labs     01/16/24 2026 01/17/24 0214 01/18/24 0214   PH 7.343* 7.353 7.493*   PCO2 29.8* 29.2* 31.3*   PO2 32* 38* 43   HCO3 16.2* 16.3* 24.1   POCSATURATED 60 71 83   BE -10* -9* 1        OXYGEN:  Flow (L/min): 1          MEWS score: 5    Rounding completed with charge DERRICK Richardson. Discussed tachycardia overnight. Current HR 91. Cardiology consulted. ECHO and CTA chest pending. No additional concerns verbalized at this time. Instructed to call [phone] for further concerns or assistance.    Madison Chawla RN       "

## 2024-01-18 NOTE — PLAN OF CARE
Pt slept very little this shift. A&Ox 4. Tachycardic up to the 180's began at approx 0130. MD and ld at BS. Sepsis workup completed. Valium administered with little to no effect. CIWA's have been at 2 all shift. Denies pain. Skin care completed. Accuchecks stable at Q1 hour until midnight. MD aware and order changed to ACHS. Safety precautions in place. Call light in reach. No further concerns noted at this time.    Problem: Adult Inpatient Plan of Care  Goal: Plan of Care Review  Outcome: Ongoing, Progressing  Goal: Patient-Specific Goal (Individualized)  Outcome: Ongoing, Progressing  Goal: Absence of Hospital-Acquired Illness or Injury  Outcome: Ongoing, Progressing  Goal: Optimal Comfort and Wellbeing  Outcome: Ongoing, Progressing  Goal: Readiness for Transition of Care  Outcome: Ongoing, Progressing     Problem: Diabetes Comorbidity  Goal: Blood Glucose Level Within Targeted Range  Outcome: Ongoing, Progressing

## 2024-01-18 NOTE — CONSULTS
[facility] ([place] [id number])  Cardiology  Consult Note    Patient Name: Cira Keys  MRN: [id number]  Admission Date: 1/16/2024  Hospital Length of Stay: 2 days  Code Status: Full Code   Attending Provider: Hayden Duffy MD   Consulting Provider: Billy Bonilla MD  Primary Care Physician: Krysta Mercado MD  Principal Problem:Shock    Patient information was obtained from patient, past medical records, and ER records.     Inpatient consult to Cardiology  Consult performed by: Billy Bonilla MD  Consult ordered by: Marzena Rojas MD        Subjective:     HPI:   Cira Keys is a 43 y.o. F with a PMHx of T1DM, HLD, and GERD who initially presented for LOC episode while at [facility] with her , who is receiving chemo. Found to be in DKA, admitted initially to hospital medicine. Transferred to MICU for hypotension, AMS and somnolence. Stepped back down to hospital medicine following improvement in symptoms. She is also being treated for alcohol withdrawals. Cardiology was consulted for sinus tachycardia of unknown etiology. Morning of 1/18 had 4 EKGs showing sinus tachycardia. HR 160s-170s. Febrile, Lactate wnl, Leukocytosis, septic w/u initiated. TSH at admission wnl. She received IV ativan 1mg and IV Valium 5mg x2 doses. HR improved to 90s-100s later in the morning of 1/18. TTE 1/18 without any significant abnormality. No large central PE identified on CTA chest PE studies. SBP in past 24 hours 110-140s. Stable on RA.    On our initial eval she reports feeling okay, denies chest pain, SOB, SUGGS, palpitations. She reports that 2 nights ago she had an approx 2 hour episode of chest pain with associated SOB. The pain was central, non-radiating, crushing, 5/10 in intensity, worsening by leaning forward. She reports having audio/visual hallucinations at that time. During her episode of tachycardia the morning of 1/18 she reports her only symptom being palpitations. She denies any  personal or family history of cardiac disease or arrhythmias. She denies any anginal symptoms prior to this admission. She drinks a gallon jug of vodka over the course of 3 days. Her last drink was Monday (1/15) evening. Addiction psychiatry has been consulted.    Past Medical History:   Diagnosis Date    E coli bacteremia     GERD (gastroesophageal reflux disease)      Hyperlipidemia      Iron deficiency anemia due to chronic blood loss 10/31/2015    Type 1 diabetes mellitus without complication 10/31/2015               Past Surgical History:   Procedure Laterality Date     SECTION        LAPAROSCOPIC SALPINGECTOMY Bilateral 2023     Procedure: SALPINGECTOMY, LAPAROSCOPIC;  Surgeon: Clem Sidhu MD;  Location: [facility];  Service: OB/GYN;  Laterality: Bilateral;    LAPAROSCOPIC TOTAL HYSTERECTOMY N/A 2023     Procedure: HYSTERECTOMY, TOTAL, LAPAROSCOPIC;  Surgeon: Clem Sidhu MD;  Location: [facility];  Service: OB/GYN;  Laterality: N/A;    TUBAL LIGATION             Review of patient's allergies indicates:  No Known Allergies     No current facility-administered medications on file prior to encounter.           Current Outpatient Medications on File Prior to Encounter   Medication Sig    atorvastatin (LIPITOR) 20 MG tablet Take 1 tablet by mouth once daily    busPIRone (BUSPAR) 5 MG Tab Take 1 tablet by mouth twice daily as needed (Patient taking differently: Take 5 mg by mouth 2 (two) times daily.)    citalopram (CELEXA) 10 MG tablet Take 1 tablet (10 mg total) by mouth once daily.    esomeprazole (NEXIUM) 20 MG capsule Take 20 mg by mouth once daily.    insulin detemir U-100, Levemir, (LEVEMIR FLEXPEN) 100 unit/mL (3 mL) InPn pen Inject 12 Units into the skin 2 (two) times daily.    lisinopriL (PRINIVIL,ZESTRIL) 5 MG tablet Take 1 tablet by mouth once daily    gabapentin (NEURONTIN) 300 MG capsule Take 1 capsule (300 mg total) by mouth once daily. (Patient not taking: Reported on  "2023)    insulin aspart U-100 (NOVOLOG) 100 unit/mL injection Inject 100 Units into the skin 3 (three) times daily before meals. To use with insulin pump. Max daily dose 100 units. normal      Family History         Problem Relation (Age of Onset)     Hyperlipidemia Mother     Hypertension Father     No Known Problems Sister, Brother                   Tobacco Use    Smoking status: Former       Current packs/day: 0.00       Types: Cigarettes       Quit date: 12/10/2021       Years since quittin.1       Passive exposure: Current    Smokeless tobacco: Never   Substance and Sexual Activity    Alcohol use: Yes       Alcohol/week: 5.0 standard drinks of alcohol       Types: 5 Drinks containing 0.5 oz of alcohol per week       Comment: occ    Drug use: No       Comment: "20-30 years ago drug use"    Sexual activity: Yes       Partners: Male       Birth control/protection: See Surgical Hx       Comment:       Review of Systems   Cardiovascular:  Negative for chest pain, dyspnea on exertion, irregular heartbeat, leg swelling, near-syncope, orthopnea, palpitations and syncope.   Respiratory:  Negative for shortness of breath.       Objective:      Vital Signs (Most Recent):  Temp: 99.4 °F (37.4 °C) (24 0745)  Pulse: 83 (24 1115)  Resp: (!) 25 (24 0827)  BP: 118/63 (24 1100)  SpO2: 99 % (24 1100) Vital Signs (24h Range):  Temp:  [98.1 °F (36.7 °C)-101.5 °F (38.6 °C)] 99.4 °F (37.4 °C)  Pulse:  [] 83  Resp:  [20-36] 25  SpO2:  [90 %-99 %] 99 %  BP: (110-140)/(55-68) 118/63      Weight: 80.9 kg (178 lb 5.6 oz)  Body mass index is 27.93 kg/m².     SpO2: 99 %        Intake/Output Summary (Last 24 hours) at 2024 1255  Last data filed at 2024 0343      Gross per 24 hour   Intake 2037.65 ml   Output 600 ml   Net 1437.65 ml         Lines/Drains/Airways         Peripheral Intravenous Line  Duration                     Peripheral IV - Single Lumen 24 2122 20 G " Anterior;Right Wrist 1 day          Peripheral IV - Single Lumen 01/16/24 2202 22 G Anterior;Left Hand 1 day                       Physical Exam  Vitals and nursing note reviewed.   Constitutional:       General: She is not in acute distress.     Appearance: Normal appearance. She is not ill-appearing.   HENT:      Head: Normocephalic and atraumatic.      Mouth/Throat:      Mouth: Mucous membranes are moist.   Eyes:      General: No scleral icterus.        Right eye: No discharge.         Left eye: No discharge.   Cardiovascular:      Rate and Rhythm: Normal rate and regular rhythm.      Pulses: Normal pulses.      Heart sounds: Normal heart sounds. No murmur heard.  Pulmonary:      Effort: Pulmonary effort is normal. No respiratory distress.      Breath sounds: Normal breath sounds. No wheezing or rales.   Abdominal:      General: Abdomen is flat. There is no distension.      Palpations: Abdomen is soft.      Tenderness: There is no abdominal tenderness.   Musculoskeletal:      Cervical back: Normal range of motion.      Right lower leg: No edema.      Left lower leg: No edema.   Skin:     General: Skin is warm and dry.   Neurological:      Mental Status: She is alert and oriented to person, place, and time.         Significant Labs: CMP             Recent Labs   Lab 01/17/24  0209 01/17/24  0312 01/17/24  1017 01/17/24  1352 01/17/24  2248 01/18/24  0236 01/18/24  0947   NA  --    < > 134*   < > 132* 132* 130*   K  --    < > 4.2   < > 3.5 3.7 3.9   CL  --    < > 107   < > 101 101 100   CO2  --    < > 21*   < > 22* 21* 22*   GLU  --    < > 166*   < > 104 200* 227*   BUN  --    < > 8   < > 4* 3* <3*   CREATININE  --    < > 0.7   < > 0.6 0.7 0.7   CALCIUM  --    < > 8.0*   < > 9.2 8.9 8.2*   PROT 6.0  --  5.7*  --   --  7.0  --    ALBUMIN 3.1*  --  3.0*  --   --  3.5  --    BILITOT 1.3*  --  0.9  --   --  1.0  --    ALKPHOS 51*  --  43*  --   --  60  --    AST 13  --  16  --   --  23  --    ALT 9*  --  8*  --   --   12  --    ANIONGAP  --    < > 6*   < > 9 10 8    < > = values in this interval not displayed.   , CBC          Recent Labs   Lab 01/17/24  0312 01/17/24  1352 01/18/24  0214 01/18/24  0239   WBC 17.59* 14.10*  --  14.86*   HGB 8.6* 8.8*  --  10.5*   HCT 27.6* 27.7*   < > 32.6*    244  --  278    < > = values in this interval not displayed.   , and All pertinent lab results from the last 24 hours have been reviewed.     Significant Imaging:   Echo     Addendum Date: 1/18/2024         Left Ventricle: The left ventricle is normal in size. Ventricular mass   is normal. Normal wall thickness. Normal wall motion. There is normal   systolic function with a visually estimated ejection fraction of 60 - 65%.   There is normal diastolic function.    Right Ventricle: Normal right ventricular cavity size. Wall thickness   is normal. Right ventricle wall motion  is normal. Systolic function is   normal.    Left Atrium: Normal left atrial size.    Right Atrium: Normal right atrial size.    Aorta: Aortic root is normal in size measuring 2.93 cm. Ascending aorta   is normal measuring 3.14 cm.    IVC/SVC: Normal venous pressure at 3 mmHg.     Assessment and Plan:     Sinus tachycardia  43 y.o. F with a PMHx of T1DM, HLD, and GERD who initially presented for LOC episode admitted to hospital medicine, treated for DKA and alcohol withdrawal. Transferred to MICU for hypotension, AMS and somnolence. Cardiology was consulted for sinus tachycardia of unknown etiology. Morning of 1/18 had 4 EKGs showing sinus tachycardia. HR 160s-170s. Only symptoms were palpations at this time. Febrile, Lactate wnl, Leukocytosis, septic w/u initiated. TSH at admission wnl. Received ativan and valium. HR improved to 90s-100s later in the morning of 1/18. CTA negative for large PE. SBP in past 24 hours 110-140s. Stable on RA. Denies any personal or family history of cardiac disease or arrhythmias. Denies any anginal symptoms prior to this admission.  She drinks a gallon jug of vodka over the course of 3 days. Her last drink was Monday (1/15) evening. Addiction psychiatry has been consulted.    Sinus tachycardia. At patient's age, physiologically able to be in sinus tachycardia into 170s and QRS morphology remains unchanged. Troponin wnl, no further anginal symptoms, TTE without significant abnormality. Sinus tach is unlikely due to cardiac etiology.    Recommendations:  - Fluid resuscitation if volume down  - F/u septic w/u  - Treat alcohol withdrawal  - Check electrolytes keep K > 4, Mg > 2        VTE Risk Mitigation (From admission, onward)           Ordered     enoxaparin injection 40 mg  Every 24 hours         01/17/24 0803     IP VTE LOW RISK PATIENT  Once         01/16/24 1139     Place sequential compression device  Until discontinued         01/16/24 1139                    Thank you for your consult. I will sign off. Please contact us if you have any additional questions.    Billy Bonilla MD  Cardiology   [facility] ([place])

## 2024-01-18 NOTE — ASSESSMENT & PLAN NOTE
43 y.o. F with a PMHx of T1DM, HLD, and GERD who initially presented for LOC episode admitted to hospital medicine, treated for DKA and alcohol withdrawal. Transferred to MICU for hypotension, AMS and somnolence. Cardiology was consulted for sinus tachycardia of unknown etiology. Morning of 1/18 had 4 EKGs showing sinus tachycardia. HR 160s-170s. Only symptoms were palpations at this time. Febrile, Lactate wnl, Leukocytosis, septic w/u initiated. TSH at admission wnl. Received ativan and valium. HR improved to 90s-100s later in the morning of 1/18. CTA negative for large PE. SBP in past 24 hours 110-140s. Stable on RA. Denies any personal or family history of cardiac disease or arrhythmias. Denies any anginal symptoms prior to this admission. She drinks a gallon jug of vodka over the course of 3 days. Her last drink was Monday (1/15) evening. Addiction psychiatry has been consulted.    Sinus tachycardia. At patient's age, physiologically able to be in sinus tachycardia into 170s and QRS morphology remains unchanged. Troponin wnl, no further anginal symptoms, TTE without significant abnormality. Sinus tach is unlikely due to cardiac etiology.    Recommendations:  - Fluid resuscitation if volume down  - F/u septic w/u  - Treat alcohol withdrawal  - Check electrolytes keep K > 4, Mg > 2

## 2024-01-18 NOTE — ASSESSMENT & PLAN NOTE
43 y.o. F with a PMHx of T1DM, HLD, and GERD who initially presented for LOC episode admitted to hospital medicine, treated for DKA and alcohol withdrawal. Transferred to MICU for hypotension, AMS and somnolence. Cardiology was consulted for sinus tachycardia of unknown etiology. Morning of 1/18 had 4 EKGs showing sinus tachycardia. HR 160s-170s. Febrile, Lactate wnl, Leukocytosis, septic w/u initiated. TSH at admission wnl. She received IV ativan 1mg and IV Valium 5mg x2 doses. HR improved to 90s-100s later in the morning of 1/18. TTE 1/18 without any significant abnormality. No large central PE identified on CTA chest PE studies. SBP in past 24 hours 110-140s. Stable on RA.    Asymptomatic on initial eval. Had palpitations early morning of 1/18 that have now resolved. 2 nights ago she had an approx 2 hour episode of chest pain with associated SOB. The pain was central, non-radiating, crushing, 5/10 in intensity, worsening by leaning forward. She reports having audio/visual hallucinations at that time. Denies any personal or family history of cardiac disease or arrhythmias. Denies any anginal symptoms prior to this admission. She drinks a gallon jug of vodka over the course of 3 days. Her last drink was Monday (1/15) evening. Addiction psychiatry has been consulted.    Sinus tachycardia. At patient's age, physiologically able to be in sinus tachycardia into 170s and QRS morphology remains unchanged. Troponin wnl, no further anginal symptoms, TTE without significant abnormality. Sinus tach is unlikely due to cardiac etiology.    Recommendations:  - Fluid resuscitation if volume down  - F/u septic w/u  - Treat alcohol withdrawal  - Check electrolytes keep K > 4, Mg > 2

## 2024-01-18 NOTE — CLINICAL REVIEW
IP Sepsis Screen (most recent)       Sepsis Screen (IP) - 01/17/24 1829       Is the patient's history or complaint suggestive of a possible infection? Yes  -CB    Are there at least two of the following signs and symptoms present? Yes  -CB    Sepsis signs/symptoms - Tachycardia Tachycardia     >90  -CB    Sepsis signs/symptoms - Tachypnea Tachypnea     >20  -CB    Sepsis signs/symptoms - WBC WBC < 4,000 or WBC > 12,000  -CB    Are any of the following organ dysfunction criteria present and not considered to be due to a chronic condition? Yes  -CB    Organ Dysfunction Criteria - O2 O2 Saturation < 95% on room air  -CB    Initiate Sepsis Protocol No  -CB    Reason sepsis not considered Pt. receiving appropriate management  -CB              User Key  (r) = Recorded By, (t) = Taken By, (c) = Cosigned By      Initials Name    Shantell Enamorado, RN

## 2024-01-19 DIAGNOSIS — Z96.41 INSULIN PUMP IN PLACE: ICD-10-CM

## 2024-01-19 DIAGNOSIS — E10.65 TYPE 1 DIABETES MELLITUS WITH HYPERGLYCEMIA: Primary | ICD-10-CM

## 2024-01-19 LAB
ALBUMIN SERPL BCP-MCNC: 2.9 G/DL (ref 3.5–5.2)
ALP SERPL-CCNC: 66 U/L (ref 55–135)
ALT SERPL W/O P-5'-P-CCNC: 10 U/L (ref 10–44)
ANION GAP SERPL CALC-SCNC: 4 MMOL/L (ref 8–16)
AST SERPL-CCNC: 19 U/L (ref 10–40)
BASOPHILS # BLD AUTO: 0.08 K/UL (ref 0–0.2)
BASOPHILS NFR BLD: 1.4 % (ref 0–1.9)
BILIRUB SERPL-MCNC: 0.6 MG/DL (ref 0.1–1)
BUN SERPL-MCNC: 3 MG/DL (ref 6–20)
CALCIUM SERPL-MCNC: 8.7 MG/DL (ref 8.7–10.5)
CHLORIDE SERPL-SCNC: 105 MMOL/L (ref 95–110)
CO2 SERPL-SCNC: 22 MMOL/L (ref 23–29)
CREAT SERPL-MCNC: 0.6 MG/DL (ref 0.5–1.4)
DIFFERENTIAL METHOD BLD: ABNORMAL
EOSINOPHIL # BLD AUTO: 0.2 K/UL (ref 0–0.5)
EOSINOPHIL NFR BLD: 2.7 % (ref 0–8)
ERYTHROCYTE [DISTWIDTH] IN BLOOD BY AUTOMATED COUNT: 15.6 % (ref 11.5–14.5)
EST. GFR  (NO RACE VARIABLE): >60 ML/MIN/1.73 M^2
ETHYLENE GLYCOL SERPLBLD-MCNC: NOT DETECTED MG/DL
GLUCOSE SERPL-MCNC: 117 MG/DL (ref 70–110)
HCT VFR BLD AUTO: 32 % (ref 37–48.5)
HGB BLD-MCNC: 10.3 G/DL (ref 12–16)
IMM GRANULOCYTES # BLD AUTO: 0.01 K/UL (ref 0–0.04)
IMM GRANULOCYTES NFR BLD AUTO: 0.2 % (ref 0–0.5)
LYMPHOCYTES # BLD AUTO: 1 K/UL (ref 1–4.8)
LYMPHOCYTES NFR BLD: 18 % (ref 18–48)
MAGNESIUM SERPL-MCNC: 2 MG/DL (ref 1.6–2.6)
MCH RBC QN AUTO: 28 PG (ref 27–31)
MCHC RBC AUTO-ENTMCNC: 32.2 G/DL (ref 32–36)
MCV RBC AUTO: 87 FL (ref 82–98)
MONOCYTES # BLD AUTO: 0.6 K/UL (ref 0.3–1)
MONOCYTES NFR BLD: 10.2 % (ref 4–15)
NEUTROPHILS # BLD AUTO: 3.8 K/UL (ref 1.8–7.7)
NEUTROPHILS NFR BLD: 67.5 % (ref 38–73)
NRBC BLD-RTO: 0 /100 WBC
PHOSPHATE SERPL-MCNC: 2.8 MG/DL (ref 2.7–4.5)
PLATELET # BLD AUTO: 255 K/UL (ref 150–450)
PMV BLD AUTO: 10.3 FL (ref 9.2–12.9)
POCT GLUCOSE: 104 MG/DL (ref 70–110)
POCT GLUCOSE: 109 MG/DL (ref 70–110)
POCT GLUCOSE: 112 MG/DL (ref 70–110)
POCT GLUCOSE: 124 MG/DL (ref 70–110)
POCT GLUCOSE: 142 MG/DL (ref 70–110)
POCT GLUCOSE: 157 MG/DL (ref 70–110)
POCT GLUCOSE: 161 MG/DL (ref 70–110)
POCT GLUCOSE: 179 MG/DL (ref 70–110)
POCT GLUCOSE: 235 MG/DL (ref 70–110)
POCT GLUCOSE: 91 MG/DL (ref 70–110)
POTASSIUM SERPL-SCNC: 3.9 MMOL/L (ref 3.5–5.1)
PROT SERPL-MCNC: 6.4 G/DL (ref 6–8.4)
RBC # BLD AUTO: 3.68 M/UL (ref 4–5.4)
SODIUM SERPL-SCNC: 131 MMOL/L (ref 136–145)
WBC # BLD AUTO: 5.6 K/UL (ref 3.9–12.7)

## 2024-01-19 PROCEDURE — 25000003 PHARM REV CODE 250

## 2024-01-19 PROCEDURE — 94761 N-INVAS EAR/PLS OXIMETRY MLT: CPT

## 2024-01-19 PROCEDURE — 83735 ASSAY OF MAGNESIUM: CPT

## 2024-01-19 PROCEDURE — 20600001 HC STEP DOWN PRIVATE ROOM

## 2024-01-19 PROCEDURE — 85025 COMPLETE CBC W/AUTO DIFF WBC: CPT

## 2024-01-19 PROCEDURE — 80053 COMPREHEN METABOLIC PANEL: CPT

## 2024-01-19 PROCEDURE — 63600175 PHARM REV CODE 636 W HCPCS

## 2024-01-19 PROCEDURE — 84100 ASSAY OF PHOSPHORUS: CPT

## 2024-01-19 RX ORDER — INSULIN ASPART 100 [IU]/ML
7 INJECTION, SOLUTION INTRAVENOUS; SUBCUTANEOUS
Status: DISCONTINUED | OUTPATIENT
Start: 2024-01-19 | End: 2024-01-20

## 2024-01-19 RX ORDER — INSULIN PMP CART,AUT,G6/7,CNTR
1 EACH SUBCUTANEOUS
Qty: 10 EACH | Refills: 11 | Status: SHIPPED | OUTPATIENT
Start: 2024-01-19

## 2024-01-19 RX ORDER — BLOOD-GLUCOSE SENSOR
1 EACH MISCELLANEOUS
Qty: 3 EACH | Refills: 11 | Status: SHIPPED | OUTPATIENT
Start: 2024-01-19 | End: 2025-01-18

## 2024-01-19 RX ORDER — DIAZEPAM 5 MG/1
5 TABLET ORAL EVERY 8 HOURS
Status: DISCONTINUED | OUTPATIENT
Start: 2024-01-19 | End: 2024-01-20

## 2024-01-19 RX ORDER — INSULIN ASPART 100 [IU]/ML
7 INJECTION, SOLUTION INTRAVENOUS; SUBCUTANEOUS
Status: DISCONTINUED | OUTPATIENT
Start: 2024-01-19 | End: 2024-01-19

## 2024-01-19 RX ORDER — BLOOD-GLUCOSE TRANSMITTER
1 EACH MISCELLANEOUS
Qty: 1 EACH | Refills: 3 | Status: SHIPPED | OUTPATIENT
Start: 2024-01-19

## 2024-01-19 RX ADMIN — INSULIN ASPART 7 UNITS: 100 INJECTION, SOLUTION INTRAVENOUS; SUBCUTANEOUS at 06:01

## 2024-01-19 RX ADMIN — FOLIC ACID 1 MG: 1 TABLET ORAL at 09:01

## 2024-01-19 RX ADMIN — VANCOMYCIN HYDROCHLORIDE 1250 MG: 1.25 INJECTION, POWDER, LYOPHILIZED, FOR SOLUTION INTRAVENOUS at 04:01

## 2024-01-19 RX ADMIN — INSULIN ASPART 7 UNITS: 100 INJECTION, SOLUTION INTRAVENOUS; SUBCUTANEOUS at 09:01

## 2024-01-19 RX ADMIN — CEFTRIAXONE 2 G: 2 INJECTION, POWDER, FOR SOLUTION INTRAMUSCULAR; INTRAVENOUS at 11:01

## 2024-01-19 RX ADMIN — MUPIROCIN: 20 OINTMENT TOPICAL at 09:01

## 2024-01-19 RX ADMIN — DIAZEPAM 10 MG: 5 TABLET ORAL at 06:01

## 2024-01-19 RX ADMIN — ATORVASTATIN CALCIUM 20 MG: 20 TABLET, FILM COATED ORAL at 09:01

## 2024-01-19 RX ADMIN — DIAZEPAM 5 MG: 5 TABLET ORAL at 02:01

## 2024-01-19 RX ADMIN — PIPERACILLIN SODIUM AND TAZOBACTAM SODIUM 4.5 G: 4; .5 INJECTION, POWDER, FOR SOLUTION INTRAVENOUS at 06:01

## 2024-01-19 RX ADMIN — THIAMINE HYDROCHLORIDE 500 MG: 100 INJECTION, SOLUTION INTRAMUSCULAR; INTRAVENOUS at 10:01

## 2024-01-19 RX ADMIN — INSULIN ASPART 7 UNITS: 100 INJECTION, SOLUTION INTRAVENOUS; SUBCUTANEOUS at 01:01

## 2024-01-19 RX ADMIN — ENOXAPARIN SODIUM 40 MG: 40 INJECTION SUBCUTANEOUS at 05:01

## 2024-01-19 RX ADMIN — DIAZEPAM 5 MG: 5 TABLET ORAL at 10:01

## 2024-01-19 NOTE — ASSESSMENT & PLAN NOTE
Possibly secondary to alcohol withdrawal vs sepsis  Cardiology consulted, not concerned for intrinsic cardiac pathology  CTA negative for PE  Valium taper for alcohol withdrawal  On Ceftriaxone for abx coverage

## 2024-01-19 NOTE — ASSESSMENT & PLAN NOTE
Patient is a 44 yo female with PMH of T1DM (previous use of insulin pump/dexcom) who presented following a syncopal episode. Doesn't recall moments leading up to the syncope, but does report ongoing nausea. Reports taking Levemir 12 units BID and insulin aspart as needed with meals when blood sugar > 200. Hgb A1C 6.5%. In ED was found to be hyperglycemic to 345 with elevated anion gap, metabolic acidosis, and UA with glucosuria/ketonuria. Patient was started on insulin drip and normal saline infusion with eventual transition to D5W and 1/2 NS.    - NOW RESOLVED  - See plan for T1DM

## 2024-01-19 NOTE — SUBJECTIVE & OBJECTIVE
Interval History: Overnight, patient's BG was 37 and she was given oral glucose with improvement and wasn't an issue for the remainder of the night. Patient remains afebrile and HDS with no episodes of tachycardia. She reports feeling well overall with no physical complaints this morning. Valium taper was decreased to 5 mg Q8H, and insulin regimen was adjusted with decreasing prandial insulin to 7 units.    Review of Systems  Objective:     Vital Signs (Most Recent):  Temp: 98.2 °F (36.8 °C) (01/19/24 1115)  Pulse: 80 (01/19/24 1459)  Resp: (!) 26 (01/19/24 1115)  BP: 136/76 (01/19/24 1115)  SpO2: 100 % (01/19/24 1459) Vital Signs (24h Range):  Temp:  [97.5 °F (36.4 °C)-98.2 °F (36.8 °C)] 98.2 °F (36.8 °C)  Pulse:  [80-97] 80  Resp:  [18-26] 26  SpO2:  [94 %-100 %] 100 %  BP: (130-146)/(58-77) 136/76     Weight: 80.8 kg (178 lb 2.1 oz)  Body mass index is 27.9 kg/m².  No intake or output data in the 24 hours ending 01/19/24 1514      Physical Exam  Vitals and nursing note reviewed.   HENT:      Head: Normocephalic and atraumatic.      Mouth/Throat:      Mouth: Mucous membranes are moist.      Pharynx: Oropharynx is clear.   Eyes:      Extraocular Movements: Extraocular movements intact.      Conjunctiva/sclera: Conjunctivae normal.      Pupils: Pupils are equal, round, and reactive to light.   Cardiovascular:      Rate and Rhythm: Normal rate and regular rhythm.      Pulses: Normal pulses.      Heart sounds: Normal heart sounds.   Pulmonary:      Effort: Pulmonary effort is normal. No respiratory distress.      Breath sounds: Normal breath sounds. No wheezing.   Abdominal:      General: Abdomen is flat. There is no distension.      Palpations: Abdomen is soft.      Tenderness: There is no abdominal tenderness. There is no guarding or rebound.   Musculoskeletal:         General: No tenderness.      Right lower leg: No edema.      Left lower leg: No edema.   Skin:     General: Skin is warm and dry.   Neurological:       General: No focal deficit present.      Mental Status: She is oriented to person, place, and time.      Cranial Nerves: No cranial nerve deficit.      Motor: No weakness.   Psychiatric:         Behavior: Behavior normal.             Significant Labs: All pertinent labs within the past 24 hours have been reviewed.    Significant Imaging: I have reviewed all pertinent imaging results/findings within the past 24 hours.

## 2024-01-19 NOTE — ASSESSMENT & PLAN NOTE
Patient febrile to 101.5 on 1/18  At this time unclear if secondary to infection vs alcohol withdrawal vs other  CTA was negative for PE  Blood cultures pending and currently on BSABX

## 2024-01-19 NOTE — NURSING
Shift Note    Pt slept well this shift. A&Ox 4. VSS. Denies pain. BG low at HS. Glucose tabs administered with effect. PIV tender and positional at shift change. New PIV placed to RFA. Drsg to L PIV changed, IV working effectively. No signs of infiltration noted. Accuchecks Q1 until midnight, then Q2 the rest of shift. Safety precautions in place. Call light in reach. No further concerns noted at this time.      2006:Called to bedside for pt feeling hypoglycemic. BG 37. Pt asymptomatic except slightly lightheaded. A&Ox4.  PIV infiltrated. MD paged at this time. Oral glucose administered.    2029: New PIV placed. Oral glucose and OJ completed. BG 47 and 55. MD notified and on the way to bedside.    2031: MD at bedside    2039: BG 69. Continue to monitor. MD remains at bedside    2100: . MD remains at bedside. Continue to monitor for the next 1-2 hours. Wants Levemir given when BG stable in the 100's.     2246: . MD notified. Levemir administered.

## 2024-01-19 NOTE — ASSESSMENT & PLAN NOTE
Patient febrile to 101.5 on 1/18  At this time unclear if secondary to infection vs alcohol withdrawal vs other  CTA was negative for PE  Blood cultures pending  De-escalated abx coverage to Ceftriaxone

## 2024-01-19 NOTE — ASSESSMENT & PLAN NOTE
Unclear etiology at this time  Possibly secondary to alcohol use vs alcohol withdrawal/tachycardia vs orthostatic vs vasovagal (occurred with n/v)  Has not had further syncopal events since admission

## 2024-01-19 NOTE — ASSESSMENT & PLAN NOTE
Patient has Abnormal Magnesium: hypomagnesemia. Will continue to monitor electrolytes closely. Will replace the affected electrolytes and repeat labs to be done after interventions completed. The patient's magnesium results have been reviewed and are listed below.  Recent Labs   Lab 01/18/24  0236   MG 1.4*      - Replete PRN; ensure goal Mg ~2 given propensity for tachycardia/arrhythmias

## 2024-01-19 NOTE — SUBJECTIVE & OBJECTIVE
"Interval History: Patient tachycardic to 180s overnight. Rapid response and overnight MD assisted with management. Ultimately administered 10mg IV valium, 1mg IV ativan and 1.5 L NS. HR improved to 90s afterwards. Cardiology consulted, do not believe tachycardia to be cardiac in etiology. Upon further questioning, patient admitted to heavy alcohol use (1 "jug" of vodka over 3 days). Valium taper initiated. Addiction psych consulted. Sugars have been well controlled on current regimen.     Review of Systems   Constitutional:  Negative for chills, diaphoresis, fever and unexpected weight change.   Eyes:  Negative for visual disturbance.   Respiratory:  Negative for cough, choking, chest tightness and shortness of breath.    Cardiovascular:  Negative for chest pain, palpitations and leg swelling.   Gastrointestinal:  Positive for nausea. Negative for abdominal distention, abdominal pain, blood in stool, constipation, diarrhea and vomiting.   Genitourinary:  Positive for frequency. Negative for dysuria and urgency.   Skin:  Negative for color change and rash.   Neurological:  Positive for tremors. Negative for dizziness, speech difficulty, weakness, light-headedness, numbness and headaches.   Psychiatric/Behavioral:  The patient is nervous/anxious.      Objective:     Vital Signs (Most Recent):  Temp: 97.5 °F (36.4 °C) (01/18/24 1555)  Pulse: 88 (01/18/24 1607)  Resp: (!) 23 (01/18/24 1555)  BP: 132/73 (01/18/24 1555)  SpO2: 98 % (01/18/24 1555) Vital Signs (24h Range):  Temp:  [97.5 °F (36.4 °C)-101.5 °F (38.6 °C)] 97.5 °F (36.4 °C)  Pulse:  [] 88  Resp:  [20-36] 23  SpO2:  [90 %-99 %] 98 %  BP: (116-140)/(58-73) 132/73     Weight: 80.9 kg (178 lb 5.6 oz)  Body mass index is 27.93 kg/m².    Intake/Output Summary (Last 24 hours) at 1/18/2024 1911  Last data filed at 1/18/2024 0343  Gross per 24 hour   Intake 480 ml   Output 600 ml   Net -120 ml         Physical Exam  Constitutional:       Comments: Alert and " anxious; tremulousness exhibited   HENT:      Head: Normocephalic and atraumatic.      Mouth/Throat:      Mouth: Mucous membranes are moist.      Pharynx: Oropharynx is clear.   Eyes:      Extraocular Movements: Extraocular movements intact.      Conjunctiva/sclera: Conjunctivae normal.      Pupils: Pupils are equal, round, and reactive to light.   Cardiovascular:      Rate and Rhythm: Regular rhythm. Tachycardia present.      Pulses: Normal pulses.      Heart sounds: Normal heart sounds.   Pulmonary:      Effort: Pulmonary effort is normal. No respiratory distress.      Breath sounds: Normal breath sounds. No wheezing.   Abdominal:      General: Abdomen is flat. There is no distension.      Palpations: Abdomen is soft.      Tenderness: There is no abdominal tenderness. There is no guarding or rebound.   Musculoskeletal:         General: No tenderness.      Right lower leg: No edema.      Left lower leg: No edema.   Skin:     General: Skin is warm and dry.   Neurological:      General: No focal deficit present.      Mental Status: She is oriented to person, place, and time.      Cranial Nerves: No cranial nerve deficit.      Motor: No weakness.   Psychiatric:         Behavior: Behavior normal.             Significant Labs: All pertinent labs within the past 24 hours have been reviewed.  CBC:   Recent Labs   Lab 01/17/24  0312 01/17/24  1352 01/18/24  0214 01/18/24  0239   WBC 17.59* 14.10*  --  14.86*   HGB 8.6* 8.8*  --  10.5*   HCT 27.6* 27.7* 35* 32.6*    244  --  278     CMP:   Recent Labs   Lab 01/17/24  0209 01/17/24  0312 01/17/24  1017 01/17/24  1352 01/18/24  0236 01/18/24  0947 01/18/24  1158   NA  --    < > 134*   < > 132* 130* 131*   K  --    < > 4.2   < > 3.7 3.9 4.1   CL  --    < > 107   < > 101 100 102   CO2  --    < > 21*   < > 21* 22* 22*   GLU  --    < > 166*   < > 200* 227* 243*   BUN  --    < > 8   < > 3* <3* 3*   CREATININE  --    < > 0.7   < > 0.7 0.7 0.7   CALCIUM  --    < > 8.0*   < >  8.9 8.2* 8.2*   PROT 6.0  --  5.7*  --  7.0  --   --    ALBUMIN 3.1*  --  3.0*  --  3.5  --   --    BILITOT 1.3*  --  0.9  --  1.0  --   --    ALKPHOS 51*  --  43*  --  60  --   --    AST 13  --  16  --  23  --   --    ALT 9*  --  8*  --  12  --   --    ANIONGAP  --    < > 6*   < > 10 8 7*    < > = values in this interval not displayed.       Significant Imaging: I have reviewed all pertinent imaging results/findings within the past 24 hours.

## 2024-01-19 NOTE — ASSESSMENT & PLAN NOTE
- Small bilateral pleural effusions noted on imagine. Not significant. Will not be pursing diuresis or thoracentesis.

## 2024-01-19 NOTE — PROGRESS NOTES
[facility] ([address] Medicine  Progress Note    Patient Name: Cira Keys  MRN: [id number]  Patient Class: IP- Inpatient   Admission Date: 1/16/2024  Length of Stay: 2 days  Attending Physician: Hayden Duffy MD  Primary Care Provider: Krysta Mercado MD        Subjective:     Principal Problem:Alcohol withdrawal syndrome with complication        HPI:  Cira Keys is a 43 y.o. female with a PMH of type 1 diabetes mellitus, hyperlipidemia, and GERD who was brought in by a rapid response team from the [facility] after loss of consciousness. Patient states she was accompanying her , who is receiving chemotherapy. States she was feeling nauseated this morning but does not remember any details around the time of the event. Per the ED, there was no injury or fall noted by the patient or the rapid response team.      Patient states she is compliant with her insulin regimen and received her last dose of Levemir this morning. States she is on a sliding-scale insulin regimen and monitors glucose via finger prick approximately 5x per day. States her insulin levels have been consistently in the low 200s, sometimes in the 100s, but have never reached the 300s. Patient was taken off her insulin pump in December 2023 due to insurance. Patient currently endorses nausea but denies fever, chills, headache, vomiting, chest pain, shortness of breath, abdominal pain, diarrhea, dysuria, increased urinary frequency, or new rashes. Patient endorses regular alcohol use and states she drinks approximately 1-2 vodka jigar-aids per day.    In the ED, patient was hypertensive to 189/91, Afebrile, and otherwise vitally stable. Her blood glucose was 345, and she was found to be in DKA with an anion gap of 18, BHB 4.9, and a Bicarb of 19. Other significant labs include: Hgb A1C 6.5, Mg 1.2, UA with 4+ glucose and 3+ ketones, UDS only positive for marijuana, and Phos 2.3. BNP, troponin,  "COVID/Flu, CBC, ethanol, Hep C, HIV, and TSH were all negative/unremarkable.   Patient was started on DKA protocol with an insulin drip and normal saline infusion. Following resolution of DKA and hyperglycemia improvement, she was transitioned to D5W and 1/2 NS with 12 units of Levemir given alongside Insulin infusion, and the infusion to be stopped after 2 hours of giving levemir. Patient was admitted to hospital medicine for management of DKA and hyperglycemia.    Overview/Hospital Course:  Patient admitted to  initially for syncopal episode and DKA. She was briefly upgraded to ICU after she developed hypotension refractory to IVF. Blood cultures were obtained (which so far have shown no growth) and BSABX initiated. Course was complicated by intermittent tachycardia (max 180s bpm). CTA was negative for PE. Cardiology were consulted, however felt tachycardia was not secondary to cardiac pathology. Upon further questioning, patient endorsed heavy alcohol use (a fifth of vodka over 3 days) as well as recreational drug use. Addiction psych were consulted and provided patient with motivational interviewing and outpatient resources. She was initiated on a valium taper with ativan PRNs.     Interval History: Patient tachycardic to 180s overnight. Rapid response and overnight MD assisted with management. Ultimately administered 10mg IV valium, 1mg IV ativan and 1.5 L NS. HR improved to 90s afterwards. Cardiology consulted, do not believe tachycardia to be cardiac in etiology. Upon further questioning, patient admitted to heavy alcohol use (1 "jug" of vodka over 3 days). Valium taper initiated. Addiction psych consulted. Sugars have been well controlled on current regimen.     Review of Systems   Constitutional:  Negative for chills, diaphoresis, fever and unexpected weight change.   Eyes:  Negative for visual disturbance.   Respiratory:  Negative for cough, choking, chest tightness and shortness of breath.  "   Cardiovascular:  Negative for chest pain, palpitations and leg swelling.   Gastrointestinal:  Positive for nausea. Negative for abdominal distention, abdominal pain, blood in stool, constipation, diarrhea and vomiting.   Genitourinary:  Positive for frequency. Negative for dysuria and urgency.   Skin:  Negative for color change and rash.   Neurological:  Positive for tremors. Negative for dizziness, speech difficulty, weakness, light-headedness, numbness and headaches.   Psychiatric/Behavioral:  The patient is nervous/anxious.      Objective:     Vital Signs (Most Recent):  Temp: 97.5 °F (36.4 °C) (01/18/24 1555)  Pulse: 88 (01/18/24 1607)  Resp: (!) 23 (01/18/24 1555)  BP: 132/73 (01/18/24 1555)  SpO2: 98 % (01/18/24 1555) Vital Signs (24h Range):  Temp:  [97.5 °F (36.4 °C)-101.5 °F (38.6 °C)] 97.5 °F (36.4 °C)  Pulse:  [] 88  Resp:  [20-36] 23  SpO2:  [90 %-99 %] 98 %  BP: (116-140)/(58-73) 132/73     Weight: 80.9 kg (178 lb 5.6 oz)  Body mass index is 27.93 kg/m².    Intake/Output Summary (Last 24 hours) at 1/18/2024 1911  Last data filed at 1/18/2024 0343  Gross per 24 hour   Intake 480 ml   Output 600 ml   Net -120 ml         Physical Exam  Constitutional:       Comments: Alert and anxious; tremulousness exhibited   HENT:      Head: Normocephalic and atraumatic.      Mouth/Throat:      Mouth: Mucous membranes are moist.      Pharynx: Oropharynx is clear.   Eyes:      Extraocular Movements: Extraocular movements intact.      Conjunctiva/sclera: Conjunctivae normal.      Pupils: Pupils are equal, round, and reactive to light.   Cardiovascular:      Rate and Rhythm: Regular rhythm. Tachycardia present.      Pulses: Normal pulses.      Heart sounds: Normal heart sounds.   Pulmonary:      Effort: Pulmonary effort is normal. No respiratory distress.      Breath sounds: Normal breath sounds. No wheezing.   Abdominal:      General: Abdomen is flat. There is no distension.      Palpations: Abdomen is soft.       Tenderness: There is no abdominal tenderness. There is no guarding or rebound.   Musculoskeletal:         General: No tenderness.      Right lower leg: No edema.      Left lower leg: No edema.   Skin:     General: Skin is warm and dry.   Neurological:      General: No focal deficit present.      Mental Status: She is oriented to person, place, and time.      Cranial Nerves: No cranial nerve deficit.      Motor: No weakness.   Psychiatric:         Behavior: Behavior normal.             Significant Labs: All pertinent labs within the past 24 hours have been reviewed.  CBC:   Recent Labs   Lab 01/17/24  0312 01/17/24  1352 01/18/24  0214 01/18/24  0239   WBC 17.59* 14.10*  --  14.86*   HGB 8.6* 8.8*  --  10.5*   HCT 27.6* 27.7* 35* 32.6*    244  --  278     CMP:   Recent Labs   Lab 01/17/24  0209 01/17/24  0312 01/17/24  1017 01/17/24  1352 01/18/24  0236 01/18/24  0947 01/18/24  1158   NA  --    < > 134*   < > 132* 130* 131*   K  --    < > 4.2   < > 3.7 3.9 4.1   CL  --    < > 107   < > 101 100 102   CO2  --    < > 21*   < > 21* 22* 22*   GLU  --    < > 166*   < > 200* 227* 243*   BUN  --    < > 8   < > 3* <3* 3*   CREATININE  --    < > 0.7   < > 0.7 0.7 0.7   CALCIUM  --    < > 8.0*   < > 8.9 8.2* 8.2*   PROT 6.0  --  5.7*  --  7.0  --   --    ALBUMIN 3.1*  --  3.0*  --  3.5  --   --    BILITOT 1.3*  --  0.9  --  1.0  --   --    ALKPHOS 51*  --  43*  --  60  --   --    AST 13  --  16  --  23  --   --    ALT 9*  --  8*  --  12  --   --    ANIONGAP  --    < > 6*   < > 10 8 7*    < > = values in this interval not displayed.       Significant Imaging: I have reviewed all pertinent imaging results/findings within the past 24 hours.    Assessment/Plan:      * Alcohol withdrawal syndrome with complication  Patient's hospital course has been complicated by significant tachycardia (max 180 bpm), tremulousness, anxiety. Patient initially endorsed drinking 2-3 mixed drinks per day, however upon further questioning she  reports drinking one fifth of vodka over three days as well as other recreational drug use. Patient's last drink was 1/15. She endorsed auditory hallucinations early in hospitalization. At this time with preserved mental state and hallucinations have resolved. She denies history of complicated withdrawal.     - Addiction psych consulted, appreciate assistance. Provided patient with outpatient resources  - Valium taper with 10mg PO q8h  - Ativan IV 2mg q2h PRN for CIWA > 8  - Folic acid/thiamine      DKA (diabetic ketoacidoses)  Patient is a 44 yo female with PMH of T1DM (previous use of insulin pump/dexcom) who presented following a syncopal episode. Doesn't recall moments leading up to the syncope, but does report ongoing nausea. Reports taking Levemir 12 units BID and insulin aspart as needed with meals when blood sugar > 200. Hgb A1C 6.5%. In ED was found to be hyperglycemic to 345 with elevated anion gap, metabolic acidosis, and UA with glucosuria/ketonuria. Patient was started on insulin drip and normal saline infusion with eventual transition to D5W and 1/2 NS.    - NOW RESOLVED  - See plan for T1DM    Type 1 diabetes mellitus with hyperglycemia  Patient's FSGs are controlled on current medication regimen.  Last A1c reviewed-   Lab Results   Component Value Date    HGBA1C 6.5 (H) 01/16/2024     Most recent fingerstick glucose reviewed-   Recent Labs   Lab 01/18/24  0145 01/18/24  0741 01/18/24  1057 01/18/24  1554   POCTGLUCOSE 203* 203* 257* 151*     Current correctional scale  Low  Maintain anti-hyperglycemic dose as follows-   Antihyperglycemics (From admission, onward)      Start     Stop Route Frequency Ordered    01/17/24 1645  insulin aspart U-100 pen 12 Units         -- SubQ 3 times daily with meals 01/17/24 1108    01/17/24 1208  insulin aspart U-100 pen 0-5 Units         -- SubQ Before meals & nightly PRN 01/17/24 1109    01/17/24 1115  insulin detemir U-100 (Levemir) pen 18 Units         -- SubQ 2  times daily 01/17/24 1108          - Reports home dose 12u Detemir BID and LDISS PRN  - Initially in DKA on arrival. Was close to resolution, however she tried to AMA, later returned to DKA.  - Has been very brittle with hard to control sugars  - Currently requiring significantly more insulin than her weight based dosing. Suspect infection vs alcohol withdrawal contributing.   - Accuchecks ACHS  - Diabetic diet    Fever  Patient febrile to 101.5 on 1/18  At this time unclear if secondary to infection vs alcohol withdrawal vs other  CTA was negative for PE  Blood cultures pending and currently on BSABX      Hypomagnesemia  Patient has Abnormal Magnesium: hypomagnesemia. Will continue to monitor electrolytes closely. Will replace the affected electrolytes and repeat labs to be done after interventions completed. The patient's magnesium results have been reviewed and are listed below.  Recent Labs   Lab 01/18/24  0236   MG 1.4*      - Replete PRN; ensure goal Mg ~2 given propensity for tachycardia/arrhythmias    Essential hypertension  - Home Med Lisinopril 5mg qd  - Holding home antihypertensive given shock picture during initial hospital course    Sinus tachycardia  Possibly secondary to alcohol withdrawal vs sepsis  Cardiology consulted, not concerned for intrinsic cardiac pathology  CTA negative for PE  Valium taper for alcohol withdrawal  On BSABX      Pleural effusion  - Small bilateral pleural effusions noted on imagine. Not significant. Will not be pursing diuresis or thoracentesis.      Alcohol use disorder  - See alcohol withdrawal      Syncope  Unclear etiology at this time  Possibly secondary to alcohol use vs alcohol withdrawal/tachycardia vs orthostatic vs vasovagal (occurred with n/v)  Has not had further syncopal events since admission      Mixed hyperlipidemia  - Continue home atorvastatin      WALTER (generalized anxiety disorder)  - Holding home citalopram    Alcoholic liver disease  - Noted on prior  imaging        VTE Risk Mitigation (From admission, onward)           Ordered     enoxaparin injection 40 mg  Every 24 hours         01/17/24 0803     IP VTE LOW RISK PATIENT  Once         01/16/24 1139     Place sequential compression device  Until discontinued         01/16/24 1139                    Discharge Planning   JUVENTINO: 1/19/2024     Code Status: Full Code   Is the patient medically ready for discharge?: No    Reason for patient still in hospital (select all that apply): Patient trending condition and Treatment  Discharge Plan A: Home with family        Marzena Rojas MD  Department of Hospital Medicine   [facility] ([place]

## 2024-01-19 NOTE — ASSESSMENT & PLAN NOTE
Patient has Abnormal Magnesium: hypomagnesemia. Will continue to monitor electrolytes closely. Will replace the affected electrolytes and repeat labs to be done after interventions completed. The patient's magnesium results have been reviewed and are listed below.  Recent Labs   Lab 01/19/24  0519   MG 2.0      - Replete PRN; ensure goal Mg ~2 given propensity for tachycardia/arrhythmias

## 2024-01-19 NOTE — ASSESSMENT & PLAN NOTE
- Home Med Lisinopril 5mg qd  - Holding home antihypertensive given shock picture during initial hospital course

## 2024-01-19 NOTE — ASSESSMENT & PLAN NOTE
Patient's hospital course has been complicated by significant tachycardia (max 180 bpm), tremulousness, anxiety. Patient initially endorsed drinking 2-3 mixed drinks per day, however upon further questioning she reports drinking one fifth of vodka over three days as well as other recreational drug use. Patient's last drink was 1/15. She endorsed auditory hallucinations early in hospitalization. At this time with preserved mental state and hallucinations have resolved. She denies history of complicated withdrawal.     - Addiction psych consulted, appreciate assistance. Provided patient with outpatient resources  - Decreased valium taper to 5mg PO q8h  - Ativan IV 2mg q2h PRN for CIWA > 8  - Folic acid/thiamine

## 2024-01-19 NOTE — HOSPITAL COURSE
Patient admitted to  initially for syncopal episode and DKA. She was briefly upgraded to ICU after she developed hypotension refractory to IVF. Blood cultures were obtained (which so far have shown no growth) and BSABX initiated. Course was complicated by intermittent tachycardia (max 180s bpm). CTA was negative for PE. Cardiology were consulted, however felt tachycardia was not secondary to cardiac pathology. Upon further questioning, patient endorsed heavy alcohol use (a fifth of vodka over 3 days) as well as recreational drug use. Addiction psych were consulted and provided patient with motivational interviewing and outpatient resources. She was initiated on a valium taper with ativan PRNs. Patient has improved with minimal to no signs of ongoing withdrawal and no longer requiring PRN Ativan. Continuing to taper down valium. Endocrine was consulted due to ongoing labile blood sugars, and patient was started on a transition insulin drip to better determine longstanding insulin requirements. After two days of a transition insulin drip, patient was medically cleared for discharge, and she was discharged home on 01/22

## 2024-01-19 NOTE — ASSESSMENT & PLAN NOTE
Possibly secondary to alcohol withdrawal vs sepsis  Cardiology consulted, not concerned for intrinsic cardiac pathology  CTA negative for PE  Valium taper for alcohol withdrawal  On BSABX

## 2024-01-19 NOTE — ASSESSMENT & PLAN NOTE
Patient's FSGs are controlled on current medication regimen.  Last A1c reviewed-   Lab Results   Component Value Date    HGBA1C 6.5 (H) 01/16/2024     Most recent fingerstick glucose reviewed-   Recent Labs   Lab 01/18/24  0145 01/18/24  0741 01/18/24  1057 01/18/24  1554   POCTGLUCOSE 203* 203* 257* 151*     Current correctional scale  Low  Maintain anti-hyperglycemic dose as follows-   Antihyperglycemics (From admission, onward)      Start     Stop Route Frequency Ordered    01/17/24 1645  insulin aspart U-100 pen 12 Units         -- SubQ 3 times daily with meals 01/17/24 1108    01/17/24 1208  insulin aspart U-100 pen 0-5 Units         -- SubQ Before meals & nightly PRN 01/17/24 1109    01/17/24 1115  insulin detemir U-100 (Levemir) pen 18 Units         -- SubQ 2 times daily 01/17/24 1108          - Reports home dose 12u Detemir BID and LDISS PRN  - Initially in DKA on arrival. Was close to resolution, however she tried to AMA, later returned to DKA.  - Has been very brittle with hard to control sugars  - Currently requiring significantly more insulin than her weight based dosing. Suspect infection vs alcohol withdrawal contributing.   - Accuchecks ACHS  - Diabetic diet

## 2024-01-19 NOTE — NURSING
This morning pt heart rate was elevated in the 160's, shortly after heart rate came down to the 90's and has stayed in the 80's-90's NSR all shift.  Patient's CIWA at a 4 this morning and has been cooperative with all tests today.  Blood sugars have also remained under control today.  Morning insulin was held due to pt needing to be NPO for CTA.  Safety precautions in place with call light in reach.

## 2024-01-19 NOTE — ASSESSMENT & PLAN NOTE
Patient is a 42 yo female with PMH of T1DM (previous use of insulin pump/dexcom) who presented following a syncopal episode. Doesn't recall moments leading up to the syncope, but does report ongoing nausea. Reports taking Levemir 12 units BID and insulin aspart as needed with meals when blood sugar > 200. Hgb A1C 6.5%. In ED was found to be hyperglycemic to 345 with elevated anion gap, metabolic acidosis, and UA with glucosuria/ketonuria. Patient was started on insulin drip and normal saline infusion with eventual transition to D5W and 1/2 NS.    - NOW RESOLVED  - See plan for T1DM

## 2024-01-19 NOTE — PROGRESS NOTES
[facility] [facility]  Progress Note    Patient Name: Cira Keys  MRN: [id number]  Patient Class: IP- Inpatient   Admission Date: 1/16/2024  Length of Stay: 3 days  Attending Physician: Hayden Duffy MD  Primary Care Provider: Krysta Mercado MD        Subjective:     Principal Problem:Alcohol withdrawal syndrome with complication        HPI:  Cira Keys is a 43 y.o. female with a PMH of type 1 diabetes mellitus, hyperlipidemia, and GERD who was brought in by a rapid response team from the [facility] after loss of consciousness. Patient states she was accompanying her , who is receiving chemotherapy. States she was feeling nauseated this morning but does not remember any details around the time of the event. Per the ED, there was no injury or fall noted by the patient or the rapid response team.      Patient states she is compliant with her insulin regimen and received her last dose of Levemir this morning. States she is on a sliding-scale insulin regimen and monitors glucose via finger prick approximately 5x per day. States her insulin levels have been consistently in the low 200s, sometimes in the 100s, but have never reached the 300s. Patient was taken off her insulin pump in December 2023 due to insurance. Patient currently endorses nausea but denies fever, chills, headache, vomiting, chest pain, shortness of breath, abdominal pain, diarrhea, dysuria, increased urinary frequency, or new rashes. Patient endorses regular alcohol use and states she drinks approximately 1-2 vodka jigar-aids per day.    In the ED, patient was hypertensive to 189/91, Afebrile, and otherwise vitally stable. Her blood glucose was 345, and she was found to be in DKA with an anion gap of 18, BHB 4.9, and a Bicarb of 19. Other significant labs include: Hgb A1C 6.5, Mg 1.2, UA with 4+ glucose and 3+ ketones, UDS only positive for marijuana, and Phos 2.3. BNP, troponin,  COVID/Flu, CBC, ethanol, Hep C, HIV, and TSH were all negative/unremarkable.   Patient was started on DKA protocol with an insulin drip and normal saline infusion. Following resolution of DKA and hyperglycemia improvement, she was transitioned to D5W and 1/2 NS with 12 units of Levemir given alongside Insulin infusion, and the infusion to be stopped after 2 hours of giving levemir. Patient was admitted to hospital medicine for management of DKA and hyperglycemia.    Overview/Hospital Course:  Patient admitted to  initially for syncopal episode and DKA. She was briefly upgraded to ICU after she developed hypotension refractory to IVF. Blood cultures were obtained (which so far have shown no growth) and BSABX initiated. Course was complicated by intermittent tachycardia (max 180s bpm). CTA was negative for PE. Cardiology were consulted, however felt tachycardia was not secondary to cardiac pathology. Upon further questioning, patient endorsed heavy alcohol use (a fifth of vodka over 3 days) as well as recreational drug use. Addiction psych were consulted and provided patient with motivational interviewing and outpatient resources. She was initiated on a valium taper with ativan PRNs.     Interval History: Overnight, patient's BG was 37 and she was given oral glucose with improvement and wasn't an issue for the remainder of the night. Patient remains afebrile and HDS with no episodes of tachycardia. She reports feeling well overall with no physical complaints this morning. Valium taper was decreased to 5 mg Q8H, and insulin regimen was adjusted with decreasing prandial insulin to 7 units.    Review of Systems  Objective:     Vital Signs (Most Recent):  Temp: 98.2 °F (36.8 °C) (01/19/24 1115)  Pulse: 80 (01/19/24 1459)  Resp: (!) 26 (01/19/24 1115)  BP: 136/76 (01/19/24 1115)  SpO2: 100 % (01/19/24 1459) Vital Signs (24h Range):  Temp:  [97.5 °F (36.4 °C)-98.2 °F (36.8 °C)] 98.2 °F (36.8 °C)  Pulse:  [80-97] 80  Resp:   [18-26] 26  SpO2:  [94 %-100 %] 100 %  BP: (130-146)/(58-77) 136/76     Weight: 80.8 kg (178 lb 2.1 oz)  Body mass index is 27.9 kg/m².  No intake or output data in the 24 hours ending 01/19/24 1514      Physical Exam  Vitals and nursing note reviewed.   HENT:      Head: Normocephalic and atraumatic.      Mouth/Throat:      Mouth: Mucous membranes are moist.      Pharynx: Oropharynx is clear.   Eyes:      Extraocular Movements: Extraocular movements intact.      Conjunctiva/sclera: Conjunctivae normal.      Pupils: Pupils are equal, round, and reactive to light.   Cardiovascular:      Rate and Rhythm: Normal rate and regular rhythm.      Pulses: Normal pulses.      Heart sounds: Normal heart sounds.   Pulmonary:      Effort: Pulmonary effort is normal. No respiratory distress.      Breath sounds: Normal breath sounds. No wheezing.   Abdominal:      General: Abdomen is flat. There is no distension.      Palpations: Abdomen is soft.      Tenderness: There is no abdominal tenderness. There is no guarding or rebound.   Musculoskeletal:         General: No tenderness.      Right lower leg: No edema.      Left lower leg: No edema.   Skin:     General: Skin is warm and dry.   Neurological:      General: No focal deficit present.      Mental Status: She is oriented to person, place, and time.      Cranial Nerves: No cranial nerve deficit.      Motor: No weakness.   Psychiatric:         Behavior: Behavior normal.             Significant Labs: All pertinent labs within the past 24 hours have been reviewed.    Significant Imaging: I have reviewed all pertinent imaging results/findings within the past 24 hours.    Assessment/Plan:      * Alcohol withdrawal syndrome with complication  Patient's hospital course has been complicated by significant tachycardia (max 180 bpm), tremulousness, anxiety. Patient initially endorsed drinking 2-3 mixed drinks per day, however upon further questioning she reports drinking one fifth of vodka  over three days as well as other recreational drug use. Patient's last drink was 1/15. She endorsed auditory hallucinations early in hospitalization. At this time with preserved mental state and hallucinations have resolved. She denies history of complicated withdrawal.     - Addiction psych consulted, appreciate assistance. Provided patient with outpatient resources  - Decreased valium taper to 5mg PO q8h  - Ativan IV 2mg q2h PRN for CIWA > 8  - Folic acid/thiamine      Sinus tachycardia  Possibly secondary to alcohol withdrawal vs sepsis  Cardiology consulted, not concerned for intrinsic cardiac pathology  CTA negative for PE  Valium taper for alcohol withdrawal  On Ceftriaxone for abx coverage      Pleural effusion  - Small bilateral pleural effusions noted on imagine. Not significant. Will not be pursing diuresis or thoracentesis.      Alcohol use disorder  - See alcohol withdrawal      Syncope  Unclear etiology at this time  Possibly secondary to alcohol use vs alcohol withdrawal/tachycardia vs orthostatic vs vasovagal (occurred with n/v)  Has not had further syncopal events since admission      Mixed hyperlipidemia  - Continue home atorvastatin      WALTER (generalized anxiety disorder)  - Holding home citalopram    Alcoholic liver disease  - Noted on prior imaging      DKA (diabetic ketoacidoses)  Patient is a 44 yo female with PMH of T1DM (previous use of insulin pump/dexcom) who presented following a syncopal episode. Doesn't recall moments leading up to the syncope, but does report ongoing nausea. Reports taking Levemir 12 units BID and insulin aspart as needed with meals when blood sugar > 200. Hgb A1C 6.5%. In ED was found to be hyperglycemic to 345 with elevated anion gap, metabolic acidosis, and UA with glucosuria/ketonuria. Patient was started on insulin drip and normal saline infusion with eventual transition to D5W and 1/2 NS.    - NOW RESOLVED  - See plan for T1DM    Essential hypertension  - Home Med  Lisinopril 5mg qd  - Holding home antihypertensive given shock picture during initial hospital course    Fever  Patient febrile to 101.5 on 1/18  At this time unclear if secondary to infection vs alcohol withdrawal vs other  CTA was negative for PE  Blood cultures pending  De-escalated abx coverage to Ceftriaxone    Hypomagnesemia  Patient has Abnormal Magnesium: hypomagnesemia. Will continue to monitor electrolytes closely. Will replace the affected electrolytes and repeat labs to be done after interventions completed. The patient's magnesium results have been reviewed and are listed below.  Recent Labs   Lab 01/19/24  0519   MG 2.0      - Replete PRN; ensure goal Mg ~2 given propensity for tachycardia/arrhythmias    Type 1 diabetes mellitus with hyperglycemia  Patient's FSGs are controlled on current medication regimen.  Last A1c reviewed-   Lab Results   Component Value Date    HGBA1C 6.5 (H) 01/16/2024     Most recent fingerstick glucose reviewed-   Recent Labs   Lab 01/19/24  0606 01/19/24  0811 01/19/24  0824 01/19/24  1116   POCTGLUCOSE 142* 109 104 91       Current correctional scale  Low  Maintain anti-hyperglycemic dose as follows-   Antihyperglycemics (From admission, onward)      Start     Stop Route Frequency Ordered    01/19/24 0945  insulin aspart U-100 pen 7 Units         -- SubQ 3 times daily with meals 01/19/24 0938    01/19/24 0938  insulin detemir U-100 (Levemir) pen 14 Units         -- SubQ 2 times daily 01/19/24 0938    01/17/24 1208  insulin aspart U-100 pen 0-5 Units         -- SubQ Before meals & nightly PRN 01/17/24 1109          - Reports home dose 12u Detemir BID and LDISS PRN  - Initially in DKA on arrival. Was close to resolution, however she tried to AMA, later returned to DKA.  - Has been very brittle with hard to control sugars  - Insulin requirements beginning to normalize closer to her home regimen; suspect infection or alcohol withdrawals contributed to higher requirements.  -  Accuchecks ACHS  - Diabetic diet      VTE Risk Mitigation (From admission, onward)           Ordered     enoxaparin injection 40 mg  Every 24 hours         01/17/24 0803     IP VTE LOW RISK PATIENT  Once         01/16/24 1139     Place sequential compression device  Until discontinued         01/16/24 1139                    Discharge Planning   JUVENTINO: 1/19/2024     Code Status: Full Code   Is the patient medically ready for discharge?: No    Reason for patient still in hospital (select all that apply): Patient trending condition and Treatment  Discharge Plan A: Home with family                  Shawn Caldera DO  Department of Hospital Medicine   [facility] ([place]

## 2024-01-19 NOTE — ASSESSMENT & PLAN NOTE
Patient's FSGs are controlled on current medication regimen.  Last A1c reviewed-   Lab Results   Component Value Date    HGBA1C 6.5 (H) 01/16/2024     Most recent fingerstick glucose reviewed-   Recent Labs   Lab 01/19/24  0606 01/19/24  0811 01/19/24  0824 01/19/24  1116   POCTGLUCOSE 142* 109 104 91       Current correctional scale  Low  Maintain anti-hyperglycemic dose as follows-   Antihyperglycemics (From admission, onward)      Start     Stop Route Frequency Ordered    01/19/24 0945  insulin aspart U-100 pen 7 Units         -- SubQ 3 times daily with meals 01/19/24 0938    01/19/24 0938  insulin detemir U-100 (Levemir) pen 14 Units         -- SubQ 2 times daily 01/19/24 0938    01/17/24 1208  insulin aspart U-100 pen 0-5 Units         -- SubQ Before meals & nightly PRN 01/17/24 1109          - Reports home dose 12u Detemir BID and LDISS PRN  - Initially in DKA on arrival. Was close to resolution, however she tried to AMA, later returned to DKA.  - Has been very brittle with hard to control sugars  - Insulin requirements beginning to normalize closer to her home regimen; suspect infection or alcohol withdrawals contributed to higher requirements.  - Accuchecks ACHS  - Diabetic diet

## 2024-01-19 NOTE — ASSESSMENT & PLAN NOTE
Patient's hospital course has been complicated by significant tachycardia (max 180 bpm), tremulousness, anxiety. Patient initially endorsed drinking 2-3 mixed drinks per day, however upon further questioning she reports drinking one fifth of vodka over three days as well as other recreational drug use. Patient's last drink was 1/15. She endorsed auditory hallucinations early in hospitalization. At this time with preserved mental state and hallucinations have resolved. She denies history of complicated withdrawal.     - Addiction psych consulted, appreciate assistance. Provided patient with outpatient resources  - Valium taper with 10mg PO q8h  - Ativan IV 2mg q2h PRN for CIWA > 8  - Folic acid/thiamine

## 2024-01-19 NOTE — PLAN OF CARE
Patient rounded on through shift as per policy, Patient in stable condition with no complaints. Safety measures in place: bed in lowest position, three rails up, call light in reach, personal belonging in reach. Plan of care review with patient. No acute events during shift. Plan of care ongoing.    Problem: Adult Inpatient Plan of Care  Goal: Plan of Care Review  Outcome: Ongoing, Progressing  Goal: Patient-Specific Goal (Individualized)  Outcome: Ongoing, Progressing  Goal: Absence of Hospital-Acquired Illness or Injury  Outcome: Ongoing, Progressing  Goal: Optimal Comfort and Wellbeing  Outcome: Ongoing, Progressing  Goal: Readiness for Transition of Care  Outcome: Ongoing, Progressing     Problem: Diabetes Comorbidity  Goal: Blood Glucose Level Within Targeted Range  Outcome: Ongoing, Progressing

## 2024-01-20 LAB
ALBUMIN SERPL BCP-MCNC: 2.8 G/DL (ref 3.5–5.2)
ALP SERPL-CCNC: 68 U/L (ref 55–135)
ALT SERPL W/O P-5'-P-CCNC: 11 U/L (ref 10–44)
ANION GAP SERPL CALC-SCNC: 7 MMOL/L (ref 8–16)
ANION GAP SERPL CALC-SCNC: 8 MMOL/L (ref 8–16)
AST SERPL-CCNC: 16 U/L (ref 10–40)
B-OH-BUTYR BLD STRIP-SCNC: 0.1 MMOL/L (ref 0–0.5)
BASOPHILS # BLD AUTO: 0.04 K/UL (ref 0–0.2)
BASOPHILS NFR BLD: 0.8 % (ref 0–1.9)
BILIRUB SERPL-MCNC: 0.5 MG/DL (ref 0.1–1)
BUN SERPL-MCNC: 8 MG/DL (ref 6–20)
BUN SERPL-MCNC: 8 MG/DL (ref 6–20)
CALCIUM SERPL-MCNC: 9 MG/DL (ref 8.7–10.5)
CALCIUM SERPL-MCNC: 9 MG/DL (ref 8.7–10.5)
CHLORIDE SERPL-SCNC: 102 MMOL/L (ref 95–110)
CHLORIDE SERPL-SCNC: 103 MMOL/L (ref 95–110)
CO2 SERPL-SCNC: 22 MMOL/L (ref 23–29)
CO2 SERPL-SCNC: 22 MMOL/L (ref 23–29)
CREAT SERPL-MCNC: 1 MG/DL (ref 0.5–1.4)
CREAT SERPL-MCNC: 1 MG/DL (ref 0.5–1.4)
DIFFERENTIAL METHOD BLD: ABNORMAL
EOSINOPHIL # BLD AUTO: 0.2 K/UL (ref 0–0.5)
EOSINOPHIL NFR BLD: 3.5 % (ref 0–8)
ERYTHROCYTE [DISTWIDTH] IN BLOOD BY AUTOMATED COUNT: 15.4 % (ref 11.5–14.5)
EST. GFR  (NO RACE VARIABLE): >60 ML/MIN/1.73 M^2
EST. GFR  (NO RACE VARIABLE): >60 ML/MIN/1.73 M^2
GLUCOSE SERPL-MCNC: 311 MG/DL (ref 70–110)
GLUCOSE SERPL-MCNC: 332 MG/DL (ref 70–110)
HCT VFR BLD AUTO: 33.7 % (ref 37–48.5)
HGB BLD-MCNC: 10.6 G/DL (ref 12–16)
IMM GRANULOCYTES # BLD AUTO: 0.01 K/UL (ref 0–0.04)
IMM GRANULOCYTES NFR BLD AUTO: 0.2 % (ref 0–0.5)
LYMPHOCYTES # BLD AUTO: 1 K/UL (ref 1–4.8)
LYMPHOCYTES NFR BLD: 19.9 % (ref 18–48)
MAGNESIUM SERPL-MCNC: 1.7 MG/DL (ref 1.6–2.6)
MCH RBC QN AUTO: 27.3 PG (ref 27–31)
MCHC RBC AUTO-ENTMCNC: 31.5 G/DL (ref 32–36)
MCV RBC AUTO: 87 FL (ref 82–98)
MONOCYTES # BLD AUTO: 0.6 K/UL (ref 0.3–1)
MONOCYTES NFR BLD: 11.3 % (ref 4–15)
NEUTROPHILS # BLD AUTO: 3.1 K/UL (ref 1.8–7.7)
NEUTROPHILS NFR BLD: 64.3 % (ref 38–73)
NRBC BLD-RTO: 0 /100 WBC
PHOSPHATE SERPL-MCNC: 4.2 MG/DL (ref 2.7–4.5)
PLATELET # BLD AUTO: 253 K/UL (ref 150–450)
PMV BLD AUTO: 9.9 FL (ref 9.2–12.9)
POCT GLUCOSE: 207 MG/DL (ref 70–110)
POCT GLUCOSE: 221 MG/DL (ref 70–110)
POCT GLUCOSE: 231 MG/DL (ref 70–110)
POCT GLUCOSE: 253 MG/DL (ref 70–110)
POCT GLUCOSE: 272 MG/DL (ref 70–110)
POCT GLUCOSE: 375 MG/DL (ref 70–110)
POCT GLUCOSE: 395 MG/DL (ref 70–110)
POCT GLUCOSE: 398 MG/DL (ref 70–110)
POTASSIUM SERPL-SCNC: 4.2 MMOL/L (ref 3.5–5.1)
POTASSIUM SERPL-SCNC: 4.4 MMOL/L (ref 3.5–5.1)
PROT SERPL-MCNC: 6.2 G/DL (ref 6–8.4)
RBC # BLD AUTO: 3.88 M/UL (ref 4–5.4)
SODIUM SERPL-SCNC: 132 MMOL/L (ref 136–145)
SODIUM SERPL-SCNC: 132 MMOL/L (ref 136–145)
WBC # BLD AUTO: 4.87 K/UL (ref 3.9–12.7)

## 2024-01-20 PROCEDURE — 63600175 PHARM REV CODE 636 W HCPCS

## 2024-01-20 PROCEDURE — 99222 1ST HOSP IP/OBS MODERATE 55: CPT | Mod: ,,, | Performed by: NURSE PRACTITIONER

## 2024-01-20 PROCEDURE — 25000003 PHARM REV CODE 250: Performed by: NURSE PRACTITIONER

## 2024-01-20 PROCEDURE — 63600175 PHARM REV CODE 636 W HCPCS: Performed by: NURSE PRACTITIONER

## 2024-01-20 PROCEDURE — 83735 ASSAY OF MAGNESIUM: CPT

## 2024-01-20 PROCEDURE — 25000003 PHARM REV CODE 250

## 2024-01-20 PROCEDURE — 80048 BASIC METABOLIC PNL TOTAL CA: CPT | Mod: XB | Performed by: STUDENT IN AN ORGANIZED HEALTH CARE EDUCATION/TRAINING PROGRAM

## 2024-01-20 PROCEDURE — 36415 COLL VENOUS BLD VENIPUNCTURE: CPT | Performed by: STUDENT IN AN ORGANIZED HEALTH CARE EDUCATION/TRAINING PROGRAM

## 2024-01-20 PROCEDURE — 85025 COMPLETE CBC W/AUTO DIFF WBC: CPT

## 2024-01-20 PROCEDURE — 82010 KETONE BODYS QUAN: CPT | Performed by: STUDENT IN AN ORGANIZED HEALTH CARE EDUCATION/TRAINING PROGRAM

## 2024-01-20 PROCEDURE — 20600001 HC STEP DOWN PRIVATE ROOM

## 2024-01-20 PROCEDURE — 84100 ASSAY OF PHOSPHORUS: CPT

## 2024-01-20 PROCEDURE — 80053 COMPREHEN METABOLIC PANEL: CPT

## 2024-01-20 RX ORDER — IBUPROFEN 200 MG
24 TABLET ORAL
Status: DISCONTINUED | OUTPATIENT
Start: 2024-01-20 | End: 2024-01-22 | Stop reason: HOSPADM

## 2024-01-20 RX ORDER — DIAZEPAM 5 MG/1
5 TABLET ORAL EVERY 12 HOURS
Status: DISCONTINUED | OUTPATIENT
Start: 2024-01-20 | End: 2024-01-21

## 2024-01-20 RX ORDER — INSULIN ASPART 100 [IU]/ML
1-4 INJECTION, SOLUTION INTRAVENOUS; SUBCUTANEOUS
Status: DISCONTINUED | OUTPATIENT
Start: 2024-01-20 | End: 2024-01-22 | Stop reason: HOSPADM

## 2024-01-20 RX ORDER — INSULIN ASPART 100 [IU]/ML
0-5 INJECTION, SOLUTION INTRAVENOUS; SUBCUTANEOUS
Status: DISCONTINUED | OUTPATIENT
Start: 2024-01-20 | End: 2024-01-22 | Stop reason: HOSPADM

## 2024-01-20 RX ORDER — IBUPROFEN 200 MG
16 TABLET ORAL
Status: DISCONTINUED | OUTPATIENT
Start: 2024-01-20 | End: 2024-01-22 | Stop reason: HOSPADM

## 2024-01-20 RX ORDER — GLUCAGON 1 MG
1 KIT INJECTION
Status: DISCONTINUED | OUTPATIENT
Start: 2024-01-20 | End: 2024-01-22 | Stop reason: HOSPADM

## 2024-01-20 RX ORDER — INSULIN ASPART 100 [IU]/ML
1-10 INJECTION, SOLUTION INTRAVENOUS; SUBCUTANEOUS
Status: DISCONTINUED | OUTPATIENT
Start: 2024-01-20 | End: 2024-01-22 | Stop reason: HOSPADM

## 2024-01-20 RX ADMIN — ATORVASTATIN CALCIUM 20 MG: 20 TABLET, FILM COATED ORAL at 08:01

## 2024-01-20 RX ADMIN — Medication 100 MG: at 09:01

## 2024-01-20 RX ADMIN — MUPIROCIN: 20 OINTMENT TOPICAL at 09:01

## 2024-01-20 RX ADMIN — DIAZEPAM 5 MG: 5 TABLET ORAL at 09:01

## 2024-01-20 RX ADMIN — FOLIC ACID 1 MG: 1 TABLET ORAL at 08:01

## 2024-01-20 RX ADMIN — ENOXAPARIN SODIUM 40 MG: 40 INJECTION SUBCUTANEOUS at 05:01

## 2024-01-20 RX ADMIN — INSULIN ASPART 2 UNITS: 100 INJECTION, SOLUTION INTRAVENOUS; SUBCUTANEOUS at 05:01

## 2024-01-20 RX ADMIN — MUPIROCIN: 20 OINTMENT TOPICAL at 08:01

## 2024-01-20 RX ADMIN — DIAZEPAM 5 MG: 5 TABLET ORAL at 06:01

## 2024-01-20 RX ADMIN — DIAZEPAM 5 MG: 5 TABLET ORAL at 01:01

## 2024-01-20 RX ADMIN — INSULIN HUMAN 0.5 UNITS/HR: 1 INJECTION, SOLUTION INTRAVENOUS at 09:01

## 2024-01-20 RX ADMIN — INSULIN ASPART 3 UNITS: 100 INJECTION, SOLUTION INTRAVENOUS; SUBCUTANEOUS at 12:01

## 2024-01-20 RX ADMIN — Medication 100 MG: at 08:01

## 2024-01-20 RX ADMIN — INSULIN ASPART 7 UNITS: 100 INJECTION, SOLUTION INTRAVENOUS; SUBCUTANEOUS at 08:01

## 2024-01-20 RX ADMIN — INSULIN ASPART 5 UNITS: 100 INJECTION, SOLUTION INTRAVENOUS; SUBCUTANEOUS at 05:01

## 2024-01-20 RX ADMIN — CEFTRIAXONE 2 G: 2 INJECTION, POWDER, FOR SOLUTION INTRAMUSCULAR; INTRAVENOUS at 10:01

## 2024-01-20 RX ADMIN — INSULIN ASPART 7 UNITS: 100 INJECTION, SOLUTION INTRAVENOUS; SUBCUTANEOUS at 12:01

## 2024-01-20 RX ADMIN — Medication 100 MG: at 05:01

## 2024-01-20 RX ADMIN — INSULIN ASPART 5 UNITS: 100 INJECTION, SOLUTION INTRAVENOUS; SUBCUTANEOUS at 08:01

## 2024-01-20 NOTE — ASSESSMENT & PLAN NOTE
BG goal 140-180    Patient with labile BG. T1DM. Sees Dr. Mao outpatient. Home regimen consist of Levemir 12 BID and Novolog SSI. Previously on Omnipod 5 with the following settings (stopped Dec 2023 do to insurance changing but was having fairly good control on the following settings)   Basal rate  12A: 0.8 U/h   ICR    12A: 12   ISF  12A: 45  Target: 130  IAT: 5h         Plan: (will start previous pump settings inpatient and adjust as indicated)   -Start Transition IV insulin infusion at 0.5 u/hr with stepdown parameters (Start this evening; patient has already received 14 units of basal Levemir this morning) Plan to increase to 0.8 u/hr on 1/21 to match home insulin pump settings  -Start Novolog ICR 1:12 (1 unit per 12 g of carbs) TID with meals   -Start Low Dose Correction Scale  -BG monitoring ac/hs/0200    ** Please call Endocrine for any BG related issues **    Discharge plans- TBD  Lab Results   Component Value Date    HGBA1C 6.5 (H) 01/16/2024

## 2024-01-20 NOTE — CONSULTS
[facility] ([place] [id number])  Endocrinology  Diabetes Consult Note    Consult Requested by: Fabian Cotton, *   Reason for admit: Alcohol withdrawal syndrome with complication    HISTORY OF PRESENT ILLNESS:  Reason for Consult: Management of T1DM, Hyperglycemia     Surgical Procedure and Date: n/a    Diabetes diagnosis year:     Diagnosed around age 9       11/03/15 04:28   C-Peptide <0.1 (L)   Glutamic Acid Decarb Ab 0.12 (H)      Home Diabetes Medications: Currently doing the following Levemir 12 untis BID and Novolog Correction Scale   Patient was previously on Omnipod 5 pump (stopped in Dec do to insurance reasons)       Lab Results   Component Value Date    HGBA1C 6.5 (H) 01/16/2024       How often checking glucose at home? 4-5x daily (previously had Dexcom but insurance issues now)   BG readings on regimen: 170-250s  Hypoglycemia on the regimen?  Yes, occasionally in early morning hours   Missed doses on regimen?  No    Diabetes Complications include:     Hyperglycemia and Hypoglycemia Frequent DKA     Complicating diabetes co morbidities:   HLD       HPI:   Patient is a 43 y.o. female with a diagnosis of  type 1 diabetes mellitus, hyperlipidemia, and GERD who was brought in by a rapid response team from the [facility] after loss of consciousness. Patient states she was accompanying her , who is receiving chemotherapy. States she was feeling nauseated this morning but does not remember any details around the time of the event. Per the ED, there was no injury or fall noted by the patient or the rapid response team.      Patient states she is compliant with her insulin regimen. States she was previously on Omnipod 5 and Dexcom but lost insurance in Dec 2023 and now is doing Levemir and Novolog Correction Scale and finger sticks 4-5x daily. States her insulin levels have been consistently in the low 200s, sometimes in the 100s, but have never reached the 300s. In the ED,  patient was hypertensive to 189/91, Afebrile, and otherwise vitally stable. Her blood glucose was 345, and she was found to be in DKA with an anion gap of 18, BHB 4.9, and a Bicarb of 19. Other significant labs include: Hgb A1C 6.5, Mg 1.2, UA with 4+ glucose and 3+ ketones, UDS only positive for marijuana, and Phos 2.3. BNP, troponin, COVID/Flu, CBC, ethanol, Hep C, HIV, and TSH were all negative/unremarkable. Patient was started on DKA protocol with an insulin drip and normal saline infusion. Following resolution of DKA and hyperglycemia improvement, she was transitioned to D5W and 1/2 NS with 12 units of Levemir. Patient was admitted to hospital medicine for management of DKA and hyperglycemia. Endocrinology consulted for management of T1DM.          Interval HPI:   Overnight events: Remains in 38412M. BG above goal ranges at time of consult on current SQ insulin regimen (basal,prandial, and SSI). BG appears to be labile since transition off insulin infusion protocol on . Diet diabetic 2000 Calorie    Eatin%  Nausea: No  Hypoglycemia and intervention: No  Fever: No  TPN and/or TF: No  If yes, type of TF/TPN and rate: n/a    PMH, PSH, FH, SH updated and reviewed     ROS:  Constitutional: Negative for weight changes.  Eyes: Negative for visual disturbance.  Respiratory: Negative for cough.   Cardiovascular: Negative for chest pain.  Gastrointestinal: Negative for nausea.  Endocrine: Negative for polyuria, polydipsia.  Musculoskeletal: Negative for back pain.  Skin: Negative for rash.  Neurological: Negative for syncope.  Psychiatric/Behavioral: Negative for depression.      Review of Systems    Current Medications and/or Treatments Impacting Glycemic Control  Immunotherapy:    Immunosuppressants       None          Steroids:   Hormones (From admission, onward)      Start     Stop Route Frequency Ordered    24 1237  melatonin tablet 6 mg         -- Oral Nightly PRN 24 1139          Pressors:     Autonomic Drugs (From admission, onward)      None          Hyperglycemia/Diabetes Medications:   Antihyperglycemics (From admission, onward)      Start     Stop Route Frequency Ordered    01/19/24 0945  insulin aspart U-100 pen 7 Units         -- SubQ 3 times daily with meals 01/19/24 0938    01/19/24 0938  insulin detemir U-100 (Levemir) pen 14 Units         -- SubQ 2 times daily 01/19/24 0938    01/17/24 1208  insulin aspart U-100 pen 0-5 Units         -- SubQ Before meals & nightly PRN 01/17/24 1109             PHYSICAL EXAMINATION:  Vitals:    01/20/24 1206   BP: (!) 158/72   Pulse: 106   Resp: 19   Temp: 98 °F (36.7 °C)     Body mass index is 27.9 kg/m².     Physical Exam  Constitutional: Well developed, well nourished, NAD.  ENT: External ears no masses with nose patent; normal hearing.  Neck: Supple; trachea midline.  Cardiovascular: Normal heart sounds, no LE edema. DP +2 bilaterally.  Lungs: Normal effort; lungs anterior bilaterally clear to auscultation.  Abdomen: Soft, no masses, no hernias.  MS: No clubbing or cyanosis of nails noted; unable to assess gait.  Skin: No rashes, lesions, or ulcers; no nodules. Injection sites are ok. No lipo hypertropthy or atrophy.  Psychiatric: Good judgement and insight; normal mood and affect.  Neurological: Cranial nerves are grossly intact.   Foot: Nails in good condition, no amputations noted.         Labs Reviewed and Include   Recent Labs   Lab 01/20/24  0601 01/20/24  1042   * 311*   CALCIUM 9.0 9.0   ALBUMIN 2.8*  --    PROT 6.2  --    * 132*   K 4.4 4.2   CO2 22* 22*    103   BUN 8 8   CREATININE 1.0 1.0   ALKPHOS 68  --    ALT 11  --    AST 16  --    BILITOT 0.5  --      Lab Results   Component Value Date    WBC 4.87 01/20/2024    HGB 10.6 (L) 01/20/2024    HCT 33.7 (L) 01/20/2024    MCV 87 01/20/2024     01/20/2024     Recent Labs   Lab 01/16/24  1005   TSH 3.674     Lab Results   Component Value Date    HGBA1C 6.5 (H) 01/16/2024  "      Nutritional status:   Body mass index is 27.9 kg/m².  Lab Results   Component Value Date    ALBUMIN 2.8 (L) 01/20/2024    ALBUMIN 2.9 (L) 01/19/2024    ALBUMIN 3.5 01/18/2024     No results found for: "PREALBUMIN"    Estimated Creatinine Clearance: 78.5 mL/min (based on SCr of 1 mg/dL).    Accu-Checks  Recent Labs     01/19/24  1116 01/19/24  1558 01/19/24 2012 01/19/24  2253 01/19/24  2356 01/20/24  0233 01/20/24  0758 01/20/24  0759 01/20/24  0919 01/20/24  1204   POCTGLUCOSE 91 179* 235* 112* 207* 253* 398* 375* 395* 272*        ASSESSMENT and PLAN    Psychiatric  * Alcohol withdrawal syndrome with complication  Managed per primary team  Avoid hypoglycemia        Cardiac/Vascular  Mixed hyperlipidemia  May increase insulin resistance.         Endocrine  Type 1 diabetes mellitus with hyperglycemia  BG goal 140-180    Patient with labile BG. T1DM. Sees Dr. Mao outpatient. Home regimen consist of Levemir 12 BID and Novolog SSI. Previously on Omnipod 5 with the following settings (stopped Dec 2023 do to insurance changing but was having fairly good control on the following settings)   Basal rate  12A: 0.8 U/h   ICR    12A: 12   ISF  12A: 45  Target: 130  IAT: 5h         Plan: (will start previous pump settings inpatient and adjust as indicated)   -Start Transition IV insulin infusion at 0.5 u/hr with stepdown parameters (Start this evening; patient has already received 14 units of basal Levemir this morning) Plan to increase to 0.8 u/hr on 1/21 to match home insulin pump settings  -Start Novolog ICR 1:12 (1 unit per 12 g of carbs) TID with meals   -Start Low Dose Correction Scale  -BG monitoring ac/hs/0200    ADDENDUM:   Patient had cereal/milk overnight on 1/19 may have contributed to BG spikes. Will add snack dose of Novolog  -Start Novolog 4 units with prn nightly snack (Must give no closer than 4 hours from dinner)    ** Please call Endocrine for any BG related issues **    Discharge plans- TBD  Lab " Results   Component Value Date    HGBA1C 6.5 (H) 01/16/2024             Plan discussed with patient at bedside.     Tari Pascual NP  Endocrinology  [facility] ([place]

## 2024-01-20 NOTE — ASSESSMENT & PLAN NOTE
Patient's hospital course has been complicated by significant tachycardia (max 180 bpm), tremulousness, anxiety. Patient initially endorsed drinking 2-3 mixed drinks per day, however upon further questioning she reports drinking one fifth of vodka over three days as well as other recreational drug use. Patient's last drink was 1/15. She endorsed auditory hallucinations early in hospitalization. At this time with preserved mental state and hallucinations have resolved. She denies history of complicated withdrawal.     - Addiction psych consulted, appreciate assistance. Provided patient with outpatient resources  - Decreased valium taper to 5mg PO q12h  - Ativan IV 2mg q2h PRN for CIWA > 8  - Folic acid/thiamine

## 2024-01-20 NOTE — HPI
Reason for Consult: Management of T1DM, Hyperglycemia     Surgical Procedure and Date: n/a    Diabetes diagnosis year:     Diagnosed around age 9       11/03/15 04:28   C-Peptide <0.1 (L)   Glutamic Acid Decarb Ab 0.12 (H)      Home Diabetes Medications: Currently doing the following Levemir 12 untis BID and Novolog Correction Scale   Patient was previously on Omnipod 5 pump (stopped in Dec do to insurance reasons)       Lab Results   Component Value Date    HGBA1C 6.5 (H) 01/16/2024       How often checking glucose at home? 4-5x daily (previously had Dexcom but insurance issues now)   BG readings on regimen: 170-250s  Hypoglycemia on the regimen?  Yes, occasionally in early morning hours   Missed doses on regimen?  No    Diabetes Complications include:     Hyperglycemia and Hypoglycemia Frequent DKA     Complicating diabetes co morbidities:   HLD       HPI:   Patient is a 43 y.o. female with a diagnosis of  type 1 diabetes mellitus, hyperlipidemia, and GERD who was brought in by a rapid response team from the [facility] after loss of consciousness. Patient states she was accompanying her , who is receiving chemotherapy. States she was feeling nauseated this morning but does not remember any details around the time of the event. Per the ED, there was no injury or fall noted by the patient or the rapid response team.      Patient states she is compliant with her insulin regimen. States she was previously on Omnipod 5 and Dexcom but lost insurance in Dec 2023 and now is doing Levemir and Novolog Correction Scale and finger sticks 4-5x daily. States her insulin levels have been consistently in the low 200s, sometimes in the 100s, but have never reached the 300s. In the ED, patient was hypertensive to 189/91, Afebrile, and otherwise vitally stable. Her blood glucose was 345, and she was found to be in DKA with an anion gap of 18, BHB 4.9, and a Bicarb of 19. Other significant labs include: Hgb A1C 6.5,  Mg 1.2, UA with 4+ glucose and 3+ ketones, UDS only positive for marijuana, and Phos 2.3. BNP, troponin, COVID/Flu, CBC, ethanol, Hep C, HIV, and TSH were all negative/unremarkable. Patient was started on DKA protocol with an insulin drip and normal saline infusion. Following resolution of DKA and hyperglycemia improvement, she was transitioned to D5W and 1/2 NS with 12 units of Levemir. Patient was admitted to hospital medicine for management of DKA and hyperglycemia. Endocrinology consulted for management of T1DM.

## 2024-01-20 NOTE — ASSESSMENT & PLAN NOTE
Patient's FSGs are uncontrolled due to hyperglycemia on current medication regimen.  Last A1c reviewed-   Lab Results   Component Value Date    HGBA1C 6.5 (H) 01/16/2024     Most recent fingerstick glucose reviewed-   Recent Labs   Lab 01/20/24  0758 01/20/24  0759 01/20/24  0919 01/20/24  1204   POCTGLUCOSE 398* 375* 395* 272*       Current correctional scale  Low  Maintain anti-hyperglycemic dose as follows-   Antihyperglycemics (From admission, onward)      Start     Stop Route Frequency Ordered    01/20/24 2100  insulin regular in 0.9 % NaCl 100 unit/100 mL (1 unit/mL) infusion        Question:  Enter initial dose (Units/hr):  Answer:  0.5    -- IV Continuous 01/20/24 1306    01/20/24 2100  insulin aspart U-100 pen 1-4 Units         -- SubQ As needed (PRN) 01/20/24 1315    01/20/24 1645  insulin aspart U-100 pen 1-10 Units         -- SubQ 3 times daily with meals 01/20/24 1306    01/20/24 1402  insulin aspart U-100 pen 0-5 Units         -- SubQ As needed (PRN) 01/20/24 1306          - Reports home dose 12u Detemir BID and LDISS PRN  - Initially in DKA on arrival. Was close to resolution, however she tried to AMA, later returned to DKA.  - Has been very brittle with hard to control sugars  - Insulin requirements still greater than home insulin regimen; suspect infection or alcohol withdrawals contributed to higher requirements.  - Endocrine was consulted and patient was started on transition insulin drip  - Accuchecks ACHS  - Diabetic diet

## 2024-01-20 NOTE — SUBJECTIVE & OBJECTIVE
Interval HPI:   Overnight events: Remains in [id number]. BG above goal ranges at time of consult on current SQ insulin regimen (basal,prandial, and SSI). BG appears to be labile since transition off insulin infusion protocol on . Diet diabetic  Calorie    Eatin%  Nausea: No  Hypoglycemia and intervention: No  Fever: No  TPN and/or TF: No  If yes, type of TF/TPN and rate: n/a    PMH, PSH, FH, SH updated and reviewed     ROS:  Constitutional: Negative for weight changes.  Eyes: Negative for visual disturbance.  Respiratory: Negative for cough.   Cardiovascular: Negative for chest pain.  Gastrointestinal: Negative for nausea.  Endocrine: Negative for polyuria, polydipsia.  Musculoskeletal: Negative for back pain.  Skin: Negative for rash.  Neurological: Negative for syncope.  Psychiatric/Behavioral: Negative for depression.      Review of Systems    Current Medications and/or Treatments Impacting Glycemic Control  Immunotherapy:    Immunosuppressants       None          Steroids:   Hormones (From admission, onward)      Start     Stop Route Frequency Ordered    24 1237  melatonin tablet 6 mg         -- Oral Nightly PRN 24 1139          Pressors:    Autonomic Drugs (From admission, onward)      None          Hyperglycemia/Diabetes Medications:   Antihyperglycemics (From admission, onward)      Start     Stop Route Frequency Ordered    24 0945  insulin aspart U-100 pen 7 Units         -- SubQ 3 times daily with meals 24 0938    24 0938  insulin detemir U-100 (Levemir) pen 14 Units         -- SubQ 2 times daily 24 0938    24 1208  insulin aspart U-100 pen 0-5 Units         -- SubQ Before meals & nightly PRN 24 1109             PHYSICAL EXAMINATION:  Vitals:    24 1206   BP: (!) 158/72   Pulse: 106   Resp: 19   Temp: 98 °F (36.7 °C)     Body mass index is 27.9 kg/m².     Physical Exam  Constitutional: Well developed, well nourished, NAD.  ENT: External ears  no masses with nose patent; normal hearing.  Neck: Supple; trachea midline.  Cardiovascular: Normal heart sounds, no LE edema. DP +2 bilaterally.  Lungs: Normal effort; lungs anterior bilaterally clear to auscultation.  Abdomen: Soft, no masses, no hernias.  MS: No clubbing or cyanosis of nails noted; unable to assess gait.  Skin: No rashes, lesions, or ulcers; no nodules. Injection sites are ok. No lipo hypertropthy or atrophy.  Psychiatric: Good judgement and insight; normal mood and affect.  Neurological: Cranial nerves are grossly intact.   Foot: Nails in good condition, no amputations noted.

## 2024-01-20 NOTE — SUBJECTIVE & OBJECTIVE
Interval History: NAEON. AFVSS. Patient reports feeling well and wanting to go home, no complaints this morning. Appetite is still decreased and only eating less than half of meals. However, she is still having ongoing hyperglycemia even on insulin regimen greater than home regimen and only eating minimally. Endocrine was consulted due to labile blood sugars, and she was started on a transition insulin drip to better determine insulin requirements.     Review of Systems  Objective:     Vital Signs (Most Recent):  Temp: 98 °F (36.7 °C) (01/20/24 1206)  Pulse: (!) 129 (01/20/24 1518)  Resp: 19 (01/20/24 1206)  BP: (!) 158/72 (01/20/24 1206)  SpO2: 97 % (01/20/24 1518) Vital Signs (24h Range):  Temp:  [97.9 °F (36.6 °C)-98.8 °F (37.1 °C)] 98 °F (36.7 °C)  Pulse:  [] 129  Resp:  [19-21] 19  SpO2:  [96 %-100 %] 97 %  BP: (141-163)/(66-78) 158/72     Weight: 80.8 kg (178 lb 2.1 oz)  Body mass index is 27.9 kg/m².    Intake/Output Summary (Last 24 hours) at 1/20/2024 1606  Last data filed at 1/20/2024 0811  Gross per 24 hour   Intake --   Output 1500 ml   Net -1500 ml         Physical Exam  Vitals and nursing note reviewed.   HENT:      Head: Normocephalic and atraumatic.      Mouth/Throat:      Mouth: Mucous membranes are moist.      Pharynx: Oropharynx is clear.   Eyes:      Extraocular Movements: Extraocular movements intact.      Conjunctiva/sclera: Conjunctivae normal.      Pupils: Pupils are equal, round, and reactive to light.   Cardiovascular:      Rate and Rhythm: Normal rate and regular rhythm.      Pulses: Normal pulses.      Heart sounds: Normal heart sounds.   Pulmonary:      Effort: Pulmonary effort is normal. No respiratory distress.      Breath sounds: Normal breath sounds. No wheezing.   Abdominal:      General: Abdomen is flat. There is no distension.      Palpations: Abdomen is soft.      Tenderness: There is no abdominal tenderness. There is no guarding or rebound.   Musculoskeletal:          General: No tenderness.      Right lower leg: No edema.      Left lower leg: No edema.   Skin:     General: Skin is warm and dry.   Neurological:      General: No focal deficit present.      Mental Status: She is oriented to person, place, and time.      Cranial Nerves: No cranial nerve deficit.      Motor: No weakness.   Psychiatric:         Behavior: Behavior normal.             Significant Labs: All pertinent labs within the past 24 hours have been reviewed.    Significant Imaging: I have reviewed all pertinent imaging results/findings within the past 24 hours.

## 2024-01-20 NOTE — PROGRESS NOTES
[facility] [facility]  Progress Note    Patient Name: Cira Keys  MRN: [id number]  Patient Class: IP- Inpatient   Admission Date: 1/16/2024  Length of Stay: 4 days  Attending Physician: Fabian Cotton, *  Primary Care Provider: Krysta Mercado MD        Subjective:     Principal Problem:Alcohol withdrawal syndrome with complication        HPI:  Cira Keys is a 43 y.o. female with a PMH of type 1 diabetes mellitus, hyperlipidemia, and GERD who was brought in by a rapid response team from the [facility] after loss of consciousness. Patient states she was accompanying her , who is receiving chemotherapy. States she was feeling nauseated this morning but does not remember any details around the time of the event. Per the ED, there was no injury or fall noted by the patient or the rapid response team.      Patient states she is compliant with her insulin regimen and received her last dose of Levemir this morning. States she is on a sliding-scale insulin regimen and monitors glucose via finger prick approximately 5x per day. States her insulin levels have been consistently in the low 200s, sometimes in the 100s, but have never reached the 300s. Patient was taken off her insulin pump in December 2023 due to insurance. Patient currently endorses nausea but denies fever, chills, headache, vomiting, chest pain, shortness of breath, abdominal pain, diarrhea, dysuria, increased urinary frequency, or new rashes. Patient endorses regular alcohol use and states she drinks approximately 1-2 vodka jigar-aids per day.    In the ED, patient was hypertensive to 189/91, Afebrile, and otherwise vitally stable. Her blood glucose was 345, and she was found to be in DKA with an anion gap of 18, BHB 4.9, and a Bicarb of 19. Other significant labs include: Hgb A1C 6.5, Mg 1.2, UA with 4+ glucose and 3+ ketones, UDS only positive for marijuana, and Phos 2.3. BNP,  troponin, COVID/Flu, CBC, ethanol, Hep C, HIV, and TSH were all negative/unremarkable.   Patient was started on DKA protocol with an insulin drip and normal saline infusion. Following resolution of DKA and hyperglycemia improvement, she was transitioned to D5W and 1/2 NS with 12 units of Levemir given alongside Insulin infusion, and the infusion to be stopped after 2 hours of giving levemir. Patient was admitted to hospital medicine for management of DKA and hyperglycemia.    Overview/Hospital Course:  Patient admitted to  initially for syncopal episode and DKA. She was briefly upgraded to ICU after she developed hypotension refractory to IVF. Blood cultures were obtained (which so far have shown no growth) and BSABX initiated. Course was complicated by intermittent tachycardia (max 180s bpm). CTA was negative for PE. Cardiology were consulted, however felt tachycardia was not secondary to cardiac pathology. Upon further questioning, patient endorsed heavy alcohol use (a fifth of vodka over 3 days) as well as recreational drug use. Addiction psych were consulted and provided patient with motivational interviewing and outpatient resources. She was initiated on a valium taper with ativan PRNs. Patient has improved with minimal to no signs of ongoing withdrawal and no longer requiring PRN Ativan. Continuing to taper down valium. Endocrine was consulted due to ongoing labile blood sugars, and patient was started on a transition insulin drip to better determine longstanding insulin requirements.    Interval History: NAEON. AFVSS. Patient reports feeling well and wanting to go home, no complaints this morning. Appetite is still decreased and only eating less than half of meals. However, she is still having ongoing hyperglycemia even on insulin regimen greater than home regimen and only eating minimally. Endocrine was consulted due to labile blood sugars, and she was started on a transition insulin drip to better  determine insulin requirements.     Review of Systems  Objective:     Vital Signs (Most Recent):  Temp: 98 °F (36.7 °C) (01/20/24 1206)  Pulse: (!) 129 (01/20/24 1518)  Resp: 19 (01/20/24 1206)  BP: (!) 158/72 (01/20/24 1206)  SpO2: 97 % (01/20/24 1518) Vital Signs (24h Range):  Temp:  [97.9 °F (36.6 °C)-98.8 °F (37.1 °C)] 98 °F (36.7 °C)  Pulse:  [] 129  Resp:  [19-21] 19  SpO2:  [96 %-100 %] 97 %  BP: (141-163)/(66-78) 158/72     Weight: 80.8 kg (178 lb 2.1 oz)  Body mass index is 27.9 kg/m².    Intake/Output Summary (Last 24 hours) at 1/20/2024 1606  Last data filed at 1/20/2024 0811  Gross per 24 hour   Intake --   Output 1500 ml   Net -1500 ml         Physical Exam  Vitals and nursing note reviewed.   HENT:      Head: Normocephalic and atraumatic.      Mouth/Throat:      Mouth: Mucous membranes are moist.      Pharynx: Oropharynx is clear.   Eyes:      Extraocular Movements: Extraocular movements intact.      Conjunctiva/sclera: Conjunctivae normal.      Pupils: Pupils are equal, round, and reactive to light.   Cardiovascular:      Rate and Rhythm: Normal rate and regular rhythm.      Pulses: Normal pulses.      Heart sounds: Normal heart sounds.   Pulmonary:      Effort: Pulmonary effort is normal. No respiratory distress.      Breath sounds: Normal breath sounds. No wheezing.   Abdominal:      General: Abdomen is flat. There is no distension.      Palpations: Abdomen is soft.      Tenderness: There is no abdominal tenderness. There is no guarding or rebound.   Musculoskeletal:         General: No tenderness.      Right lower leg: No edema.      Left lower leg: No edema.   Skin:     General: Skin is warm and dry.   Neurological:      General: No focal deficit present.      Mental Status: She is oriented to person, place, and time.      Cranial Nerves: No cranial nerve deficit.      Motor: No weakness.   Psychiatric:         Behavior: Behavior normal.             Significant Labs: All pertinent labs  within the past 24 hours have been reviewed.    Significant Imaging: I have reviewed all pertinent imaging results/findings within the past 24 hours.    Assessment/Plan:      * Alcohol withdrawal syndrome with complication  Patient's hospital course has been complicated by significant tachycardia (max 180 bpm), tremulousness, anxiety. Patient initially endorsed drinking 2-3 mixed drinks per day, however upon further questioning she reports drinking one fifth of vodka over three days as well as other recreational drug use. Patient's last drink was 1/15. She endorsed auditory hallucinations early in hospitalization. At this time with preserved mental state and hallucinations have resolved. She denies history of complicated withdrawal.     - Addiction psych consulted, appreciate assistance. Provided patient with outpatient resources  - Decreased valium taper to 5mg PO q12h  - Ativan IV 2mg q2h PRN for CIWA > 8  - Folic acid/thiamine      Sinus tachycardia  Possibly secondary to alcohol withdrawal vs sepsis  Cardiology consulted, not concerned for intrinsic cardiac pathology  CTA negative for PE  Valium taper for alcohol withdrawal  On Ceftriaxone for abx coverage      Pleural effusion  - Small bilateral pleural effusions noted on imagine. Not significant. Will not be pursing diuresis or thoracentesis.      Alcohol use disorder  - See alcohol withdrawal      Syncope  Unclear etiology at this time  Possibly secondary to alcohol use vs alcohol withdrawal/tachycardia vs orthostatic vs vasovagal (occurred with n/v)  Has not had further syncopal events since admission      Mixed hyperlipidemia  - Continue home atorvastatin      WALTER (generalized anxiety disorder)  - Holding home citalopram    Alcoholic liver disease  - Noted on prior imaging      DKA (diabetic ketoacidoses)  Patient is a 44 yo female with PMH of T1DM (previous use of insulin pump/dexcom) who presented following a syncopal episode. Doesn't recall moments  leading up to the syncope, but does report ongoing nausea. Reports taking Levemir 12 units BID and insulin aspart as needed with meals when blood sugar > 200. Hgb A1C 6.5%. In ED was found to be hyperglycemic to 345 with elevated anion gap, metabolic acidosis, and UA with glucosuria/ketonuria. Patient was started on insulin drip and normal saline infusion with eventual transition to D5W and 1/2 NS.    - NOW RESOLVED  - See plan for T1DM    Essential hypertension  - Home Med Lisinopril 5mg qd  - Holding home antihypertensive given shock picture during initial hospital course    Fever  Patient febrile to 101.5 on 1/18  At this time unclear if secondary to infection vs alcohol withdrawal vs other  CTA was negative for PE  Blood cultures pending  De-escalated abx coverage to Ceftriaxone    Hypomagnesemia  Patient has Abnormal Magnesium: hypomagnesemia. Will continue to monitor electrolytes closely. Will replace the affected electrolytes and repeat labs to be done after interventions completed. The patient's magnesium results have been reviewed and are listed below.  Recent Labs   Lab 01/19/24  0519   MG 2.0      - Replete PRN; ensure goal Mg ~2 given propensity for tachycardia/arrhythmias    Type 1 diabetes mellitus with hyperglycemia  Patient's FSGs are uncontrolled due to hyperglycemia on current medication regimen.  Last A1c reviewed-   Lab Results   Component Value Date    HGBA1C 6.5 (H) 01/16/2024     Most recent fingerstick glucose reviewed-   Recent Labs   Lab 01/20/24  0758 01/20/24  0759 01/20/24  0919 01/20/24  1204   POCTGLUCOSE 398* 375* 395* 272*       Current correctional scale  Low  Maintain anti-hyperglycemic dose as follows-   Antihyperglycemics (From admission, onward)      Start     Stop Route Frequency Ordered    01/20/24 2100  insulin regular in 0.9 % NaCl 100 unit/100 mL (1 unit/mL) infusion        Question:  Enter initial dose (Units/hr):  Answer:  0.5    -- IV Continuous 01/20/24 1306    01/20/24  2100  insulin aspart U-100 pen 1-4 Units         -- SubQ As needed (PRN) 01/20/24 1315    01/20/24 1645  insulin aspart U-100 pen 1-10 Units         -- SubQ 3 times daily with meals 01/20/24 1306    01/20/24 1402  insulin aspart U-100 pen 0-5 Units         -- SubQ As needed (PRN) 01/20/24 1306          - Reports home dose 12u Detemir BID and LDISS PRN  - Initially in DKA on arrival. Was close to resolution, however she tried to AMA, later returned to DKA.  - Has been very brittle with hard to control sugars  - Insulin requirements still greater than home insulin regimen; suspect infection or alcohol withdrawals contributed to higher requirements.  - Endocrine was consulted and patient was started on transition insulin drip  - Accuchecks ACHS  - Diabetic diet      VTE Risk Mitigation (From admission, onward)           Ordered     enoxaparin injection 40 mg  Every 24 hours         01/17/24 0803     IP VTE LOW RISK PATIENT  Once         01/16/24 1139     Place sequential compression device  Until discontinued         01/16/24 1139                    Discharge Planning   JUVENTINO: 1/19/2024     Code Status: Full Code   Is the patient medically ready for discharge?: No    Reason for patient still in hospital (select all that apply): Patient unstable, Patient trending condition, and Treatment  Discharge Plan A: Home with family                  Shawn Caldera DO  Department of Hospital Medicine   [facility] ([place])

## 2024-01-20 NOTE — PLAN OF CARE
Patient rounded on through shift as per policy, Patient in stable condition with no complaints. Safety measures in place: bed in lowest position, three rails up, call light in reach, personal belonging in reach. No acute events during shift. Ongoing blood glucose management , with patient starting insulin gtt tonight at 2100. Patient verbalized understanding of current plan of care.     Problem: Adult Inpatient Plan of Care  Goal: Plan of Care Review  Outcome: Ongoing, Progressing  Goal: Patient-Specific Goal (Individualized)  Outcome: Ongoing, Progressing  Goal: Absence of Hospital-Acquired Illness or Injury  Outcome: Ongoing, Progressing  Goal: Optimal Comfort and Wellbeing  Outcome: Ongoing, Progressing  Goal: Readiness for Transition of Care  Outcome: Ongoing, Progressing     Problem: Diabetes Comorbidity  Goal: Blood Glucose Level Within Targeted Range  Outcome: Ongoing, Progressing     Problem: Fall Injury Risk  Goal: Absence of Fall and Fall-Related Injury  Outcome: Ongoing, Progressing

## 2024-01-21 LAB
ALBUMIN SERPL BCP-MCNC: 2.8 G/DL (ref 3.5–5.2)
ALP SERPL-CCNC: 62 U/L (ref 55–135)
ALT SERPL W/O P-5'-P-CCNC: 9 U/L (ref 10–44)
ANION GAP SERPL CALC-SCNC: 9 MMOL/L (ref 8–16)
AST SERPL-CCNC: 11 U/L (ref 10–40)
BACTERIA BLD CULT: NORMAL
BACTERIA BLD CULT: NORMAL
BASOPHILS # BLD AUTO: 0.05 K/UL (ref 0–0.2)
BASOPHILS NFR BLD: 1.1 % (ref 0–1.9)
BILIRUB SERPL-MCNC: 0.4 MG/DL (ref 0.1–1)
BUN SERPL-MCNC: 9 MG/DL (ref 6–20)
CALCIUM SERPL-MCNC: 8.8 MG/DL (ref 8.7–10.5)
CHLORIDE SERPL-SCNC: 102 MMOL/L (ref 95–110)
CO2 SERPL-SCNC: 23 MMOL/L (ref 23–29)
CREAT SERPL-MCNC: 0.9 MG/DL (ref 0.5–1.4)
DIFFERENTIAL METHOD BLD: ABNORMAL
EOSINOPHIL # BLD AUTO: 0.2 K/UL (ref 0–0.5)
EOSINOPHIL NFR BLD: 5.1 % (ref 0–8)
ERYTHROCYTE [DISTWIDTH] IN BLOOD BY AUTOMATED COUNT: 15.6 % (ref 11.5–14.5)
EST. GFR  (NO RACE VARIABLE): >60 ML/MIN/1.73 M^2
GLUCOSE SERPL-MCNC: 196 MG/DL (ref 70–110)
HCT VFR BLD AUTO: 32.5 % (ref 37–48.5)
HGB BLD-MCNC: 10.1 G/DL (ref 12–16)
IMM GRANULOCYTES # BLD AUTO: 0.02 K/UL (ref 0–0.04)
IMM GRANULOCYTES NFR BLD AUTO: 0.4 % (ref 0–0.5)
LYMPHOCYTES # BLD AUTO: 1.3 K/UL (ref 1–4.8)
LYMPHOCYTES NFR BLD: 29.6 % (ref 18–48)
MAGNESIUM SERPL-MCNC: 1.7 MG/DL (ref 1.6–2.6)
MCH RBC QN AUTO: 27.3 PG (ref 27–31)
MCHC RBC AUTO-ENTMCNC: 31.1 G/DL (ref 32–36)
MCV RBC AUTO: 88 FL (ref 82–98)
MONOCYTES # BLD AUTO: 0.8 K/UL (ref 0.3–1)
MONOCYTES NFR BLD: 16.8 % (ref 4–15)
NEUTROPHILS # BLD AUTO: 2.1 K/UL (ref 1.8–7.7)
NEUTROPHILS NFR BLD: 47 % (ref 38–73)
NRBC BLD-RTO: 0 /100 WBC
PHOSPHATE SERPL-MCNC: 4.4 MG/DL (ref 2.7–4.5)
PLATELET # BLD AUTO: 282 K/UL (ref 150–450)
PMV BLD AUTO: 9.3 FL (ref 9.2–12.9)
POCT GLUCOSE: 118 MG/DL (ref 70–110)
POCT GLUCOSE: 143 MG/DL (ref 70–110)
POCT GLUCOSE: 160 MG/DL (ref 70–110)
POCT GLUCOSE: 165 MG/DL (ref 70–110)
POCT GLUCOSE: 236 MG/DL (ref 70–110)
POCT GLUCOSE: 237 MG/DL (ref 70–110)
POCT GLUCOSE: 283 MG/DL (ref 70–110)
POCT GLUCOSE: 288 MG/DL (ref 70–110)
POCT GLUCOSE: 311 MG/DL (ref 70–110)
POCT GLUCOSE: 95 MG/DL (ref 70–110)
POTASSIUM SERPL-SCNC: 3.9 MMOL/L (ref 3.5–5.1)
PROT SERPL-MCNC: 6.2 G/DL (ref 6–8.4)
RBC # BLD AUTO: 3.7 M/UL (ref 4–5.4)
SODIUM SERPL-SCNC: 134 MMOL/L (ref 136–145)
WBC # BLD AUTO: 4.52 K/UL (ref 3.9–12.7)

## 2024-01-21 PROCEDURE — 25000003 PHARM REV CODE 250: Performed by: NURSE PRACTITIONER

## 2024-01-21 PROCEDURE — 80053 COMPREHEN METABOLIC PANEL: CPT

## 2024-01-21 PROCEDURE — 84100 ASSAY OF PHOSPHORUS: CPT

## 2024-01-21 PROCEDURE — 25000003 PHARM REV CODE 250

## 2024-01-21 PROCEDURE — 63600175 PHARM REV CODE 636 W HCPCS

## 2024-01-21 PROCEDURE — 36415 COLL VENOUS BLD VENIPUNCTURE: CPT

## 2024-01-21 PROCEDURE — 85025 COMPLETE CBC W/AUTO DIFF WBC: CPT

## 2024-01-21 PROCEDURE — 83735 ASSAY OF MAGNESIUM: CPT

## 2024-01-21 PROCEDURE — 20600001 HC STEP DOWN PRIVATE ROOM

## 2024-01-21 PROCEDURE — 99232 SBSQ HOSP IP/OBS MODERATE 35: CPT | Mod: ,,, | Performed by: NURSE PRACTITIONER

## 2024-01-21 RX ORDER — MAGNESIUM SULFATE HEPTAHYDRATE 40 MG/ML
2 INJECTION, SOLUTION INTRAVENOUS ONCE
Status: COMPLETED | OUTPATIENT
Start: 2024-01-21 | End: 2024-01-21

## 2024-01-21 RX ORDER — DIAZEPAM 5 MG/1
5 TABLET ORAL DAILY
Status: DISCONTINUED | OUTPATIENT
Start: 2024-01-22 | End: 2024-01-22 | Stop reason: HOSPADM

## 2024-01-21 RX ADMIN — DIAZEPAM 5 MG: 5 TABLET ORAL at 08:01

## 2024-01-21 RX ADMIN — Medication 100 MG: at 09:01

## 2024-01-21 RX ADMIN — INSULIN ASPART 3 UNITS: 100 INJECTION, SOLUTION INTRAVENOUS; SUBCUTANEOUS at 08:01

## 2024-01-21 RX ADMIN — INSULIN ASPART 5 UNITS: 100 INJECTION, SOLUTION INTRAVENOUS; SUBCUTANEOUS at 06:01

## 2024-01-21 RX ADMIN — Medication 100 MG: at 03:01

## 2024-01-21 RX ADMIN — ATORVASTATIN CALCIUM 20 MG: 20 TABLET, FILM COATED ORAL at 08:01

## 2024-01-21 RX ADMIN — Medication 100 MG: at 08:01

## 2024-01-21 RX ADMIN — FOLIC ACID 1 MG: 1 TABLET ORAL at 08:01

## 2024-01-21 RX ADMIN — INSULIN ASPART 1 UNITS: 100 INJECTION, SOLUTION INTRAVENOUS; SUBCUTANEOUS at 08:01

## 2024-01-21 RX ADMIN — MUPIROCIN: 20 OINTMENT TOPICAL at 10:01

## 2024-01-21 RX ADMIN — INSULIN ASPART 4 UNITS: 100 INJECTION, SOLUTION INTRAVENOUS; SUBCUTANEOUS at 12:01

## 2024-01-21 RX ADMIN — CEFTRIAXONE 2 G: 2 INJECTION, POWDER, FOR SOLUTION INTRAMUSCULAR; INTRAVENOUS at 10:01

## 2024-01-21 RX ADMIN — INSULIN ASPART 7 UNITS: 100 INJECTION, SOLUTION INTRAVENOUS; SUBCUTANEOUS at 12:01

## 2024-01-21 RX ADMIN — MAGNESIUM SULFATE HEPTAHYDRATE 2 G: 40 INJECTION, SOLUTION INTRAVENOUS at 08:01

## 2024-01-21 RX ADMIN — ENOXAPARIN SODIUM 40 MG: 40 INJECTION SUBCUTANEOUS at 05:01

## 2024-01-21 RX ADMIN — MUPIROCIN: 20 OINTMENT TOPICAL at 09:01

## 2024-01-21 RX ADMIN — INSULIN HUMAN 0.8 UNITS/HR: 1 INJECTION, SOLUTION INTRAVENOUS at 11:01

## 2024-01-21 RX ADMIN — INSULIN ASPART 1 UNITS: 100 INJECTION, SOLUTION INTRAVENOUS; SUBCUTANEOUS at 06:01

## 2024-01-21 NOTE — PROGRESS NOTES
[facility] ([address] Medicine  Progress Note    Patient Name: Cira Keys  MRN: [id number]  Patient Class: IP- Inpatient   Admission Date: 1/16/2024  Length of Stay: 5 days  Attending Physician: Fabian Cotton, *  Primary Care Provider: Krysta Mercado MD        Subjective:     Principal Problem:Alcohol withdrawal syndrome with complication        HPI:  Cira Keys is a 43 y.o. female with a PMH of type 1 diabetes mellitus, hyperlipidemia, and GERD who was brought in by a rapid response team from the [facility] after loss of consciousness. Patient states she was accompanying her , who is receiving chemotherapy. States she was feeling nauseated this morning but does not remember any details around the time of the event. Per the ED, there was no injury or fall noted by the patient or the rapid response team.      Patient states she is compliant with her insulin regimen and received her last dose of Levemir this morning. States she is on a sliding-scale insulin regimen and monitors glucose via finger prick approximately 5x per day. States her insulin levels have been consistently in the low 200s, sometimes in the 100s, but have never reached the 300s. Patient was taken off her insulin pump in December 2023 due to insurance. Patient currently endorses nausea but denies fever, chills, headache, vomiting, chest pain, shortness of breath, abdominal pain, diarrhea, dysuria, increased urinary frequency, or new rashes. Patient endorses regular alcohol use and states she drinks approximately 1-2 vodka jigar-aids per day.    In the ED, patient was hypertensive to 189/91, Afebrile, and otherwise vitally stable. Her blood glucose was 345, and she was found to be in DKA with an anion gap of 18, BHB 4.9, and a Bicarb of 19. Other significant labs include: Hgb A1C 6.5, Mg 1.2, UA with 4+ glucose and 3+ ketones, UDS only positive for marijuana, and Phos 2.3. BNP,  troponin, COVID/Flu, CBC, ethanol, Hep C, HIV, and TSH were all negative/unremarkable.   Patient was started on DKA protocol with an insulin drip and normal saline infusion. Following resolution of DKA and hyperglycemia improvement, she was transitioned to D5W and 1/2 NS with 12 units of Levemir given alongside Insulin infusion, and the infusion to be stopped after 2 hours of giving levemir. Patient was admitted to hospital medicine for management of DKA and hyperglycemia.    Overview/Hospital Course:  Patient admitted to  initially for syncopal episode and DKA. She was briefly upgraded to ICU after she developed hypotension refractory to IVF. Blood cultures were obtained (which so far have shown no growth) and BSABX initiated. Course was complicated by intermittent tachycardia (max 180s bpm). CTA was negative for PE. Cardiology were consulted, however felt tachycardia was not secondary to cardiac pathology. Upon further questioning, patient endorsed heavy alcohol use (a fifth of vodka over 3 days) as well as recreational drug use. Addiction psych were consulted and provided patient with motivational interviewing and outpatient resources. She was initiated on a valium taper with ativan PRNs. Patient has improved with minimal to no signs of ongoing withdrawal and no longer requiring PRN Ativan. Continuing to taper down valium. Endocrine was consulted due to ongoing labile blood sugars, and patient was started on a transition insulin drip to better determine longstanding insulin requirements.    Interval History: NAEON. AFVSS. Patient reports feeling well and denies any withdrawal like symptoms. Reports her appetite is improved and denies any additional snacking outside of meals. Continuing transition insulin drip and decreased valium taper to 5 mg daily.    Review of Systems  Objective:     Vital Signs (Most Recent):  Temp: 97.8 °F (36.6 °C) (01/21/24 0745)  Pulse: 89 (01/21/24 1453)  Resp: (!) 25 (01/21/24  0527)  BP: (!) 140/86 (01/21/24 0745)  SpO2: 98 % (01/21/24 1453) Vital Signs (24h Range):  Temp:  [97.8 °F (36.6 °C)-98.4 °F (36.9 °C)] 97.8 °F (36.6 °C)  Pulse:  [] 89  Resp:  [19-25] 25  SpO2:  [97 %-100 %] 98 %  BP: (117-149)/(73-86) 140/86     Weight: 80.8 kg (178 lb 2.1 oz)  Body mass index is 27.9 kg/m².    Intake/Output Summary (Last 24 hours) at 1/21/2024 1620  Last data filed at 1/20/2024 1818  Gross per 24 hour   Intake --   Output 400 ml   Net -400 ml         Physical Exam  Vitals and nursing note reviewed.   HENT:      Head: Normocephalic and atraumatic.      Mouth/Throat:      Mouth: Mucous membranes are moist.      Pharynx: Oropharynx is clear.   Eyes:      Extraocular Movements: Extraocular movements intact.      Conjunctiva/sclera: Conjunctivae normal.      Pupils: Pupils are equal, round, and reactive to light.   Cardiovascular:      Rate and Rhythm: Normal rate and regular rhythm.      Pulses: Normal pulses.      Heart sounds: Normal heart sounds.   Pulmonary:      Effort: Pulmonary effort is normal. No respiratory distress.      Breath sounds: Normal breath sounds. No wheezing.   Abdominal:      General: Abdomen is flat. There is no distension.      Palpations: Abdomen is soft.      Tenderness: There is no abdominal tenderness. There is no guarding or rebound.   Musculoskeletal:         General: No tenderness.      Right lower leg: No edema.      Left lower leg: No edema.   Skin:     General: Skin is warm and dry.   Neurological:      General: No focal deficit present.      Mental Status: She is oriented to person, place, and time.      Cranial Nerves: No cranial nerve deficit.      Motor: No weakness.   Psychiatric:         Behavior: Behavior normal.             Significant Labs: All pertinent labs within the past 24 hours have been reviewed.    Significant Imaging: I have reviewed all pertinent imaging results/findings within the past 24 hours.    Assessment/Plan:      * Alcohol  withdrawal syndrome with complication  Patient's hospital course has been complicated by significant tachycardia (max 180 bpm), tremulousness, anxiety. Patient initially endorsed drinking 2-3 mixed drinks per day, however upon further questioning she reports drinking one fifth of vodka over three days as well as other recreational drug use. Patient's last drink was 1/15. She endorsed auditory hallucinations early in hospitalization. At this time with preserved mental state and hallucinations have resolved. She denies history of complicated withdrawal.     - Addiction psych consulted, appreciate assistance. Provided patient with outpatient resources  - Decreased valium taper to 5mg PO daily  - Ativan IV 2mg q2h PRN for CIWA > 8  - Folic acid/thiamine      Sinus tachycardia  Possibly secondary to alcohol withdrawal vs sepsis  Cardiology consulted, not concerned for intrinsic cardiac pathology  CTA negative for PE  Valium taper for alcohol withdrawal  On Ceftriaxone for abx coverage      Pleural effusion  - Small bilateral pleural effusions noted on imagine. Not significant. Will not be pursing diuresis or thoracentesis.      Alcohol use disorder  - See alcohol withdrawal      Syncope  Unclear etiology at this time  Possibly secondary to alcohol use vs alcohol withdrawal/tachycardia vs orthostatic vs vasovagal (occurred with n/v)  Has not had further syncopal events since admission      Mixed hyperlipidemia  - Continue home atorvastatin      WALTER (generalized anxiety disorder)  - Holding home citalopram    Alcoholic liver disease  - Noted on prior imaging      DKA (diabetic ketoacidoses)  Patient is a 42 yo female with PMH of T1DM (previous use of insulin pump/dexcom) who presented following a syncopal episode. Doesn't recall moments leading up to the syncope, but does report ongoing nausea. Reports taking Levemir 12 units BID and insulin aspart as needed with meals when blood sugar > 200. Hgb A1C 6.5%. In ED was found  to be hyperglycemic to 345 with elevated anion gap, metabolic acidosis, and UA with glucosuria/ketonuria. Patient was started on insulin drip and normal saline infusion with eventual transition to D5W and 1/2 NS.    - NOW RESOLVED  - See plan for T1DM    Essential hypertension  - Home Med Lisinopril 5mg qd  - Holding home antihypertensive given shock picture during initial hospital course    Fever  Patient febrile to 101.5 on 1/18  At this time unclear if secondary to infection vs alcohol withdrawal vs other  CTA was negative for PE  Blood cultures pending  De-escalated abx coverage to Ceftriaxone    Hypomagnesemia  Patient has Abnormal Magnesium: hypomagnesemia. Will continue to monitor electrolytes closely. Will replace the affected electrolytes and repeat labs to be done after interventions completed. The patient's magnesium results have been reviewed and are listed below.  Recent Labs   Lab 01/19/24  0519   MG 2.0      - Replete PRN; ensure goal Mg ~2 given propensity for tachycardia/arrhythmias    Type 1 diabetes mellitus with hyperglycemia  Patient's FSGs are uncontrolled due to hyperglycemia on current medication regimen.  Last A1c reviewed-   Lab Results   Component Value Date    HGBA1C 6.5 (H) 01/16/2024     Most recent fingerstick glucose reviewed-   Recent Labs   Lab 01/21/24  0538 01/21/24  0746 01/21/24  1229 01/21/24  1531   POCTGLUCOSE 236* 288* 311* 283*       Current correctional scale  Low  Maintain anti-hyperglycemic dose as follows-   Antihyperglycemics (From admission, onward)      Start     Stop Route Frequency Ordered    01/21/24 1245  insulin regular in 0.9 % NaCl 100 unit/100 mL (1 unit/mL) infusion        Question:  Enter initial dose (Units/hr):  Answer:  1    -- IV Continuous 01/21/24 1241    01/20/24 2100  insulin aspart U-100 pen 1-4 Units         -- SubQ As needed (PRN) 01/20/24 1315    01/20/24 1645  insulin aspart U-100 pen 1-10 Units         -- SubQ 3 times daily with meals 01/20/24  1306    01/20/24 1402  insulin aspart U-100 pen 0-5 Units         -- SubQ As needed (PRN) 01/20/24 1306          - Reports home dose 12u Detemir BID and LDISS PRN  - Initially in DKA on arrival. Was close to resolution, however she tried to AMA, later returned to DKA.  - Has been very brittle with hard to control sugars  - Insulin requirements still greater than home insulin regimen; suspect infection or alcohol withdrawals contributed to higher requirements.  - Continuing transition insulin drip and Endocrine following/managing  - Accuchecks ACHS  - Diabetic diet      VTE Risk Mitigation (From admission, onward)           Ordered     enoxaparin injection 40 mg  Every 24 hours         01/17/24 0803     IP VTE LOW RISK PATIENT  Once         01/16/24 1139     Place sequential compression device  Until discontinued         01/16/24 1139                    Discharge Planning   JUVENTINO: 1/19/2024     Code Status: Full Code   Is the patient medically ready for discharge?: No    Reason for patient still in hospital (select all that apply): Patient trending condition  Discharge Plan A: Home with family                  Shawn Caldera DO  Department of Hospital Medicine   [facility] - [facility] ([place]

## 2024-01-21 NOTE — SUBJECTIVE & OBJECTIVE
"Interval HPI:   Overnight events: Remains in 43822R. BG at and above goal ranges on IV/SQ insulin regimen. (-288 overnight). Patient denies snacking overnight. Diet diabetic Consistent Carbohydrate;  Calorie    Eatin-100%  Nausea: No  Hypoglycemia and intervention: No  Fever: No  TPN and/or TF: No  If yes, type of TF/TPN and rate: n/a    BP (!) 140/86   Pulse 82   Temp 97.8 °F (36.6 °C)   Resp (!) 25   Ht 5' 7" (1.702 m)   Wt 80.8 kg (178 lb 2.1 oz)   LMP 2023   SpO2 100%   Breastfeeding No   BMI 27.90 kg/m²     Labs Reviewed and Include    Recent Labs   Lab 24  0439   *   CALCIUM 8.8   ALBUMIN 2.8*   PROT 6.2   *   K 3.9   CO2 23      BUN 9   CREATININE 0.9   ALKPHOS 62   ALT 9*   AST 11   BILITOT 0.4     Lab Results   Component Value Date    WBC 4.52 2024    HGB 10.1 (L) 2024    HCT 32.5 (L) 2024    MCV 88 2024     2024     Recent Labs   Lab 24  1005   TSH 3.674     Lab Results   Component Value Date    HGBA1C 6.5 (H) 2024       Nutritional status:   Body mass index is 27.9 kg/m².  Lab Results   Component Value Date    ALBUMIN 2.8 (L) 2024    ALBUMIN 2.8 (L) 2024    ALBUMIN 2.9 (L) 2024     No results found for: "PREALBUMIN"    Estimated Creatinine Clearance: 87.3 mL/min (based on SCr of 0.9 mg/dL).    Accu-Checks  Recent Labs     24  0233 24  0758 24  0759 24  0919 24  1204 24  1659 24  0120 24  0538 24  0746   POCTGLUCOSE 253* 398* 375* 395* 272* 221* 231* 143* 236* 288*       Current Medications and/or Treatments Impacting Glycemic Control  Immunotherapy:    Immunosuppressants       None          Steroids:   Hormones (From admission, onward)      Start     Stop Route Frequency Ordered    24 1237  melatonin tablet 6 mg         -- Oral Nightly PRN 24 1139          Pressors:    Autonomic Drugs (From admission, " onward)      None          Hyperglycemia/Diabetes Medications:   Antihyperglycemics (From admission, onward)      Start     Stop Route Frequency Ordered    01/21/24 0845  insulin regular in 0.9 % NaCl 100 unit/100 mL (1 unit/mL) infusion        Question:  Enter initial dose (Units/hr):  Answer:  0.8    -- IV Continuous 01/21/24 0833    01/20/24 2100  insulin aspart U-100 pen 1-4 Units         -- SubQ As needed (PRN) 01/20/24 1315    01/20/24 1645  insulin aspart U-100 pen 1-10 Units         -- SubQ 3 times daily with meals 01/20/24 1306    01/20/24 1402  insulin aspart U-100 pen 0-5 Units         -- SubQ As needed (PRN) 01/20/24 1306

## 2024-01-21 NOTE — ASSESSMENT & PLAN NOTE
BG goal 140-180    Patient with labile BG. T1DM. Sees Dr. Mao outpatient. Home regimen consist of Levemir 12 BID and Novolog SSI. Previously on Omnipod 5 with the following settings (stopped Dec 2023 do to insurance changing but was having fairly good control on the following settings)   Basal rate  12A: 0.8 U/h   ICR    12A: 12   ISF  12A: 45  Target: 130  IAT: 5h         Plan: (will start previous pump settings inpatient and adjust as indicated)   -Increase Transition IV insulin infusion to 0.8  u/hr with stepdown parameters (previous home insulin basal setting)   -Increase Novolog ICR 1:8 (1 unit per 8 g of carbs) TID with meals (consistent prandial excursions noted)   - Novolog 2-4 units with prn nightly snack based on carb ratio of 1:8g (Must give no closer than 4 hours from dinner)   -Continue Low Dose Correction Scale  -BG monitoring ac/hs/0200    ** Please call Endocrine for any BG related issues **    Discharge plans- TBD  Lab Results   Component Value Date    HGBA1C 6.5 (H) 01/16/2024

## 2024-01-21 NOTE — ASSESSMENT & PLAN NOTE
Patient's FSGs are uncontrolled due to hyperglycemia on current medication regimen.  Last A1c reviewed-   Lab Results   Component Value Date    HGBA1C 6.5 (H) 01/16/2024     Most recent fingerstick glucose reviewed-   Recent Labs   Lab 01/21/24  0538 01/21/24  0746 01/21/24  1229 01/21/24  1531   POCTGLUCOSE 236* 288* 311* 283*       Current correctional scale  Low  Maintain anti-hyperglycemic dose as follows-   Antihyperglycemics (From admission, onward)      Start     Stop Route Frequency Ordered    01/21/24 1245  insulin regular in 0.9 % NaCl 100 unit/100 mL (1 unit/mL) infusion        Question:  Enter initial dose (Units/hr):  Answer:  1    -- IV Continuous 01/21/24 1241    01/20/24 2100  insulin aspart U-100 pen 1-4 Units         -- SubQ As needed (PRN) 01/20/24 1315    01/20/24 1645  insulin aspart U-100 pen 1-10 Units         -- SubQ 3 times daily with meals 01/20/24 1306    01/20/24 1402  insulin aspart U-100 pen 0-5 Units         -- SubQ As needed (PRN) 01/20/24 1306          - Reports home dose 12u Detemir BID and LDISS PRN  - Initially in DKA on arrival. Was close to resolution, however she tried to AMA, later returned to DKA.  - Has been very brittle with hard to control sugars  - Insulin requirements still greater than home insulin regimen; suspect infection or alcohol withdrawals contributed to higher requirements.  - Continuing transition insulin drip and Endocrine following/managing  - Accuchecks ACHS  - Diabetic diet

## 2024-01-21 NOTE — SUBJECTIVE & OBJECTIVE
Interval History: NAEON. AFVSS. Patient reports feeling well and denies any withdrawal like symptoms. Reports her appetite is improved and denies any additional snacking outside of meals. Continuing transition insulin drip and decreased valium taper to 5 mg daily.    Review of Systems  Objective:     Vital Signs (Most Recent):  Temp: 97.8 °F (36.6 °C) (01/21/24 0745)  Pulse: 89 (01/21/24 1453)  Resp: (!) 25 (01/21/24 0527)  BP: (!) 140/86 (01/21/24 0745)  SpO2: 98 % (01/21/24 1453) Vital Signs (24h Range):  Temp:  [97.8 °F (36.6 °C)-98.4 °F (36.9 °C)] 97.8 °F (36.6 °C)  Pulse:  [] 89  Resp:  [19-25] 25  SpO2:  [97 %-100 %] 98 %  BP: (117-149)/(73-86) 140/86     Weight: 80.8 kg (178 lb 2.1 oz)  Body mass index is 27.9 kg/m².    Intake/Output Summary (Last 24 hours) at 1/21/2024 1620  Last data filed at 1/20/2024 1818  Gross per 24 hour   Intake --   Output 400 ml   Net -400 ml         Physical Exam  Vitals and nursing note reviewed.   HENT:      Head: Normocephalic and atraumatic.      Mouth/Throat:      Mouth: Mucous membranes are moist.      Pharynx: Oropharynx is clear.   Eyes:      Extraocular Movements: Extraocular movements intact.      Conjunctiva/sclera: Conjunctivae normal.      Pupils: Pupils are equal, round, and reactive to light.   Cardiovascular:      Rate and Rhythm: Normal rate and regular rhythm.      Pulses: Normal pulses.      Heart sounds: Normal heart sounds.   Pulmonary:      Effort: Pulmonary effort is normal. No respiratory distress.      Breath sounds: Normal breath sounds. No wheezing.   Abdominal:      General: Abdomen is flat. There is no distension.      Palpations: Abdomen is soft.      Tenderness: There is no abdominal tenderness. There is no guarding or rebound.   Musculoskeletal:         General: No tenderness.      Right lower leg: No edema.      Left lower leg: No edema.   Skin:     General: Skin is warm and dry.   Neurological:      General: No focal deficit present.       Mental Status: She is oriented to person, place, and time.      Cranial Nerves: No cranial nerve deficit.      Motor: No weakness.   Psychiatric:         Behavior: Behavior normal.             Significant Labs: All pertinent labs within the past 24 hours have been reviewed.    Significant Imaging: I have reviewed all pertinent imaging results/findings within the past 24 hours.

## 2024-01-21 NOTE — ASSESSMENT & PLAN NOTE
Patient's hospital course has been complicated by significant tachycardia (max 180 bpm), tremulousness, anxiety. Patient initially endorsed drinking 2-3 mixed drinks per day, however upon further questioning she reports drinking one fifth of vodka over three days as well as other recreational drug use. Patient's last drink was 1/15. She endorsed auditory hallucinations early in hospitalization. At this time with preserved mental state and hallucinations have resolved. She denies history of complicated withdrawal.     - Addiction psych consulted, appreciate assistance. Provided patient with outpatient resources  - Decreased valium taper to 5mg PO daily  - Ativan IV 2mg q2h PRN for CIWA > 8  - Folic acid/thiamine

## 2024-01-21 NOTE — PLAN OF CARE
Patient rounded on through shift as per policy, Patient in stable condition with no complaints. Safety measures in place: bed in lowest position, three rails up, call light in reach, personal belonging in reach. No acute events during shift. Managing patient's BG on insulin gtt and supplemental insulin with carb counting and sliding scale. Ongoing plan of care.     Problem: Adult Inpatient Plan of Care  Goal: Plan of Care Review  Outcome: Ongoing, Progressing  Goal: Patient-Specific Goal (Individualized)  Outcome: Ongoing, Progressing  Goal: Absence of Hospital-Acquired Illness or Injury  Outcome: Ongoing, Progressing  Goal: Optimal Comfort and Wellbeing  Outcome: Ongoing, Progressing  Goal: Readiness for Transition of Care  Outcome: Ongoing, Progressing     Problem: Diabetes Comorbidity  Goal: Blood Glucose Level Within Targeted Range  Outcome: Ongoing, Progressing     Problem: Fall Injury Risk  Goal: Absence of Fall and Fall-Related Injury  Outcome: Ongoing, Progressing

## 2024-01-22 VITALS
HEART RATE: 90 BPM | RESPIRATION RATE: 19 BRPM | HEIGHT: 67 IN | WEIGHT: 178.13 LBS | TEMPERATURE: 99 F | DIASTOLIC BLOOD PRESSURE: 66 MMHG | SYSTOLIC BLOOD PRESSURE: 141 MMHG | BODY MASS INDEX: 27.96 KG/M2 | OXYGEN SATURATION: 99 %

## 2024-01-22 LAB
ALBUMIN SERPL BCP-MCNC: 3 G/DL (ref 3.5–5.2)
ALP SERPL-CCNC: 61 U/L (ref 55–135)
ALT SERPL W/O P-5'-P-CCNC: 9 U/L (ref 10–44)
ANION GAP SERPL CALC-SCNC: 8 MMOL/L (ref 8–16)
AST SERPL-CCNC: 12 U/L (ref 10–40)
BILIRUB SERPL-MCNC: 0.3 MG/DL (ref 0.1–1)
BUN SERPL-MCNC: 12 MG/DL (ref 6–20)
CALCIUM SERPL-MCNC: 9 MG/DL (ref 8.7–10.5)
CHLORIDE SERPL-SCNC: 102 MMOL/L (ref 95–110)
CO2 SERPL-SCNC: 24 MMOL/L (ref 23–29)
CREAT SERPL-MCNC: 1.2 MG/DL (ref 0.5–1.4)
EST. GFR  (NO RACE VARIABLE): 57.2 ML/MIN/1.73 M^2
GLUCOSE SERPL-MCNC: 249 MG/DL (ref 70–110)
MAGNESIUM SERPL-MCNC: 1.9 MG/DL (ref 1.6–2.6)
PHOSPHATE SERPL-MCNC: 4.1 MG/DL (ref 2.7–4.5)
POCT GLUCOSE: 195 MG/DL (ref 70–110)
POCT GLUCOSE: 208 MG/DL (ref 70–110)
POCT GLUCOSE: 252 MG/DL (ref 70–110)
POCT GLUCOSE: 272 MG/DL (ref 70–110)
POCT GLUCOSE: 290 MG/DL (ref 70–110)
POTASSIUM SERPL-SCNC: 4.4 MMOL/L (ref 3.5–5.1)
PROT SERPL-MCNC: 6.5 G/DL (ref 6–8.4)
SODIUM SERPL-SCNC: 134 MMOL/L (ref 136–145)

## 2024-01-22 PROCEDURE — 99232 SBSQ HOSP IP/OBS MODERATE 35: CPT | Mod: ,,, | Performed by: NURSE PRACTITIONER

## 2024-01-22 PROCEDURE — 80053 COMPREHEN METABOLIC PANEL: CPT

## 2024-01-22 PROCEDURE — 83735 ASSAY OF MAGNESIUM: CPT

## 2024-01-22 PROCEDURE — 36415 COLL VENOUS BLD VENIPUNCTURE: CPT

## 2024-01-22 PROCEDURE — 25000003 PHARM REV CODE 250

## 2024-01-22 PROCEDURE — 84100 ASSAY OF PHOSPHORUS: CPT

## 2024-01-22 PROCEDURE — 25000003 PHARM REV CODE 250: Performed by: NURSE PRACTITIONER

## 2024-01-22 RX ORDER — INSULIN GLARGINE 100 [IU]/ML
12 INJECTION, SOLUTION SUBCUTANEOUS 2 TIMES DAILY
Qty: 9 ML | Refills: 3 | Status: SHIPPED | OUTPATIENT
Start: 2024-01-22 | End: 2025-01-21

## 2024-01-22 RX ORDER — INSULIN ASPART 100 [IU]/ML
INJECTION, SOLUTION INTRAVENOUS; SUBCUTANEOUS
Qty: 12 ML | Refills: 3 | Status: SHIPPED | OUTPATIENT
Start: 2024-01-22

## 2024-01-22 RX ORDER — INSULIN ASPART 100 [IU]/ML
INJECTION, SOLUTION INTRAVENOUS; SUBCUTANEOUS
Qty: 12 ML | Refills: 3 | Status: SHIPPED | OUTPATIENT
Start: 2024-01-22 | End: 2024-01-22

## 2024-01-22 RX ADMIN — Medication 100 MG: at 02:01

## 2024-01-22 RX ADMIN — INSULIN ASPART 3 UNITS: 100 INJECTION, SOLUTION INTRAVENOUS; SUBCUTANEOUS at 06:01

## 2024-01-22 RX ADMIN — INSULIN DETEMIR 12 UNITS: 100 INJECTION, SOLUTION SUBCUTANEOUS at 02:01

## 2024-01-22 RX ADMIN — Medication 100 MG: at 08:01

## 2024-01-22 RX ADMIN — FOLIC ACID 1 MG: 1 TABLET ORAL at 08:01

## 2024-01-22 RX ADMIN — INSULIN ASPART 3 UNITS: 100 INJECTION, SOLUTION INTRAVENOUS; SUBCUTANEOUS at 08:01

## 2024-01-22 RX ADMIN — INSULIN ASPART 2 UNITS: 100 INJECTION, SOLUTION INTRAVENOUS; SUBCUTANEOUS at 08:01

## 2024-01-22 RX ADMIN — INSULIN ASPART 10 UNITS: 100 INJECTION, SOLUTION INTRAVENOUS; SUBCUTANEOUS at 01:01

## 2024-01-22 RX ADMIN — DIAZEPAM 5 MG: 5 TABLET ORAL at 08:01

## 2024-01-22 RX ADMIN — ATORVASTATIN CALCIUM 20 MG: 20 TABLET, FILM COATED ORAL at 08:01

## 2024-01-22 RX ADMIN — INSULIN ASPART 3 UNITS: 100 INJECTION, SOLUTION INTRAVENOUS; SUBCUTANEOUS at 01:01

## 2024-01-22 RX ADMIN — INSULIN ASPART 6 UNITS: 100 INJECTION, SOLUTION INTRAVENOUS; SUBCUTANEOUS at 05:01

## 2024-01-22 NOTE — SUBJECTIVE & OBJECTIVE
"Interval HPI:   Overnight events: Remains in [id number]. BG above goal ranges on current IV/SQ insulin regimen. Plans for d/c today per primary team. Diet diabetic Consistent Carbohydrate;  Calorie    Eatin%  Nausea: No  Hypoglycemia and intervention: No  Fever: No  TPN and/or TF: No  If yes, type of TF/TPN and rate: n/a    BP (!) 141/66 (BP Location: Left arm, Patient Position: Lying)   Pulse 90   Temp 98.5 °F (36.9 °C) (Oral)   Resp 19   Ht 5' 7" (1.702 m)   Wt 80.8 kg (178 lb 2.1 oz)   LMP 2023   SpO2 99%   Breastfeeding No   BMI 27.90 kg/m²     Labs Reviewed and Include    Recent Labs   Lab 24  0456   *   CALCIUM 9.0   ALBUMIN 3.0*   PROT 6.5   *   K 4.4   CO2 24      BUN 12   CREATININE 1.2   ALKPHOS 61   ALT 9*   AST 12   BILITOT 0.3     Lab Results   Component Value Date    WBC 4.52 2024    HGB 10.1 (L) 2024    HCT 32.5 (L) 2024    MCV 88 2024     2024     Recent Labs   Lab 24  1005   TSH 3.674     Lab Results   Component Value Date    HGBA1C 6.5 (H) 2024       Nutritional status:   Body mass index is 27.9 kg/m².  Lab Results   Component Value Date    ALBUMIN 3.0 (L) 2024    ALBUMIN 2.8 (L) 2024    ALBUMIN 2.8 (L) 2024     No results found for: "PREALBUMIN"    Estimated Creatinine Clearance: 65.5 mL/min (based on SCr of 1.2 mg/dL).    Accu-Checks  Recent Labs     24  1708 24  1844 24  2116 24  2257 24  2334 24  0144 24  0226 24  0815 24  1218 24  1557   POCTGLUCOSE 237* 160* 165* 95 118* 195* 208* 290* 272* 252*       Current Medications and/or Treatments Impacting Glycemic Control  Immunotherapy:    Immunosuppressants       None          Steroids:   Hormones (From admission, onward)      Start     Stop Route Frequency Ordered    24 1237  melatonin tablet 6 mg         -- Oral Nightly PRN 24 1139          Pressors:  "   Autonomic Drugs (From admission, onward)      None          Hyperglycemia/Diabetes Medications:   Antihyperglycemics (From admission, onward)      Start     Stop Route Frequency Ordered    01/22/24 1415  insulin detemir U-100 (Levemir) pen 12 Units         -- SubQ 2 times daily 01/22/24 1406    01/22/24 0900  insulin regular in 0.9 % NaCl 100 unit/100 mL (1 unit/mL) infusion        Question:  Enter initial dose (Units/hr):  Answer:  1    01/22/24 1430 IV Continuous 01/22/24 0854    01/20/24 2100  insulin aspart U-100 pen 1-4 Units         -- SubQ As needed (PRN) 01/20/24 1315    01/20/24 1645  insulin aspart U-100 pen 1-10 Units         -- SubQ 3 times daily with meals 01/20/24 1306    01/20/24 1402  insulin aspart U-100 pen 0-5 Units         -- SubQ As needed (PRN) 01/20/24 1306

## 2024-01-22 NOTE — NURSING
Spoke with on call endocrinologist, Shannan Yeh, regarding patient's blood sugar. Blood sugar is currently 218 on a insulin infusion rate of 0.8 units/hr. Infusion was stopped at a rate of 1.2 units/hr for 30 minutes at 2300, and then restarted at 0.8 units/hr at 2330. Per on call provider, keep infusion at current rate and continue to monitor as ordered.

## 2024-01-22 NOTE — ASSESSMENT & PLAN NOTE
BG goal 140-180    Patient with labile BG. T1DM. Sees Dr. Mao outpatient. Home regimen consist of Levemir 12 BID and Novolog SSI. Previously on Omnipod 5 with the following settings (stopped Dec 2023 do to insurance changing but was having fairly good control on the following settings)   Basal rate  12A: 0.8 U/h   ICR    12A: 12   ISF  12A: 45  Target: 130  IAT: 5h         Plan: (will start previous pump settings inpatient and adjust as indicated)   -Discontinue Transition IV insulin infusion at 1  u/hr with stepdown parameters (plans for d/c)  -Start Levemir 12 units BID (Give Levemir 12 units now and stop insulin infusion 1 hour after administration)   -Increase Novolog ICR 1:8 (1 unit per 8 g of carbs) TID with meals   -Novolog 2-4 units with prn nightly snack based on carb ratio of 1:8g (Must give no closer than 4 hours from dinner)   -Continue Low Dose Correction Scale  -BG monitoring ac/hs/0200    ** Please call Endocrine for any BG related issues **    Discharge plans-   -Levemir 12 units BID  -Novolog ICR 1:8g (1 unit per 8g of carbs) TID with meals in addition to the following correction scale   150 - 200 + 1 unit  201 - 250 + 2 units  251 - 300 + 3 units  301 - 350 + 4 units   > 350   + 5 units    -Follow up with Dr. Dr. Mao in 1-2 weeks.     Lab Results   Component Value Date    HGBA1C 6.5 (H) 01/16/2024

## 2024-01-22 NOTE — PROGRESS NOTES
[facility] ([place] [address])  Endocrinology  Progress Note    Admit Date: 1/16/2024     Reason for Consult: Management of T1DM, Hyperglycemia     Surgical Procedure and Date: n/a    Diabetes diagnosis year:     Diagnosed around age 9       11/03/15 04:28   C-Peptide <0.1 (L)   Glutamic Acid Decarb Ab 0.12 (H)      Home Diabetes Medications: Currently doing the following Levemir 12 untis BID and Novolog Correction Scale   Patient was previously on Omnipod 5 pump (stopped in Dec do to insurance reasons)       Lab Results   Component Value Date    HGBA1C 6.5 (H) 01/16/2024       How often checking glucose at home? 4-5x daily (previously had Dexcom but insurance issues now)   BG readings on regimen: 170-250s  Hypoglycemia on the regimen?  Yes, occasionally in early morning hours   Missed doses on regimen?  No    Diabetes Complications include:     Hyperglycemia and Hypoglycemia Frequent DKA     Complicating diabetes co morbidities:   HLD       HPI:   Patient is a 43 y.o. female with a diagnosis of  type 1 diabetes mellitus, hyperlipidemia, and GERD who was brought in by a rapid response team from the [facility] after loss of consciousness. Patient states she was accompanying her , who is receiving chemotherapy. States she was feeling nauseated this morning but does not remember any details around the time of the event. Per the ED, there was no injury or fall noted by the patient or the rapid response team.      Patient states she is compliant with her insulin regimen. States she was previously on Omnipod 5 and Dexcom but lost insurance in Dec 2023 and now is doing Levemir and Novolog Correction Scale and finger sticks 4-5x daily. States her insulin levels have been consistently in the low 200s, sometimes in the 100s, but have never reached the 300s. In the ED, patient was hypertensive to 189/91, Afebrile, and otherwise vitally stable. Her blood glucose was 345, and she was found to be in  "DKA with an anion gap of 18, BHB 4.9, and a Bicarb of 19. Other significant labs include: Hgb A1C 6.5, Mg 1.2, UA with 4+ glucose and 3+ ketones, UDS only positive for marijuana, and Phos 2.3. BNP, troponin, COVID/Flu, CBC, ethanol, Hep C, HIV, and TSH were all negative/unremarkable. Patient was started on DKA protocol with an insulin drip and normal saline infusion. Following resolution of DKA and hyperglycemia improvement, she was transitioned to D5W and 1/2 NS with 12 units of Levemir. Patient was admitted to hospital medicine for management of DKA and hyperglycemia. Endocrinology consulted for management of T1DM.          Interval HPI:   Overnight events: Remains in [id number]. BG above goal ranges on current IV/SQ insulin regimen. Plans for d/c today per primary team. Diet diabetic Consistent Carbohydrate;  Calorie    Eatin%  Nausea: No  Hypoglycemia and intervention: No  Fever: No  TPN and/or TF: No  If yes, type of TF/TPN and rate: n/a    BP (!) 141/66 (BP Location: Left arm, Patient Position: Lying)   Pulse 90   Temp 98.5 °F (36.9 °C) (Oral)   Resp 19   Ht 5' 7" (1.702 m)   Wt 80.8 kg (178 lb 2.1 oz)   LMP 2023   SpO2 99%   Breastfeeding No   BMI 27.90 kg/m²     Labs Reviewed and Include    Recent Labs   Lab 24  0456   *   CALCIUM 9.0   ALBUMIN 3.0*   PROT 6.5   *   K 4.4   CO2 24      BUN 12   CREATININE 1.2   ALKPHOS 61   ALT 9*   AST 12   BILITOT 0.3     Lab Results   Component Value Date    WBC 4.52 2024    HGB 10.1 (L) 2024    HCT 32.5 (L) 2024    MCV 88 2024     2024     Recent Labs   Lab 24  1005   TSH 3.674     Lab Results   Component Value Date    HGBA1C 6.5 (H) 2024       Nutritional status:   Body mass index is 27.9 kg/m².  Lab Results   Component Value Date    ALBUMIN 3.0 (L) 2024    ALBUMIN 2.8 (L) 2024    ALBUMIN 2.8 (L) 2024     No results found for: "PREALBUMIN"    Estimated " Creatinine Clearance: 65.5 mL/min (based on SCr of 1.2 mg/dL).    Accu-Checks  Recent Labs     01/21/24  1708 01/21/24  1844 01/21/24  2116 01/21/24  2257 01/21/24  2334 01/22/24  0144 01/22/24  0226 01/22/24  0815 01/22/24  1218 01/22/24  1557   POCTGLUCOSE 237* 160* 165* 95 118* 195* 208* 290* 272* 252*       Current Medications and/or Treatments Impacting Glycemic Control  Immunotherapy:    Immunosuppressants       None          Steroids:   Hormones (From admission, onward)      Start     Stop Route Frequency Ordered    01/16/24 1237  melatonin tablet 6 mg         -- Oral Nightly PRN 01/16/24 1139          Pressors:    Autonomic Drugs (From admission, onward)      None          Hyperglycemia/Diabetes Medications:   Antihyperglycemics (From admission, onward)      Start     Stop Route Frequency Ordered    01/22/24 1415  insulin detemir U-100 (Levemir) pen 12 Units         -- SubQ 2 times daily 01/22/24 1406    01/22/24 0900  insulin regular in 0.9 % NaCl 100 unit/100 mL (1 unit/mL) infusion        Question:  Enter initial dose (Units/hr):  Answer:  1    01/22/24 1430 IV Continuous 01/22/24 0854    01/20/24 2100  insulin aspart U-100 pen 1-4 Units         -- SubQ As needed (PRN) 01/20/24 1315    01/20/24 1645  insulin aspart U-100 pen 1-10 Units         -- SubQ 3 times daily with meals 01/20/24 1306    01/20/24 1402  insulin aspart U-100 pen 0-5 Units         -- SubQ As needed (PRN) 01/20/24 1306            ASSESSMENT and PLAN    Psychiatric  * Alcohol withdrawal syndrome with complication  Managed per primary team  Avoid hypoglycemia        Cardiac/Vascular  Mixed hyperlipidemia  May increase insulin resistance.         Endocrine  Type 1 diabetes mellitus with hyperglycemia  BG goal 140-180    Patient with labile BG. T1DM. Sees Dr. Mao outpatient. Home regimen consist of Levemir 12 BID and Novolog SSI. Previously on Omnipod 5 with the following settings (stopped Dec 2023 do to insurance changing but was  having fairly good control on the following settings)   Basal rate  12A: 0.8 U/h   ICR    12A: 12   ISF  12A: 45  Target: 130  IAT: 5h         Plan: (will start previous pump settings inpatient and adjust as indicated)   -Discontinue Transition IV insulin infusion at 1  u/hr with stepdown parameters (plans for d/c)  -Start Levemir 12 units BID (Give Levemir 12 units now and stop insulin infusion 1 hour after administration)   -Increase Novolog ICR 1:8 (1 unit per 8 g of carbs) TID with meals   -Novolog 2-4 units with prn nightly snack based on carb ratio of 1:8g (Must give no closer than 4 hours from dinner)   -Continue Low Dose Correction Scale  -BG monitoring //0200    ** Please call Endocrine for any BG related issues **    Discharge plans-   -Levemir 12 units BID  -Novolog ICR 1:8g (1 unit per 8g of carbs) TID with meals in addition to the following correction scale   150 - 200 + 1 unit  201 - 250 + 2 units  251 - 300 + 3 units  301 - 350 + 4 units   > 350   + 5 units    -Follow up with Dr. Dr. Mao in 1-2 weeks.     Lab Results   Component Value Date    HGBA1C 6.5 (H) 01/16/2024             Tari Pascual NP  Endocrinology  [facility] ([place])

## 2024-01-23 LAB
BACTERIA BLD CULT: NORMAL
BACTERIA BLD CULT: NORMAL

## 2024-01-23 NOTE — DISCHARGE SUMMARY
[facility] ([place] [address])  [facility] Medicine  Discharge Summary      Patient Name: Cira Keys  MRN: [id number]  TOY: [id number]  Patient Class: IP- Inpatient  Admission Date: 1/16/2024  Hospital Length of Stay: 6 days  Discharge Date and Time:  01/22/2024  Attending Physician: Christa att. providers found   Discharging Provider: Shawn Caldera DO  Primary Care Provider: Krysta Mercado MD  [facility] Team: [facility] Shawn Caldera DO  Primary Care Team: [facility]    HPI:   Cira Keys is a 43 y.o. female with a PMH of type 1 diabetes mellitus, hyperlipidemia, and GERD who was brought in by a rapid response team from the [facility] [place] after loss of consciousness. Patient states she was accompanying her , who is receiving chemotherapy. States she was feeling nauseated this morning but does not remember any details around the time of the event. Per the ED, there was no injury or fall noted by the patient or the rapid response team.      Patient states she is compliant with her insulin regimen and received her last dose of Levemir this morning. States she is on a sliding-scale insulin regimen and monitors glucose via finger prick approximately 5x per day. States her insulin levels have been consistently in the low 200s, sometimes in the 100s, but have never reached the 300s. Patient was taken off her insulin pump in December 2023 due to insurance. Patient currently endorses nausea but denies fever, chills, headache, vomiting, chest pain, shortness of breath, abdominal pain, diarrhea, dysuria, increased urinary frequency, or new rashes. Patient endorses regular alcohol use and states she drinks approximately 1-2 vodka jigar-aids per day.    In the ED, patient was hypertensive to 189/91, Afebrile, and otherwise vitally stable. Her blood glucose was 345, and she was found to be in DKA with an anion gap of 18, BHB 4.9, and a Bicarb of 19. Other significant labs  include: Hgb A1C 6.5, Mg 1.2, UA with 4+ glucose and 3+ ketones, UDS only positive for marijuana, and Phos 2.3. BNP, troponin, COVID/Flu, CBC, ethanol, Hep C, HIV, and TSH were all negative/unremarkable.   Patient was started on DKA protocol with an insulin drip and normal saline infusion. Following resolution of DKA and hyperglycemia improvement, she was transitioned to D5W and 1/2 NS with 12 units of Levemir given alongside Insulin infusion, and the infusion to be stopped after 2 hours of giving levemir. Patient was admitted to hospital medicine for management of DKA and hyperglycemia.    * No surgery found *      Hospital Course:   Patient admitted to  initially for syncopal episode and DKA. She was briefly upgraded to ICU after she developed hypotension refractory to IVF. Blood cultures were obtained (which so far have shown no growth) and BSABX initiated. Course was complicated by intermittent tachycardia (max 180s bpm). CTA was negative for PE. Cardiology were consulted, however felt tachycardia was not secondary to cardiac pathology. Upon further questioning, patient endorsed heavy alcohol use (a fifth of vodka over 3 days) as well as recreational drug use. Addiction psych were consulted and provided patient with motivational interviewing and outpatient resources. She was initiated on a valium taper with ativan PRNs. Patient has improved with minimal to no signs of ongoing withdrawal and no longer requiring PRN Ativan. Continuing to taper down valium. Endocrine was consulted due to ongoing labile blood sugars, and patient was started on a transition insulin drip to better determine longstanding insulin requirements. After two days of a transition insulin drip, patient was medically cleared for discharge, and she was discharged home on 01/22   Physical Exam  Vitals and nursing note reviewed.   HENT:      Head: Normocephalic and atraumatic.      Mouth/Throat:      Mouth: Mucous membranes are moist.       Pharynx: Oropharynx is clear.   Eyes:      Extraocular Movements: Extraocular movements intact.      Conjunctiva/sclera: Conjunctivae normal.      Pupils: Pupils are equal, round, and reactive to light.   Cardiovascular:      Rate and Rhythm: Normal rate and regular rhythm.      Pulses: Normal pulses.      Heart sounds: Normal heart sounds.   Pulmonary:      Effort: Pulmonary effort is normal. No respiratory distress.      Breath sounds: Normal breath sounds. No wheezing.   Abdominal:      General: Abdomen is flat. There is no distension.      Palpations: Abdomen is soft.      Tenderness: There is no abdominal tenderness. There is no guarding or rebound.   Musculoskeletal:         General: No tenderness.      Right lower leg: No edema.      Left lower leg: No edema.   Skin:     General: Skin is warm and dry.   Neurological:      General: No focal deficit present.      Mental Status: She is oriented to person, place, and time.      Cranial Nerves: No cranial nerve deficit.      Motor: No weakness.   Psychiatric:         Behavior: Behavior normal.      Goals of Care Treatment Preferences:  Code Status: Full Code          What is most important right now is to focus on avoiding the hospital.  Accordingly, we have decided that the best plan to meet the patient's goals includes continuing with treatment.      Consults:   Consults (From admission, onward)          Status Ordering Provider     Inpatient consult to Endocrinology  Once        Provider:  (Not yet assigned)    Completed ADRIANE WILLIAMSON     Inpatient consult to Midline team  Once        Provider:  (Not yet assigned)    Completed MAREK JAIN     Inpatient consult to Cardiology  Once        Provider:  (Not yet assigned)    Completed BANDAR JESSICA     Inpatient consult to Psychiatry  Once        Provider:  (Not yet assigned)    Completed NOLAN RUIZ     Inpatient consult to Critical Care Medicine  Once        Provider:  (Not yet assigned)    Completed  BANDAR JESSICA            No new Assessment & Plan notes have been filed under this hospital service since the last note was generated.  Service: Hospital Medicine    Final Active Diagnoses:    Diagnosis Date Noted POA    PRINCIPAL PROBLEM:  Alcohol withdrawal syndrome with complication [F10.939] 09/11/2020 Yes    Pleural effusion [J90] 01/18/2024 Yes    Sinus tachycardia [R00.0] 01/18/2024 No    Alcohol use disorder [F10.90] 01/17/2024 Yes    Syncope [R55] 01/16/2024 Yes    Mixed hyperlipidemia [E78.2] 09/25/2023 Yes    WALTER (generalized anxiety disorder) [F41.1] 03/18/2022 Yes    Alcoholic liver disease [K70.9] 09/28/2020 Yes    DKA (diabetic ketoacidoses) [E11.10] 09/11/2020 Yes    Essential hypertension [I10] 03/22/2017 Yes    Fever [R50.9] 08/08/2016 Yes    Hypomagnesemia [E83.42] 11/01/2015 Yes    Type 1 diabetes mellitus with hyperglycemia [E10.65] 10/31/2015 Yes      Problems Resolved During this Admission:    Diagnosis Date Noted Date Resolved POA    SVT (supraventricular tachycardia) [I47.10] 01/18/2024 01/18/2024 Unknown    Shock [R57.9] 01/16/2024 01/18/2024 No    Encephalopathy, metabolic [G93.41] 01/16/2024 01/18/2024 Yes       Discharged Condition: stable    Disposition: Home or Self Care    Follow Up:   Follow-up Information       Krysta Mercado MD Follow up.    Specialty: Internal Medicine  Contact information:  [address] [place] [address]  [phone]                           Patient Instructions:      Ambulatory referral/consult to Endocrinology   Standing Status: Future   Referral Priority: Routine Referral Type: Consultation   Requested Specialty: Endocrinology   Number of Visits Requested: 1       Significant Diagnostic Studies: N/A    Pending Diagnostic Studies:       None           Medications:  Reconciled Home Medications:      Medication List        START taking these medications      BASAGLAR KWIKPEN U-100 INSULIN glargine 100 units/mL SubQ pen  Generic drug: insulin  Inject 12 Units  into the skin 2 (two) times daily.     DEXCOM G6 SENSOR Kate  Generic drug: blood-glucose sensor  1 each by Misc.(Non-Drug; Combo Route) route every 10 days.     DEXCOM G6 TRANSMITTER Kate  Generic drug: blood-glucose transmitter  1 each by Misc.(Non-Drug; Combo Route) route every 3 (three) months.     insulin aspart U-100 100 unit/mL (3 mL) Inpn pen  Commonly known as: NovoLOG  Inject 1-10 units in to the skin three times daily with meals: inject 5u for small meals and 10u for large meals. Additionally inject units based on following sliding scale:150 - 200 + 1 unit; 201 - 250 + 2 units; 251 - 300 + 3 units; 301 - 350 + 4 units; > 350 + 5 units. Total daily dose is 50 units per day  Replaces: insulin aspart U-100 100 unit/mL injection     OMNIPOD 5 G6 PODS (GEN 5) Crtg  Generic drug: insulin pump cart,automated,BT  Inject 1 each into the skin every 72 hours.            CHANGE how you take these medications      busPIRone 5 MG Tab  Commonly known as: BUSPAR  Take 1 tablet by mouth twice daily as needed  What changed: when to take this            CONTINUE taking these medications      atorvastatin 20 MG tablet  Commonly known as: LIPITOR  Take 1 tablet by mouth once daily     citalopram 10 MG tablet  Commonly known as: CeleXA  Take 1 tablet (10 mg total) by mouth once daily.     esomeprazole 20 MG capsule  Commonly known as: NEXIUM  Take 20 mg by mouth once daily.     gabapentin 300 MG capsule  Commonly known as: NEURONTIN  Take 1 capsule (300 mg total) by mouth once daily.     lisinopriL 5 MG tablet  Commonly known as: PRINIVIL,ZESTRIL  Take 1 tablet by mouth once daily            STOP taking these medications      insulin aspart U-100 100 unit/mL injection  Commonly known as: NovoLOG  Replaced by: insulin aspart U-100 100 unit/mL (3 mL) Inpn pen     LEVEMIR FLEXPEN 100 unit/mL (3 mL) Inpn pen  Generic drug: insulin detemir U-100 (Levemir)            ASK your doctor about these medications      BD ULTRA-FINE SAMMIE  "PEN NEEDLE 32 gauge x 5/32" Ndle  Generic drug: pen needle, diabetic  Use with insulin 5 times daily.              Indwelling Lines/Drains at time of discharge:   Lines/Drains/Airways       None                   Time spent on the discharge of patient: 35 minutes         Shawn Caldera DO  Department of Hospital Medicine  [facility] ([place]  "

## 2024-01-23 NOTE — NURSING
Patient received discharge information, IV removed. Insulin regimen discussed with patient, patient verbalized understanding. Patient waiting for meds to bed.

## 2024-01-23 NOTE — PLAN OF CARE
01/23/24 0909   Final Note   Assessment Type Final Discharge Note   Anticipated Discharge Disposition Home   What phone number can be called within the next 1-3 days to see how you are doing after discharge? [phone]   Hospital Resources/Appts/Education Provided Provided patient/caregiver with written discharge plan information   Post-Acute Status   Post-Acute Authorization HME   HME Status Set-up Complete/Auth obtained   Coverage [facility]   Patient choice form signed by patient/caregiver List with quality metrics by geographic area provided   Discharge Delays None known at this time     Pt was DC and with home with family.  No additional assistance needed.    Emily Siddiqui MSW, CSW

## 2024-01-24 LAB
POCT GLUCOSE: 139 MG/DL (ref 70–110)
POCT GLUCOSE: 148 MG/DL (ref 70–110)
POCT GLUCOSE: 190 MG/DL (ref 70–110)

## 2024-01-25 ENCOUNTER — PATIENT MESSAGE (OUTPATIENT)
Dept: ENDOCRINOLOGY | Facility: CLINIC | Age: 44
End: 2024-01-25
Payer: COMMERCIAL

## 2024-01-28 NOTE — TELEPHONE ENCOUNTER
No care due was identified.  Health [facility] Embedded Care Due Messages. Reference number: [id number].   1/28/2024 8:02:15 AM CST

## 2024-01-29 RX ORDER — ATORVASTATIN CALCIUM 20 MG/1
20 TABLET, FILM COATED ORAL
Qty: 30 TABLET | Refills: 5 | Status: SHIPPED | OUTPATIENT
Start: 2024-01-29

## 2024-01-30 DIAGNOSIS — M67.919 TENDINOPATHY OF ROTATOR CUFF, UNSPECIFIED LATERALITY: ICD-10-CM

## 2024-01-30 RX ORDER — GABAPENTIN 300 MG/1
300 CAPSULE ORAL
Qty: 30 CAPSULE | Refills: 0 | Status: SHIPPED | OUTPATIENT
Start: 2024-01-30 | End: 2024-02-28

## 2024-01-30 NOTE — TELEPHONE ENCOUNTER
No care due was identified.  Health [facility] Embedded Care Due Messages. Reference number: [id number].   1/30/2024 11:50:26 AM CST

## 2024-02-16 ENCOUNTER — OFFICE VISIT (OUTPATIENT)
Dept: ENDOCRINOLOGY | Facility: CLINIC | Age: 44
End: 2024-02-16
Payer: COMMERCIAL

## 2024-02-16 VITALS — WEIGHT: 168 LBS | HEIGHT: 67 IN | BODY MASS INDEX: 26.37 KG/M2

## 2024-02-16 DIAGNOSIS — E66.3 OVERWEIGHT (BMI 25.0-29.9): Primary | ICD-10-CM

## 2024-02-16 DIAGNOSIS — Z96.41 INSULIN PUMP IN PLACE: ICD-10-CM

## 2024-02-16 DIAGNOSIS — I10 ESSENTIAL HYPERTENSION: ICD-10-CM

## 2024-02-16 DIAGNOSIS — E78.2 MIXED HYPERLIPIDEMIA: ICD-10-CM

## 2024-02-16 DIAGNOSIS — E10.65 TYPE 1 DIABETES MELLITUS WITH HYPERGLYCEMIA: ICD-10-CM

## 2024-02-16 PROBLEM — E11.10 DKA (DIABETIC KETOACIDOSIS): Status: RESOLVED | Noted: 2020-09-11 | Resolved: 2024-02-16

## 2024-02-16 PROCEDURE — 99214 OFFICE O/P EST MOD 30 MIN: CPT | Mod: 25,S$GLB,, | Performed by: STUDENT IN AN ORGANIZED HEALTH CARE EDUCATION/TRAINING PROGRAM

## 2024-02-16 PROCEDURE — 99999 PR PBB SHADOW E&M-EST. PATIENT-LVL IV: CPT | Mod: PBBFAC,,, | Performed by: STUDENT IN AN ORGANIZED HEALTH CARE EDUCATION/TRAINING PROGRAM

## 2024-02-16 PROCEDURE — 95251 CONT GLUC MNTR ANALYSIS I&R: CPT | Mod: S$GLB,,, | Performed by: STUDENT IN AN ORGANIZED HEALTH CARE EDUCATION/TRAINING PROGRAM

## 2024-02-16 NOTE — ASSESSMENT & PLAN NOTE
Continue statin   Localized Dermabrasion Text: The patient was draped in routine manner.  Localized dermabrasion using sterilized sandpaper was performed in routine manner to papillary dermis. This spot dermabrasion is being performed to complete skin cancer reconstruction. It also will eliminate the other sun damaged precancerous cells that are known to be part of the regional effect of a lifetime's worth of sun exposure. This localized dermabrasion is therapeutic and should not be considered cosmetic in any regard. Localized Dermabrasion With Wire Brush Text: The patient was draped in routine manner.  Localized dermabrasion using sterilized sandpaper was performed in routine manner to papillary dermis. This spot dermabrasion is being performed to complete skin cancer reconstruction. It also will eliminate the other sun damaged precancerous cells that are known to be part of the regional effect of a lifetime's worth of sun exposure. This localized dermabrasion is therapeutic and should not be considered cosmetic in any regard.

## 2024-02-16 NOTE — PROGRESS NOTES
"  Subjective:      Patient ID: Cira Keys is a 43 y.o. female.    Chief Complaint:  Type 1 diabetes mellitus      History of Present Illness  This is a 43 y.o. female. with a past medical history of type 1 diabetes mellitus here for follow up.    She had a recent DKA episode due to inability to get Dexcom and some increased ETOH intake      Type 1 diabetes mellitus  Diagnosed around age 9     11/03/15 04:28   C-Peptide <0.1 (L)   Glutamic Acid Decarb Ab 0.12 (H)       Current diabetes medications:  - Insulin Novolog via Omnipod 5 insulin pump  Basal rate  12A: 0.8 U/h    ICR  12A: 12    ISF  12A: 45    Target: 120  IAT: 5h        Past diabetes medications:  - Lantus  - Levemir  - Toujeo  - Novolog  - Insulin pump    Lab Results   Component Value Date    CREATININE 1.2 01/22/2024    EGFRNORACEVR 57.2 (A) 01/22/2024       Known diabetic complications:  hypoglycemia    Weight trend:  Wt Readings from Last 8 Encounters:   02/16/24 76.2 kg (168 lb)   01/19/24 80.8 kg (178 lb 2.1 oz)   12/28/23 78.8 kg (173 lb 11.6 oz)   12/06/23 77.6 kg (171 lb 1.2 oz)   10/13/23 77.5 kg (170 lb 13.7 oz)   09/25/23 74.8 kg (165 lb)   08/31/23 76.1 kg (167 lb 10.6 oz)   08/03/23 75 kg (165 lb 4.8 oz)       Family history of diabetes:  No    Prior visit with diabetes education: yes    Current diet: 3 meals per day      Blood glucose monitoring at home: See CGM reports on media tab          Diabetes Management Status  Statin: Taking  ACE/ARB: Taking    Screening or Prevention Patient's value   HgA1C Testing and Control   Lab Results   Component Value Date    HGBA1C 6.5 (H) 01/16/2024        LDL control Lab Results   Component Value Date    LDLCALC 75.8 10/02/2023      Nephropathy screening Lab Results   Component Value Date    MICALBCREAT 4.3 10/02/2023            Review of Systems  As above    Social and family history reviewed  Current medications and allergies reviewed    Objective:   Ht 5' 7" (1.702 m)   Wt 76.2 kg (168 lb) "   LMP 07/01/2023   BMI 26.31 kg/m²   Physical Exam  Alert, oriented    BP Readings from Last 1 Encounters:   01/22/24 (!) 141/66      Wt Readings from Last 1 Encounters:   02/16/24 1409 76.2 kg (168 lb)     Body mass index is 26.31 kg/m².    Lab Review:   Lab Results   Component Value Date    HGBA1C 6.5 (H) 01/16/2024     Lab Results   Component Value Date    CHOL 181 10/02/2023    HDL 92 (H) 10/02/2023    LDLCALC 75.8 10/02/2023    TRIG 66 10/02/2023    CHOLHDL 50.8 (H) 10/02/2023     Lab Results   Component Value Date     (L) 01/22/2024    K 4.4 01/22/2024     01/22/2024    CO2 24 01/22/2024     (H) 01/22/2024    BUN 12 01/22/2024    CREATININE 1.2 01/22/2024    CALCIUM 9.0 01/22/2024    PROT 6.5 01/22/2024    ALBUMIN 3.0 (L) 01/22/2024    BILITOT 0.3 01/22/2024    ALKPHOS 61 01/22/2024    AST 12 01/22/2024    ALT 9 (L) 01/22/2024    ANIONGAP 8 01/22/2024    ESTGFRAFRICA >60 03/20/2022    EGFRNONAA >60 03/20/2022    TSH 3.674 01/16/2024       All pertinent labs reviewed    Assessment and Plan     Type 1 diabetes mellitus with hyperglycemia  Controlled with A1c < 7%. CGM shows TIR about 60% with variable hyperglycemia, mostly < 250. She feels like she underestimates carbs so will work on that.    She had a recent episode of DKA due to not having CGM and increased ETOH intake. She has her Dexcom now through pharmacy and is staying away from ETOH.    Plan  - Continue insulin Novolog via Omnipod 5 with above settings    Basal rate  12A: 0.8 U/h    ICR  12A: 12    ISF  12A: 45    Target: 120->110  IAT: 4h    - Continue Dexcom G6 CGM    F/u 6 months    Labs with PCP    Overweight (BMI 25.0-29.9)  Continue lifestyle changes  ETOH cessation    Insulin pump in place  Continue insulin Novolog via Omnipod 5    Essential hypertension  Continue antihypertensive regimen including ARB/ACEi    Mixed hyperlipidemia  Continue statin          Jhonny Mao MD  Endocrinology

## 2024-02-16 NOTE — ASSESSMENT & PLAN NOTE
Controlled with A1c < 7%. CGM shows TIR about 60% with variable hyperglycemia, mostly < 250. She feels like she underestimates carbs so will work on that.    She had a recent episode of DKA due to not having CGM and increased ETOH intake. She has her Dexcom now through pharmacy and is staying away from ETOH.    Plan  - Continue insulin Novolog via Omnipod 5 with above settings    Basal rate  12A: 0.8 U/h    ICR  12A: 12    ISF  12A: 45    Target: 120->110  IAT: 4h    - Continue Dexcom G6 CGM    F/u 6 months    Labs with PCP

## 2024-02-28 DIAGNOSIS — M67.919 TENDINOPATHY OF ROTATOR CUFF, UNSPECIFIED LATERALITY: ICD-10-CM

## 2024-02-28 RX ORDER — GABAPENTIN 300 MG/1
300 CAPSULE ORAL
Qty: 30 CAPSULE | Refills: 5 | Status: SHIPPED | OUTPATIENT
Start: 2024-02-28

## 2024-02-28 NOTE — TELEPHONE ENCOUNTER
No care due was identified.  [facility] Embedded Care Due Messages. Reference number: [id number].   2/28/2024 11:37:42 AM CST

## 2024-03-07 ENCOUNTER — PATIENT OUTREACH (OUTPATIENT)
Dept: ADMINISTRATIVE | Facility: HOSPITAL | Age: 44
End: 2024-03-07
Payer: COMMERCIAL

## 2024-03-27 DIAGNOSIS — E11.9 TYPE 2 DIABETES MELLITUS WITHOUT COMPLICATION, UNSPECIFIED WHETHER LONG TERM INSULIN USE: ICD-10-CM

## 2024-03-27 DIAGNOSIS — Z12.31 OTHER SCREENING MAMMOGRAM: ICD-10-CM

## 2024-03-28 NOTE — TELEPHONE ENCOUNTER
No care due was identified.  [facility] Embedded Care Due Messages. Reference number: [id number].   3/28/2024 7:04:40 AM CDT

## 2024-03-28 NOTE — TELEPHONE ENCOUNTER
90 day supply called during your absence.    LOV 12/6/2023  Scheduled visit 6/7/2024    Requested Prescriptions     Pending Prescriptions Disp Refills    lisinopriL (PRINIVIL,ZESTRIL) 5 MG tablet [Pharmacy Med Name: Lisinopril 5 MG Oral Tablet] 90 tablet 0     Sig: Take 1 tablet by mouth once daily

## 2024-04-02 RX ORDER — LISINOPRIL 5 MG/1
TABLET ORAL
Qty: 90 TABLET | Refills: 0 | Status: SHIPPED | OUTPATIENT
Start: 2024-04-02

## 2024-04-06 ENCOUNTER — TELEPHONE (OUTPATIENT)
Dept: ENDOCRINOLOGY | Facility: CLINIC | Age: 44
End: 2024-04-06
Payer: COMMERCIAL

## 2024-04-06 NOTE — TELEPHONE ENCOUNTER
Cira Keys Alejo: [id number]  Need help? Call us at [phone]  Status   Sent to [employer]  Drug    Omnipod 5 G6 Pods (Gen 5)   Form    [employer] [facility] Electronic Prior Authorization Form 2017 N[employer]

## 2024-04-08 RX ORDER — BUSPIRONE HYDROCHLORIDE 5 MG/1
TABLET ORAL
Qty: 120 TABLET | Refills: 0 | Status: SHIPPED | OUTPATIENT
Start: 2024-04-08 | End: 2024-06-17

## 2024-04-30 DIAGNOSIS — F41.1 GAD (GENERALIZED ANXIETY DISORDER): ICD-10-CM

## 2024-04-30 RX ORDER — CITALOPRAM 10 MG/1
10 TABLET ORAL
Qty: 30 TABLET | Refills: 0 | Status: SHIPPED | OUTPATIENT
Start: 2024-04-30

## 2024-04-30 NOTE — TELEPHONE ENCOUNTER
No care due was identified.  [facility] Embedded Care Due Messages. Reference number: [id number].   4/30/2024 7:21:25 AM CDT

## 2024-05-13 ENCOUNTER — PATIENT MESSAGE (OUTPATIENT)
Dept: INTERNAL MEDICINE | Facility: CLINIC | Age: 44
End: 2024-05-13
Payer: COMMERCIAL

## 2024-05-21 ENCOUNTER — PATIENT MESSAGE (OUTPATIENT)
Dept: ENDOCRINOLOGY | Facility: CLINIC | Age: 44
End: 2024-05-21
Payer: COMMERCIAL

## 2024-05-21 DIAGNOSIS — E10.65 TYPE 1 DIABETES MELLITUS WITH HYPERGLYCEMIA: Primary | ICD-10-CM

## 2024-05-21 RX ORDER — INSULIN ASPART 100 [IU]/ML
INJECTION, SOLUTION INTRAVENOUS; SUBCUTANEOUS
Qty: 30 ML | Refills: 11 | Status: SHIPPED | OUTPATIENT
Start: 2024-05-21

## 2024-05-21 RX ORDER — INSULIN ASPART 100 [IU]/ML
INJECTION, SOLUTION INTRAVENOUS; SUBCUTANEOUS
Qty: 12 ML | Refills: 3 | OUTPATIENT
Start: 2024-05-21

## 2024-06-02 NOTE — SUBJECTIVE & OBJECTIVE
"Past Medical History:   Diagnosis Date    E coli bacteremia     GERD (gastroesophageal reflux disease)     Iron deficiency anemia due to chronic blood loss 10/31/2015    Type 1 diabetes mellitus without complication 10/31/2015       Past Surgical History:   Procedure Laterality Date     SECTION      TUBAL LIGATION         Review of patient's allergies indicates:  No Known Allergies    No current facility-administered medications on file prior to encounter.      Current Outpatient Medications on File Prior to Encounter   Medication Sig    b complex vitamins tablet Take 1 tablet by mouth once daily.    insulin needles, disposable, 32 x 1/4 " Ndle Use to inject insulin 5 times a day    insulin NPH (NOVOLIN N) 100 unit/mL injection Use 10 units in am and 5 units at night    lisinopril 10 MG tablet TAKE ONE TABLET BY MOUTH ONCE DAILY    loratadine (CLARITIN) 10 mg tablet Take 10 mg by mouth once daily.    norgestimate-ethinyl estradiol (SPRINTEC, 28,) 0.25-35 mg-mcg per tablet Take 1 tablet by mouth once daily.     Family History     Problem Relation (Age of Onset)    No Known Problems Mother, Father, Sister, Brother        Tobacco Use    Smoking status: Former Smoker     Packs/day: 1.00     Start date: 1996     Last attempt to quit: 2014     Years since quittin.1    Smokeless tobacco: Never Used   Substance and Sexual Activity    Alcohol use: Yes     Alcohol/week: 2.4 oz     Types: 4 Standard drinks or equivalent per week     Comment: occasionally    Drug use: No    Sexual activity: Yes     Birth control/protection: See Surgical Hx     Comment:      Review of Systems   Constitutional: Negative for chills and fever.   HENT: Negative for congestion, hearing loss, sinus pressure and sore throat.    Eyes: Negative for photophobia.   Respiratory: Positive for cough. Negative for choking, chest tightness and wheezing.    Cardiovascular: Negative for chest pain and " palpitations.   Gastrointestinal: Positive for abdominal pain, nausea and vomiting. Negative for blood in stool.   Genitourinary: Negative for dysuria and hematuria.   Musculoskeletal: Negative for arthralgias and myalgias.   Skin: Negative for pallor.   Neurological: Negative for dizziness and numbness.   Hematological: Does not bruise/bleed easily.   Psychiatric/Behavioral: Negative for confusion and suicidal ideas. The patient is not nervous/anxious.      Objective:     Vital Signs (Most Recent):  Temp: 98.7 °F (37.1 °C) (01/08/19 1535)  Pulse: 81 (01/08/19 1612)  Resp: 18 (01/08/19 1535)  BP: 126/60 (01/08/19 1535)  SpO2: 100 % (01/08/19 1535) Vital Signs (24h Range):  Temp:  [96.5 °F (35.8 °C)-98.7 °F (37.1 °C)] 98.7 °F (37.1 °C)  Pulse:  [] 81  Resp:  [16-20] 18  SpO2:  [98 %-100 %] 100 %  BP: (126-150)/() 126/60     Weight: 81.2 kg (179 lb)  Body mass index is 28.04 kg/m².    Physical Exam   Constitutional: She is oriented to person, place, and time. She appears well-developed and well-nourished.   HENT:   Head: Normocephalic and atraumatic.   Right Ear: External ear normal.   Left Ear: External ear normal.   Mouth/Throat: Oropharynx is clear and moist.   Eyes: Conjunctivae and EOM are normal. Pupils are equal, round, and reactive to light.   Neck: Normal range of motion. Neck supple. No JVD present. No tracheal deviation present. No thyromegaly present.   Cardiovascular: Normal rate, regular rhythm, normal heart sounds and intact distal pulses.   Pulmonary/Chest: Effort normal and breath sounds normal. No respiratory distress. She has no wheezes. She has no rales. She exhibits no tenderness.   Abdominal: Soft. Bowel sounds are normal. She exhibits no distension and no mass. There is no tenderness. There is no rebound and no guarding.   Musculoskeletal: Normal range of motion. She exhibits no edema.   Lymphadenopathy:     She has no cervical adenopathy.   Neurological: She is alert and oriented to  person, place, and time. She has normal reflexes. She displays normal reflexes. No cranial nerve deficit. She exhibits normal muscle tone. Coordination normal.   CN: Optic discs are flat with normal vasculature, PERRL, Extraoccular movements and visual fields are full. Normal facial sensation and strength, Hearing symmetric, Tongue and Palate are midline and strong. Shoulder Shrug symmetric and strong.   Skin: Skin is warm and dry.   Psychiatric: She has a normal mood and affect.   Nursing note and vitals reviewed.        CRANIAL NERVES     CN III, IV, VI   Pupils are equal, round, and reactive to light.  Extraocular motions are normal.        Significant Labs:   CBC:   Recent Labs   Lab 01/08/19  1135   WBC 9.06   HGB 12.3   HCT 38.7   *     CMP:   Recent Labs   Lab 01/08/19  1136   *   K 3.7   CL 95   CO2 24   *   BUN 7   CREATININE 1.0   CALCIUM 9.7   PROT 8.1   ALBUMIN 3.7   BILITOT 1.8*   ALKPHOS 115   AST 57*   ALT 25   ANIONGAP 16   EGFRNONAA >60   Urine Studies:   Recent Labs   Lab 01/08/19  1235   COLORU Yellow   APPEARANCEUA Clear   PHUR 6.0   SPECGRAV 1.010   PROTEINUA Negative   GLUCUA 2+*   KETONESU 3+*   BILIRUBINUA 1+*   OCCULTUA 3+*   NITRITE Negative   UROBILINOGEN 1.0   LEUKOCYTESUR Negative   RBCUA 4        Beta-Hydroxybutyrate 0.0 - 0.5 mmol/L 3.1 Abnormally high   6.1 Abnormally high   4.9 Abnormally high   5.4 Abnormally high         Significant Imaging: CXR: I have reviewed all pertinent results/findings within the past 24 hours and my personal findings are:  clear   [facility]

## 2024-06-10 NOTE — TELEPHONE ENCOUNTER
Medication called in during your absence.    LOV 12/6/2023    Requested Prescriptions     Pending Prescriptions Disp Refills    busPIRone (BUSPAR) 5 MG Tab [Pharmacy Med Name: busPIRone HCl 5 MG Oral Tablet] 120 tablet 0     Sig: Take 1 tablet by mouth twice daily as needed

## 2024-06-17 RX ORDER — BUSPIRONE HYDROCHLORIDE 5 MG/1
TABLET ORAL
Qty: 120 TABLET | Refills: 0 | Status: SHIPPED | OUTPATIENT
Start: 2024-06-17

## 2024-06-18 ENCOUNTER — PATIENT MESSAGE (OUTPATIENT)
Dept: INTERNAL MEDICINE | Facility: CLINIC | Age: 44
End: 2024-06-18
Payer: COMMERCIAL

## 2024-06-23 DIAGNOSIS — F41.1 GAD (GENERALIZED ANXIETY DISORDER): ICD-10-CM

## 2024-06-23 NOTE — TELEPHONE ENCOUNTER
No care due was identified.  [facility] Embedded Care Due Messages. Reference number: [id number].   6/23/2024 8:07:22 AM CDT

## 2024-06-24 RX ORDER — CITALOPRAM 10 MG/1
10 TABLET ORAL
Qty: 30 TABLET | Refills: 0 | Status: SHIPPED | OUTPATIENT
Start: 2024-06-24

## 2024-07-03 ENCOUNTER — LAB VISIT (OUTPATIENT)
Dept: LAB | Facility: HOSPITAL | Age: 44
End: 2024-07-03
Attending: INTERNAL MEDICINE
Payer: COMMERCIAL

## 2024-07-03 DIAGNOSIS — E66.3 OVERWEIGHT (BMI 25.0-29.9): ICD-10-CM

## 2024-07-03 DIAGNOSIS — I10 ESSENTIAL HYPERTENSION: ICD-10-CM

## 2024-07-03 DIAGNOSIS — F41.1 GAD (GENERALIZED ANXIETY DISORDER): ICD-10-CM

## 2024-07-03 DIAGNOSIS — E10.65 TYPE 1 DIABETES MELLITUS WITH HYPERGLYCEMIA: ICD-10-CM

## 2024-07-03 DIAGNOSIS — E78.2 MIXED HYPERLIPIDEMIA: ICD-10-CM

## 2024-07-03 LAB
ALBUMIN SERPL BCP-MCNC: 3.4 G/DL (ref 3.5–5.2)
ALBUMIN/CREAT UR: NORMAL UG/MG (ref 0–30)
ALP SERPL-CCNC: 45 U/L (ref 55–135)
ALT SERPL W/O P-5'-P-CCNC: 12 U/L (ref 10–44)
ANION GAP SERPL CALC-SCNC: 8 MMOL/L (ref 8–16)
AST SERPL-CCNC: 13 U/L (ref 10–40)
BASOPHILS # BLD AUTO: 0.08 K/UL (ref 0–0.2)
BASOPHILS NFR BLD: 1.4 % (ref 0–1.9)
BILIRUB SERPL-MCNC: 0.5 MG/DL (ref 0.1–1)
BUN SERPL-MCNC: 11 MG/DL (ref 6–20)
CALCIUM SERPL-MCNC: 9.1 MG/DL (ref 8.7–10.5)
CHLORIDE SERPL-SCNC: 105 MMOL/L (ref 95–110)
CHOLEST SERPL-MCNC: 129 MG/DL (ref 120–199)
CHOLEST/HDLC SERPL: 2.7 {RATIO} (ref 2–5)
CO2 SERPL-SCNC: 24 MMOL/L (ref 23–29)
CREAT SERPL-MCNC: 0.8 MG/DL (ref 0.5–1.4)
CREAT UR-MCNC: 78.4 MG/DL (ref 15–325)
DIFFERENTIAL METHOD BLD: ABNORMAL
EOSINOPHIL # BLD AUTO: 0.1 K/UL (ref 0–0.5)
EOSINOPHIL NFR BLD: 2.1 % (ref 0–8)
ERYTHROCYTE [DISTWIDTH] IN BLOOD BY AUTOMATED COUNT: 12.8 % (ref 11.5–14.5)
EST. GFR  (NO RACE VARIABLE): >60 ML/MIN/1.73 M^2
ESTIMATED AVG GLUCOSE: 128 MG/DL (ref 68–131)
GLUCOSE SERPL-MCNC: 167 MG/DL (ref 70–110)
HBA1C MFR BLD: 6.1 % (ref 4–5.6)
HCT VFR BLD AUTO: 33.7 % (ref 37–48.5)
HDLC SERPL-MCNC: 47 MG/DL (ref 40–75)
HDLC SERPL: 36.4 % (ref 20–50)
HGB BLD-MCNC: 11.2 G/DL (ref 12–16)
IMM GRANULOCYTES # BLD AUTO: 0.01 K/UL (ref 0–0.04)
IMM GRANULOCYTES NFR BLD AUTO: 0.2 % (ref 0–0.5)
LDLC SERPL CALC-MCNC: 67.8 MG/DL (ref 63–159)
LYMPHOCYTES # BLD AUTO: 2 K/UL (ref 1–4.8)
LYMPHOCYTES NFR BLD: 35.5 % (ref 18–48)
MCH RBC QN AUTO: 29.2 PG (ref 27–31)
MCHC RBC AUTO-ENTMCNC: 33.2 G/DL (ref 32–36)
MCV RBC AUTO: 88 FL (ref 82–98)
MICROALBUMIN UR DL<=1MG/L-MCNC: <2.5 UG/ML
MONOCYTES # BLD AUTO: 0.5 K/UL (ref 0.3–1)
MONOCYTES NFR BLD: 8.5 % (ref 4–15)
NEUTROPHILS # BLD AUTO: 3 K/UL (ref 1.8–7.7)
NEUTROPHILS NFR BLD: 52.3 % (ref 38–73)
NONHDLC SERPL-MCNC: 82 MG/DL
NRBC BLD-RTO: 0 /100 WBC
PLATELET # BLD AUTO: 267 K/UL (ref 150–450)
PMV BLD AUTO: 9.4 FL (ref 9.2–12.9)
POTASSIUM SERPL-SCNC: 4.4 MMOL/L (ref 3.5–5.1)
PROT SERPL-MCNC: 6.4 G/DL (ref 6–8.4)
RBC # BLD AUTO: 3.83 M/UL (ref 4–5.4)
SODIUM SERPL-SCNC: 137 MMOL/L (ref 136–145)
TRIGL SERPL-MCNC: 71 MG/DL (ref 30–150)
TSH SERPL DL<=0.005 MIU/L-ACNC: 2.35 UIU/ML (ref 0.4–4)
WBC # BLD AUTO: 5.64 K/UL (ref 3.9–12.7)

## 2024-07-03 PROCEDURE — 82570 ASSAY OF URINE CREATININE: CPT | Performed by: INTERNAL MEDICINE

## 2024-07-03 PROCEDURE — 84443 ASSAY THYROID STIM HORMONE: CPT | Performed by: INTERNAL MEDICINE

## 2024-07-03 PROCEDURE — 85025 COMPLETE CBC W/AUTO DIFF WBC: CPT | Performed by: INTERNAL MEDICINE

## 2024-07-03 PROCEDURE — 80053 COMPREHEN METABOLIC PANEL: CPT | Performed by: INTERNAL MEDICINE

## 2024-07-03 PROCEDURE — 36415 COLL VENOUS BLD VENIPUNCTURE: CPT | Performed by: INTERNAL MEDICINE

## 2024-07-03 PROCEDURE — 83036 HEMOGLOBIN GLYCOSYLATED A1C: CPT | Performed by: INTERNAL MEDICINE

## 2024-07-03 PROCEDURE — 80061 LIPID PANEL: CPT | Performed by: INTERNAL MEDICINE

## 2024-07-05 ENCOUNTER — PATIENT MESSAGE (OUTPATIENT)
Dept: ENDOCRINOLOGY | Facility: CLINIC | Age: 44
End: 2024-07-05
Payer: COMMERCIAL

## 2024-07-07 DIAGNOSIS — E10.65 TYPE 1 DIABETES MELLITUS WITH HYPERGLYCEMIA: Primary | ICD-10-CM

## 2024-07-21 DIAGNOSIS — F41.1 GAD (GENERALIZED ANXIETY DISORDER): ICD-10-CM

## 2024-07-21 NOTE — TELEPHONE ENCOUNTER
No care due was identified.  Health [facility] Embedded Care Due Messages. Reference number: [id number].   7/21/2024 8:07:09 AM CDT

## 2024-07-22 RX ORDER — CITALOPRAM 10 MG/1
10 TABLET ORAL
Qty: 30 TABLET | Refills: 0 | Status: SHIPPED | OUTPATIENT
Start: 2024-07-22

## 2024-08-20 DIAGNOSIS — F41.1 GAD (GENERALIZED ANXIETY DISORDER): ICD-10-CM

## 2024-08-20 RX ORDER — CITALOPRAM 10 MG/1
10 TABLET ORAL
Qty: 30 TABLET | Refills: 0 | Status: SHIPPED | OUTPATIENT
Start: 2024-08-20

## 2024-08-20 NOTE — TELEPHONE ENCOUNTER
No care due was identified.  Health [facility] Embedded Care Due Messages. Reference number: [id number].   8/20/2024 7:06:17 AM CDT  
Please see the attached refill request.  
39.7

## 2024-08-26 ENCOUNTER — PATIENT OUTREACH (OUTPATIENT)
Dept: ADMINISTRATIVE | Facility: HOSPITAL | Age: 44
End: 2024-08-26
Payer: COMMERCIAL

## 2024-08-26 NOTE — LETTER
AUTHORIZATION FOR RELEASE OF   CONFIDENTIAL INFORMATION      We are seeing Cira Keys, date of birth 1980, in the clinic at [facility]. Krysta Mercado MD is the patient's PCP. Cira Keys has an outstanding lab/procedure at the time we reviewed her chart. In order to help keep her health information updated, she has authorized us to request the following medical record(s):        (  )  MAMMOGRAM                                      (  )  COLONOSCOPY      (  )  PAP SMEAR                                          (  )  OUTSIDE LAB RESULTS     (  )  DEXA SCAN                                          ( XX )  EYE EXAM            (  )  FOOT EXAM                                          (  )  ENTIRE RECORD     (  )  OUTSIDE IMMUNIZATIONS                 (  )  _______________         Please fax records to [facility], Knight, Sarah, MD, Fax [phone]         Patient Name: Cira Keys  : 1980  Patient Phone #: [phone]

## 2024-08-26 NOTE — PROGRESS NOTES
L/m for scheduling office visit  Eye exam  Mammogram  Colorectal cancer screen  [facility] for eye exam

## 2024-10-14 ENCOUNTER — PATIENT OUTREACH (OUTPATIENT)
Dept: ADMINISTRATIVE | Facility: HOSPITAL | Age: 44
End: 2024-10-14
Payer: COMMERCIAL

## 2024-10-14 NOTE — PROGRESS NOTES
Chart reviewed, immunization record updated.  No new results noted on [facility] or [facility] web site.  Care Everywhere updated.   Patient care coordination note  LOV with PCP 12/6/23  Patient states she has not done her eye exam and her insurance does not cover Dr. Mercado she will let us know to change the provider in her chart when she finds a new provider.

## 2024-12-20 ENCOUNTER — PATIENT MESSAGE (OUTPATIENT)
Dept: ENDOCRINOLOGY | Facility: CLINIC | Age: 44
End: 2024-12-20
Payer: COMMERCIAL

## 2024-12-20 ENCOUNTER — PATIENT MESSAGE (OUTPATIENT)
Dept: INTERNAL MEDICINE | Facility: CLINIC | Age: 44
End: 2024-12-20
Payer: COMMERCIAL

## 2024-12-26 ENCOUNTER — PATIENT MESSAGE (OUTPATIENT)
Dept: INTERNAL MEDICINE | Facility: CLINIC | Age: 44
End: 2024-12-26
Payer: MEDICAID

## 2025-03-07 ENCOUNTER — PATIENT MESSAGE (OUTPATIENT)
Dept: ENDOCRINOLOGY | Facility: CLINIC | Age: 45
End: 2025-03-07
Payer: MEDICAID

## 2025-03-07 DIAGNOSIS — E10.65 TYPE 1 DIABETES MELLITUS WITH HYPERGLYCEMIA: Primary | ICD-10-CM

## 2025-03-07 RX ORDER — INSULIN PMP CART,AUT,G6/7,CNTR
1 EACH SUBCUTANEOUS
Qty: 15 EACH | Refills: 0 | Status: SHIPPED | OUTPATIENT
Start: 2025-03-07

## 2025-03-24 NOTE — PROGRESS NOTES
"  Subjective:      Patient ID: Cira Keys is a 44 y.o. female.    Chief Complaint:  Type 1 diabetes mellitus      History of Present Illness  This is a 44 y.o. female. with a past medical history of type 1 diabetes mellitus here for follow up.    Type 1 diabetes mellitus  Diagnosed around age 9     11/03/15 04:28   C-Peptide <0.1 (L)   Glutamic Acid Decarb Ab 0.12 (H)     Current diabetes medications:  - Insulin Novolog via Omnipod 5 insulin pump  Basal rate  12A: 0.8 U/h  ICR: 12  ISF: 45  Target: 110-110  IAT: 4h  TDD: 32.1 U/day     Past diabetes medications:  - Lantus  - Levemir  - Toujeo  - Novolog  - Insulin pump    Lab Results   Component Value Date    CREATININE 0.8 07/03/2024    EGFRNORACEVR >60 07/03/2024     Known diabetic complications:  hypoglycemia    Weight trend:  Wt Readings from Last 8 Encounters:   03/25/25 69.9 kg (154 lb 3.2 oz)   02/16/24 76.2 kg (168 lb)   01/19/24 80.8 kg (178 lb 2.1 oz)   12/28/23 78.8 kg (173 lb 11.6 oz)   12/06/23 77.6 kg (171 lb 1.2 oz)   10/13/23 77.5 kg (170 lb 13.7 oz)   09/25/23 74.8 kg (165 lb)   08/31/23 76.1 kg (167 lb 10.6 oz)     Family history of diabetes:  No    Prior visit with diabetes education: yes    Current diet: 3 meals per day    Blood glucose monitoring at home:       Diabetes Management Status  Statin: Taking  ACE/ARB: Taking    Screening or Prevention Patient's value   HgA1C Testing and Control   Lab Results   Component Value Date    HGBA1C 6.1 (H) 07/03/2024        LDL control Lab Results   Component Value Date    LDLCALC 67.8 07/03/2024      Nephropathy screening Lab Results   Component Value Date    MICALBCREAT Unable to calculate 07/03/2024        Review of Systems  As above    Social and family history reviewed  Current medications and allergies reviewed    Objective:   /62   Pulse 74   Resp 18   Ht 5' 7" (1.702 m)   Wt 69.9 kg (154 lb 3.2 oz)   LMP 07/01/2023   SpO2 99%   BMI 24.15 kg/m²   Physical Exam  Alert, " oriented    BP Readings from Last 1 Encounters:   03/25/25 124/62      Wt Readings from Last 1 Encounters:   03/25/25 1010 69.9 kg (154 lb 3.2 oz)     Body mass index is 24.15 kg/m².    Lab Review:   Lab Results   Component Value Date    HGBA1C 6.1 (H) 07/03/2024     Lab Results   Component Value Date    CHOL 129 07/03/2024    HDL 47 07/03/2024    LDLCALC 67.8 07/03/2024    TRIG 71 07/03/2024    CHOLHDL 36.4 07/03/2024     Lab Results   Component Value Date     07/03/2024    K 4.4 07/03/2024     07/03/2024    CO2 24 07/03/2024     (H) 07/03/2024    BUN 11 07/03/2024    CREATININE 0.8 07/03/2024    CALCIUM 9.1 07/03/2024    PROT 6.4 07/03/2024    ALBUMIN 3.4 (L) 07/03/2024    BILITOT 0.5 07/03/2024    ALKPHOS 45 (L) 07/03/2024    AST 13 07/03/2024    ALT 12 07/03/2024    ANIONGAP 8 07/03/2024    ESTGFRAFRICA >60 03/20/2022    EGFRNONAA >60 03/20/2022    TSH 2.353 07/03/2024     All pertinent labs reviewed    Assessment and Plan     Type 1 diabetes mellitus with hyperglycemia  Glucose uncontrolled with TIR 57% with variable hyperglycemia. She was in manual mode multiple times over the last 2 weeks due to pump malfunction which resulted in hyperglycemia. Encouraged her to always be in automode. She understands. Since she has been in automode, glucose control has improved. She does have prandial hyperglycemia even when bolusing on time. Adjusted ICR in clinic.     Plan  - Continue insulin Novolog via Omnipod 5 with above settings  Basal rate  12A: 0.8 U/h  ICR: 12 >> 11  ISF: 45  Target: 110-110  IAT: 4h  - Continue Dexcom G6 CGM    F/u 1 month     Labs now    Overweight (BMI 25.0-29.9)  Continue lifestyle changes  ETOH cessation    Mixed hyperlipidemia  Continue statin    Margret Mayer PA-C  Endocrinology

## 2025-03-24 NOTE — ASSESSMENT & PLAN NOTE
Glucose uncontrolled with TIR 57% with variable hyperglycemia. She was in manual mode multiple times over the last 2 weeks due to pump malfunction which resulted in hyperglycemia. Encouraged her to always be in automode. She understands. Since she has been in automode, glucose control has improved. She does have prandial hyperglycemia even when bolusing on time. Adjusted ICR in clinic.     Plan  - Continue insulin Novolog via Omnipod 5 with above settings  Basal rate  12A: 0.8 U/h  ICR: 12 >> 11  ISF: 45  Target: 110-110  IAT: 4h  - Continue Dexcom G6 CGM    F/u 1 month     Labs now

## 2025-03-25 ENCOUNTER — OFFICE VISIT (OUTPATIENT)
Dept: ENDOCRINOLOGY | Facility: CLINIC | Age: 45
End: 2025-03-25
Payer: MEDICAID

## 2025-03-25 ENCOUNTER — LAB VISIT (OUTPATIENT)
Dept: LAB | Facility: HOSPITAL | Age: 45
End: 2025-03-25
Attending: PHYSICIAN ASSISTANT
Payer: MEDICAID

## 2025-03-25 VITALS
RESPIRATION RATE: 18 BRPM | BODY MASS INDEX: 24.2 KG/M2 | HEIGHT: 67 IN | DIASTOLIC BLOOD PRESSURE: 62 MMHG | WEIGHT: 154.19 LBS | OXYGEN SATURATION: 99 % | SYSTOLIC BLOOD PRESSURE: 124 MMHG | HEART RATE: 74 BPM

## 2025-03-25 DIAGNOSIS — E78.2 MIXED HYPERLIPIDEMIA: ICD-10-CM

## 2025-03-25 DIAGNOSIS — E66.3 OVERWEIGHT (BMI 25.0-29.9): ICD-10-CM

## 2025-03-25 DIAGNOSIS — E10.65 TYPE 1 DIABETES MELLITUS WITH HYPERGLYCEMIA: ICD-10-CM

## 2025-03-25 DIAGNOSIS — E10.65 TYPE 1 DIABETES MELLITUS WITH HYPERGLYCEMIA: Primary | ICD-10-CM

## 2025-03-25 LAB
ALBUMIN SERPL BCP-MCNC: 3.6 G/DL (ref 3.5–5.2)
ALBUMIN/CREAT UR: NORMAL
ALP SERPL-CCNC: 47 UNIT/L (ref 40–150)
ALT SERPL W/O P-5'-P-CCNC: 13 UNIT/L (ref 10–44)
ANION GAP (OHS): 6 MMOL/L (ref 8–16)
AST SERPL-CCNC: 13 UNIT/L (ref 11–45)
BILIRUB SERPL-MCNC: 0.6 MG/DL (ref 0.1–1)
BUN SERPL-MCNC: 15 MG/DL (ref 6–20)
CALCIUM SERPL-MCNC: 9.2 MG/DL (ref 8.7–10.5)
CHLORIDE SERPL-SCNC: 104 MMOL/L (ref 95–110)
CO2 SERPL-SCNC: 25 MMOL/L (ref 23–29)
CREAT SERPL-MCNC: 0.8 MG/DL (ref 0.5–1.4)
CREAT UR-MCNC: 94.7 MG/DL (ref 15–325)
GFR SERPLBLD CREATININE-BSD FMLA CKD-EPI: >60 ML/MIN/1.73/M2
GLUCOSE SERPL-MCNC: 113 MG/DL (ref 70–110)
MICROALBUMIN UR-MCNC: <2.5 UG/ML (ref ?–5000)
POTASSIUM SERPL-SCNC: 4.8 MMOL/L (ref 3.5–5.1)
PROT SERPL-MCNC: 6.9 GM/DL (ref 6–8.4)
SODIUM SERPL-SCNC: 135 MMOL/L (ref 136–145)
T4 FREE SERPL-MCNC: NORMAL NG/DL
TSH SERPL-ACNC: 2.97 UIU/ML (ref 0.4–4)

## 2025-03-25 PROCEDURE — 99999 PR PBB SHADOW E&M-EST. PATIENT-LVL IV: CPT | Mod: PBBFAC,,, | Performed by: PHYSICIAN ASSISTANT

## 2025-03-25 PROCEDURE — 99214 OFFICE O/P EST MOD 30 MIN: CPT | Mod: PBBFAC | Performed by: PHYSICIAN ASSISTANT

## 2025-03-25 PROCEDURE — 82043 UR ALBUMIN QUANTITATIVE: CPT

## 2025-03-25 PROCEDURE — 82040 ASSAY OF SERUM ALBUMIN: CPT

## 2025-03-25 PROCEDURE — 84443 ASSAY THYROID STIM HORMONE: CPT

## 2025-03-25 PROCEDURE — 83036 HEMOGLOBIN GLYCOSYLATED A1C: CPT

## 2025-03-25 PROCEDURE — 36415 COLL VENOUS BLD VENIPUNCTURE: CPT

## 2025-03-26 ENCOUNTER — RESULTS FOLLOW-UP (OUTPATIENT)
Dept: ENDOCRINOLOGY | Facility: CLINIC | Age: 45
End: 2025-03-26

## 2025-03-26 LAB
EAG (OHS): 140 MG/DL (ref 68–131)
HBA1C MFR BLD: 6.5 % (ref 4–5.6)

## 2025-04-01 ENCOUNTER — PATIENT MESSAGE (OUTPATIENT)
Dept: ENDOCRINOLOGY | Facility: CLINIC | Age: 45
End: 2025-04-01
Payer: MEDICAID

## 2025-04-01 DIAGNOSIS — Z96.41 INSULIN PUMP IN PLACE: ICD-10-CM

## 2025-04-01 DIAGNOSIS — E10.65 TYPE 1 DIABETES MELLITUS WITH HYPERGLYCEMIA: ICD-10-CM

## 2025-04-04 DIAGNOSIS — E10.65 TYPE 1 DIABETES MELLITUS WITH HYPERGLYCEMIA: Primary | ICD-10-CM

## 2025-04-04 RX ORDER — BLOOD-GLUCOSE SENSOR
1 EACH MISCELLANEOUS
Qty: 9 EACH | Refills: 3 | Status: SHIPPED | OUTPATIENT
Start: 2025-04-04 | End: 2026-04-04

## 2025-04-07 ENCOUNTER — PATIENT MESSAGE (OUTPATIENT)
Dept: INTERNAL MEDICINE | Facility: CLINIC | Age: 45
End: 2025-04-07
Payer: MEDICAID

## 2025-04-07 ENCOUNTER — PATIENT MESSAGE (OUTPATIENT)
Dept: ENDOCRINOLOGY | Facility: CLINIC | Age: 45
End: 2025-04-07
Payer: MEDICAID

## 2025-04-10 ENCOUNTER — OFFICE VISIT (OUTPATIENT)
Dept: INTERNAL MEDICINE | Facility: CLINIC | Age: 45
End: 2025-04-10
Payer: MEDICAID

## 2025-04-10 VITALS
OXYGEN SATURATION: 99 % | SYSTOLIC BLOOD PRESSURE: 122 MMHG | DIASTOLIC BLOOD PRESSURE: 68 MMHG | BODY MASS INDEX: 24.81 KG/M2 | HEIGHT: 67 IN | RESPIRATION RATE: 18 BRPM | WEIGHT: 158.06 LBS | HEART RATE: 87 BPM

## 2025-04-10 DIAGNOSIS — L03.311 CELLULITIS OF ABDOMINAL WALL: Primary | ICD-10-CM

## 2025-04-10 DIAGNOSIS — E10.65 TYPE 1 DIABETES MELLITUS WITH HYPERGLYCEMIA: ICD-10-CM

## 2025-04-10 PROCEDURE — 3044F HG A1C LEVEL LT 7.0%: CPT | Mod: CPTII,,, | Performed by: NURSE PRACTITIONER

## 2025-04-10 PROCEDURE — 3061F NEG MICROALBUMINURIA REV: CPT | Mod: CPTII,,, | Performed by: NURSE PRACTITIONER

## 2025-04-10 PROCEDURE — 3066F NEPHROPATHY DOC TX: CPT | Mod: CPTII,,, | Performed by: NURSE PRACTITIONER

## 2025-04-10 PROCEDURE — 99214 OFFICE O/P EST MOD 30 MIN: CPT | Mod: PBBFAC,PN | Performed by: NURSE PRACTITIONER

## 2025-04-10 PROCEDURE — 3078F DIAST BP <80 MM HG: CPT | Mod: CPTII,,, | Performed by: NURSE PRACTITIONER

## 2025-04-10 PROCEDURE — 3074F SYST BP LT 130 MM HG: CPT | Mod: CPTII,,, | Performed by: NURSE PRACTITIONER

## 2025-04-10 PROCEDURE — 99999 PR PBB SHADOW E&M-EST. PATIENT-LVL IV: CPT | Mod: PBBFAC,,, | Performed by: NURSE PRACTITIONER

## 2025-04-10 PROCEDURE — 4010F ACE/ARB THERAPY RXD/TAKEN: CPT | Mod: CPTII,,, | Performed by: NURSE PRACTITIONER

## 2025-04-10 PROCEDURE — 1159F MED LIST DOCD IN RCRD: CPT | Mod: CPTII,,, | Performed by: NURSE PRACTITIONER

## 2025-04-10 PROCEDURE — 99214 OFFICE O/P EST MOD 30 MIN: CPT | Mod: S$PBB,,, | Performed by: NURSE PRACTITIONER

## 2025-04-10 PROCEDURE — 3008F BODY MASS INDEX DOCD: CPT | Mod: CPTII,,, | Performed by: NURSE PRACTITIONER

## 2025-04-10 RX ORDER — BLOOD-GLUCOSE SENSOR
EACH MISCELLANEOUS
COMMUNITY
Start: 2024-11-27

## 2025-04-10 RX ORDER — PROPRANOLOL HYDROCHLORIDE 10 MG/1
10 TABLET ORAL EVERY 8 HOURS PRN
COMMUNITY

## 2025-04-10 RX ORDER — CEPHALEXIN 500 MG/1
500 CAPSULE ORAL EVERY 12 HOURS
Qty: 14 CAPSULE | Refills: 0 | Status: SHIPPED | OUTPATIENT
Start: 2025-04-10 | End: 2025-04-17

## 2025-04-10 NOTE — PROGRESS NOTES
History of Present Illness    CHIEF COMPLAINT:  Patient presents today for evaluation of an insulin pump site reaction.    HISTORY OF PRESENT ILLNESS:  She reports placing a new insulin pump on her abdomen approximately 1.5 weeks ago. During pump use, she experienced unstable blood sugars and significant itching at the site. Upon removal, she noticed unusual insulin leakage, which had not occurred previously. A lump has developed at the site. She denies pain but continues to report itching.    MEDICAL HISTORY:  Type 1 diabetes diagnosed at age 9      ROS:  General: +fever, -chills, -fatigue, -weight gain, -weight loss  Eyes: -vision changes, -redness, -discharge  ENT: -ear pain, -nasal congestion, -sore throat  Cardiovascular: -chest pain, -palpitations, -lower extremity edema  Respiratory: -cough, -shortness of breath  Gastrointestinal: -abdominal pain, -nausea, -vomiting, -diarrhea, -constipation, -blood in stool  Genitourinary: -dysuria, -hematuria, -frequency  Musculoskeletal: -joint pain, -muscle pain  Skin: -rash, -lesion, +itching, +lumps/masses  Neurological: -headache, -dizziness, -numbness, -tingling  Psychiatric: -anxiety, -depression, -sleep difficulty          Physical Exam    General: No acute distress. Well-developed. Well-nourished.  Eyes: EOMI. Sclerae anicteric.  HENT: Normocephalic. Atraumatic. Nares patent. Moist oral mucosa.  Ears: Bilateral TMs clear. Bilateral EACs clear.  Cardiovascular: Regular rate. Regular rhythm. No murmurs. No rubs. No gallops. Normal S1, S2.  Respiratory: Normal respiratory effort. Clear to auscultation bilaterally. No rales. No rhonchi. No wheezing.  Abdomen: Soft. Non-tender. Non-distended. Normoactive bowel sounds.  Musculoskeletal: No  obvious deformity.  Extremities: No lower extremity edema.  Neurological: Alert & oriented x3. No slurred speech. Normal gait.  Psychiatric: Normal mood. Normal affect. Good insight. Good judgment.  Skin: Warm. Dry. No rash. Hard  lump at insulin pump site to left abdominal region with mild erythema noted. No warmth but is indurated. No need to drain.           Assessment & Plan    1. Cellulitis of abdominal wall  cephALEXin (KEFLEX) 500 MG capsule      2. Type 1 diabetes mellitus with hyperglycemia           IMPRESSION:  - Assessed insulin pump site reaction, noting a lump and itching.  - Determined antibiotic treatment necessary due to diabetic status and risk of infection.    TYPE 1 DIABETES WITH SKIN COMPLICATIONS:  - Monitor insulin pump site for any changes or worsening of symptoms.  - Observed a hard and itchy lump at the insulin pump site, which is not painful.  - Assessed the skin complication as a reaction, exercising caution due to the patient's diabetic status.  - Instructed the patient to return if the condition worsens.  - Advised against placing insulin pump on stomach area.  - Cautioned the patient not to attempt extracting insulin from the site to avoid complications.  - Noted that the patient experienced unusual insulin leakage when removing the pump about 1.5 weeks ago.  - Confirmed that the patient has changed the location of insulin pump application.  - Noted that the patient reports itching and fever at the insulin pump site.  - Observed a lump at the insulin pump site.  - Assessed the possibility of infection due to the patient's diabetic status.    INSULIN PUMP ISSUES:  - Acknowledged the issue with the insulin pump.  - Verified that the patient continues to use the insulin pump, changing application site as needed.    POTENTIAL SKIN INFECTION:  - Prescribed cephalexin (Keflex) as a precautionary measure against potential infection.  - Prescribed cephalexin (Keflex) 1 tablet twice daily for 7 days to address potential infection and prevent abscess formation.  - Instructed the patient to contact the office if the insulin pump site condition worsens.    LONG-TERM INSULIN USE:  - Confirmed that the patient uses an insulin  "pump for diabetes management.  - Noted that the patient has been using insulin since childhood.  - Acknowledged the patient's type 1 diabetes status.       "This note will not be shared with the patient."  This note was generated with the assistance of ambient listening technology. Verbal consent was obtained by the patient and accompanying visitor(s) for the recording of patient appointment to facilitate this note. I attest to having reviewed and edited the generated note for accuracy, though some syntax or spelling errors may persist. Please contact the author of this note for any clarification.        "

## 2025-04-17 ENCOUNTER — PATIENT MESSAGE (OUTPATIENT)
Dept: ENDOCRINOLOGY | Facility: CLINIC | Age: 45
End: 2025-04-17
Payer: MEDICAID

## 2025-04-17 DIAGNOSIS — E10.65 TYPE 1 DIABETES MELLITUS WITH HYPERGLYCEMIA: ICD-10-CM

## 2025-04-17 RX ORDER — INSULIN PMP CART,AUT,G6/7,CNTR
1 EACH SUBCUTANEOUS
Qty: 15 EACH | Refills: 11 | Status: SHIPPED | OUTPATIENT
Start: 2025-04-17

## 2025-04-30 ENCOUNTER — PATIENT MESSAGE (OUTPATIENT)
Dept: ENDOCRINOLOGY | Facility: CLINIC | Age: 45
End: 2025-04-30
Payer: MEDICAID

## 2025-04-30 DIAGNOSIS — E11.9 TYPE 2 DIABETES MELLITUS WITHOUT COMPLICATION, UNSPECIFIED WHETHER LONG TERM INSULIN USE: ICD-10-CM

## 2025-05-08 ENCOUNTER — PATIENT MESSAGE (OUTPATIENT)
Dept: INTERNAL MEDICINE | Facility: CLINIC | Age: 45
End: 2025-05-08
Payer: MEDICAID

## 2025-05-29 ENCOUNTER — PATIENT MESSAGE (OUTPATIENT)
Dept: ENDOCRINOLOGY | Facility: CLINIC | Age: 45
End: 2025-05-29
Payer: MEDICAID

## 2025-05-30 ENCOUNTER — PATIENT MESSAGE (OUTPATIENT)
Dept: ENDOCRINOLOGY | Facility: CLINIC | Age: 45
End: 2025-05-30
Payer: MEDICAID

## 2025-07-16 DIAGNOSIS — E11.9 TYPE 2 DIABETES MELLITUS WITHOUT COMPLICATION: ICD-10-CM

## 2025-08-12 DIAGNOSIS — E10.65 TYPE 1 DIABETES MELLITUS WITH HYPERGLYCEMIA: ICD-10-CM

## 2025-08-13 ENCOUNTER — OFFICE VISIT (OUTPATIENT)
Dept: INTERNAL MEDICINE | Facility: CLINIC | Age: 45
End: 2025-08-13
Payer: MEDICAID

## 2025-08-13 VITALS
WEIGHT: 159.19 LBS | BODY MASS INDEX: 24.99 KG/M2 | HEART RATE: 69 BPM | OXYGEN SATURATION: 98 % | HEIGHT: 67 IN | SYSTOLIC BLOOD PRESSURE: 98 MMHG | DIASTOLIC BLOOD PRESSURE: 62 MMHG | RESPIRATION RATE: 16 BRPM

## 2025-08-13 DIAGNOSIS — E10.9 TYPE 1 DIABETES MELLITUS WITHOUT COMPLICATION: Primary | ICD-10-CM

## 2025-08-13 DIAGNOSIS — Z12.31 ENCOUNTER FOR SCREENING MAMMOGRAM FOR MALIGNANT NEOPLASM OF BREAST: ICD-10-CM

## 2025-08-13 DIAGNOSIS — F41.1 GAD (GENERALIZED ANXIETY DISORDER): ICD-10-CM

## 2025-08-13 DIAGNOSIS — Z96.41 INSULIN PUMP IN PLACE: ICD-10-CM

## 2025-08-13 DIAGNOSIS — E78.2 MIXED HYPERLIPIDEMIA: ICD-10-CM

## 2025-08-13 DIAGNOSIS — I10 ESSENTIAL HYPERTENSION: ICD-10-CM

## 2025-08-13 DIAGNOSIS — Z12.11 COLON CANCER SCREENING: ICD-10-CM

## 2025-08-13 DIAGNOSIS — F10.11 HISTORY OF ALCOHOL ABUSE: ICD-10-CM

## 2025-08-13 PROBLEM — R82.90 ABNORMAL URINALYSIS: Status: RESOLVED | Noted: 2022-08-23 | Resolved: 2025-08-13

## 2025-08-13 PROBLEM — J90 PLEURAL EFFUSION: Status: RESOLVED | Noted: 2024-01-18 | Resolved: 2025-08-13

## 2025-08-13 PROBLEM — R11.2 NAUSEA AND VOMITING: Status: RESOLVED | Noted: 2022-08-23 | Resolved: 2025-08-13

## 2025-08-13 PROBLEM — R00.0 SINUS TACHYCARDIA: Status: RESOLVED | Noted: 2024-01-18 | Resolved: 2025-08-13

## 2025-08-13 PROBLEM — B17.9 ACUTE HEPATITIS: Status: RESOLVED | Noted: 2020-09-11 | Resolved: 2025-08-13

## 2025-08-13 PROBLEM — R55 SYNCOPE: Status: RESOLVED | Noted: 2024-01-16 | Resolved: 2025-08-13

## 2025-08-13 PROBLEM — E66.3 OVERWEIGHT (BMI 25.0-29.9): Status: RESOLVED | Noted: 2022-11-29 | Resolved: 2025-08-13

## 2025-08-13 PROBLEM — F10.939 ALCOHOL WITHDRAWAL SYNDROME WITH COMPLICATION: Status: RESOLVED | Noted: 2020-09-11 | Resolved: 2025-08-13

## 2025-08-13 PROBLEM — F10.90 ALCOHOL USE DISORDER: Status: RESOLVED | Noted: 2024-01-17 | Resolved: 2025-08-13

## 2025-08-13 PROCEDURE — 3078F DIAST BP <80 MM HG: CPT | Mod: CPTII,,, | Performed by: INTERNAL MEDICINE

## 2025-08-13 PROCEDURE — 1159F MED LIST DOCD IN RCRD: CPT | Mod: CPTII,,, | Performed by: INTERNAL MEDICINE

## 2025-08-13 PROCEDURE — 1160F RVW MEDS BY RX/DR IN RCRD: CPT | Mod: CPTII,,, | Performed by: INTERNAL MEDICINE

## 2025-08-13 PROCEDURE — G2211 COMPLEX E/M VISIT ADD ON: HCPCS | Mod: ,,, | Performed by: INTERNAL MEDICINE

## 2025-08-13 PROCEDURE — 3066F NEPHROPATHY DOC TX: CPT | Mod: CPTII,,, | Performed by: INTERNAL MEDICINE

## 2025-08-13 PROCEDURE — 3008F BODY MASS INDEX DOCD: CPT | Mod: CPTII,,, | Performed by: INTERNAL MEDICINE

## 2025-08-13 PROCEDURE — 3074F SYST BP LT 130 MM HG: CPT | Mod: CPTII,,, | Performed by: INTERNAL MEDICINE

## 2025-08-13 PROCEDURE — 99999 PR PBB SHADOW E&M-EST. PATIENT-LVL IV: CPT | Mod: PBBFAC,,, | Performed by: INTERNAL MEDICINE

## 2025-08-13 PROCEDURE — 3061F NEG MICROALBUMINURIA REV: CPT | Mod: CPTII,,, | Performed by: INTERNAL MEDICINE

## 2025-08-13 PROCEDURE — 99214 OFFICE O/P EST MOD 30 MIN: CPT | Mod: S$PBB,,, | Performed by: INTERNAL MEDICINE

## 2025-08-13 PROCEDURE — 4010F ACE/ARB THERAPY RXD/TAKEN: CPT | Mod: CPTII,,, | Performed by: INTERNAL MEDICINE

## 2025-08-13 PROCEDURE — 99214 OFFICE O/P EST MOD 30 MIN: CPT | Mod: PBBFAC | Performed by: INTERNAL MEDICINE

## 2025-08-13 PROCEDURE — 3044F HG A1C LEVEL LT 7.0%: CPT | Mod: CPTII,,, | Performed by: INTERNAL MEDICINE

## 2025-08-13 RX ORDER — INSULIN ASPART 100 [IU]/ML
INJECTION, SOLUTION INTRAVENOUS; SUBCUTANEOUS
Qty: 30 ML | Refills: 11 | Status: SHIPPED | OUTPATIENT
Start: 2025-08-13

## 2025-08-14 ENCOUNTER — TELEPHONE (OUTPATIENT)
Dept: OBSTETRICS AND GYNECOLOGY | Facility: CLINIC | Age: 45
End: 2025-08-14
Payer: MEDICAID

## 2025-08-19 ENCOUNTER — PATIENT MESSAGE (OUTPATIENT)
Dept: INTERNAL MEDICINE | Facility: CLINIC | Age: 45
End: 2025-08-19
Payer: COMMERCIAL

## 2025-08-19 ENCOUNTER — PATIENT MESSAGE (OUTPATIENT)
Dept: ENDOCRINOLOGY | Facility: CLINIC | Age: 45
End: 2025-08-19
Payer: COMMERCIAL

## 2025-08-19 DIAGNOSIS — M25.561 ACUTE PAIN OF BOTH KNEES: Primary | ICD-10-CM

## 2025-08-19 DIAGNOSIS — M25.562 ACUTE PAIN OF BOTH KNEES: Primary | ICD-10-CM

## 2025-08-20 ENCOUNTER — PATIENT MESSAGE (OUTPATIENT)
Dept: INTERNAL MEDICINE | Facility: CLINIC | Age: 45
End: 2025-08-20
Payer: COMMERCIAL

## 2025-08-20 ENCOUNTER — HOSPITAL ENCOUNTER (OUTPATIENT)
Dept: RADIOLOGY | Facility: HOSPITAL | Age: 45
Discharge: HOME | End: 2025-08-20
Attending: INTERNAL MEDICINE
Payer: MEDICAID

## 2025-08-20 DIAGNOSIS — M25.562 ACUTE PAIN OF BOTH KNEES: ICD-10-CM

## 2025-08-20 DIAGNOSIS — M25.561 ACUTE PAIN OF BOTH KNEES: ICD-10-CM

## 2025-08-20 PROCEDURE — 73560 X-RAY EXAM OF KNEE 1 OR 2: CPT | Mod: TC,50

## 2025-08-20 PROCEDURE — 73560 X-RAY EXAM OF KNEE 1 OR 2: CPT | Mod: 26,50,, | Performed by: RADIOLOGY

## 2025-08-21 ENCOUNTER — PATIENT MESSAGE (OUTPATIENT)
Dept: ENDOCRINOLOGY | Facility: CLINIC | Age: 45
End: 2025-08-21
Payer: MEDICAID

## 2025-08-21 ENCOUNTER — LAB VISIT (OUTPATIENT)
Dept: LAB | Facility: HOSPITAL | Age: 45
End: 2025-08-21
Attending: INTERNAL MEDICINE
Payer: MEDICAID

## 2025-08-21 ENCOUNTER — OFFICE VISIT (OUTPATIENT)
Dept: ENDOCRINOLOGY | Facility: CLINIC | Age: 45
End: 2025-08-21
Payer: MEDICAID

## 2025-08-21 VITALS
SYSTOLIC BLOOD PRESSURE: 106 MMHG | DIASTOLIC BLOOD PRESSURE: 60 MMHG | OXYGEN SATURATION: 96 % | HEART RATE: 66 BPM | WEIGHT: 156.69 LBS | BODY MASS INDEX: 24.59 KG/M2 | RESPIRATION RATE: 18 BRPM | HEIGHT: 67 IN

## 2025-08-21 DIAGNOSIS — E10.9 TYPE 1 DIABETES MELLITUS WITHOUT COMPLICATION: ICD-10-CM

## 2025-08-21 DIAGNOSIS — M25.569 KNEE PAIN, UNSPECIFIED CHRONICITY, UNSPECIFIED LATERALITY: ICD-10-CM

## 2025-08-21 DIAGNOSIS — E78.2 MIXED HYPERLIPIDEMIA: ICD-10-CM

## 2025-08-21 DIAGNOSIS — R52 PAIN: Primary | ICD-10-CM

## 2025-08-21 DIAGNOSIS — R79.89 ABNORMAL TSH: Primary | ICD-10-CM

## 2025-08-21 DIAGNOSIS — E10.9 TYPE 1 DIABETES MELLITUS WITHOUT COMPLICATION: Primary | ICD-10-CM

## 2025-08-21 DIAGNOSIS — Z96.41 INSULIN PUMP IN PLACE: ICD-10-CM

## 2025-08-21 LAB
ALBUMIN SERPL BCP-MCNC: 3.9 G/DL (ref 3.5–5.2)
ALP SERPL-CCNC: 45 UNIT/L (ref 40–150)
ALT SERPL W/O P-5'-P-CCNC: 18 UNIT/L (ref 10–44)
ANION GAP (OHS): 9 MMOL/L (ref 8–16)
AST SERPL-CCNC: 21 UNIT/L (ref 11–45)
BILIRUB SERPL-MCNC: 0.7 MG/DL (ref 0.1–1)
BUN SERPL-MCNC: 17 MG/DL (ref 6–20)
CALCIUM SERPL-MCNC: 9 MG/DL (ref 8.7–10.5)
CHLORIDE SERPL-SCNC: 105 MMOL/L (ref 95–110)
CO2 SERPL-SCNC: 25 MMOL/L (ref 23–29)
CREAT SERPL-MCNC: 0.8 MG/DL (ref 0.5–1.4)
EAG (OHS): 140 MG/DL (ref 68–131)
GFR SERPLBLD CREATININE-BSD FMLA CKD-EPI: >60 ML/MIN/1.73/M2
GLUCOSE SERPL-MCNC: 151 MG/DL (ref 70–110)
HBA1C MFR BLD: 6.5 % (ref 4–5.6)
POTASSIUM SERPL-SCNC: 4.2 MMOL/L (ref 3.5–5.1)
PROT SERPL-MCNC: 6.7 GM/DL (ref 6–8.4)
SODIUM SERPL-SCNC: 139 MMOL/L (ref 136–145)
T4 FREE SERPL-MCNC: 0.96 NG/DL (ref 0.71–1.51)
TSH SERPL-ACNC: 4.94 UIU/ML (ref 0.4–4)

## 2025-08-21 PROCEDURE — 83036 HEMOGLOBIN GLYCOSYLATED A1C: CPT

## 2025-08-21 PROCEDURE — 36415 COLL VENOUS BLD VENIPUNCTURE: CPT

## 2025-08-21 PROCEDURE — 84439 ASSAY OF FREE THYROXINE: CPT

## 2025-08-21 PROCEDURE — 99999 PR PBB SHADOW E&M-EST. PATIENT-LVL III: CPT | Mod: PBBFAC,,, | Performed by: PHYSICIAN ASSISTANT

## 2025-08-21 PROCEDURE — 82040 ASSAY OF SERUM ALBUMIN: CPT

## 2025-08-27 ENCOUNTER — LAB VISIT (OUTPATIENT)
Dept: LAB | Facility: HOSPITAL | Age: 45
End: 2025-08-27
Payer: MEDICAID

## 2025-08-27 ENCOUNTER — PATIENT MESSAGE (OUTPATIENT)
Dept: ENDOCRINOLOGY | Facility: CLINIC | Age: 45
End: 2025-08-27
Payer: MEDICAID

## 2025-08-27 DIAGNOSIS — Z12.11 COLON CANCER SCREENING: ICD-10-CM

## 2025-08-27 DIAGNOSIS — E10.65 TYPE 1 DIABETES MELLITUS WITH HYPERGLYCEMIA: ICD-10-CM

## 2025-08-27 DIAGNOSIS — Z96.41 INSULIN PUMP IN PLACE: ICD-10-CM

## 2025-08-27 LAB — OB PNL STL IA: NEGATIVE

## 2025-08-27 PROCEDURE — 82274 ASSAY TEST FOR BLOOD FECAL: CPT

## 2025-08-27 RX ORDER — BLOOD-GLUCOSE TRANSMITTER
1 EACH MISCELLANEOUS
Qty: 1 EACH | Refills: 3 | Status: SHIPPED | OUTPATIENT
Start: 2025-08-27

## 2025-08-27 RX ORDER — BLOOD-GLUCOSE SENSOR
1 EACH MISCELLANEOUS
Qty: 3 EACH | Refills: 11 | Status: SHIPPED | OUTPATIENT
Start: 2025-08-27

## (undated) DEVICE — TRAY CATH FOL SIL URIMTR 16FR

## (undated) DEVICE — SCRUB HIBICLENS 4% CHG 4OZ

## (undated) DEVICE — SUT CTD VICRYL 3-0 PS-1

## (undated) DEVICE — GOWN POLY REINF BRTH SLV XL

## (undated) DEVICE — SUT VLOC 180 ABSORB ESTITCH

## (undated) DEVICE — SOL CLEARIFY VISUALIZATION LAP

## (undated) DEVICE — APPLICATOR CHLORAPREP ORN 26ML

## (undated) DEVICE — TROCAR KII FIOS ZTHREAD 11X100

## (undated) DEVICE — TROCAR LAPSCP KII SZ 11 10CM

## (undated) DEVICE — NDL INSUF ULTRA VERESS 120MM

## (undated) DEVICE — ENDOSTITCH INSTRUMENT

## (undated) DEVICE — TRAY DRY SKIN SCRUB PREP

## (undated) DEVICE — EVACUATOR KIT SMOKE PLUME AWAY

## (undated) DEVICE — TUBING LAPARSCOPIC INSUFFLATIN

## (undated) DEVICE — IRRIGATOR ENDOSCOPY DISP.

## (undated) DEVICE — Device

## (undated) DEVICE — PAD SANITARY OB STERILE

## (undated) DEVICE — VOYANT MARYLAND FUSION DEVICE

## (undated) DEVICE — ADHESIVE DERMABOND ADVANCED

## (undated) DEVICE — ELECTRODE LAP FLAT L-HOOK 36CM

## (undated) DEVICE — SOL NS 1000CC

## (undated) DEVICE — ELECTRODE REM PLYHSV RETURN 9